# Patient Record
Sex: FEMALE | Race: WHITE | NOT HISPANIC OR LATINO
[De-identification: names, ages, dates, MRNs, and addresses within clinical notes are randomized per-mention and may not be internally consistent; named-entity substitution may affect disease eponyms.]

---
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
## 2017-01-06 ENCOUNTER — MEDICATION RENEWAL (OUTPATIENT)
Age: 22
End: 2017-01-06

## 2017-01-10 ENCOUNTER — OTHER (OUTPATIENT)
Age: 22
End: 2017-01-10

## 2017-01-10 ENCOUNTER — MEDICATION RENEWAL (OUTPATIENT)
Age: 22
End: 2017-01-10

## 2017-01-16 ENCOUNTER — FORM ENCOUNTER (OUTPATIENT)
Age: 22
End: 2017-01-16

## 2017-01-17 ENCOUNTER — OUTPATIENT (OUTPATIENT)
Dept: OUTPATIENT SERVICES | Age: 22
LOS: 1 days | End: 2017-01-17
Payer: COMMERCIAL

## 2017-01-17 DIAGNOSIS — K21.9 GASTRO-ESOPHAGEAL REFLUX DISEASE WITHOUT ESOPHAGITIS: ICD-10-CM

## 2017-01-17 PROCEDURE — 49452 REPLACE G-J TUBE PERC: CPT

## 2017-01-23 ENCOUNTER — MEDICATION RENEWAL (OUTPATIENT)
Age: 22
End: 2017-01-23

## 2017-01-23 DIAGNOSIS — Z43.4 ENCOUNTER FOR ATTENTION TO OTHER ARTIFICIAL OPENINGS OF DIGESTIVE TRACT: ICD-10-CM

## 2017-01-23 DIAGNOSIS — K21.9 GASTRO-ESOPHAGEAL REFLUX DISEASE WITHOUT ESOPHAGITIS: ICD-10-CM

## 2017-01-25 ENCOUNTER — MEDICATION RENEWAL (OUTPATIENT)
Age: 22
End: 2017-01-25

## 2017-01-25 ENCOUNTER — RX RENEWAL (OUTPATIENT)
Age: 22
End: 2017-01-25

## 2017-01-30 ENCOUNTER — MEDICATION RENEWAL (OUTPATIENT)
Age: 22
End: 2017-01-30

## 2017-01-30 ENCOUNTER — OTHER (OUTPATIENT)
Age: 22
End: 2017-01-30

## 2017-01-31 ENCOUNTER — FORM ENCOUNTER (OUTPATIENT)
Age: 22
End: 2017-01-31

## 2017-02-01 ENCOUNTER — MEDICATION RENEWAL (OUTPATIENT)
Age: 22
End: 2017-02-01

## 2017-02-01 ENCOUNTER — OUTPATIENT (OUTPATIENT)
Dept: OUTPATIENT SERVICES | Facility: HOSPITAL | Age: 22
LOS: 1 days | End: 2017-02-01
Payer: COMMERCIAL

## 2017-02-01 DIAGNOSIS — K21.9 GASTRO-ESOPHAGEAL REFLUX DISEASE WITHOUT ESOPHAGITIS: ICD-10-CM

## 2017-02-01 PROCEDURE — 49452 REPLACE G-J TUBE PERC: CPT

## 2017-02-01 RX ORDER — SYRINGE WITH NEEDLE, 1 ML 25GX5/8"
22G X 1-1/2" SYRINGE, EMPTY DISPOSABLE MISCELLANEOUS
Qty: 1 | Refills: 1 | Status: ACTIVE | COMMUNITY
Start: 2017-01-25 | End: 1900-01-01

## 2017-02-03 DIAGNOSIS — K21.9 GASTRO-ESOPHAGEAL REFLUX DISEASE WITHOUT ESOPHAGITIS: ICD-10-CM

## 2017-02-03 DIAGNOSIS — K94.23 GASTROSTOMY MALFUNCTION: ICD-10-CM

## 2017-02-06 ENCOUNTER — APPOINTMENT (OUTPATIENT)
Dept: PEDIATRIC PULMONARY CYSTIC FIB | Facility: CLINIC | Age: 22
End: 2017-02-06

## 2017-02-06 VITALS — HEART RATE: 88 BPM | RESPIRATION RATE: 20 BRPM | OXYGEN SATURATION: 92 %

## 2017-02-06 VITALS — WEIGHT: 72 LBS

## 2017-02-06 VITALS — TEMPERATURE: 98.2 F

## 2017-02-14 ENCOUNTER — FORM ENCOUNTER (OUTPATIENT)
Age: 22
End: 2017-02-14

## 2017-02-15 ENCOUNTER — OUTPATIENT (OUTPATIENT)
Dept: OUTPATIENT SERVICES | Facility: HOSPITAL | Age: 22
LOS: 1 days | End: 2017-02-15
Payer: COMMERCIAL

## 2017-02-15 ENCOUNTER — APPOINTMENT (OUTPATIENT)
Dept: ULTRASOUND IMAGING | Facility: CLINIC | Age: 22
End: 2017-02-15

## 2017-02-15 ENCOUNTER — APPOINTMENT (OUTPATIENT)
Dept: PEDIATRIC NEPHROLOGY | Facility: CLINIC | Age: 22
End: 2017-02-15

## 2017-02-15 VITALS — HEART RATE: 68 BPM | DIASTOLIC BLOOD PRESSURE: 62 MMHG | SYSTOLIC BLOOD PRESSURE: 89 MMHG

## 2017-02-15 VITALS — WEIGHT: 74.96 LBS

## 2017-02-15 DIAGNOSIS — N17.9 ACUTE KIDNEY FAILURE, UNSPECIFIED: ICD-10-CM

## 2017-02-15 DIAGNOSIS — N13.30 UNSPECIFIED HYDRONEPHROSIS: ICD-10-CM

## 2017-02-15 PROCEDURE — 76775 US EXAM ABDO BACK WALL LIM: CPT

## 2017-02-16 ENCOUNTER — MESSAGE (OUTPATIENT)
Age: 22
End: 2017-02-16

## 2017-02-16 LAB
25(OH)D3 SERPL-MCNC: 74 NG/ML
ALBUMIN SERPL ELPH-MCNC: 3.3 G/DL
ALP BLD-CCNC: 116 U/L
ALT SERPL-CCNC: 21 U/L
ANION GAP SERPL CALC-SCNC: 12 MMOL/L
AST SERPL-CCNC: 15 U/L
BASOPHILS # BLD AUTO: 0 K/UL
BASOPHILS NFR BLD AUTO: 0 %
BILIRUB SERPL-MCNC: 0.2 MG/DL
BUN SERPL-MCNC: 5 MG/DL
CALCIUM SERPL-MCNC: 8.7 MG/DL
CALCIUM SERPL-MCNC: 8.7 MG/DL
CHLORIDE SERPL-SCNC: 102 MMOL/L
CO2 SERPL-SCNC: 25 MMOL/L
CREAT SERPL-MCNC: 0.43 MG/DL
EOSINOPHIL # BLD AUTO: 0.01 K/UL
EOSINOPHIL NFR BLD AUTO: 0.1 %
GLUCOSE SERPL-MCNC: 104 MG/DL
HCT VFR BLD CALC: 40.9 %
HGB BLD-MCNC: 12.8 G/DL
IMM GRANULOCYTES NFR BLD AUTO: 0.1 %
LYMPHOCYTES # BLD AUTO: 1.87 K/UL
LYMPHOCYTES NFR BLD AUTO: 21.5 %
MAGNESIUM SERPL-MCNC: 2.2 MG/DL
MAN DIFF?: NORMAL
MCHC RBC-ENTMCNC: 31.3 GM/DL
MCHC RBC-ENTMCNC: 31.6 PG
MCV RBC AUTO: 101 FL
MONOCYTES # BLD AUTO: 0.15 K/UL
MONOCYTES NFR BLD AUTO: 1.7 %
NEUTROPHILS # BLD AUTO: 6.64 K/UL
NEUTROPHILS NFR BLD AUTO: 76.6 %
PARATHYROID HORMONE INTACT: 89 PG/ML
PHOSPHATE SERPL-MCNC: 3.8 MG/DL
PLATELET # BLD AUTO: 191 K/UL
POTASSIUM SERPL-SCNC: 3.9 MMOL/L
PROT SERPL-MCNC: 6.2 G/DL
RBC # BLD: 4.05 M/UL
RBC # FLD: 13.1 %
SODIUM SERPL-SCNC: 139 MMOL/L
WBC # FLD AUTO: 8.68 K/UL

## 2017-02-17 LAB
ALDOSTERONE SERUM: 18.3 NG/DL
APPEARANCE: ABNORMAL
BACTERIA: ABNORMAL
BILIRUBIN URINE: NEGATIVE
BLOOD URINE: NEGATIVE
CALCIUM ?TM UR-MCNC: 2 MG/DL
CALCIUM/CREAT UR: 0.1 RATIO
CHLORIDE ?TM UR-SCNC: <20 MMOL/L
COLOR: YELLOW
CREAT SPEC-SCNC: 13 MG/DL
CREAT SPEC-SCNC: 15 MG/DL
CREAT/PROT UR: 1 RATIO
GLUCOSE QUALITATIVE U: NORMAL MG/DL
HYALINE CASTS: 0 /LPF
KETONES URINE: NEGATIVE
LEUKOCYTE ESTERASE URINE: NEGATIVE
MICROSCOPIC-UA: NORMAL
NITRITE URINE: NEGATIVE
OSMOLALITY SERPL: 291 MOS/KG
PH URINE: >8.5
POTASSIUM UR-SCNC: 21 MMOL/L
PROT UR-MCNC: 13 MG/DL
PROTEIN URINE: NEGATIVE MG/DL
RED BLOOD CELLS URINE: 3 /HPF
RENIN PLASMA: 40.1 PG/ML
SODIUM ?TM SUB UR QN: 25 MMOL/L
SPECIFIC GRAVITY URINE: 1
SQUAMOUS EPITHELIAL CELLS: 2 /HPF
UROBILINOGEN URINE: NORMAL MG/DL
WHITE BLOOD CELLS URINE: 24 /HPF

## 2017-02-21 ENCOUNTER — RX RENEWAL (OUTPATIENT)
Age: 22
End: 2017-02-21

## 2017-02-21 ENCOUNTER — MEDICATION RENEWAL (OUTPATIENT)
Age: 22
End: 2017-02-21

## 2017-02-21 LAB
MAGNESIUM UR-MCNC: 6 MG/DL
OSMOLALITY UR: 134 MOS/KG

## 2017-02-27 ENCOUNTER — APPOINTMENT (OUTPATIENT)
Dept: PEDIATRIC GASTROENTEROLOGY | Facility: CLINIC | Age: 22
End: 2017-02-27

## 2017-02-27 VITALS — WEIGHT: 74.96 LBS

## 2017-03-02 ENCOUNTER — APPOINTMENT (OUTPATIENT)
Dept: PEDIATRIC NEUROLOGY | Facility: CLINIC | Age: 22
End: 2017-03-02

## 2017-03-02 VITALS — WEIGHT: 74.96 LBS

## 2017-04-21 ENCOUNTER — RX RENEWAL (OUTPATIENT)
Age: 22
End: 2017-04-21

## 2017-04-21 ENCOUNTER — OTHER (OUTPATIENT)
Age: 22
End: 2017-04-21

## 2017-04-25 ENCOUNTER — FORM ENCOUNTER (OUTPATIENT)
Age: 22
End: 2017-04-25

## 2017-04-26 ENCOUNTER — OUTPATIENT (OUTPATIENT)
Dept: OUTPATIENT SERVICES | Age: 22
LOS: 1 days | End: 2017-04-26
Payer: COMMERCIAL

## 2017-04-26 DIAGNOSIS — K21.9 GASTRO-ESOPHAGEAL REFLUX DISEASE WITHOUT ESOPHAGITIS: ICD-10-CM

## 2017-04-26 PROCEDURE — 49452 REPLACE G-J TUBE PERC: CPT

## 2017-05-02 DIAGNOSIS — Z43.4 ENCOUNTER FOR ATTENTION TO OTHER ARTIFICIAL OPENINGS OF DIGESTIVE TRACT: ICD-10-CM

## 2017-05-02 DIAGNOSIS — K21.9 GASTRO-ESOPHAGEAL REFLUX DISEASE WITHOUT ESOPHAGITIS: ICD-10-CM

## 2017-05-15 ENCOUNTER — MEDICATION RENEWAL (OUTPATIENT)
Age: 22
End: 2017-05-15

## 2017-05-22 ENCOUNTER — MEDICATION RENEWAL (OUTPATIENT)
Age: 22
End: 2017-05-22

## 2017-05-29 ENCOUNTER — RX RENEWAL (OUTPATIENT)
Age: 22
End: 2017-05-29

## 2017-06-07 ENCOUNTER — APPOINTMENT (OUTPATIENT)
Dept: PEDIATRIC NEPHROLOGY | Facility: CLINIC | Age: 22
End: 2017-06-07

## 2017-06-07 VITALS — WEIGHT: 77.36 LBS

## 2017-06-08 LAB
25(OH)D3 SERPL-MCNC: 72.5 NG/ML
ALBUMIN SERPL ELPH-MCNC: 3.4 G/DL
ALP BLD-CCNC: 134 U/L
ALT SERPL-CCNC: 18 U/L
ANION GAP SERPL CALC-SCNC: 19 MMOL/L
AST SERPL-CCNC: 15 U/L
BASOPHILS # BLD AUTO: 0 K/UL
BASOPHILS NFR BLD AUTO: 0 %
BILIRUB SERPL-MCNC: 0.2 MG/DL
BUN SERPL-MCNC: 7 MG/DL
CALCIUM SERPL-MCNC: 9.8 MG/DL
CALCIUM SERPL-MCNC: 9.8 MG/DL
CHLORIDE SERPL-SCNC: 108 MMOL/L
CO2 SERPL-SCNC: 20 MMOL/L
CREAT SERPL-MCNC: 0.5 MG/DL
EOSINOPHIL # BLD AUTO: 0.01 K/UL
EOSINOPHIL NFR BLD AUTO: 0.2 %
GLUCOSE SERPL-MCNC: 106 MG/DL
HCT VFR BLD CALC: 40.5 %
HGB BLD-MCNC: 12.9 G/DL
IMM GRANULOCYTES NFR BLD AUTO: 0.2 %
LYMPHOCYTES # BLD AUTO: 1.57 K/UL
LYMPHOCYTES NFR BLD AUTO: 26.1 %
MAGNESIUM SERPL-MCNC: 2 MG/DL
MAN DIFF?: NORMAL
MCHC RBC-ENTMCNC: 31.5 PG
MCHC RBC-ENTMCNC: 31.9 GM/DL
MCV RBC AUTO: 98.8 FL
MONOCYTES # BLD AUTO: 0.16 K/UL
MONOCYTES NFR BLD AUTO: 2.7 %
NEUTROPHILS # BLD AUTO: 4.26 K/UL
NEUTROPHILS NFR BLD AUTO: 70.8 %
PARATHYROID HORMONE INTACT: 45 PG/ML
PHOSPHATE SERPL-MCNC: 3.7 MG/DL
PLATELET # BLD AUTO: 120 K/UL
POTASSIUM SERPL-SCNC: 4.5 MMOL/L
PROT SERPL-MCNC: 6.7 G/DL
RBC # BLD: 4.1 M/UL
RBC # FLD: 12.9 %
SODIUM SERPL-SCNC: 147 MMOL/L
WBC # FLD AUTO: 6.01 K/UL

## 2017-06-10 LAB — LEVETIRACETAM SERPL-MCNC: 25.4 MCG/ML

## 2017-06-14 ENCOUNTER — MEDICATION RENEWAL (OUTPATIENT)
Age: 22
End: 2017-06-14

## 2017-07-05 ENCOUNTER — MEDICATION RENEWAL (OUTPATIENT)
Age: 22
End: 2017-07-05

## 2017-07-18 ENCOUNTER — FORM ENCOUNTER (OUTPATIENT)
Age: 22
End: 2017-07-18

## 2017-07-19 ENCOUNTER — OUTPATIENT (OUTPATIENT)
Dept: OUTPATIENT SERVICES | Age: 22
LOS: 1 days | End: 2017-07-19
Payer: COMMERCIAL

## 2017-07-19 DIAGNOSIS — K21.9 GASTRO-ESOPHAGEAL REFLUX DISEASE WITHOUT ESOPHAGITIS: ICD-10-CM

## 2017-07-19 PROCEDURE — 49452 REPLACE G-J TUBE PERC: CPT

## 2017-07-20 DIAGNOSIS — K21.9 GASTRO-ESOPHAGEAL REFLUX DISEASE WITHOUT ESOPHAGITIS: ICD-10-CM

## 2017-07-24 DIAGNOSIS — Z43.4 ENCOUNTER FOR ATTENTION TO OTHER ARTIFICIAL OPENINGS OF DIGESTIVE TRACT: ICD-10-CM

## 2017-08-09 ENCOUNTER — MEDICATION RENEWAL (OUTPATIENT)
Age: 22
End: 2017-08-09

## 2017-08-28 ENCOUNTER — APPOINTMENT (OUTPATIENT)
Dept: PEDIATRIC GASTROENTEROLOGY | Facility: CLINIC | Age: 22
End: 2017-08-28
Payer: COMMERCIAL

## 2017-08-28 VITALS — WEIGHT: 74.96 LBS | BODY MASS INDEX: 17.35 KG/M2 | HEIGHT: 55 IN

## 2017-08-28 PROCEDURE — 99214 OFFICE O/P EST MOD 30 MIN: CPT

## 2017-08-28 RX ORDER — AMOXICILLIN AND CLAVULANATE POTASSIUM 600; 42.9 MG/5ML; MG/5ML
600-42.9 FOR SUSPENSION ORAL
Qty: 150 | Refills: 0 | Status: DISCONTINUED | COMMUNITY
Start: 2017-06-24

## 2017-09-07 ENCOUNTER — APPOINTMENT (OUTPATIENT)
Dept: PEDIATRIC NEUROLOGY | Facility: CLINIC | Age: 22
End: 2017-09-07
Payer: COMMERCIAL

## 2017-09-07 VITALS — WEIGHT: 76.99 LBS

## 2017-09-07 PROCEDURE — 99215 OFFICE O/P EST HI 40 MIN: CPT

## 2017-09-08 ENCOUNTER — MEDICATION RENEWAL (OUTPATIENT)
Age: 22
End: 2017-09-08

## 2017-09-09 ENCOUNTER — MEDICATION RENEWAL (OUTPATIENT)
Age: 22
End: 2017-09-09

## 2017-09-22 ENCOUNTER — MOBILE ON CALL (OUTPATIENT)
Age: 22
End: 2017-09-22

## 2017-09-22 ENCOUNTER — MESSAGE (OUTPATIENT)
Age: 22
End: 2017-09-22

## 2017-09-24 LAB
25(OH)D3 SERPL-MCNC: 71.1 NG/ML
ALBUMIN SERPL ELPH-MCNC: 3.3 G/DL
ALP BLD-CCNC: 120 U/L
ALT SERPL-CCNC: 22 U/L
ANION GAP SERPL CALC-SCNC: 13 MMOL/L
AST SERPL-CCNC: 26 U/L
BASOPHILS # BLD AUTO: 0 K/UL
BASOPHILS NFR BLD AUTO: 0 %
BILIRUB SERPL-MCNC: 0.2 MG/DL
BUN SERPL-MCNC: 5 MG/DL
CALCIUM SERPL-MCNC: 9.1 MG/DL
CALCIUM SERPL-MCNC: 9.1 MG/DL
CHLORIDE SERPL-SCNC: 103 MMOL/L
CO2 SERPL-SCNC: 25 MMOL/L
CREAT SERPL-MCNC: 0.4 MG/DL
EOSINOPHIL # BLD AUTO: 0.04 K/UL
EOSINOPHIL NFR BLD AUTO: 0.4 %
GLUCOSE SERPL-MCNC: 94 MG/DL
HCT VFR BLD CALC: 39.8 %
HGB BLD-MCNC: 12.9 G/DL
IMM GRANULOCYTES NFR BLD AUTO: 0.2 %
LYMPHOCYTES # BLD AUTO: 1.91 K/UL
LYMPHOCYTES NFR BLD AUTO: 17.3 %
MAGNESIUM SERPL-MCNC: 2.2 MG/DL
MAN DIFF?: NORMAL
MCHC RBC-ENTMCNC: 32.4 GM/DL
MCHC RBC-ENTMCNC: 32.4 PG
MCV RBC AUTO: 100 FL
MONOCYTES # BLD AUTO: 0.28 K/UL
MONOCYTES NFR BLD AUTO: 2.5 %
NEUTROPHILS # BLD AUTO: 8.77 K/UL
NEUTROPHILS NFR BLD AUTO: 79.6 %
PARATHYROID HORMONE INTACT: 77 PG/ML
PHOSPHATE SERPL-MCNC: 3.7 MG/DL
PLATELET # BLD AUTO: 124 K/UL
POTASSIUM SERPL-SCNC: 4 MMOL/L
PROT SERPL-MCNC: 7.2 G/DL
RBC # BLD: 3.98 M/UL
RBC # FLD: 13.1 %
SODIUM SERPL-SCNC: 141 MMOL/L
WBC # FLD AUTO: 11.02 K/UL

## 2017-10-04 ENCOUNTER — APPOINTMENT (OUTPATIENT)
Dept: PEDIATRIC NEPHROLOGY | Facility: CLINIC | Age: 22
End: 2017-10-04
Payer: COMMERCIAL

## 2017-10-04 VITALS — WEIGHT: 76.5 LBS

## 2017-10-04 PROCEDURE — 99214 OFFICE O/P EST MOD 30 MIN: CPT

## 2017-10-06 ENCOUNTER — APPOINTMENT (OUTPATIENT)
Dept: PEDIATRIC PULMONARY CYSTIC FIB | Facility: CLINIC | Age: 22
End: 2017-10-06
Payer: COMMERCIAL

## 2017-10-06 VITALS
WEIGHT: 76 LBS | BODY MASS INDEX: 17.59 KG/M2 | RESPIRATION RATE: 24 BRPM | HEIGHT: 55 IN | DIASTOLIC BLOOD PRESSURE: 51 MMHG | OXYGEN SATURATION: 99 % | SYSTOLIC BLOOD PRESSURE: 74 MMHG | HEART RATE: 66 BPM

## 2017-10-06 PROCEDURE — 94005 HOME VENT MGMT SUPERVISION: CPT

## 2017-10-06 PROCEDURE — 99215 OFFICE O/P EST HI 40 MIN: CPT | Mod: 25

## 2017-10-06 PROCEDURE — 94770: CPT

## 2017-10-10 ENCOUNTER — FORM ENCOUNTER (OUTPATIENT)
Age: 22
End: 2017-10-10

## 2017-10-11 ENCOUNTER — OUTPATIENT (OUTPATIENT)
Dept: OUTPATIENT SERVICES | Age: 22
LOS: 1 days | End: 2017-10-11
Payer: COMMERCIAL

## 2017-10-11 DIAGNOSIS — K21.9 GASTRO-ESOPHAGEAL REFLUX DISEASE WITHOUT ESOPHAGITIS: ICD-10-CM

## 2017-10-11 DIAGNOSIS — Z43.4 ENCOUNTER FOR ATTENTION TO OTHER ARTIFICIAL OPENINGS OF DIGESTIVE TRACT: ICD-10-CM

## 2017-10-11 PROCEDURE — 49452 REPLACE G-J TUBE PERC: CPT

## 2017-10-13 ENCOUNTER — MOBILE ON CALL (OUTPATIENT)
Age: 22
End: 2017-10-13

## 2017-10-13 ENCOUNTER — INPATIENT (INPATIENT)
Facility: HOSPITAL | Age: 22
LOS: 0 days | Discharge: ANOTHER HOSPITAL | End: 2017-10-14
Attending: INTERNAL MEDICINE | Admitting: INTERNAL MEDICINE
Payer: COMMERCIAL

## 2017-10-13 VITALS — WEIGHT: 76.06 LBS

## 2017-10-13 DIAGNOSIS — Z93.0 TRACHEOSTOMY STATUS: Chronic | ICD-10-CM

## 2017-10-13 LAB
ALBUMIN SERPL ELPH-MCNC: 2.4 G/DL — LOW (ref 3.3–5)
ALP SERPL-CCNC: 124 U/L — HIGH (ref 40–120)
ALT FLD-CCNC: 24 U/L — SIGNIFICANT CHANGE UP (ref 12–78)
ANION GAP SERPL CALC-SCNC: 7 MMOL/L — SIGNIFICANT CHANGE UP (ref 5–17)
APPEARANCE UR: (no result)
AST SERPL-CCNC: 28 U/L — SIGNIFICANT CHANGE UP (ref 15–37)
BACTERIA # UR AUTO: (no result)
BASE EXCESS BLDV CALC-SCNC: 4.6 MMOL/L — HIGH (ref -2–2)
BASOPHILS # BLD AUTO: 0 K/UL — SIGNIFICANT CHANGE UP (ref 0–0.2)
BASOPHILS NFR BLD AUTO: 0.3 % — SIGNIFICANT CHANGE UP (ref 0–2)
BILIRUB SERPL-MCNC: 0.4 MG/DL — SIGNIFICANT CHANGE UP (ref 0.2–1.2)
BILIRUB UR-MCNC: NEGATIVE — SIGNIFICANT CHANGE UP
BUN SERPL-MCNC: 6 MG/DL — LOW (ref 7–23)
CALCIUM SERPL-MCNC: 8.8 MG/DL — SIGNIFICANT CHANGE UP (ref 8.5–10.1)
CHLORIDE SERPL-SCNC: 99 MMOL/L — SIGNIFICANT CHANGE UP (ref 96–108)
CO2 SERPL-SCNC: 29 MMOL/L — SIGNIFICANT CHANGE UP (ref 22–31)
COLOR SPEC: YELLOW — SIGNIFICANT CHANGE UP
CREAT SERPL-MCNC: 0.38 MG/DL — LOW (ref 0.5–1.3)
DIFF PNL FLD: (no result)
EOSINOPHIL # BLD AUTO: 0 K/UL — SIGNIFICANT CHANGE UP (ref 0–0.5)
EOSINOPHIL NFR BLD AUTO: 0.2 % — SIGNIFICANT CHANGE UP (ref 0–6)
EPI CELLS # UR: (no result)
GLUCOSE SERPL-MCNC: 99 MG/DL — SIGNIFICANT CHANGE UP (ref 70–99)
GLUCOSE UR QL: NEGATIVE MG/DL — SIGNIFICANT CHANGE UP
HCO3 BLDV-SCNC: 30 MMOL/L — HIGH (ref 21–29)
HCT VFR BLD CALC: 38.9 % — SIGNIFICANT CHANGE UP (ref 34.5–45)
HGB BLD-MCNC: 12.9 G/DL — SIGNIFICANT CHANGE UP (ref 11.5–15.5)
INR BLD: 1.1 RATIO — SIGNIFICANT CHANGE UP (ref 0.88–1.16)
KETONES UR-MCNC: NEGATIVE — SIGNIFICANT CHANGE UP
LACTATE SERPL-SCNC: 0.8 MMOL/L — SIGNIFICANT CHANGE UP (ref 0.7–2)
LEUKOCYTE ESTERASE UR-ACNC: (no result)
LYMPHOCYTES # BLD AUTO: 0.8 K/UL — LOW (ref 1–3.3)
LYMPHOCYTES # BLD AUTO: 5.6 % — LOW (ref 13–44)
MCHC RBC-ENTMCNC: 32.1 PG — SIGNIFICANT CHANGE UP (ref 27–34)
MCHC RBC-ENTMCNC: 33.2 GM/DL — SIGNIFICANT CHANGE UP (ref 32–36)
MCV RBC AUTO: 96.5 FL — SIGNIFICANT CHANGE UP (ref 80–100)
MONOCYTES # BLD AUTO: 0.4 K/UL — SIGNIFICANT CHANGE UP (ref 0–0.9)
MONOCYTES NFR BLD AUTO: 2.7 % — SIGNIFICANT CHANGE UP (ref 2–14)
NEUTROPHILS # BLD AUTO: 13 K/UL — HIGH (ref 1.8–7.4)
NEUTROPHILS NFR BLD AUTO: 91.3 % — HIGH (ref 43–77)
NITRITE UR-MCNC: NEGATIVE — SIGNIFICANT CHANGE UP
PCO2 BLDV: 50 MMHG — SIGNIFICANT CHANGE UP (ref 35–50)
PH BLDV: 7.4 — SIGNIFICANT CHANGE UP (ref 7.35–7.45)
PH UR: 8 — SIGNIFICANT CHANGE UP (ref 5–8)
PLATELET # BLD AUTO: 137 K/UL — LOW (ref 150–400)
PO2 BLDV: 215 MMHG — HIGH (ref 25–45)
POTASSIUM SERPL-MCNC: 4.1 MMOL/L — SIGNIFICANT CHANGE UP (ref 3.5–5.3)
POTASSIUM SERPL-SCNC: 4.1 MMOL/L — SIGNIFICANT CHANGE UP (ref 3.5–5.3)
PROT SERPL-MCNC: 6.5 GM/DL — SIGNIFICANT CHANGE UP (ref 6–8.3)
PROT UR-MCNC: NEGATIVE MG/DL — SIGNIFICANT CHANGE UP
PROTHROM AB SERPL-ACNC: 11.9 SEC — SIGNIFICANT CHANGE UP (ref 9.8–12.7)
RAPID RVP RESULT: SIGNIFICANT CHANGE UP
RBC # BLD: 4.03 M/UL — SIGNIFICANT CHANGE UP (ref 3.8–5.2)
RBC # FLD: 11.6 % — SIGNIFICANT CHANGE UP (ref 10.3–14.5)
RBC CASTS # UR COMP ASSIST: SIGNIFICANT CHANGE UP /HPF (ref 0–4)
SAO2 % BLDV: 100 % — HIGH (ref 67–88)
SODIUM SERPL-SCNC: 135 MMOL/L — SIGNIFICANT CHANGE UP (ref 135–145)
SP GR SPEC: 1.01 — SIGNIFICANT CHANGE UP (ref 1.01–1.02)
UROBILINOGEN FLD QL: NEGATIVE MG/DL — SIGNIFICANT CHANGE UP
WBC # BLD: 14.2 K/UL — HIGH (ref 3.8–10.5)
WBC # FLD AUTO: 14.2 K/UL — HIGH (ref 3.8–10.5)
WBC UR QL: SIGNIFICANT CHANGE UP

## 2017-10-13 PROCEDURE — 99285 EMERGENCY DEPT VISIT HI MDM: CPT

## 2017-10-13 PROCEDURE — 71010: CPT | Mod: 26

## 2017-10-13 PROCEDURE — 93010 ELECTROCARDIOGRAM REPORT: CPT

## 2017-10-13 PROCEDURE — 70450 CT HEAD/BRAIN W/O DYE: CPT | Mod: 26

## 2017-10-13 RX ORDER — SODIUM CHLORIDE 9 MG/ML
500 INJECTION INTRAMUSCULAR; INTRAVENOUS; SUBCUTANEOUS
Qty: 0 | Refills: 0 | Status: COMPLETED | OUTPATIENT
Start: 2017-10-13 | End: 2017-10-13

## 2017-10-13 RX ORDER — SODIUM CHLORIDE 9 MG/ML
4 INJECTION INTRAMUSCULAR; INTRAVENOUS; SUBCUTANEOUS
Qty: 0 | Refills: 0 | Status: DISCONTINUED | OUTPATIENT
Start: 2017-10-13 | End: 2017-10-14

## 2017-10-13 RX ORDER — POTASSIUM CHLORIDE 20 MEQ
15 PACKET (EA) ORAL
Qty: 0 | Refills: 0 | Status: DISCONTINUED | OUTPATIENT
Start: 2017-10-13 | End: 2017-10-13

## 2017-10-13 RX ORDER — HEPARIN SODIUM 5000 [USP'U]/ML
5000 INJECTION INTRAVENOUS; SUBCUTANEOUS EVERY 12 HOURS
Qty: 0 | Refills: 0 | Status: DISCONTINUED | OUTPATIENT
Start: 2017-10-13 | End: 2017-10-14

## 2017-10-13 RX ORDER — PIPERACILLIN AND TAZOBACTAM 4; .5 G/20ML; G/20ML
3.38 INJECTION, POWDER, LYOPHILIZED, FOR SOLUTION INTRAVENOUS EVERY 8 HOURS
Qty: 0 | Refills: 0 | Status: DISCONTINUED | OUTPATIENT
Start: 2017-10-13 | End: 2017-10-14

## 2017-10-13 RX ORDER — MAGNESIUM OXIDE 400 MG ORAL TABLET 241.3 MG
400 TABLET ORAL EVERY 12 HOURS
Qty: 0 | Refills: 0 | Status: DISCONTINUED | OUTPATIENT
Start: 2017-10-13 | End: 2017-10-14

## 2017-10-13 RX ORDER — PANTOPRAZOLE SODIUM 20 MG/1
20 TABLET, DELAYED RELEASE ORAL DAILY
Qty: 0 | Refills: 0 | Status: DISCONTINUED | OUTPATIENT
Start: 2017-10-13 | End: 2017-10-14

## 2017-10-13 RX ORDER — BUDESONIDE, MICRONIZED 100 %
1 POWDER (GRAM) MISCELLANEOUS
Qty: 0 | Refills: 0 | Status: DISCONTINUED | OUTPATIENT
Start: 2017-10-13 | End: 2017-10-13

## 2017-10-13 RX ORDER — BUDESONIDE, MICRONIZED 100 %
1 POWDER (GRAM) MISCELLANEOUS
Qty: 0 | Refills: 0 | Status: DISCONTINUED | OUTPATIENT
Start: 2017-10-13 | End: 2017-10-14

## 2017-10-13 RX ORDER — BUDESONIDE, MICRONIZED 100 %
2 POWDER (GRAM) MISCELLANEOUS
Qty: 0 | Refills: 0 | COMMUNITY

## 2017-10-13 RX ORDER — ACETAMINOPHEN 500 MG
650 TABLET ORAL EVERY 6 HOURS
Qty: 0 | Refills: 0 | Status: DISCONTINUED | OUTPATIENT
Start: 2017-10-13 | End: 2017-10-14

## 2017-10-13 RX ORDER — CIPROFLOXACIN AND DEXAMETHASONE 3; 1 MG/ML; MG/ML
2 SUSPENSION/ DROPS AURICULAR (OTIC)
Qty: 0 | Refills: 0 | Status: DISCONTINUED | OUTPATIENT
Start: 2017-10-13 | End: 2017-10-13

## 2017-10-13 RX ORDER — IPRATROPIUM BROMIDE 0.2 MG/ML
500 SOLUTION, NON-ORAL INHALATION ONCE
Qty: 0 | Refills: 0 | Status: COMPLETED | OUTPATIENT
Start: 2017-10-13 | End: 2017-10-13

## 2017-10-13 RX ORDER — SODIUM CHLORIDE 9 MG/ML
1000 INJECTION INTRAMUSCULAR; INTRAVENOUS; SUBCUTANEOUS
Qty: 0 | Refills: 0 | Status: DISCONTINUED | OUTPATIENT
Start: 2017-10-13 | End: 2017-10-14

## 2017-10-13 RX ORDER — EPINEPHRINE 0.3 MG/.3ML
0.3 INJECTION INTRAMUSCULAR; SUBCUTANEOUS ONCE
Qty: 0 | Refills: 0 | Status: DISCONTINUED | OUTPATIENT
Start: 2017-10-13 | End: 2017-10-13

## 2017-10-13 RX ORDER — TOBRAMYCIN SULFATE 40 MG/ML
300 VIAL (ML) INJECTION
Qty: 0 | Refills: 0 | Status: DISCONTINUED | OUTPATIENT
Start: 2017-10-13 | End: 2017-10-14

## 2017-10-13 RX ORDER — PIPERACILLIN AND TAZOBACTAM 4; .5 G/20ML; G/20ML
3.38 INJECTION, POWDER, LYOPHILIZED, FOR SOLUTION INTRAVENOUS ONCE
Qty: 0 | Refills: 0 | Status: COMPLETED | OUTPATIENT
Start: 2017-10-13 | End: 2017-10-13

## 2017-10-13 RX ORDER — PIPERACILLIN AND TAZOBACTAM 4; .5 G/20ML; G/20ML
4.5 INJECTION, POWDER, LYOPHILIZED, FOR SOLUTION INTRAVENOUS EVERY 8 HOURS
Qty: 0 | Refills: 0 | Status: DISCONTINUED | OUTPATIENT
Start: 2017-10-13 | End: 2017-10-13

## 2017-10-13 RX ORDER — SODIUM CHLORIDE 9 MG/ML
3 INJECTION INTRAMUSCULAR; INTRAVENOUS; SUBCUTANEOUS ONCE
Qty: 0 | Refills: 0 | Status: COMPLETED | OUTPATIENT
Start: 2017-10-13 | End: 2017-10-13

## 2017-10-13 RX ORDER — LEVETIRACETAM 250 MG/1
500 TABLET, FILM COATED ORAL
Qty: 0 | Refills: 0 | Status: DISCONTINUED | OUTPATIENT
Start: 2017-10-13 | End: 2017-10-14

## 2017-10-13 RX ORDER — LEVALBUTEROL 1.25 MG/.5ML
1.25 SOLUTION, CONCENTRATE RESPIRATORY (INHALATION)
Qty: 0 | Refills: 0 | Status: DISCONTINUED | OUTPATIENT
Start: 2017-10-13 | End: 2017-10-14

## 2017-10-13 RX ADMIN — Medication 500 MICROGRAM(S): at 20:23

## 2017-10-13 RX ADMIN — SODIUM CHLORIDE 2000 MILLILITER(S): 9 INJECTION INTRAMUSCULAR; INTRAVENOUS; SUBCUTANEOUS at 10:41

## 2017-10-13 RX ADMIN — SODIUM CHLORIDE 2000 MILLILITER(S): 9 INJECTION INTRAMUSCULAR; INTRAVENOUS; SUBCUTANEOUS at 10:40

## 2017-10-13 RX ADMIN — MAGNESIUM OXIDE 400 MG ORAL TABLET 400 MILLIGRAM(S): 241.3 TABLET ORAL at 21:51

## 2017-10-13 RX ADMIN — SODIUM CHLORIDE 2000 MILLILITER(S): 9 INJECTION INTRAMUSCULAR; INTRAVENOUS; SUBCUTANEOUS at 10:30

## 2017-10-13 RX ADMIN — PIPERACILLIN AND TAZOBACTAM 200 GRAM(S): 4; .5 INJECTION, POWDER, LYOPHILIZED, FOR SOLUTION INTRAVENOUS at 12:01

## 2017-10-13 RX ADMIN — PIPERACILLIN AND TAZOBACTAM 25 GRAM(S): 4; .5 INJECTION, POWDER, LYOPHILIZED, FOR SOLUTION INTRAVENOUS at 21:56

## 2017-10-13 RX ADMIN — SODIUM CHLORIDE 2000 MILLILITER(S): 9 INJECTION INTRAMUSCULAR; INTRAVENOUS; SUBCUTANEOUS at 11:12

## 2017-10-13 RX ADMIN — Medication 0.5 MILLIGRAM(S): at 20:24

## 2017-10-13 RX ADMIN — SODIUM CHLORIDE 3 MILLILITER(S): 9 INJECTION INTRAMUSCULAR; INTRAVENOUS; SUBCUTANEOUS at 10:30

## 2017-10-13 NOTE — ED ADULT NURSE NOTE - OBJECTIVE STATEMENT
Pt is non-verbal, non-ambulatory, mom states hasn't been acting herself, staring off to left, left facial paralysis which is knew. Pt has peg tube infusing pedilite.

## 2017-10-13 NOTE — ED PROVIDER NOTE - MEDICAL DECISION MAKING DETAILS
22F pmhx asthma, GERD, seizures on Keppra, presents to ED c/o cough with blood and feeling off as per mother. Plan CXR, labs, fluids.

## 2017-10-13 NOTE — ED PROVIDER NOTE - PSH
Peg (Percutaneous Endoscopic Gastrostomy) Adjustment/Replacement/Removal    S/P Tonsillectomy and Adenoidectomy    Strabismus

## 2017-10-13 NOTE — PATIENT PROFILE ADULT. - HEALTHCARE INFORMATION NEEDED, PROFILE
mother is worried about dehydration, electrolyte imbalance and fluid retention based on prior hospitalizations.

## 2017-10-13 NOTE — PROGRESS NOTE ADULT - SUBJECTIVE AND OBJECTIVE BOX
HPI:     Asked to see patient by Dr. Cdae     seen [facility] resident dougie     This patient is a 22 year old female with cerebral palxy, history of seizures, hx. sepsis 2015 from 2015 after which she     wound up on vent at night(SIMV),      has trach and PEG,       At baseline she is alert and responsive sits in chair, does not walk and is non verbal      Over past day she has been hypothermic, less responsive, weak, bloody secretions      In ED cxr shows right sided infiltrate, wbc 14.      head ct in ED negative          PMH sigificant for:         seizures         developmental disability         cerebral palsy         s/p trach/PEG          GERD with reflux    Socail lives at home , on vent at night                          Weight (kg): 34.5 (10-13 @ 09:08)    ICU Vital Signs Last 24 Hrs  T(C): 35.3 (13 Oct 2017 14:22), Max: 35.5 (13 Oct 2017 09:14)  T(F): 95.6 (13 Oct 2017 14:22), Max: 95.9 (13 Oct 2017 09:14)  HR: 88 (13 Oct 2017 14:22) (75 - 88)  BP: 86/55 (13 Oct 2017 14:22) (83/49 - 88/48)  BP(mean): --  ABP: --  ABP(mean): --  RR: 20 (13 Oct 2017 14:22) (20 - 20)  SpO2: 97% (13 Oct 2017 14:22) (97% - 100%)    neuro spastic, opens eyes, looks aroung  respiratory bilateral rhnchi  cv RRR  abd s/p PEG soft  extremity contracted                              12.9   14.2  )-----------( 137      ( 13 Oct 2017 10:34 )             38.9       10-13    135  |  99  |  6<L>  ----------------------------<  99  4.1   |  29  |  0.38<L>    Ca    8.8      13 Oct 2017 10:34    TPro  6.5  /  Alb  2.4<L>  /  TBili  0.4  /  DBili  x   /  AST  28  /  ALT  24  /  AlkPhos  124<H>  10-13          Urinalysis Basic - ( 13 Oct 2017 12:30 )    Color: Yellow / Appearance: Slightly Turbid / S.010 / pH: x  Gluc: x / Ketone: Negative  / Bili: Negative / Urobili: Negative mg/dL   Blood: x / Protein: Negative mg/dL / Nitrite: Negative   Leuk Esterase: Small / RBC: 0-2 /HPF / WBC 3-5   Sq Epi: x / Non Sq Epi: Many / Bacteria: Moderate        DVT Prophylaxis:  heparin subq                                                               Advanced Directives: Full code

## 2017-10-13 NOTE — ED ADULT TRIAGE NOTE - CHIEF COMPLAINT QUOTE
mom states patient not responding as usual, no fever, cough, nausea, vomiting or diarrhea, not tracking as she usually does.  Patient is non verbal. cough this morning. blood tinged, patient is on vent overnight, and does have hemoptysis at times mom states patient not responding as usual, no fever, cough, nausea, vomiting or diarrhea, not tracking as she usually does.  Patient is non verbal. cough this morning. blood tinged, patient is on vent overnight, and does have hemoptysis at times.  Mom concerned that something is starting and wanted to catch it early.  Patient has peg tube and trach.  patient is currently awake and alert, as per mom patient is not at her usual baseline

## 2017-10-13 NOTE — ED ADULT NURSE REASSESSMENT NOTE - NS ED NURSE REASSESS COMMENT FT1
Orlando RN on ICU made aware that pt temp drop to 94.8  rectal on bear hugger. Orlando stated he will discuss with ICU

## 2017-10-13 NOTE — ED PROVIDER NOTE - CARE PLAN
Principal Discharge DX:	Impacted cerumen of right ear Principal Discharge DX:	Pneumonia  Secondary Diagnosis:	Hypothermia, initial encounter

## 2017-10-13 NOTE — ED ADULT NURSE NOTE - CHIEF COMPLAINT QUOTE
mom states patient not responding as usual, no fever, cough, nausea, vomiting or diarrhea, not tracking as she usually does.  Patient is non verbal. cough this morning. blood tinged, patient is on vent overnight, and does have hemoptysis at times.  Mom concerned that something is starting and wanted to catch it early.  Patient has peg tube and trach.  patient is currently awake and alert, as per mom patient is not at her usual baseline

## 2017-10-13 NOTE — ED PROVIDER NOTE - PMH
Asthma    Cerebral Palsy    Developmental Delay    Gastroesophageal Reflux Disease    Hypothyroidism  Treated with synthroid in past. Stopped treatment in 2008 due to normal thyroid function.  Hypotonia    Scoliosis    Seizures

## 2017-10-13 NOTE — CHART NOTE - NSCHARTNOTEFT_GEN_A_CORE
called by RN as patient mother would like transfer to [facility] due to continuity of care and different vent type    spoke to patient mother bedside who is stating would like transfer to [facility] due to continuity of care and also humidification on ventilator as ventilators at [facility] do not have humidification on them, as per respiratory therapy.     spoke to patient covering pulmonologist Dr Finn ([facility]) who attempted set up transfer to [facility] but PICU full, stated call transfer center in am and patient will be in list for transfer    Dr Finn agrees with management via zosyn and holding tobramycin at this time, stating if patient very to have bloody secretions while on vent to add it back on.     if necessary can reattempt to transfer patient in am via calling transfer center.      mother aware of plan and agrees to stay in [facility] at this time with vent management without humidification and zosyn    d/w Dr Ace, respiratory, RN Staff called by RN as patient mother would like transfer to [facility] due to continuity of care and different vent type    spoke to patient mother bedside who is stating would like transfer to [facility] due to continuity of care and also heated humidification on ventilator as ventilators at [facility] do not have heated humidification on them.    spoke to patient covering pulmonologist Dr Finn ([facility]) who attempted set up transfer to [facility] but PICU full, stated call transfer center in am and patient will be in list for transfer    Dr Finn agrees with management via zosyn and holding tobramycin at this time, stating if patient very to have bloody secretions while on vent to add it back on.     if necessary can reattempt to transfer patient in am via calling transfer center.      mother aware of plan and agrees to stay in [facility] at this time with vent management without heated humidification and zosyn    patient is currently getting adequate humidifcation via HME    d/w Dr Ace, respiratory, RN Staff

## 2017-10-13 NOTE — H&P ADULT - HISTORY OF PRESENT ILLNESS
Patient is nonverbal not motile.    hx provided by mother    22 year old female with pmh of severe global developmental delay, cp/spastic diplegia, seizures, asthma, hypothyroidism, hypotonia, scoliosis (28 degrees) and kyphosis (68 degrees), strabismus, gerd, hypokalemia comes to  ER with complaints of cough with blood tinge and not feeling like her self. mother  patient has 24 hour nursing coverage at home. [facility] last night was told by RN that patient is not making alot of urine and tmep was 95 degrees. [facility] this morning patient did not look like did give pedialyte but then patient may have had ?seizure like activity. [facility] patient was staring at her in space. [facility] patient is weaker then normal. mom  in 2015 was admitted to [facility] where was dx with urosepsis and later placed on trach.    hx of multiple admissions    patient on vent at night  patient on o2 during day    PSH  2001 aug- tonsils and adenoids  2001 july- strabismus correction  2005 march- g tube placement  2012 june- trach placement

## 2017-10-13 NOTE — ED PROVIDER NOTE - OBJECTIVE STATEMENT
22F pmhx asthma, GERD, seizures on Keppra, presents to ED c/o cough with blood and feeling off as per mother. Mom states patient not responding as usual, having glassy eyes, not tracking as she usually does with low urine output since yesterday,.  Patient is non verbal. Patient has peg tube and trach.  Pt is currently awake and alert, as per mom patient is not at her usual baseline.

## 2017-10-13 NOTE — H&P ADULT - PMH
Asthma    Cerebral Palsy    Developmental Delay    Gastroesophageal Reflux Disease    Hypokalemia    Hypothyroidism  Treated with synthroid in past. Stopped treatment in 2008 due to normal thyroid function.  Hypotonia    Kyphosis    Scoliosis    Seizures

## 2017-10-13 NOTE — H&P ADULT - PSH
Peg (Percutaneous Endoscopic Gastrostomy) Adjustment/Replacement/Removal    S/P Tonsillectomy and Adenoidectomy    Strabismus    Tracheostomy in place

## 2017-10-13 NOTE — H&P ADULT - ASSESSMENT
22 year old female with pmh of severe global developmental delay, cp/spastic diplegia, seizures, asthma, hypothyroidism, hypotonia, scoliosis (28 degrees) and kyphosis (68 degrees), strabismus, gerd, hypokalemia    #multifocal pneumonia  - given vent at night admit to icu   - most likely secondary to aspiration pneumonia  - continue zosyn - patient mother reported allergic to vanco (hydronephrosis-unsure if true allergy), cephalosporins f/up cultures to see needed for staph aures  - sputum culture ordered; blood culture and urine culture previously ordered by ed  - s/p zosyn in ed   - ivf normal saline @100cc/hr  - may take home tube feeds as not available in hospital   - on trach at night; o2 via mask on trach during day  - cxr as above   - chest pt     #seizure?  - will monitor patient in icu   - given hx of seizures in past if patient has any seizure like activity consider eeg     #hx of thyroid problem  - check tsh in am

## 2017-10-14 ENCOUNTER — TRANSCRIPTION ENCOUNTER (OUTPATIENT)
Age: 22
End: 2017-10-14

## 2017-10-14 ENCOUNTER — INPATIENT (INPATIENT)
Age: 22
LOS: 1 days | Discharge: HOME | End: 2017-10-16
Attending: PEDIATRICS | Admitting: PEDIATRICS
Payer: COMMERCIAL

## 2017-10-14 VITALS
RESPIRATION RATE: 18 BRPM | OXYGEN SATURATION: 90 % | HEART RATE: 97 BPM | DIASTOLIC BLOOD PRESSURE: 66 MMHG | WEIGHT: 74.52 LBS | SYSTOLIC BLOOD PRESSURE: 100 MMHG | TEMPERATURE: 96 F

## 2017-10-14 VITALS
DIASTOLIC BLOOD PRESSURE: 76 MMHG | RESPIRATION RATE: 12 BRPM | SYSTOLIC BLOOD PRESSURE: 134 MMHG | HEART RATE: 94 BPM | OXYGEN SATURATION: 99 % | TEMPERATURE: 97 F

## 2017-10-14 DIAGNOSIS — J18.8 OTHER PNEUMONIA, UNSPECIFIED ORGANISM: ICD-10-CM

## 2017-10-14 DIAGNOSIS — Z93.0 TRACHEOSTOMY STATUS: Chronic | ICD-10-CM

## 2017-10-14 LAB
ANION GAP SERPL CALC-SCNC: 6 MMOL/L — SIGNIFICANT CHANGE UP (ref 5–17)
BASOPHILS # BLD AUTO: 0 K/UL — SIGNIFICANT CHANGE UP (ref 0–0.2)
BASOPHILS NFR BLD AUTO: 0.3 % — SIGNIFICANT CHANGE UP (ref 0–2)
BUN SERPL-MCNC: 5 MG/DL — LOW (ref 7–23)
CALCIUM SERPL-MCNC: 7.9 MG/DL — LOW (ref 8.5–10.1)
CHLORIDE SERPL-SCNC: 109 MMOL/L — HIGH (ref 96–108)
CO2 SERPL-SCNC: 23 MMOL/L — SIGNIFICANT CHANGE UP (ref 22–31)
CREAT SERPL-MCNC: 0.4 MG/DL — LOW (ref 0.5–1.3)
CULTURE RESULTS: NO GROWTH — SIGNIFICANT CHANGE UP
EOSINOPHIL # BLD AUTO: 0 K/UL — SIGNIFICANT CHANGE UP (ref 0–0.5)
EOSINOPHIL NFR BLD AUTO: 0.2 % — SIGNIFICANT CHANGE UP (ref 0–6)
GLUCOSE SERPL-MCNC: 69 MG/DL — LOW (ref 70–99)
GRAM STN FLD: SIGNIFICANT CHANGE UP
HCT VFR BLD CALC: 33.6 % — LOW (ref 34.5–45)
HGB BLD-MCNC: 10.9 G/DL — LOW (ref 11.5–15.5)
LYMPHOCYTES # BLD AUTO: 1.6 K/UL — SIGNIFICANT CHANGE UP (ref 1–3.3)
LYMPHOCYTES # BLD AUTO: 24.8 % — SIGNIFICANT CHANGE UP (ref 13–44)
MCHC RBC-ENTMCNC: 32 PG — SIGNIFICANT CHANGE UP (ref 27–34)
MCHC RBC-ENTMCNC: 32.3 GM/DL — SIGNIFICANT CHANGE UP (ref 32–36)
MCV RBC AUTO: 99 FL — SIGNIFICANT CHANGE UP (ref 80–100)
MONOCYTES # BLD AUTO: 0.2 K/UL — SIGNIFICANT CHANGE UP (ref 0–0.9)
MONOCYTES NFR BLD AUTO: 2.8 % — SIGNIFICANT CHANGE UP (ref 2–14)
NEUTROPHILS # BLD AUTO: 4.6 K/UL — SIGNIFICANT CHANGE UP (ref 1.8–7.4)
NEUTROPHILS NFR BLD AUTO: 71.8 % — SIGNIFICANT CHANGE UP (ref 43–77)
PLATELET # BLD AUTO: 119 K/UL — LOW (ref 150–400)
POTASSIUM SERPL-MCNC: 3.5 MMOL/L — SIGNIFICANT CHANGE UP (ref 3.5–5.3)
POTASSIUM SERPL-SCNC: 3.5 MMOL/L — SIGNIFICANT CHANGE UP (ref 3.5–5.3)
RBC # BLD: 3.39 M/UL — LOW (ref 3.8–5.2)
RBC # FLD: 12.2 % — SIGNIFICANT CHANGE UP (ref 10.3–14.5)
SODIUM SERPL-SCNC: 138 MMOL/L — SIGNIFICANT CHANGE UP (ref 135–145)
SPECIMEN SOURCE: SIGNIFICANT CHANGE UP
SPECIMEN SOURCE: SIGNIFICANT CHANGE UP
TSH SERPL-MCNC: 0.42 UIU/ML — SIGNIFICANT CHANGE UP (ref 0.36–3.74)
WBC # BLD: 6.4 K/UL — SIGNIFICANT CHANGE UP (ref 3.8–10.5)
WBC # FLD AUTO: 6.4 K/UL — SIGNIFICANT CHANGE UP (ref 3.8–10.5)

## 2017-10-14 PROCEDURE — 71010: CPT | Mod: 26,77

## 2017-10-14 PROCEDURE — 71010: CPT | Mod: 26

## 2017-10-14 PROCEDURE — 99291 CRITICAL CARE FIRST HOUR: CPT

## 2017-10-14 RX ORDER — HEPARIN SODIUM 5000 [USP'U]/ML
0 INJECTION INTRAVENOUS; SUBCUTANEOUS
Qty: 0 | Refills: 0 | COMMUNITY
Start: 2017-10-14

## 2017-10-14 RX ORDER — PIPERACILLIN AND TAZOBACTAM 4; .5 G/20ML; G/20ML
2700 INJECTION, POWDER, LYOPHILIZED, FOR SOLUTION INTRAVENOUS EVERY 6 HOURS
Qty: 0 | Refills: 0 | Status: DISCONTINUED | OUTPATIENT
Start: 2017-10-14 | End: 2017-10-16

## 2017-10-14 RX ORDER — MAGNESIUM OXIDE 400 MG ORAL TABLET 241.3 MG
1 TABLET ORAL
Qty: 0 | Refills: 0 | DISCHARGE
Start: 2017-10-14

## 2017-10-14 RX ORDER — LEVALBUTEROL 1.25 MG/.5ML
1.2 SOLUTION, CONCENTRATE RESPIRATORY (INHALATION)
Qty: 0 | Refills: 0 | Status: DISCONTINUED | OUTPATIENT
Start: 2017-10-14 | End: 2017-10-14

## 2017-10-14 RX ORDER — LEVALBUTEROL 1.25 MG/.5ML
SOLUTION, CONCENTRATE RESPIRATORY (INHALATION)
Qty: 0 | Refills: 0 | Status: DISCONTINUED | OUTPATIENT
Start: 2017-10-14 | End: 2017-10-14

## 2017-10-14 RX ORDER — TOBRAMYCIN SULFATE 40 MG/ML
300 VIAL (ML) INJECTION EVERY 12 HOURS
Qty: 0 | Refills: 0 | Status: DISCONTINUED | OUTPATIENT
Start: 2017-10-14 | End: 2017-10-16

## 2017-10-14 RX ORDER — LACTOBACILLUS ACIDOPHILUS 100MM CELL
1 CAPSULE ORAL
Qty: 0 | Refills: 0 | Status: DISCONTINUED | OUTPATIENT
Start: 2017-10-14 | End: 2017-10-14

## 2017-10-14 RX ORDER — LEVETIRACETAM 250 MG/1
500 TABLET, FILM COATED ORAL EVERY 12 HOURS
Qty: 0 | Refills: 0 | Status: DISCONTINUED | OUTPATIENT
Start: 2017-10-14 | End: 2017-10-16

## 2017-10-14 RX ORDER — LEVALBUTEROL 1.25 MG/.5ML
1.25 SOLUTION, CONCENTRATE RESPIRATORY (INHALATION) EVERY 12 HOURS
Qty: 0 | Refills: 0 | Status: DISCONTINUED | OUTPATIENT
Start: 2017-10-14 | End: 2017-10-15

## 2017-10-14 RX ORDER — ACETAMINOPHEN 500 MG
2 TABLET ORAL
Qty: 0 | Refills: 0 | COMMUNITY
Start: 2017-10-14

## 2017-10-14 RX ORDER — IPRATROPIUM BROMIDE 0.2 MG/ML
500 SOLUTION, NON-ORAL INHALATION EVERY 6 HOURS
Qty: 0 | Refills: 0 | Status: DISCONTINUED | OUTPATIENT
Start: 2017-10-14 | End: 2017-10-16

## 2017-10-14 RX ORDER — LEVALBUTEROL 1.25 MG/.5ML
1.2 SOLUTION, CONCENTRATE RESPIRATORY (INHALATION) ONCE
Qty: 0 | Refills: 0 | Status: COMPLETED | OUTPATIENT
Start: 2017-10-14 | End: 2017-10-14

## 2017-10-14 RX ORDER — SODIUM CHLORIDE 9 MG/ML
3 INJECTION INTRAMUSCULAR; INTRAVENOUS; SUBCUTANEOUS EVERY 12 HOURS
Qty: 0 | Refills: 0 | Status: DISCONTINUED | OUTPATIENT
Start: 2017-10-14 | End: 2017-10-15

## 2017-10-14 RX ORDER — SODIUM CHLORIDE 9 MG/ML
1000 INJECTION INTRAMUSCULAR; INTRAVENOUS; SUBCUTANEOUS
Qty: 0 | Refills: 0 | Status: DISCONTINUED | OUTPATIENT
Start: 2017-10-14 | End: 2017-10-14

## 2017-10-14 RX ORDER — PIPERACILLIN AND TAZOBACTAM 4; .5 G/20ML; G/20ML
0 INJECTION, POWDER, LYOPHILIZED, FOR SOLUTION INTRAVENOUS
Qty: 0 | Refills: 0 | COMMUNITY
Start: 2017-10-14

## 2017-10-14 RX ORDER — MAGNESIUM OXIDE 400 MG ORAL TABLET 241.3 MG
400 TABLET ORAL EVERY 12 HOURS
Qty: 0 | Refills: 0 | Status: DISCONTINUED | OUTPATIENT
Start: 2017-10-14 | End: 2017-10-16

## 2017-10-14 RX ORDER — LACTOBACILLUS ACIDOPHILUS 100MM CELL
0 CAPSULE ORAL
Qty: 0 | Refills: 0 | DISCHARGE
Start: 2017-10-14

## 2017-10-14 RX ORDER — BUDESONIDE, MICRONIZED 100 %
1 POWDER (GRAM) MISCELLANEOUS EVERY 12 HOURS
Qty: 0 | Refills: 0 | Status: DISCONTINUED | OUTPATIENT
Start: 2017-10-14 | End: 2017-10-16

## 2017-10-14 RX ADMIN — Medication 300 MILLIGRAM(S): at 23:13

## 2017-10-14 RX ADMIN — LEVETIRACETAM 500 MILLIGRAM(S): 250 TABLET, FILM COATED ORAL at 05:42

## 2017-10-14 RX ADMIN — Medication 5 MILLILITER(S): at 15:21

## 2017-10-14 RX ADMIN — Medication 300 MILLIGRAM(S): at 09:39

## 2017-10-14 RX ADMIN — SODIUM CHLORIDE 100 MILLILITER(S): 9 INJECTION INTRAMUSCULAR; INTRAVENOUS; SUBCUTANEOUS at 05:40

## 2017-10-14 RX ADMIN — LEVETIRACETAM 500 MILLIGRAM(S): 250 TABLET, FILM COATED ORAL at 22:00

## 2017-10-14 RX ADMIN — PANTOPRAZOLE SODIUM 20 MILLIGRAM(S): 20 TABLET, DELAYED RELEASE ORAL at 15:21

## 2017-10-14 RX ADMIN — LEVALBUTEROL 1.2 MILLIGRAM(S): 1.25 SOLUTION, CONCENTRATE RESPIRATORY (INHALATION) at 09:08

## 2017-10-14 RX ADMIN — Medication 1 MILLIGRAM(S): at 09:21

## 2017-10-14 RX ADMIN — Medication 1 MILLIGRAM(S): at 23:45

## 2017-10-14 RX ADMIN — PIPERACILLIN AND TAZOBACTAM 25 GRAM(S): 4; .5 INJECTION, POWDER, LYOPHILIZED, FOR SOLUTION INTRAVENOUS at 05:39

## 2017-10-14 RX ADMIN — Medication 650 MILLIGRAM(S): at 05:50

## 2017-10-14 RX ADMIN — PIPERACILLIN AND TAZOBACTAM 25 GRAM(S): 4; .5 INJECTION, POWDER, LYOPHILIZED, FOR SOLUTION INTRAVENOUS at 15:20

## 2017-10-14 RX ADMIN — LEVALBUTEROL 1.25 MILLIGRAM(S): 1.25 SOLUTION, CONCENTRATE RESPIRATORY (INHALATION) at 23:03

## 2017-10-14 RX ADMIN — MAGNESIUM OXIDE 400 MG ORAL TABLET 400 MILLIGRAM(S): 241.3 TABLET ORAL at 05:40

## 2017-10-14 NOTE — CHART NOTE - NSCHARTNOTEFT_GEN_A_CORE
Oct 13th/2136:    Respiratory called to pt room approx 2136hrs on Oct 13th. Dr. Gray at bedside with pt. Pt's mom concerned she is not getting humidification but pt is on ventilator with HME use.   Pt's family requesting heated chamber humdification -- equipments which are not available at  at this time.   Case and concerns discussed with senior RRT, RT educator (over phone) and with Dr. Ace.   Dr. Gray spoke with pt's pulmonologist over phone - discussed plan of care and both MDs agree pt is adequately humdified via HME while on vent.     Transfer to [facility] also discussed. See MD note for further details. Nursing administration notified.      Oct 14th/0445:  Also, RRT called to pt's bedside at approx 0445 this morning. Pt's father agitated and concerned about pt's trach cuff pressure. As per ICU RN Jenelle, pt's father was manipulating air in the trach cuff.  RRT discussed concerns with pt's father and cuff pressure checked (via cufflator) to show adequate pressure in the cuff. Pt's father still agitated so Nursing administration notified.

## 2017-10-14 NOTE — PROGRESS NOTE ADULT - SUBJECTIVE AND OBJECTIVE BOX
ATSP by RN to update patients mother on patients current status and plan of care. She was told the the patient spiked a temp to 101 overnight, and there was concern for further aspiration from overnight nurse with bile from Trach. Mother stated that her main issues was a neurological one.    A  Acute Resp Failure  Multilobar PNA    Plan  Cont ICU Care  Serial Neuro Exams  Neurology Exam - Eval seizure d/o  EEG - r/o seizures  BP Stable  TC as tolerated  Vent qHS per family  TF @ goal  dec IVF  Await Blood and Sputum Cx - Pending  IV Zosyn per ID  Gastrograffin study ordered  PT for ROM ordered  Monitor renal function  Will observe urine output closely  DVT prophylaxis - Hep SQ  I spoke to patients mother at bedside with Dr. Gonzales and RN Supervisor Ronen. We discussed the current status, plan of care, and prognosis with all questions answered in detail.    Additional 30-min CC time

## 2017-10-14 NOTE — PHYSICAL THERAPY INITIAL EVALUATION ADULT - PERTINENT HX OF CURRENT PROBLEM, REHAB EVAL
per pt's mom, night prior to admission, RN (pt has 24 hr nsg care) notified parents that patient was making less urine and had temp to 95 with some smacking movements of her face and extremities ? seizure like activity. inc weakness. Here, febrile 10/14 am to 101, wbc ct 14.2, RVP (-), xray with multifocal pna,

## 2017-10-14 NOTE — PHYSICAL THERAPY INITIAL EVALUATION ADULT - ADDITIONAL COMMENTS
pt lives w/ parents, 24 hr nsg care, PT/OT daily. Pt has ceiling lift, gait , WC, hosp bed. Uses ceiling lift for OOBTC but transfers w/ assist to other surfaces including floor for prone exer.

## 2017-10-14 NOTE — DISCHARGE NOTE ADULT - HOSPITAL COURSE
HPI: Patient is nonverbal not motile. hx provided by mother    22 year old female with pmh of severe global developmental delay, cp/spastic diplegia, seizures, asthma, hypothyroidism, hypotonia, scoliosis (28 degrees) and kyphosis (68 degrees), strabismus, gerd, hypokalemia comes to  ER with complaints of cough with blood tinge and not feeling like her self. mother  patient has 24 hour nursing coverage at home. [facility] last night was told by RN that patient is not making alot of urine and tmep was 95 degrees. states this morning patient did not look like did give pedialyte but then patient may have had ?seizure like activity. [facility] patient was staring at her in space. [facility] patient is weaker then normal. mom  in 2015 was admitted to [facility] where was dx with urosepsis and later placed on trach. hx of multiple admissions. patient on vent at night. patient on o2 during day

## 2017-10-14 NOTE — PHYSICAL THERAPY INITIAL EVALUATION ADULT - GENERAL OBSERVATIONS, REHAB EVAL
pt rec'd supine in bed, head SB R, scoliosis evident, O2 via TC, monitors in place, mom present. (per nsg admin pt's mother requesting PT as pt receives PT daily at home and w/o it tends to tense/inc tone)

## 2017-10-14 NOTE — PROGRESS NOTE ADULT - ASSESSMENT
Patient with hx. of seizures, GERD, developmental disabilities, with dec ms lethargy, decrease urine output  likely aspiration pneumonia  treat zosyn  send sputum culture  dec urine output hydration  cotninue keppra  if any signs seizures consider eeg  continue tube feeds  On SIMV at night
22y old F with:  Acute on Chronic Resp Failure - On Vent  Severe global developmental delay  CP/spastic diplegia  Seizures  Asthma  Hypothyroidism  Hypotonia  Scoliosis (28 degrees) and kyphosis (68 degrees)  gerd    Plan:  Cont ICU Care  Critically Ill  Serial Neuro Exams  Cont AED  Multifocal pneumonia  Will cont Mech Vent   Secondary to aspiration pneumonia  Continue IV Zosyn  Cont IV NS  Cont TF @ goal  Monitor renal function  Strict I/O's  DVT prophylaxis - Hep SQ

## 2017-10-14 NOTE — CONSULT NOTE ADULT - ASSESSMENT
22 year old female with pmh of severe global developmental delay, cp/spastic diplegia, seizures, asthma, hypothyroidism, hypotonia, scoliosis (28 degrees) and kyphosis (68 degrees), strabismus, gerd, hypokalemia admitted 10/13 with complaints of cough with blood tinge and not feeling like her self. mother states patient has 24 hour nursing coverage at home. states that night prior to admission, RN notified parents that patient was making less urine and had temp to 95 with some smacking movements of her face and extremities ? seizure like activity. Here, febrile 10/14 am to 101, wbc ct 14.2, RVP (-), xray with multifocal pna, was given IV vancomycin/zosyn.     1. sepsis/multifocal pna/chronic resp failure s/p trach/peg/cp/spastic diplegia  - fever to 101, wbc ct normalized, slowly improving  - agree with IV zosyn [id number] for strep/mssa/gnr/anaroebic coverage #2  - hold further vancomycin  - fu blood cx/urine cx  - trach care  - f/u cbc  - monitor temps  -tolerating abx well so far; no side effects noted  -reason for abx use and side effects reviewed with patient  - supportive care
PROBLEMS:    Acute on Chronic Resp Failure - On Vent  Multifocal pneumonia  Severe global developmental delay  CP/spastic diplegia  Seizures  Asthma  Hypothyroidism  Hypotonia      PLAN;    PAT WILL BE TRANSFERRED TO [facility] FOR CONTINUE CARE.  D/W STAFF

## 2017-10-14 NOTE — DIETITIAN INITIAL EVALUATION ADULT. - PHYSICAL APPEARANCE
underweight/BMI 14.8. NFPE reveals moderate muscle wasting - thighs, calves, temporalis),  moderate fat depletion (triceps)

## 2017-10-14 NOTE — DIETITIAN INITIAL EVALUATION ADULT. - NS AS NUTRI DX NUTRIENT
Malnutrition/Pt meets criteria for severe protein-calorie malnutrition in context of chronic disease.NFPE reveals moderate muscle wasting - thighs, calves, temporalis),  moderate fat depletion (triceps).

## 2017-10-14 NOTE — DIETITIAN INITIAL EVALUATION ADULT. - OTHER INFO
severe global developmental delay, cp/spastic diplegia, seizures, asthma,Pt with  hypothyroidism, hypotonia, scoliosis and kyphosis , gerd, hypokalemia. Dx of multifocal PNA. Pt unable to speak. Consult for enteral feed.  Dx of severe global developmental delay, cp/spastic diplegia, seizures, asthma, Pt with  hypothyroidism, hypotonia, scoliosis and kyphosis , gerd, hypokalemia. Dx of multifocal PNA. Pt unable to speak. Pt has BMI of 14.8.  Pt meets criteria for severe protein-calorie malnutrition in context of chronic disease, NFPE reveals moderate muscle wasting in temporal area, thighs, calves, moderate fat loss in triceps.  Pt on Elecare tube feed at home, tube feed procedure at home very specific.  Pt's mother reports monitoring intake and output very closely.  Mother reports pt has slow motility, does not run TF constantly through day, but stops and starts.  Pt also gets pedialyte at home, depending on her need for electrolytes.

## 2017-10-14 NOTE — DISCHARGE NOTE ADULT - MEDICATION SUMMARY - MEDICATIONS TO TAKE
I will START or STAY ON the medications listed below when I get home from the hospital:    freetext medication  -  -- 1.5 tab(s) by mouth 2 times a day  -- Indication: For Hypokalemia    Pulmicort Respules 1 mg/2 mL inhalation suspension  -- 2 milliliter(s) inhaled 3 times a day  -- Indication: For Pneumonia    tobramycin 75 mg/mL inhalation solution  -- 4 milliliter(s) inhaled 2 times a day  -- Indication: For Pneumonia    acetaminophen 325 mg oral tablet  -- 2 tab(s) by mouth every 6 hours, As needed, For Temp greater than 38.5 C (101.3 F)  -- Indication: For Kyphosis    acetaminophen 325 mg oral tablet  -- 2 tab(s) by mouth every 6 hours, As needed, Moderate Pain (4 - 6)  -- Indication: For Pneumonia    heparin  -- Indication: For Pneumonia    levETIRAcetam 100 mg/mL oral solution  -- 5 milliliter(s) by mouth 2 times a day  -- Indication: For Hypokalemia    levalbuterol 1.25 mg/3 mL inhalation solution  -- 3 milliliter(s) inhaled 2 times a day  -- Indication: For Pneumonia    ipratropium 500 mcg/2.5 mL inhalation solution  -- 1.25 milliliter(s) inhaled once a day, As Needed  -- Indication: For Pneumonia    magnesium oxide 400 mg (241.3 mg elemental magnesium) oral tablet  -- 1 tab(s) by mouth every 12 hours  -- Indication: For Hypokalemia    potassium chloride 10 mEq oral tablet, extended release  -- 1.5 tab(s) oral daily  -- Indication: For Hypokalemia    sodium chloride 7% inhalation solution  -- 2 milliliter(s) inhaled 2 times a day  -- Indication: For Tracheostomy in place    colistimethate 150 mg (colistin base) injection  -- 150 milligram(s) injectable 2 times a day  **Finish on 10/14/17  -- Indication: For Pneumonia    Ciprodex 0.3%-0.1% otic suspension  -- 2 drop(s) intratracheal 2 times a day  -- Indication: For Tracheostomy in place    piperacillin-tazobactam 2 g-0.25 g intravenous injection  --  intravenous   -- Indication: For Pneumonia    lactobacillus acidophilus oral capsule  --  by mouth   -- Indication: For Pneumonia    Protonix 40 mg oral granule, enteric coated  -- 0.5 packet by mouth once a day  -- Indication: For Kyphosis    Multiple Vitamins oral liquid  -- 2 milliliter(s) by mouth once a day  -- Indication: For Pneumonia

## 2017-10-14 NOTE — DISCHARGE NOTE ADULT - CARE PLAN
Principal Discharge DX:	Pneumonia  Goal:	Resolve Pneumonia  Instructions for follow-up, activity and diet:	follow up with [facility].

## 2017-10-14 NOTE — CONSULT NOTE ADULT - SUBJECTIVE AND OBJECTIVE BOX
Patient is a 22y old  Female who presents with a chief complaint of weakness (13 Oct 2017 16:13)      HPI:    22 year old female with pmh of severe global developmental delay, cp/spastic diplegia, seizures, asthma, hypothyroidism, hypotonia, scoliosis (28 degrees) and kyphosis (68 degrees), strabismus, gerd, hypokalemia admitted 10/13 with complaints of cough with blood tinge and not feeling like her self. mother states patient has 24 hour nursing coverage at home. states that night prior to admission, RN notified parents that patient was making less urine and had temp to 95 with some smacking movements of her face and extremities ? seizure like activity. Here, febrile 10 am to 101, wbc ct 14.2, RVP (-), xray with multifocal pna, was given IV vancomycin/zosyn.     no further seizure like activity  better today  fever to 101        PSH  2001 aug- tonsils and adenoids  2001- strabismus correction  2005- g tube placement  2012- trach placement         Meds: per reconciliation sheet, noted below    MEDICATIONS  (STANDING):  buDESOnide   0.5 milliGRAM(s) Respule 1 milliGRAM(s) Inhalation two times a day  heparin  Injectable 5000 Unit(s) SubCutaneous every 12 hours  levalbuterol Inhalation 1.25 milliGRAM(s) Inhalation two times a day  levETIRAcetam  Solution 500 milliGRAM(s) Oral two times a day  magnesium oxide 400 milliGRAM(s) Oral every 12 hours  multivitamin  Drops 2 milliLiter(s) Oral daily  pantoprazole   Suspension 20 milliGRAM(s) Oral daily  piperacillin/tazobactam IVPB. 3.375 Gram(s) IV Intermittent every 8 hours  potassium chloride 1.5 Tablet(s) 1.5 Tablet(s) Oral two times a day  sodium chloride 0.9%. 1000 milliLiter(s) (100 mL/Hr) IV Continuous <Continuous>  sodium chloride 7% Inhalation 4 milliLiter(s) Inhalation two times a day  tobramycin for Nebulization 300 milliGRAM(s) Inhalation two times a day      Allergies    Bactrim (Unknown)  Benadryl (Other)  carbamazepine (Unknown)  cephalosporins (Unknown)  Depakote (Unknown)  diphenhydrAMINE (Seizure)  eggs (Unknown)  EGGS,  NUTS (Anaphylaxis)  Erythromycin Base (Unknown)  metoclopramide (Unknown)  Milk (Unknown)  MILK, SHELLFISH, WHEAT (Unknown)  Nuts (Unknown)  oxcarbazepine (Unknown)  peanuts (Unknown)  Reglan (Unknown)  SEASONAL, POLLEN, GRASS, PET DANDER, FEATHERS (Unknown)  shellfish (Unknown)  Trileptal (Unknown)  TRILEPTAL, DEPAKOTE, REGLAN, VANTIN, CEPHALOSPORINS (Unknown)  valproic acid (Unknown)  vancomycin (Unknown)    Intolerances        Social: no smoking, no alcohol, no illegal drugs; no recent travel, no exposure to TB    Family history: NC      ROS: unable to obtain dt medical condition    Vital Signs Last 24 Hrs  T(C): 38.6 (14 Oct 2017 04:00), Max: 38.6 (14 Oct 2017 04:00)  T(F): 101.5 (14 Oct 2017 04:00), Max: 101.5 (14 Oct 2017 04:00)  HR: 91 (14 Oct 2017 09:00) (77 - 102)  BP: 121/67 (14 Oct 2017 09:00) (54/42 - 121/67)  BP(mean): 78 (14 Oct 2017 09:00) (45 - 78)  RR: 15 (14 Oct 2017 09:00) (14 - 20)  SpO2: 97% (14 Oct 2017 09:00) (84% - 98%)      PE:  Constitutional: frail looking, + trach in place   HEENT: NC/AT, EOMI, PERRLA  Neck: supple  Back: no tenderness  Respiratory: decreased breath sounds  Cardiovascular: S1S2 regular, no murmurs  Abdomen: soft, not tender, not distended, positive BS  Genitourinary: deferred, peg tube in place  Rectal: deferred  Musculoskeletal: no muscle tenderness, no joint swelling or tenderness  Extremities: no pedal edema  Neurological: no focal deficits  Skin: no rashes    Labs:                        10.9   6.4   )-----------( 119      ( 14 Oct 2017 04:48 )             33.6     10-14    138  |  109<H>  |  5<L>  ----------------------------<  69<L>  3.5   |  23  |  0.40<L>    Ca    7.9<L>      14 Oct 2017 04:48    TPro  6.5  /  Alb  2.4<L>  /  TBili  0.4  /  DBili  x   /  AST  28  /  ALT  24  /  AlkPhos  124<H>  10-13     LIVER FUNCTIONS - ( 13 Oct 2017 10:34 )  Alb: 2.4 g/dL / Pro: 6.5 gm/dL / ALK PHOS: 124 U/L / ALT: 24 U/L / AST: 28 U/L / GGT: x           Urinalysis Basic - ( 13 Oct 2017 12:30 )    Color: Yellow / Appearance: Slightly Turbid / S.010 / pH: x  Gluc: x / Ketone: Negative  / Bili: Negative / Urobili: Negative mg/dL   Blood: x / Protein: Negative mg/dL / Nitrite: Negative   Leuk Esterase: Small / RBC: 0-2 /HPF / WBC 3-5   Sq Epi: x / Non Sq Epi: Many / Bacteria: Moderate            Radiology: < from: CT Head No Cont (10.13.17 @ 12:17) >    EXAM:  CT BRAIN                            PROCEDURE DATE:  10/13/2017          INTERPRETATION:  Exam Date: 10/13/2017 12:17 PM    CT head without IV contrast    CLINICAL INFORMATION: Patient has cerebral palsy with unusual behavior   today seizurelike activity        TECHNIQUE: Contiguous axial sections were obtained through the head.     This scan was performed using automatic exposure control (radiation dose   reduction software) to obtain a diagnostic image quality scan with   patient doseas low as reasonably achievable.      COMPARISON: CT head 2009     FINDINGS:       There is no evidence of intraparenchymal or extraaxial hemorrhage.     There is no CT evidence of large vessel acute infarct. No mass effect is   found in the brain.  No evidence of midline shift or herniation pattern.    The ventricles, sulci and basal cisterns are dilated, compatible   generalized cerebral volume loss, as seen on prior exams.    Visualized paranasal sinuses are clear.      IMPRESSION:      No acute intracranial findings.  The ventricles, sulci and basal cisterns   are dilated, compatible generalized cerebral volume loss, as seen on   prior exams.  < from: Xray Chest 1 View AP/PA. (10.13.17 @ 11:10) >    EXAM:  CHEST SINGLE VIEW FRONTAL                            PROCEDURE DATE:  10/13/2017          INTERPRETATION:  History: Cough    Technique: Single frontal portable view of the chest with comparison to    2016    Findings:    Tracheostomy. Heart is mildly prominent. Extensive airspace disease in   the right lung with associated pleural effusion with minimal airspace   disease in the left lung compatible with multifocal pneumonia. Visualized   osseous structures are within normal limits.        Impression:    Findings compatible with multifocal pneumonia more extensive in the right   lung    < end of copied text >                Advanced directives addressed: full resuscitation
HPI:    pat seen & examine & full consult dictated.      PAST MEDICAL & SURGICAL HISTORY:  Hypokalemia  Kyphosis  Scoliosis  Hypotonia  Asthma  Seizures  Cerebral Palsy  Gastroesophageal Reflux Disease  Hypothyroidism: Treated with synthroid in past. Stopped treatment in  due to normal thyroid function.  Developmental Delay  Tracheostomy in place  Peg (Percutaneous Endoscopic Gastrostomy) Adjustment/Replacement/Removal  S/P Tonsillectomy and Adenoidectomy  Strabismus      Home Medications:  Ciprodex 0.3%-0.1% otic suspension: 2 drop(s) intratracheal 2 times a day (13 Oct 2017 15:49)  colistimethate 150 mg (colistin base) injection: 150 milligram(s) injectable 2 times a day  **Finish on 10/14/17 (13 Oct 2017 15:49)  EPINEPHrine: 0.3 milligram(s) intramuscular once, As Needed for anaphylaxis (13 Oct 2017 15:49)  ipratropium 500 mcg/2.5 mL inhalation solution: 1.25 milliliter(s) inhaled once a day, As Needed (13 Oct 2017 16:09)  levalbuterol 1.25 mg/3 mL inhalation solution: 3 milliliter(s) inhaled 2 times a day (13 Oct 2017 15:49)  levETIRAcetam 100 mg/mL oral solution: 5 milliliter(s) orally 2 times a day (13 Oct 2017 15:49)  Multiple Vitamins oral liquid: 2 milliliter(s) orally once a day (13 Oct 2017 15:49)  potassium chloride 10 mEq oral tablet, extended release: 1.5 tab(s) oral daily (13 Oct 2017 15:49)  Protonix 40 mg oral granule, enteric coated: 0.5 packet orally once a day (13 Oct 2017 15:49)  Pulmicort Respules 1 mg/2 mL inhalation suspension: 2 milliliter(s) inhaled 3 times a day (13 Oct 2017 15:53)  sodium chloride 7% inhalation solution: 2 milliliter(s) inhaled 2 times a day (13 Oct 2017 15:49)  tobramycin 75 mg/mL inhalation solution: 4 milliliter(s) inhaled 2 times a day (13 Oct 2017 15:54)      MEDICATIONS  (STANDING):  buDESOnide   0.5 milliGRAM(s) Respule 1 milliGRAM(s) Inhalation two times a day  heparin  Injectable 5000 Unit(s) SubCutaneous every 12 hours  lactobacillus acidophilus 1 Tablet(s) Oral three times a day with meals  levalbuterol for Nebulization - Peds      levalbuterol for Nebulization - Peds 1.2 milliGRAM(s) Nebulizer once  levalbuterol for Nebulization - Peds 1.2 milliGRAM(s) Nebulizer two times a day  levETIRAcetam  Solution 500 milliGRAM(s) Oral two times a day  magnesium oxide 400 milliGRAM(s) Oral every 12 hours  multivitamin   Solution 5 milliLiter(s) Oral daily  pantoprazole   Suspension 20 milliGRAM(s) Oral daily  piperacillin/tazobactam IVPB. 3.375 Gram(s) IV Intermittent every 8 hours  potassium chloride 1.5 Tablet(s) 1.5 Tablet(s) Oral two times a day  sodium chloride 0.9%. 1000 milliLiter(s) (50 mL/Hr) IV Continuous <Continuous>  sodium chloride 7% Inhalation 4 milliLiter(s) Inhalation two times a day  tobramycin for Nebulization 300 milliGRAM(s) Inhalation two times a day    MEDICATIONS  (PRN):  acetaminophen   Tablet 650 milliGRAM(s) Oral every 6 hours PRN For Temp greater than 38.5 C (101.3 F)  acetaminophen   Tablet. 650 milliGRAM(s) Oral every 6 hours PRN Moderate Pain (4 - 6)      Allergies    Bactrim (Unknown)  Benadryl (Other)  carbamazepine (Unknown)  cephalosporins (Unknown)  Depakote (Unknown)  diphenhydrAMINE (Seizure)  eggs (Unknown)  EGGS,  NUTS (Anaphylaxis)  Erythromycin Base (Unknown)  metoclopramide (Unknown)  Milk (Unknown)  MILK, SHELLFISH, WHEAT (Unknown)  Nuts (Unknown)  oxcarbazepine (Unknown)  peanuts (Unknown)  Reglan (Unknown)  SEASONAL, POLLEN, GRASS, PET DANDER, FEATHERS (Unknown)  shellfish (Unknown)  Trileptal (Unknown)  TRILEPTAL, DEPAKOTE, REGLAN, VANTIN, CEPHALOSPORINS (Unknown)  valproic acid (Unknown)  vancomycin (Unknown)    Intolerances        SOCIAL HISTORY: Denies tobacco, etoh abuse or illicit drug use    FAMILY HISTORY:      Vital Signs Last 24 Hrs  T(C): 36.9 (14 Oct 2017 10:00), Max: 38.6 (14 Oct 2017 04:00)  T(F): 98.5 (14 Oct 2017 10:00), Max: 101.5 (14 Oct 2017 04:00)  HR: 110 (14 Oct 2017 12:00) (77 - 122)  BP: 102/50 (14 Oct 2017 12:00) (54/42 - 121/67)  BP(mean): 62 (14 Oct 2017 12:00) (45 - 78)  RR: 12 (14 Oct 2017 12:00) (10 - 20)  SpO2: 96% (14 Oct 2017 12:00) (84% - 98%)  Mode: SIMV (Synchronized Intermittent Mandatory Ventilation)  RR (machine): 15  TV (machine): 400  FiO2: 40  PEEP: 8  ITime: 1  PIP: 31        REVIEW OF SYSTEMS:    CONSTITUTIONAL:  As per HPI.  HEENT:  Eyes:  No diplopia or blurred vision. ENT:  No earache, sore throat or runny nose.  CARDIOVASCULAR:  No pressure, squeezing, tightness, heaviness or aching about the chest, neck, axilla or epigastrium.  RESPIRATORY:  No cough, shortness of breath, PND or orthopnea.  GASTROINTESTINAL:  No nausea, vomiting or diarrhea.  GENITOURINARY:  No dysuria, frequency or urgency.  MUSCULOSKELETAL:  As per HPI.  SKIN:  No change in skin, hair or nails.  NEUROLOGIC:  No paresthesias, fasciculations, seizures or weakness.  PSYCHIATRIC:  No disorder of thought or mood.  ENDOCRINE:  No heat or cold intolerance, polyuria or polydipsia.  HEMATOLOGICAL:  No easy bruising or bleedings:  .     PHYSICAL EXAMINATION:    GENERAL APPEARANCE:  Pt. is not currently dyspneic, in no distress. Pt. is alert, oriented, and pleasant.  HEENT:  Pupils are normal and react normally. No icterus. Mucous membranes well colored.  NECK:  Supple. No lymphadenopathy. Jugular venous pressure not elevated. Carotids equal.   HEART:   The cardiac impulse has a normal quality. Regular. Normal S1 and S2. There are no murmurs, rubs or gallops noted  CHEST:  Chest is clear to auscultation. Normal respiratory effort.  ABDOMEN:  Soft and nontender.   EXTREMITIES:  There is no cyanosis, clubbing or edema.   SKIN:  No rash or significant lesions are noted.    LABS:                        10.9   6.4   )-----------( 119      ( 14 Oct 2017 04:48 )             33.6     10-14    138  |  109<H>  |  5<L>  ----------------------------<  69<L>  3.5   |  23  |  0.40<L>    Ca    7.9<L>      14 Oct 2017 04:48    TPro  6.5  /  Alb  2.4<L>  /  TBili  0.4  /  DBili  x   /  AST  28  /  ALT  24  /  AlkPhos  124<H>  10-13    LIVER FUNCTIONS - ( 13 Oct 2017 10:34 )  Alb: 2.4 g/dL / Pro: 6.5 gm/dL / ALK PHOS: 124 U/L / ALT: 24 U/L / AST: 28 U/L / GGT: x           PT/INR - ( 13 Oct 2017 10:34 )   PT: 11.9 sec;   INR: 1.10 ratio               Urinalysis Basic - ( 13 Oct 2017 12:30 )    Color: Yellow / Appearance: Slightly Turbid / S.010 / pH: x  Gluc: x / Ketone: Negative  / Bili: Negative / Urobili: Negative mg/dL   Blood: x / Protein: Negative mg/dL / Nitrite: Negative   Leuk Esterase: Small / RBC: 0-2 /HPF / WBC 3-5   Sq Epi: x / Non Sq Epi: Many / Bacteria: Moderate          Culture - Sputum (collected 10-13-17 @ 20:40)  Source: .Sputum Sputum/TRAP  Gram Stain (10-14-17 @ 13:52):    Numerous polymorphonuclear leukocytes per low power field    Few Squamous epithelial cells per low power field    Numerous Gram Negative Rods seen per oil power field    Few Gram Positive Rods seen per oil power field    Rare Gram Positive Cocci in Clusters seen per oil power field        RADIOLOGY & ADDITIONAL STUDIES:

## 2017-10-14 NOTE — PROGRESS NOTE ADULT - SUBJECTIVE AND OBJECTIVE BOX
CC: On Vent    HPI: Patient is nonverbal not motile. hx provided by mother    22 year old female with pmh of severe global developmental delay, cp/spastic diplegia, seizures, asthma, hypothyroidism, hypotonia, scoliosis (28 degrees) and kyphosis (68 degrees), strabismus, gerd, hypokalemia comes to  ER with complaints of cough with blood tinge and not feeling like her self. mother  patient has 24 hour nursing coverage at home. [facility] last night was told by RN that patient is not making alot of urine and tmep was 95 degrees. [facility] this morning patient did not look like did give pedialyte but then patient may have had ?seizure like activity. [facility] patient was staring at her in space. [facility] patient is weaker then normal. mom  in  was admitted to [facility] where was dx with urosepsis and later placed on trach. hx of multiple admissions. patient on vent at night. patient on o2 during day    10/14 - Patient seen and examined. Events noted. Remains on the vent.     PSH  2001 aug- tonsils and adenoids  2001- strabismus correction  2005- g tube placement  2012- trach placement (13 Oct 2017 16:13)      PAST MEDICAL & SURGICAL HISTORY:  Hypokalemia  Kyphosis  Scoliosis  Hypotonia  Asthma  Seizures  Cerebral Palsy  Gastroesophageal Reflux Disease  Hypothyroidism: Treated with synthroid in past. Stopped treatment in  due to normal thyroid function.  Developmental Delay  Tracheostomy in place  Peg (Percutaneous Endoscopic Gastrostomy) Adjustment/Replacement/Removal  S/P Tonsillectomy and Adenoidectomy  Strabismus      FAMILY HISTORY:      Social Hx:    Allergies    Bactrim (Unknown)  Benadryl (Other)  carbamazepine (Unknown)  cephalosporins (Unknown)  Depakote (Unknown)  diphenhydrAMINE (Seizure)  eggs (Unknown)  EGGS,  NUTS (Anaphylaxis)  Erythromycin Base (Unknown)  metoclopramide (Unknown)  Milk (Unknown)  MILK, SHELLFISH, WHEAT (Unknown)  Nuts (Unknown)  oxcarbazepine (Unknown)  peanuts (Unknown)  Reglan (Unknown)  SEASONAL, POLLEN, GRASS, PET DANDER, FEATHERS (Unknown)  shellfish (Unknown)  Trileptal (Unknown)  TRILEPTAL, DEPAKOTE, REGLAN, VANTIN, CEPHALOSPORINS (Unknown)  valproic acid (Unknown)  vancomycin (Unknown)    Intolerances        22y      Weight (kg): 34.5 (10-13 @ 09:08)    ICU Vital Signs Last 24 Hrs  T(C): 38.6 (14 Oct 2017 04:00), Max: 38.6 (14 Oct 2017 04:00)  T(F): 101.5 (14 Oct 2017 04:00), Max: 101.5 (14 Oct 2017 04:00)  HR: 96 (14 Oct 2017 06:00) (75 - 102)  BP: 115/51 (14 Oct 2017 05:00) (54/42 - 117/42)  BP(mean): 63 (14 Oct 2017 05:00) (45 - 71)  ABP: --  ABP(mean): --  RR: 17 (14 Oct 2017 06:00) (14 - 20)  SpO2: 97% (14 Oct 2017 06:00) (84% - 100%)      Mode: SIMV (Synchronized Intermittent Mandatory Ventilation)  RR (machine): 15  TV (machine): 400  FiO2: 40  PEEP: 8  ITime: 1  PIP: 31      I&O's Summary    13 Oct 2017 07:01  -  14 Oct 2017 07:00  --------------------------------------------------------  IN: 850 mL / OUT: 0 mL / NET: 850 mL                              10.9   6.4   )-----------( 119      ( 14 Oct 2017 04:48 )             33.6       10-14    138  |  109<H>  |  5<L>  ----------------------------<  69<L>  3.5   |  23  |  0.40<L>    Ca    7.9<L>      14 Oct 2017 04:48    TPro  6.5  /  Alb  2.4<L>  /  TBili  0.4  /  DBili  x   /  AST  28  /  ALT  24  /  AlkPhos  124<H>  10-13      CAPILLARY BLOOD GLUCOSE          LIVER FUNCTIONS - ( 13 Oct 2017 10:34 )  Alb: 2.4 g/dL / Pro: 6.5 gm/dL / ALK PHOS: 124 U/L / ALT: 24 U/L / AST: 28 U/L / GGT: x                   PT/INR - ( 13 Oct 2017 10:34 )   PT: 11.9 sec;   INR: 1.10 ratio                 Urinalysis Basic - ( 13 Oct 2017 12:30 )    Color: Yellow / Appearance: Slightly Turbid / S.010 / pH: x  Gluc: x / Ketone: Negative  / Bili: Negative / Urobili: Negative mg/dL   Blood: x / Protein: Negative mg/dL / Nitrite: Negative   Leuk Esterase: Small / RBC: 0-2 /HPF / WBC 3-5   Sq Epi: x / Non Sq Epi: Many / Bacteria: Moderate        MEDICATIONS  (STANDING):  buDESOnide   0.5 milliGRAM(s) Respule 1 milliGRAM(s) Inhalation two times a day  heparin  Injectable 5000 Unit(s) SubCutaneous every 12 hours  levalbuterol Inhalation 1.25 milliGRAM(s) Inhalation two times a day  levETIRAcetam  Solution 500 milliGRAM(s) Oral two times a day  magnesium oxide 400 milliGRAM(s) Oral every 12 hours  multivitamin  Drops 2 milliLiter(s) Oral daily  pantoprazole   Suspension 20 milliGRAM(s) Oral daily  piperacillin/tazobactam IVPB. 3.375 Gram(s) IV Intermittent every 8 hours  potassium chloride 1.5 Tablet(s) 1.5 Tablet(s) Oral two times a day  sodium chloride 0.9%. 1000 milliLiter(s) (100 mL/Hr) IV Continuous <Continuous>  sodium chloride 7% Inhalation 4 milliLiter(s) Inhalation two times a day  tobramycin for Nebulization 300 milliGRAM(s) Inhalation two times a day    MEDICATIONS  (PRN):  acetaminophen   Tablet 650 milliGRAM(s) Oral every 6 hours PRN For Temp greater than 38.5 C (101.3 F)  acetaminophen   Tablet. 650 milliGRAM(s) Oral every 6 hours PRN Moderate Pain (4 - 6)          DVT Prophylaxis:    Advanced Directives:  Discussed with:    Visit Information:    ** Time is exclusive of billed procedures and/or teaching and/or routine family updates.

## 2017-10-14 NOTE — DISCHARGE NOTE ADULT - PATIENT PORTAL LINK FT
“You can access the [employer] Patient Portal, offered by [facility], by registering with the following website: [email or web address]”

## 2017-10-15 DIAGNOSIS — J04.10 ACUTE TRACHEITIS WITHOUT OBSTRUCTION: ICD-10-CM

## 2017-10-15 DIAGNOSIS — R63.8 OTHER SYMPTOMS AND SIGNS CONCERNING FOOD AND FLUID INTAKE: ICD-10-CM

## 2017-10-15 DIAGNOSIS — G40.409 OTHER GENERALIZED EPILEPSY AND EPILEPTIC SYNDROMES, NOT INTRACTABLE, WITHOUT STATUS EPILEPTICUS: ICD-10-CM

## 2017-10-15 DIAGNOSIS — J18.9 PNEUMONIA, UNSPECIFIED ORGANISM: ICD-10-CM

## 2017-10-15 DIAGNOSIS — J96.10 CHRONIC RESPIRATORY FAILURE, UNSPECIFIED WHETHER WITH HYPOXIA OR HYPERCAPNIA: ICD-10-CM

## 2017-10-15 PROCEDURE — 99291 CRITICAL CARE FIRST HOUR: CPT

## 2017-10-15 RX ORDER — LANSOPRAZOLE 15 MG/1
30 CAPSULE, DELAYED RELEASE ORAL DAILY
Qty: 0 | Refills: 0 | Status: DISCONTINUED | OUTPATIENT
Start: 2017-10-15 | End: 2017-10-15

## 2017-10-15 RX ORDER — POTASSIUM CHLORIDE 20 MEQ
15 PACKET (EA) ORAL DAILY
Qty: 0 | Refills: 0 | Status: DISCONTINUED | OUTPATIENT
Start: 2017-10-15 | End: 2017-10-15

## 2017-10-15 RX ORDER — EPINEPHRINE 0.3 MG/.3ML
0.3 INJECTION INTRAMUSCULAR; SUBCUTANEOUS ONCE
Qty: 0 | Refills: 0 | Status: DISCONTINUED | OUTPATIENT
Start: 2017-10-15 | End: 2017-10-16

## 2017-10-15 RX ORDER — LANSOPRAZOLE 15 MG/1
15 CAPSULE, DELAYED RELEASE ORAL DAILY
Qty: 0 | Refills: 0 | Status: DISCONTINUED | OUTPATIENT
Start: 2017-10-15 | End: 2017-10-16

## 2017-10-15 RX ORDER — POTASSIUM CHLORIDE 20 MEQ
10 PACKET (EA) ORAL
Qty: 0 | Refills: 0 | Status: DISCONTINUED | OUTPATIENT
Start: 2017-10-15 | End: 2017-10-16

## 2017-10-15 RX ORDER — SODIUM CHLORIDE 9 MG/ML
3 INJECTION INTRAMUSCULAR; INTRAVENOUS; SUBCUTANEOUS ONCE
Qty: 0 | Refills: 0 | Status: COMPLETED | OUTPATIENT
Start: 2017-10-15 | End: 2017-10-15

## 2017-10-15 RX ORDER — LEVETIRACETAM 250 MG/1
340 TABLET, FILM COATED ORAL ONCE
Qty: 0 | Refills: 0 | Status: DISCONTINUED | OUTPATIENT
Start: 2017-10-15 | End: 2017-10-15

## 2017-10-15 RX ORDER — MICONAZOLE NITRATE 2 %
1 CREAM (GRAM) TOPICAL
Qty: 0 | Refills: 0 | Status: DISCONTINUED | OUTPATIENT
Start: 2017-10-15 | End: 2017-10-16

## 2017-10-15 RX ORDER — LACTOBACILLUS RHAMNOSUS GG 10B CELL
1 CAPSULE ORAL DAILY
Qty: 0 | Refills: 0 | Status: DISCONTINUED | OUTPATIENT
Start: 2017-10-15 | End: 2017-10-16

## 2017-10-15 RX ORDER — LEVALBUTEROL 1.25 MG/.5ML
1.25 SOLUTION, CONCENTRATE RESPIRATORY (INHALATION) EVERY 8 HOURS
Qty: 0 | Refills: 0 | Status: DISCONTINUED | OUTPATIENT
Start: 2017-10-15 | End: 2017-10-16

## 2017-10-15 RX ORDER — SODIUM CHLORIDE 9 MG/ML
3 INJECTION INTRAMUSCULAR; INTRAVENOUS; SUBCUTANEOUS EVERY 8 HOURS
Qty: 0 | Refills: 0 | Status: DISCONTINUED | OUTPATIENT
Start: 2017-10-15 | End: 2017-10-16

## 2017-10-15 RX ADMIN — Medication 1 PACKET(S): at 18:30

## 2017-10-15 RX ADMIN — Medication 10 MILLIEQUIVALENT(S): at 21:24

## 2017-10-15 RX ADMIN — Medication 1 APPLICATION(S): at 21:24

## 2017-10-15 RX ADMIN — PIPERACILLIN AND TAZOBACTAM 90 MILLIGRAM(S): 4; .5 INJECTION, POWDER, LYOPHILIZED, FOR SOLUTION INTRAVENOUS at 18:30

## 2017-10-15 RX ADMIN — Medication 10 MILLIEQUIVALENT(S): at 12:29

## 2017-10-15 RX ADMIN — MAGNESIUM OXIDE 400 MG ORAL TABLET 400 MILLIGRAM(S): 241.3 TABLET ORAL at 01:00

## 2017-10-15 RX ADMIN — LEVETIRACETAM 500 MILLIGRAM(S): 250 TABLET, FILM COATED ORAL at 10:00

## 2017-10-15 RX ADMIN — Medication 1 MILLIGRAM(S): at 10:15

## 2017-10-15 RX ADMIN — LEVETIRACETAM 500 MILLIGRAM(S): 250 TABLET, FILM COATED ORAL at 22:25

## 2017-10-15 RX ADMIN — LANSOPRAZOLE 15 MILLIGRAM(S): 15 CAPSULE, DELAYED RELEASE ORAL at 10:00

## 2017-10-15 RX ADMIN — LEVALBUTEROL 1.25 MILLIGRAM(S): 1.25 SOLUTION, CONCENTRATE RESPIRATORY (INHALATION) at 09:45

## 2017-10-15 RX ADMIN — Medication 1 MILLIGRAM(S): at 19:53

## 2017-10-15 RX ADMIN — MAGNESIUM OXIDE 400 MG ORAL TABLET 400 MILLIGRAM(S): 241.3 TABLET ORAL at 22:25

## 2017-10-15 RX ADMIN — SODIUM CHLORIDE 3 MILLILITER(S): 9 INJECTION INTRAMUSCULAR; INTRAVENOUS; SUBCUTANEOUS at 09:49

## 2017-10-15 RX ADMIN — SODIUM CHLORIDE 3 MILLILITER(S): 9 INJECTION INTRAMUSCULAR; INTRAVENOUS; SUBCUTANEOUS at 00:40

## 2017-10-15 RX ADMIN — MAGNESIUM OXIDE 400 MG ORAL TABLET 400 MILLIGRAM(S): 241.3 TABLET ORAL at 10:00

## 2017-10-15 RX ADMIN — Medication 300 MILLIGRAM(S): at 10:20

## 2017-10-15 RX ADMIN — PIPERACILLIN AND TAZOBACTAM 90 MILLIGRAM(S): 4; .5 INJECTION, POWDER, LYOPHILIZED, FOR SOLUTION INTRAVENOUS at 06:00

## 2017-10-15 RX ADMIN — LEVALBUTEROL 1.25 MILLIGRAM(S): 1.25 SOLUTION, CONCENTRATE RESPIRATORY (INHALATION) at 23:12

## 2017-10-15 RX ADMIN — PIPERACILLIN AND TAZOBACTAM 90 MILLIGRAM(S): 4; .5 INJECTION, POWDER, LYOPHILIZED, FOR SOLUTION INTRAVENOUS at 00:30

## 2017-10-15 RX ADMIN — PIPERACILLIN AND TAZOBACTAM 90 MILLIGRAM(S): 4; .5 INJECTION, POWDER, LYOPHILIZED, FOR SOLUTION INTRAVENOUS at 12:28

## 2017-10-15 RX ADMIN — SODIUM CHLORIDE 3 MILLILITER(S): 9 INJECTION INTRAMUSCULAR; INTRAVENOUS; SUBCUTANEOUS at 23:33

## 2017-10-15 RX ADMIN — Medication 300 MILLIGRAM(S): at 19:43

## 2017-10-15 NOTE — H&P PEDIATRIC - NEURO
No changes from baseline, non focal exam, no witnessed seizure like episodes, no evidence of agitation or delirium

## 2017-10-15 NOTE — PHYSICAL THERAPY INITIAL EVALUATION PEDIATRIC - MUSCLE TONE ASSESSMENT, REHAB EVAL
bilateral upper extremities/hypotonic/spasticity/bilateral lower extremities/hypertonic/trunk/trunk hypotonia

## 2017-10-15 NOTE — PHYSICAL THERAPY INITIAL EVALUATION PEDIATRIC - FUNCTIONAL LEVEL AT TIME OF EVAL, PT EVAL
rec'd 7x/wk PT/OT from [facility]. As per parents, able to sit up with assist and hasn't ambulated in recent months.

## 2017-10-15 NOTE — PROGRESS NOTE PEDS - SUBJECTIVE AND OBJECTIVE BOX
Patient is a 22y old  Female who presents with a chief complaint of Multifocal pneumonia (15 Oct 2017 02:51). Also h/o left sided facial grimace and stiffening of the left arm  intermittently. H/O MRCP/ tracheostomy--vent dependence (Vented at night/trache collar during the day). Also h/o epilepsy and spasticity and some dysautonomia with temperature instability. Transferred from the MICU at [facility]. Urine + for LE and Gram negative rods noted on tracheal specimen    Interval/Overnight Events: Stable respiratory status overnight but did not tolerate trial off ventilator this am    VITAL SIGNS:  T(C): 36 (10-15-17 @ 08:00), Max: 36.3 (10-15-17 @ 05:00)  HR: 106 (10-15-17 @ 11:04) (62 - 665)  BP: 90/54 (10-15-17 @ 08:00) (88/44 - 134/76)  RR: 12 (10-15-17 @ 08:00) (9 - 18)  SpO2: 92% (10-15-17 @ 11:04) (90% - 99%)      RESPIRATORY:  Mechanical Ventilation: Mode: SIMV (Synchronized Intermittent Mandatory Ventilation), RR (machine): 15, FiO2: 60, PEEP: 8, ITime: 0.7, MAP: 12, PIP: 20    Respiratory Medications:  buDESOnide   for Nebulization - Peds 1 milliGRAM(s) Nebulizer every 12 hours  ipratropium 0.02% for Nebulization - Peds 500 MICROGram(s) Inhalation every 6 hours PRN  levalbuterol for Nebulization - Peds 1.25 milliGRAM(s) Nebulizer every 12 hours  sodium chloride 7% for Nebulization - Peds 3 milliLiter(s) Nebulizer every 12 hours     Chest vest every 6 hours  Thick white secretions  CARDIOVASCULAR    Cardiovascular Medications:  EPINEPHrine   IntraMuscular Injection - Peds 0.3 milliGRAM(s) IntraMuscular once PRN      Cardiac Rhythm: Normal sinus rhythm      HEMATOLOGIC/ONCOLOGIC:                        10.9   6.4   )-----------( 119      ( 14 Oct 2017 04:48 )             33.6       Transfusions:	None    Hematologic/Oncologic Medications:    [X] DVT Prophylaxis: Moderate risk--on Venodynes.      INFECTIOUS DISEASE:  Antimicrobials/Immunologic Medications:  piperacillin/tazobactam IV Intermittent - Peds 2700 milliGRAM(s) IV Intermittent every 6 hours  tobramycin for Nebulization - Peds 300 milliGRAM(s) Nebulizer every 12 hours    Cultures: Urine and blood pending  Tracheal Gram Numerous Gram - rods, moderate gram positive cocci    FLUIDS/ELECTROLYTES/NUTRITION:  I&O's Summary    14 Oct 2017 07:01  -  15 Oct 2017 07:00  --------------------------------------------------------  IN: 680 mL / OUT: 1855 mL / NET: -1175 mL      Daily Weight in Gm: 60099 (14 Oct 2017 19:20)  10-14    138  |  109<H>  |  5<L>  ----------------------------<  69<L>  3.5   |  23  |  0.40<L>    Ca    7.9<L>      14 Oct 2017 04:48        Diet:	 GT		Pediaylte 100 ml/hr--change to home regimen Elecare 90 ml/hr, with  supplemental water       Gastrointestinal Medications:  lansoprazole   Oral  Liquid - Peds 15 milliGRAM(s) Enteral Tube daily  magnesium oxide Tab/Cap - Peds 400 milliGRAM(s) Oral every 12 hours    Comments:    NEUROLOGY:  [] SBS:		[] DOMENICO-1:	[] BIS:  [] Adequacy of sedation and pain control has been assessed and adjusted    Neurologic Medications:  diazepam Rectal Gel - Peds 7.5 milliGRAM(s) Rectal once PRN  levETIRAcetam  Oral Liquid - Peds 500 milliGRAM(s) Enteral Tube every 12 hours        Topical/Other Medications:  lactobacillus Oral Powder (CULTURELLE KIDS) - Peds 1 Packet(s) Enteral Tube daily      PATIENT CARE ACCESS DEVICES:  [] Peripheral IV  [] Central Venous Line	[] R	[] L	[] IJ	[] Fem	[] SC			[] Placed:  [] Arterial Line		[] R	[] L	[] PT	[] DP	[] Fem	[] Rad	[] Ax	[] Placed:  [] PICC:				[] Broviac		[] Mediport  [] Urinary Catheter, Date Placed:  [] Necessity of urinary, arterial, and venous catheters discussed    PHYSICAL EXAM:  Respiratory: [] Normal  .	Breath Sounds:		[] Normal  .	Rhonchi		[] Right		[] Left  .	Wheezing		[] Right		[] Left  .	Diminished		[] Right		[] Left  .	Crackles		[] Right		[] Left  .	Effort:			[] Even unlabored	[] Nasal Flaring		[] Grunting  .				[] Stridor		[] Retractions  .				[] Ventilator assisted  .	Comments:    Cardiovascular:	[] Normal  .	Murmur:		[] None		[] Present:  .	Capillary Refill		[] Brisk, less than 2 seconds	[] Prolonged:  .	Pulses:			[] Equal and strong		[] Other:  .	Comments:    Abdominal: [] Normal  .	Characteristics:	[] Soft	[] Distended	[] Tender	[] Taut	[] Rigid	[] BS Absent  .	Comments:     Skin: [] Normal  .	Edema:		[] None		[] Generalized	[] 1+	[] 2+	[] 3+	[] 4+  .	Rash:		[] None		[] Present:  .	Comments:    Neurologic: [] Normal  .	Characteristics:	[] Alert		[] Sedated	[] No acute change from baseline  .	Comments:      IMAGING STUDIES:    Parent/Guardian is at the bedside:	[] Yes	[] No  Patient and Parent/Guardian updated as to the progress/plan of care:	[] Yes	[] No    [] The patient remains in critical and unstable condition, and requires ICU care and monitoring  [] The patient is improving but requires continued monitoring and adjustment of therapy Patient is a 22y old  Female who presents with a chief complaint of Multifocal pneumonia (15 Oct 2017 02:51). Also h/o left sided facial grimace and stiffening of the left arm  intermittently. H/O MRCP/ tracheostomy--vent dependence (Vented at night/trache collar during the day). Also h/o epilepsy and spasticity and some dysautonomia with temperature instability. Transferred from the MICU at [facility]. Urine + for LE and Gram negative rods noted on tracheal specimen    Interval/Overnight Events: Stable respiratory status overnight but did not tolerate trial off ventilator this am    VITAL SIGNS:  T(C): 36 (10-15-17 @ 08:00), Max: 36.3 (10-15-17 @ 05:00)  HR: 106 (10-15-17 @ 11:04) (62 - 665)  BP: 90/54 (10-15-17 @ 08:00) (88/44 - 134/76)  RR: 12 (10-15-17 @ 08:00) (9 - 18)  SpO2: 92% (10-15-17 @ 11:04) (90% - 99%)      RESPIRATORY:  Mechanical Ventilation: Mode: SIMV (Synchronized Intermittent Mandatory Ventilation), RR (machine): 15, FiO2: 60, PEEP: 8, ITime: 0.7, MAP: 12, PIP: 20    Respiratory Medications:  buDESOnide   for Nebulization - Peds 1 milliGRAM(s) Nebulizer every 12 hours  ipratropium 0.02% for Nebulization - Peds 500 MICROGram(s) Inhalation every 6 hours PRN  levalbuterol for Nebulization - Peds 1.25 milliGRAM(s) Nebulizer every 12 hours  sodium chloride 7% for Nebulization - Peds 3 milliLiter(s) Nebulizer every 12 hours     Chest vest every 6 hours add medibes every 8 hours with Zopinex and 7% every 8 hours  Thick white secretions  CARDIOVASCULAR    Cardiovascular Medications:  EPINEPHrine   IntraMuscular Injection - Peds 0.3 milliGRAM(s) IntraMuscular once PRN      Cardiac Rhythm: Normal sinus rhythm      HEMATOLOGIC/ONCOLOGIC:                        10.9   6.4   )-----------( 119      ( 14 Oct 2017 04:48 )             33.6       Transfusions:	None    Hematologic/Oncologic Medications:    [X] DVT Prophylaxis: Moderate risk--on Venodynes.      INFECTIOUS DISEASE:  Antimicrobials/Immunologic Medications:  piperacillin/tazobactam IV Intermittent - Peds 2700 milliGRAM(s) IV Intermittent every 6 hours  tobramycin for Nebulization - Peds 300 milliGRAM(s) Nebulizer every 12 hours  recently off her cycle of Colistin nebs    Cultures: Urine and blood pending  Tracheal Gram Numerous Gram - rods, moderate gram positive cocci    FLUIDS/ELECTROLYTES/NUTRITION:  I&O's Summary    14 Oct 2017 07:01  -  15 Oct 2017 07:00  --------------------------------------------------------  IN: 680 mL / OUT: 1855 mL / NET: -1175 mL      Daily Weight in Gm: 49812 (14 Oct 2017 19:20)  10-14    138  |  109<H>  |  5<L>  ----------------------------<  69<L>  3.5   |  23  |  0.40<L>    Ca    7.9<L>      14 Oct 2017 04:48        Diet:	 GT		Pediaylte 100 ml/hr--change to home regimen Elecare 90 ml/hr, with  supplemental water       Gastrointestinal Medications:  lansoprazole   Oral  Liquid - Peds 15 milliGRAM(s) Enteral Tube daily  magnesium oxide Tab/Cap - Peds 400 milliGRAM(s) Oral every 12 hours    Comments:    NEUROLOGY:  [] SBS:		[] DOMENICO-1:	[] BIS:  [] Adequacy of sedation and pain control has been assessed and adjusted    Neurologic Medications:  diazepam Rectal Gel - Peds 7.5 milliGRAM(s) Rectal once PRN  levETIRAcetam  Oral Liquid - Peds 500 milliGRAM(s) Enteral Tube every 12 hours        Topical/Other Medications:  lactobacillus Oral Powder (CULTURELLE KIDS) - Peds 1 Packet(s) Enteral Tube daily      PATIENT CARE ACCESS DEVICES:  [] Peripheral IV  [] Central Venous Line	[] R	[] L	[] IJ	[] Fem	[] SC			[] Placed:  [] Arterial Line		[] R	[] L	[] PT	[] DP	[] Fem	[] Rad	[] Ax	[] Placed:  [] PICC:				[] Broviac		[] Mediport  [] Urinary Catheter, Date Placed:  [] Necessity of urinary, arterial, and venous catheters discussed    PHYSICAL EXAM:  Respiratory: [] Normal  .	Breath Sounds:		[] Normal  .	Rhonchi		[] Right		[] Left  .	Wheezing		[] Right		[] Left  .	Diminished		[] Right		[] Left  .	Crackles		[] Right		[] Left  .	Effort:			[] Even unlabored	[] Nasal Flaring		[] Grunting  .				[] Stridor		[] Retractions  .				[] Ventilator assisted  .	Comments:    Cardiovascular:	[] Normal  .	Murmur:		[] None		[] Present:  .	Capillary Refill		[] Brisk, less than 2 seconds	[] Prolonged:  .	Pulses:			[] Equal and strong		[] Other:  .	Comments:    Abdominal: [] Normal  .	Characteristics:	[] Soft	[] Distended	[] Tender	[] Taut	[] Rigid	[] BS Absent  .	Comments:     Skin: [] Normal  .	Edema:		[] None		[] Generalized	[] 1+	[] 2+	[] 3+	[] 4+  .	Rash:		[] None		[] Present:  .	Comments:    Neurologic: [] Normal  .	Characteristics:	[] Alert		[] Sedated	[] No acute change from baseline  .	Comments:      IMAGING STUDIES:    Parent/Guardian is at the bedside:	[] Yes	[] No  Patient and Parent/Guardian updated as to the progress/plan of care:	[] Yes	[] No    [] The patient remains in critical and unstable condition, and requires ICU care and monitoring  [] The patient is improving but requires continued monitoring and adjustment of therapy Patient is a 22y old  Female who presents with a chief complaint of Multifocal pneumonia (15 Oct 2017 02:51). Also h/o left sided facial grimace and stiffening of the left arm  intermittently and seemed more lethargic as per Mother  . H/O MRCP/ tracheostomy--vent dependence (Vented at night/trache collar during the day). Also h/o epilepsy and spasticity and some dysautonomia with temperature instability. Transferred from the MICU at [facility]. Urine + for LE and Gram negative rods noted on tracheal specimen with Xray showing bilateral infiltates.     Interval/Overnight Events: Stable respiratory status overnight but did not tolerate trial off ventilator this am    VITAL SIGNS:  T(C): 36 (10-15-17 @ 08:00), Max: 36.3 (10-15-17 @ 05:00)  HR: 106 (10-15-17 @ 11:04) (62 - 665)  BP: 90/54 (10-15-17 @ 08:00) (88/44 - 134/76)  RR: 12 (10-15-17 @ 08:00) (9 - 18)  SpO2: 92% (10-15-17 @ 11:04) (90% - 99%)      RESPIRATORY:  Mechanical Ventilation: Mode: SIMV (Synchronized Intermittent Mandatory Ventilation), RR (machine): 15, FiO2: 60, PEEP: 8, ITime: 0.7, MAP: 12, PIP: 20    Respiratory Medications:  buDESOnide   for Nebulization - Peds 1 milliGRAM(s) Nebulizer every 12 hours  ipratropium 0.02% for Nebulization - Peds 500 MICROGram(s) Inhalation every 6 hours PRN  levalbuterol for Nebulization - Peds 1.25 milliGRAM(s) Nebulizer every 12 hours  sodium chloride 7% for Nebulization - Peds 3 milliLiter(s) Nebulizer every 12 hours     Chest vest every 6 hours add Metanebs  every 8 hours with Zopinex and 7% NaCl nebs every 8 hours  Thick white secretions    CARDIOVASCULAR    Cardiovascular Medications:  EPINEPHrine   IntraMuscular Injection - Peds 0.3 milliGRAM(s) IntraMuscular once PRN      Cardiac Rhythm: Normal sinus rhythm      HEMATOLOGIC/ONCOLOGIC:                        10.9   6.4   )-----------( 119      ( 14 Oct 2017 04:48 )             33.6       Transfusions:	None    Hematologic/Oncologic Medications:    [X] DVT Prophylaxis: Moderate risk--on Venodynes.      INFECTIOUS DISEASE:  Antimicrobials/Immunologic Medications:  piperacillin/tazobactam IV Intermittent - Peds 2700 milliGRAM(s) IV Intermittent every 6 hours  tobramycin for Nebulization - Peds 300 milliGRAM(s) Nebulizer every 12 hours  Recently off her cycle of Colistin nebs (cycles between Colistin and LUZ)    Cultures: Urine and blood pending  Tracheal Gram Numerous Gram negative rods, rare gram positive cocci in clusters and Culture growing Serratia (sensitivities not yet available)    FLUIDS/ELECTROLYTES/NUTRITION:  I&O's Summary    14 Oct 2017 07:01  -  15 Oct 2017 07:00  --------------------------------------------------------  IN: 680 mL / OUT: 1855 mL / NET: -1175 mL      Daily Weight in Gm: 07602 (14 Oct 2017 19:20)  10-14    138  |  109<H>  |  5<L>  ----------------------------<  69<L>  3.5   |  23  |  0.40<L>    Ca    7.9<L>      14 Oct 2017 04:48        Diet:	 GT Pediaylte 100 ml/hr 9 pm to 7 am--and during the day  Elecare 90 ml/hr, with  supplemental water(home regimen)       Gastrointestinal Medications:  lansoprazole   Oral  Liquid - Peds 15 milliGRAM(s) Enteral Tube daily  magnesium oxide Tab/Cap - Peds 400 milliGRAM(s) Oral every 12 hours    Comments:    NEUROLOGY:    Neurologic Medications:  diazepam Rectal Gel - Peds 7.5 milliGRAM(s) Rectal once PRN  levETIRAcetam  Oral Liquid - Peds 500 milliGRAM(s) Enteral Tube every 12 hours        Topical/Other Medications:  lactobacillus Oral Powder (CULTURELLE KIDS) - Peds 1 Packet(s) Enteral Tube daily      PATIENT CARE ACCESS DEVICES:  [X] Peripheral IV    PHYSICAL EXAM:  Respiratory:  Ventilator assisted via tracheostomy. No respiratory distress. Good air entry bilaterally with no adventitious sounds.   .	Comments:    Cardiovascular:	[] Normal  .	Murmur:		[X] None		[] Present:  .	Capillary Refill		[x] Brisk, less than 2 seconds	[] Prolonged:  .	Pulses:			[x] Equal and strong		[] Other:  .	Comments:    Abdominal: [] Normal  .	Characteristics:	[X] Soft	[X] Non-Distended	[] Tender	[] Taut	[] Rigid	[x] BS present  .	Comments:     Skin: [] Normal  .	Edema:		[X] None		[] Generalized	[] 1+	[] 2+	[] 3+	[] 4+  .	Rash:		[x] None		[] Present:  .	Comments:    Neurologic: [] Normal  .	Characteristics:	[x] Alert		No facial asymmetry. Non-verbal (baseline). Quadriparetic (baseline)        IMAGING STUDIES:    Parent/Guardian is at the bedside:	[X] Yes	[] No  Patient and Parent/Guardian updated as to the progress/plan of care:	[X] Yes	[] No    [x] The patient remains in critical and unstable condition, and requires ICU care and monitoring  [] The patient is improving but requires continued monitoring and adjustment of therapy  [X ] Total critical care time spent by attending physician was 35 minutes, excluding procedure time.

## 2017-10-15 NOTE — H&P PEDIATRIC - NSHPOUTPATIENTPROVIDERS_GEN_ALL_CORE
PMD Dr. Deborah Lora -[phone]  Dr. Latif (pulmonary) PMD Dr. Deborah Hernandez [facility] [phone]  Dr. Latif (pulmonary), Dr. Faustin (neurology), Dr. Hamilton (GI), Dr. Lakhani (nephro), Dr. Sean Hays (ENT)

## 2017-10-15 NOTE — PHYSICAL THERAPY INITIAL EVALUATION PEDIATRIC - RANGE OF MOTION EXAMINATION, REHAB
c/s A/ROM - restricted 2/2 to trach in place and tightness noted; BUE - WFL and BLE - WFL actively, reflexive movements noted

## 2017-10-15 NOTE — PROGRESS NOTE PEDS - SUBJECTIVE AND OBJECTIVE BOX
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   Patient initially presented to [place] ED on 10 AM secondary to decreased alertness, decreased urinary output and ultimately an episode that was concerning to the parents which seemed either like a seizure episode or distress with spasticity. Patient has baseline seizures which are focal which are controlled on Keppra 500 mg BID. This episode was characterized by left sided facial twitching with a grimace of the left upper lip upwards. She was hypoxic to 80% requiring increase in her flow from 2L to 4L via trach. This episode was unlike her usual focal seizures. Not associated with eye rolling or loss of consciousness. The patient does have temperature instability but was found to be below baseline with temps of 95 compared to baseline 97. Trach secretions were white but at baseline in quantity.  Parents transported her to  ED - sepsis work up initiated with fluid resuscitation of NS bolus x 1. Patient received Vanc and Zosyn. CBC revealed WBC 14, thrombocytopenia 119 (137). Blood cultures x 2 resulted negative x 24 hours. UA w/ small leuk esterase, moderate bacteria. Urine culture pending. Trach culture significant for gram neg rods, gram pos rods and cocci. RVP negative. CXR concerning for multifocal PNA with evidence of R pleural effusion. CT head with no emergent findings. Patient usually requires SIMV R 15 20/8 , 2L to maintain SpO2 > 98% overnight and 2L-4L during the day. She required aggressive pulmonary toilet for which she received home nursing care. Was last seen by pulmonology 10/6 and was well. No changes to vent settings or management for airway clearance.   Vancomycin was discontinued when blood cultures were 24 hr negative. Patient continued on IV Zosyn for tracheitis and presumed multifocal PNA secondary to poor airway clearance or aspiration. Vent settings were modified to be volume controlled with  mL. Patient was seen by [facility] pulmonary team and ID team who was in contact with Shira's pulmonologist here at [facility]. Transfer to [facility] PICU was requested by parents of the patient. (15 Oct 2017 02:51)      RESPIRATORY HISTORY: Followed by Dr. Latif, known to service.  On SIMV-PC during sleep only at home.  last admit 1year ago trach cx with resistant pseud and stenotroph.  finishing collistin month at home, due for teofilo change today. Treated for tracheitis x2 this past year with cipro.    PAST MEDICAL & SURGICAL HISTORY:  Hypokalemia  Kyphosis  Scoliosis  Hypotonia  Asthma  Seizures  Cerebral Palsy  Gastroesophageal Reflux Disease  Hypothyroidism: Treated with synthroid in past. Stopped treatment in  due to normal thyroid function.  Developmental Delay  Tracheostomy in place  Peg (Percutaneous Endoscopic Gastrostomy) Adjustment/Replacement/Removal  S/P Tonsillectomy and Adenoidectomy  Strabismus                   MEDICATIONS  (STANDING):  buDESOnide   for Nebulization - Peds 1 milliGRAM(s) Nebulizer every 12 hours  lactobacillus Oral Powder (CULTURELLE KIDS) - Peds 1 Packet(s) Enteral Tube daily  lansoprazole   Oral  Liquid - Peds 15 milliGRAM(s) Enteral Tube daily  levalbuterol for Nebulization - Peds 1.25 milliGRAM(s) Nebulizer every 12 hours  levETIRAcetam  Oral Liquid - Peds 500 milliGRAM(s) Enteral Tube every 12 hours  magnesium oxide Tab/Cap - Peds 400 milliGRAM(s) Oral every 12 hours  piperacillin/tazobactam IV Intermittent - Peds 2700 milliGRAM(s) IV Intermittent every 6 hours  sodium chloride 7% for Nebulization - Peds 3 milliLiter(s) Nebulizer every 12 hours  tobramycin for Nebulization - Peds 300 milliGRAM(s) Nebulizer every 12 hours    MEDICATIONS  (PRN):  diazepam Rectal Gel - Peds 7.5 milliGRAM(s) Rectal once PRN Seizure > 3 min  EPINEPHrine   IntraMuscular Injection - Peds 0.3 milliGRAM(s) IntraMuscular once PRN Anaphylaxis  ipratropium 0.02% for Nebulization - Peds 500 MICROGram(s) Inhalation every 6 hours PRN Bronchospasm    Allergies    Bactrim (Unknown)  Benadryl (Other)  carbamazepine (Unknown)  cephalosporins (Unknown)  Depakote (Unknown)  diphenhydrAMINE (Seizure)  eggs (Unknown)  EGGS,  NUTS (Anaphylaxis)  Erythromycin Base (Unknown)  metoclopramide (Unknown)  Milk (Unknown)  MILK, SHELLFISH, WHEAT (Unknown)  Nuts (Unknown)  oxcarbazepine (Unknown)  peanuts (Unknown)  Reglan (Unknown)  SEASONAL, POLLEN, GRASS, PET DANDER, FEATHERS (Unknown)  shellfish (Unknown)  Trileptal (Unknown)  TRILEPTAL, DEPAKOTE, REGLAN, VANTIN, CEPHALOSPORINS (Unknown)  valproic acid (Unknown)  vancomycin (Unknown)        Vital Signs Last 24 Hrs  T(C): 36 (15 Oct 2017 08:00), Max: 36.3 (15 Oct 2017 05:00)  T(F): 96.8 (15 Oct 2017 08:00), Max: 97.3 (15 Oct 2017 05:00)  HR: 106 (15 Oct 2017 11:04) (62 - 665)  BP: 90/54 (15 Oct 2017 08:00) (88/44 - 134/76)  BP(mean): 63 (15 Oct 2017 08:00) (55 - 92)  RR: 12 (15 Oct 2017 08:00) (9 - 18)  SpO2: 92% (15 Oct 2017 11:04) (90% - 99%)  Daily     Daily Weight in Gm: 52979 (14 Oct 2017 19:20)  Mode: SIMV (Synchronized Intermittent Mandatory Ventilation)  RR (machine): 15  FiO2: 60  PEEP: 8  ITime: 0.7  MAP: 12  PC: 12  PIP: 20        Lab Results:                        10.9   6.4   )-----------( 119      ( 14 Oct 2017 04:48 )             33.6     10-14    138  |  109<H>  |  5<L>  ----------------------------<  69<L>  3.5   |  23  |  0.40<L>    Ca    7.9<L>      14 Oct 2017 04:48        Urinalysis Basic - ( 13 Oct 2017 12:30 )    Color: Yellow / Appearance: Slightly Turbid / S.010 / pH: x  Gluc: x / Ketone: Negative  / Bili: Negative / Urobili: Negative mg/dL   Blood: x / Protein: Negative mg/dL / Nitrite: Negative   Leuk Esterase: Small / RBC: 0-2 /HPF / WBC 3-5   Sq Epi: x / Non Sq Epi: Many / Bacteria: Moderate        MICROBIOLOGY:  Culture - Sputum . (10.13.17 @ 20:40)    Gram Stain:   Numerous polymorphonuclear leukocytes per low power field  Few Squamous epithelial cells per low power field  Numerous Gram Negative Rods seen per oil power field  Few Gram Positive Rods seen per oil power field  Rare Gram Positive Cocci in Clusters seen per oil power field    Specimen Source: .Sputum Sputum/TRAP          IMAGING STUDIES:  < from: Xray Chest 1 View AP/PA. (10.14.17 @ 10:11) >  Findings:    Tracheostomy visualized. Heart is mildly enlarged. Improving right   pleural effusion as well as previously noted multifocal pneumonia with   mild residual changes. The apices and hemidiaphragms are unremarkable.   Degenerative changes of the visualized osseous structures. G-tube   partially visualized.        Impression:    Improving right pleural effusion and associated multifocal pneumonia    < end of copied text >

## 2017-10-15 NOTE — H&P PEDIATRIC - HISTORY OF PRESENT ILLNESS
23 yo F with history of cerebral palsy, global developmental delay, microcephaly complicated by epilepsy, hydronephrosis, diffuse hypotonia, scoliosis with spasticity, GJ tube dependence and trach dependence with chronic colonization with pseudomonas and MRSA and history of aspiration pneumonia transferred from [facility] MICU. 23 yo F with history of cerebral palsy, global developmental delay, microcephaly complicated by epilepsy, hydronephrosis, diffuse hypotonia, scoliosis & kyphosis with spasticity, electrolyte abn including hypokalemia GJ tube dependence and trach dependence with chronic colonization with pseudomonas and MRSA and history of aspiration pneumonia transferred from [facility] MICU.   Patient initially presented to [facility] ED on 10/13 AM secondary to decreased alertness, decreased urinary output and ultimately an episode that was concerning to the parents which seemed either like a seizure episode or distress with spasticity. Patient has baseline seizures which are focal which are controlled on Keppra 500 mg BID. This episode was characterized by left sided facial twitching with a grimace of the left upper lip upwards. She was hypoxic to 80% requiring increase in her flow from 2L to 4L via trach. This episode was unlike her usual focal seizures. Not associated with eye rolling or loss of consciousness. The patient does have temperature instability but was found to be below baseline with temps of 95 compared to baseline 97. Trach secretions were white but at baseline in quantity.  Parents transported her to  ED - sepsis work up initiated with fluid resuscitation of NS bolus x 1. Patient received Vanc and Zosyn. CBC revealed WBC 14, thrombocytopenia 119 (137). Blood cultures x 2 resulted negative x 24 hours. UA w/ small leuk esterase, moderate bacteria. Urine culture pending. Trach culture significant for gram neg rods, gram pos rods and cocci. RVP negative. CXR concerning for multifocal PNA with evidence of R pleural effusion. CT head with no emergent findings. Patient usually requires SIMV R 15 20/8 , 2L to maintain SpO2 > 98% overnight and 2L-4L during the day. She required aggressive pulmonary toilet for which she received home nursing care. Was last seen by pulmonology 10/6 and was well. No changes to vent settings or management for airway clearance. 23 yo F with history of cerebral palsy, global developmental delay, microcephaly complicated by epilepsy, hydronephrosis, hypothyroidism, diffuse hypotonia, scoliosis & kyphosis with spasticity, electrolyte abn including hypokalemia GJ tube dependence and trach dependence with chronic colonization with pseudomonas and MRSA and history of aspiration pneumonia [facility].   Patient initially presented to [facility] ED on 10/13 AM secondary to decreased alertness, decreased urinary output and ultimately an episode that was concerning to the parents which seemed either like a seizure episode or distress with spasticity. Patient has baseline seizures which are focal which are controlled on Keppra 500 mg BID. This episode was characterized by left sided facial twitching with a grimace of the left upper lip upwards. She was hypoxic to 80% requiring increase in her flow from 2L to 4L via trach. This episode was unlike her usual focal seizures. Not associated with eye rolling or loss of consciousness. The patient does have temperature instability but was found to be below baseline with temps of 95 compared to baseline 97. Trach secretions were white but at baseline in quantity.  Parents transported her to  ED - sepsis work up initiated with fluid resuscitation of NS bolus x 1. Patient received Vanc and Zosyn. CBC revealed WBC 14, thrombocytopenia 119 (137). Blood cultures x 2 resulted negative x 24 hours. UA w/ small leuk esterase, moderate bacteria. Urine culture pending. Trach culture significant for gram neg rods, gram pos rods and cocci. RVP negative. CXR concerning for multifocal PNA with evidence of R pleural effusion. CT head with no emergent findings. Patient usually requires SIMV R 15 20/8 , 2L to maintain SpO2 > 98% overnight and 2L-4L during the day. She required aggressive pulmonary toilet for which she received home nursing care. Was last seen by pulmonology 10/6 and was well. No changes to vent settings or management for airway clearance.   Vancomycin was discontinued when blood cultures were 24 hr negative. Patient continued on IV Zosyn for tracheitis and presumed multifocal PNA secondary to poor airway clearance or aspiration. Vent settings were modified to be volume controlled with  mL. Patient was seen by Health system pulmonary team and ID team who was in contact with Shira's pulmonologist here at [facility]. Transfer to [facility] was requested by parents of the patient.

## 2017-10-15 NOTE — H&P PEDIATRIC - SYMPTOMS
Hypothermia, tiredness, dec alertness Inc trach secretions. No nasal congestion, rhinorrhea Increasing oxygen requirements from baseline No loose/watery stools, nonbloody Decreased urine output history of hydronephrosis Questionable seizure like activity 10/13, no rectal Diastat given. No further episodes

## 2017-10-15 NOTE — H&P PEDIATRIC - PROBLEM SELECTOR PLAN 4
- Optimize to home settings of Trach collar to 2-4L during the daytime  - Overnight : RR 15 20/8   - To bring in Trilogy home vent  - CXR on admission for comparison  - Trach care as needed w/ frequent suctioning

## 2017-10-15 NOTE — PHYSICAL THERAPY INITIAL EVALUATION PEDIATRIC - IMPAIRMENTS FOUND, REHAB EVAL
arousal, attention, and cognition/gross motor/ROM/decreased midline orientation/muscle strength/posture/tone

## 2017-10-15 NOTE — H&P PEDIATRIC - PROBLEM SELECTOR PLAN 5
- Restart home feeding regimen  - Electrolyte replacement enterally - KClor, Mag Oxide  - To start probiotic

## 2017-10-15 NOTE — PHYSICAL THERAPY INITIAL EVALUATION PEDIATRIC - GENERAL OBSERVATIONS, REHAB EVAL
Rec'd supine laterally leaning towards R side with HOB elevated to 30 deg with rails intact. +trach with vent, teley, IV, GT all intact. Parents bedside and RN cleared her for evaluation. MOC reported pt fatigued however could benefit from "some ROM and not moving around too much right now".

## 2017-10-15 NOTE — H&P PEDIATRIC - NSHPLABSRESULTS_GEN_ALL_CORE
EXAM:  CHEST SINGLE VIEW FRONTAL                            PROCEDURE DATE:  10/14/2017      INTERPRETATION:  Exam Date: 10/14/2017 10:11 AM    History: Follow-up pneumonia    Technique: Single frontal portable view of the chest with comparison to    10/13/2017    Findings:    Tracheostomy visualized. Heart is mildly enlarged. Improving right   pleural effusion as well as previously noted multifocal pneumonia with   mild residual changes. The apices and hemidiaphragms are unremarkable.   Degenerative changes of the visualized osseous structures. G-tube   partially visualized.    Impression:    Improving right pleural effusion and associated multifocal pneumonia

## 2017-10-15 NOTE — H&P PEDIATRIC - CARDIOVASCULAR
negative No murmur/Symmetric upper and lower extremity pulses of normal amplitude/Regular rate and variability Brisk capillary refill

## 2017-10-15 NOTE — PHYSICAL THERAPY INITIAL EVALUATION PEDIATRIC - FUNCTIONAL LIMITATIONS, REHAB EVAL
bed mobility/transfers/cognitive/perceptual/functional activities/self-care cognitive/perceptual/bed mobility/transfers/functional activities/self-care

## 2017-10-15 NOTE — H&P PEDIATRIC - RESPIRATORY
details Decreased air entry to bilateral bases, course breath sounds bilaterally, no wheezes. Admitted on FiO2 40% to [facility]

## 2017-10-15 NOTE — H&P PEDIATRIC - PROBLEM SELECTOR PLAN 1
- IV Zosyn 80 mg/kg q 6 hrs  - Consider broadening with Vancomycin to cover for staph aureus in the setting of multifocal PNA w/ parapneumonic effusion if patient decompensates  - F/U trach culture and sensitivities, resend trach culture if necessary for speciation and sensitivities  - F/U blood and urine culture - evidence of UTI on UA  - Airway clearance - chest vest twice daily x 30 min, chest PT TID and PRN  - Hypertonic saline q12, Xopenex q12, Pulmicort q12, Atrovent q6 PRN

## 2017-10-15 NOTE — H&P PEDIATRIC - ASSESSMENT
Shira is a 23 yo F with a complex medical history including respiratory failure requiring aggressive assisted airway clearance, history of aspiration with known prior colonization with pseudomonas and MRSA here with increasing supplemental oxygen requirements in the setting of hypothermia and somnolence compared to baseline. Patient has a known history of seizures although this first episode could possibly have indicated a breakthrough seizure, she has had no use of rescue Diastat or Ativan and no further episodes since admission to OSH. Infectious processes lower seizure threshold and regardless should be considered in this situation. She was initially on broad spectrum antibiotics including Vancomycin which was appropriately discontinued when her blood cultures showed no growth and she remained hemodynamically stable without further compromise in clinical status. Patient's hypothermia although raising suspicion for a serious bacterial infection, likely is related to central dysautonomia based on prior hospitalizations and reports from the parents. She was continued on IV Zosyn for coverage of both common etiologies of PNA and anaerobes in the setting of aspiration risk. Although her culture shows gram neg rods, she has been sensitive to Cipro based off prior cultures and will likely be narrowed to enteral form of antibiotic when speciation and sensitivities result. We will optimize her pulmonary toilet / airway clearance on this admission and plan to get her back to home schedule of treatments. Patient is stable to resume J tube feeds. Will involve neurology if there is concern for breakthrough seizures.

## 2017-10-15 NOTE — PROGRESS NOTE PEDS - ASSESSMENT
22 year old female with H/O MRCP, seizure disorder, Quadriparesis, Tracheostomy, GT , Ventilator dependent (at baseline receives night time vent support and Oxygen via nasal cannula during the day) and exclusively GT fed. Transferred form the MICU at [facility] with a diagnosis of Bilateral Pneumonia (Serratia + Tracheal cultures). Mother also reporting episodes of left Facial Grimace/contraction and left arm stiffening and that patient has been more lethargic.    Plan:  Will continue close respiratory monitoring with Pulse Oximetry and EtCO2 and Adjust ventilator support to improve hypoxemia and hypercapnia and relieve work of breathing  If able to wean FiO2 to < 0.5 --will attempt to discontinue again day time vent support (may need wean of day time support to just CPAP prior to discontinuing)  Continue Airway Clearance Therapies: Chest Vest every 6 hours, 7% NaCl nebs/ Zopinex nebs with Metanebs every 8 hours  Neurology Consult to r/o ongoing seizures/EEG (given h/o stiffening of left upper extremity and left facial grimacing with changes in mental status)  Continue Home regimen of feeds  Resume Potassium supplements

## 2017-10-15 NOTE — PHYSICAL THERAPY INITIAL EVALUATION PEDIATRIC - PATIENT PROFILE REVIEW, REHAB EVAL
21 y/o female admitted for primary pneumonia dx, however concerns with seizures as well. Pt is developmentally delayed, has focal hypotonia and spastiscity; scoliosis and kyphosis and CP. Pleas see med chart PMH as well./yes

## 2017-10-16 ENCOUNTER — TRANSCRIPTION ENCOUNTER (OUTPATIENT)
Age: 22
End: 2017-10-16

## 2017-10-16 VITALS — OXYGEN SATURATION: 98 %

## 2017-10-16 DIAGNOSIS — G40.909 EPILEPSY, UNSPECIFIED, NOT INTRACTABLE, WITHOUT STATUS EPILEPTICUS: ICD-10-CM

## 2017-10-16 DIAGNOSIS — Z43.4 ENCOUNTER FOR ATTENTION TO OTHER ARTIFICIAL OPENINGS OF DIGESTIVE TRACT: ICD-10-CM

## 2017-10-16 DIAGNOSIS — J04.10 ACUTE TRACHEITIS WITHOUT OBSTRUCTION: ICD-10-CM

## 2017-10-16 DIAGNOSIS — G80.9 CEREBRAL PALSY, UNSPECIFIED: ICD-10-CM

## 2017-10-16 DIAGNOSIS — K21.9 GASTRO-ESOPHAGEAL REFLUX DISEASE WITHOUT ESOPHAGITIS: ICD-10-CM

## 2017-10-16 DIAGNOSIS — G40.919 EPILEPSY, UNSPECIFIED, INTRACTABLE, WITHOUT STATUS EPILEPTICUS: ICD-10-CM

## 2017-10-16 DIAGNOSIS — J96.21 ACUTE AND CHRONIC RESPIRATORY FAILURE WITH HYPOXIA: ICD-10-CM

## 2017-10-16 LAB
-  AMIKACIN: SIGNIFICANT CHANGE UP
-  AMPICILLIN/SULBACTAM: SIGNIFICANT CHANGE UP
-  AMPICILLIN: SIGNIFICANT CHANGE UP
-  AZTREONAM: SIGNIFICANT CHANGE UP
-  CEFAZOLIN: SIGNIFICANT CHANGE UP
-  CEFEPIME: SIGNIFICANT CHANGE UP
-  CEFOXITIN: SIGNIFICANT CHANGE UP
-  CEFTAZIDIME: SIGNIFICANT CHANGE UP
-  CEFTRIAXONE: SIGNIFICANT CHANGE UP
-  CIPROFLOXACIN: SIGNIFICANT CHANGE UP
-  ERTAPENEM: SIGNIFICANT CHANGE UP
-  GENTAMICIN: SIGNIFICANT CHANGE UP
-  IMIPENEM: SIGNIFICANT CHANGE UP
-  LEVOFLOXACIN: SIGNIFICANT CHANGE UP
-  MEROPENEM: SIGNIFICANT CHANGE UP
-  PIPERACILLIN/TAZOBACTAM: SIGNIFICANT CHANGE UP
-  TOBRAMYCIN: SIGNIFICANT CHANGE UP
-  TRIMETHOPRIM/SULFAMETHOXAZOLE: SIGNIFICANT CHANGE UP
CULTURE RESULTS: SIGNIFICANT CHANGE UP
GRAM STN SPT: SIGNIFICANT CHANGE UP
METHOD TYPE: SIGNIFICANT CHANGE UP
ORGANISM # SPEC MICROSCOPIC CNT: SIGNIFICANT CHANGE UP
ORGANISM # SPEC MICROSCOPIC CNT: SIGNIFICANT CHANGE UP
SPECIMEN SOURCE: SIGNIFICANT CHANGE UP
SPECIMEN SOURCE: SIGNIFICANT CHANGE UP

## 2017-10-16 PROCEDURE — 99239 HOSP IP/OBS DSCHRG MGMT >30: CPT

## 2017-10-16 PROCEDURE — 99253 IP/OBS CNSLTJ NEW/EST LOW 45: CPT

## 2017-10-16 PROCEDURE — 99254 IP/OBS CNSLTJ NEW/EST MOD 60: CPT | Mod: 25

## 2017-10-16 PROCEDURE — 99291 CRITICAL CARE FIRST HOUR: CPT

## 2017-10-16 PROCEDURE — 95816 EEG AWAKE AND DROWSY: CPT | Mod: 26

## 2017-10-16 RX ADMIN — LANSOPRAZOLE 15 MILLIGRAM(S): 15 CAPSULE, DELAYED RELEASE ORAL at 11:16

## 2017-10-16 RX ADMIN — Medication 1 MILLIGRAM(S): at 09:08

## 2017-10-16 RX ADMIN — LEVALBUTEROL 1.25 MILLIGRAM(S): 1.25 SOLUTION, CONCENTRATE RESPIRATORY (INHALATION) at 15:40

## 2017-10-16 RX ADMIN — PIPERACILLIN AND TAZOBACTAM 90 MILLIGRAM(S): 4; .5 INJECTION, POWDER, LYOPHILIZED, FOR SOLUTION INTRAVENOUS at 15:11

## 2017-10-16 RX ADMIN — LEVETIRACETAM 500 MILLIGRAM(S): 250 TABLET, FILM COATED ORAL at 11:16

## 2017-10-16 RX ADMIN — PIPERACILLIN AND TAZOBACTAM 90 MILLIGRAM(S): 4; .5 INJECTION, POWDER, LYOPHILIZED, FOR SOLUTION INTRAVENOUS at 06:00

## 2017-10-16 RX ADMIN — SODIUM CHLORIDE 3 MILLILITER(S): 9 INJECTION INTRAMUSCULAR; INTRAVENOUS; SUBCUTANEOUS at 08:35

## 2017-10-16 RX ADMIN — Medication 1 PACKET(S): at 12:10

## 2017-10-16 RX ADMIN — SODIUM CHLORIDE 3 MILLILITER(S): 9 INJECTION INTRAMUSCULAR; INTRAVENOUS; SUBCUTANEOUS at 15:50

## 2017-10-16 RX ADMIN — Medication 10 MILLIEQUIVALENT(S): at 11:17

## 2017-10-16 RX ADMIN — PIPERACILLIN AND TAZOBACTAM 90 MILLIGRAM(S): 4; .5 INJECTION, POWDER, LYOPHILIZED, FOR SOLUTION INTRAVENOUS at 00:03

## 2017-10-16 RX ADMIN — MAGNESIUM OXIDE 400 MG ORAL TABLET 400 MILLIGRAM(S): 241.3 TABLET ORAL at 12:10

## 2017-10-16 RX ADMIN — Medication 300 MILLIGRAM(S): at 08:55

## 2017-10-16 RX ADMIN — LEVALBUTEROL 1.25 MILLIGRAM(S): 1.25 SOLUTION, CONCENTRATE RESPIRATORY (INHALATION) at 08:05

## 2017-10-16 NOTE — CONSULT NOTE PEDS - SUBJECTIVE AND OBJECTIVE BOX
HPI: 21 yo F with history of cerebral palsy, global developmental delay, microcephaly complicated by epilepsy, hydronephrosis, hypothyroidism, diffuse hypotonia, scoliosis & kyphosis with spasticity, electrolyte abn including hypokalemia GJ tube dependence and trach dependence with chronic colonization with pseudomonas and MRSA and history of aspiration pneumonia transferred from [facility] MICU.     Patient initially presented to [place] ED on 10/13 AM secondary to decreased alertness, decreased urinary output and ultimately an episode that was concerning to the parents which seemed either like a seizure episode or distress with spasticity. Patient has baseline seizures which are focal which are controlled on Keppra 500 mg BID. This episode was characterized by left sided facial twitching with a grimace of the left upper lip upwards. She was hypoxic to 80% requiring increase in her flow from 2L to 4L via trach. This episode was unlike her usual focal seizures. Not associated with eye rolling or loss of consciousness. The patient does have temperature instability but was found to be below baseline with temps of 95 compared to baseline 97. Trach secretions were white but at baseline in quantity.  Parents transported her to  ED - sepsis work up initiated with fluid resuscitation of NS bolus x 1. Patient received Vanc and Zosyn. CBC revealed WBC 14, thrombocytopenia 119 (137). Blood cultures x 2 resulted negative x 24 hours. UA w/ small leuk esterase, moderate bacteria. Urine culture pending. Trach culture significant for gram neg rods, gram pos rods and cocci. RVP negative. CXR concerning for multifocal PNA with evidence of R pleural effusion. CT head with no emergent findings. Patient usually requires SIMV R 15 20/8 , 2L to maintain SpO2 > 98% overnight and 2L-4L during the day. She required aggressive pulmonary toilet for which she received home nursing care. Was last seen by pulmonology 10/6 and was well. No changes to vent settings or management for airway clearance.   Vancomycin was discontinued when blood cultures were 24 hr negative. Patient continued on IV Zosyn for tracheitis and presumed multifocal PNA secondary to poor airway clearance or aspiration. Vent settings were modified to be volume controlled with  mL. Patient was seen by [facility] pulmonary team and ID team who was in contact with Shira's pulmonologist here at [facility]. [facility] [place] [facility] was requested by parents of the patient. (15 Oct 2017 02:51)      Review of Systems:  All review of systems negative, except for those marked:  General:		  Eyes:			  ENT:			  Pulmonary:		  Cardiac:		  Gastrointestinal:	  Renal/Urologic:	  Musculoskeletal		  Endocrine:		  Hematologic:	  Neurologic:		  Skin:			  Allergy/Immune	  Psychiatric:		    PAST MEDICAL & SURGICAL HISTORY:  Hypokalemia  Kyphosis  Scoliosis  Hypotonia  Asthma  Seizures  Cerebral Palsy  Gastroesophageal Reflux Disease  Hypothyroidism: Treated with synthroid in past. Stopped treatment in 2008 due to normal thyroid function.  Developmental Delay  Tracheostomy in place  Peg (Percutaneous Endoscopic Gastrostomy) Adjustment/Replacement/Removal  S/P Tonsillectomy and Adenoidectomy  Strabismus    Past Hospitalizations:  MEDICATIONS  (STANDING):  buDESOnide   for Nebulization - Peds 1 milliGRAM(s) Nebulizer every 12 hours  lactobacillus Oral Powder (CULTURELLE KIDS) - Peds 1 Packet(s) Enteral Tube daily  lansoprazole   Oral  Liquid - Peds 15 milliGRAM(s) Enteral Tube daily  levalbuterol for Nebulization - Peds 1.25 milliGRAM(s) Nebulizer every 8 hours  levETIRAcetam  Oral Liquid - Peds 500 milliGRAM(s) Enteral Tube every 12 hours  magnesium oxide Tab/Cap - Peds 400 milliGRAM(s) Oral every 12 hours  miconazole 2% Topical Ointment (Critic-Aid Clear AF) - Peds 1 Application(s) Topical two times a day  piperacillin/tazobactam IV Intermittent - Peds 2700 milliGRAM(s) IV Intermittent every 6 hours  potassium chloride ER Oral Tab/Cap - Peds 10 milliEquivalent(s) Oral two times a day  sodium chloride 7% for Nebulization - Peds 3 milliLiter(s) Nebulizer every 8 hours  tobramycin for Nebulization - Peds 300 milliGRAM(s) Nebulizer every 12 hours    MEDICATIONS  (PRN):  EPINEPHrine   IntraMuscular Injection - Peds 0.3 milliGRAM(s) IntraMuscular once PRN Anaphylaxis  ipratropium 0.02% for Nebulization - Peds 500 MICROGram(s) Inhalation every 6 hours PRN Bronchospasm  LORazepam IV Intermittent - Peds 3.4 milliGRAM(s) IV Intermittent once PRN seizure    Allergies    Bactrim (Unknown)  Benadryl (Other)  carbamazepine (Unknown)  cephalosporins (Unknown)  Depakote (Unknown)  diphenhydrAMINE (Seizure)  eggs (Unknown)  EGGS,  NUTS (Anaphylaxis)  Erythromycin Base (Unknown)  metoclopramide (Unknown)  Milk (Unknown)  MILK, SHELLFISH, WHEAT (Unknown)  Nuts (Unknown)  oxcarbazepine (Unknown)  peanuts (Unknown)  Reglan (Unknown)  SEASONAL, POLLEN, GRASS, PET DANDER, FEATHERS (Unknown)  shellfish (Unknown)  Trileptal (Unknown)  TRILEPTAL, DEPAKOTE, REGLAN, VANTIN, CEPHALOSPORINS (Unknown)  valproic acid (Unknown)  vancomycin (Unknown)    Vital Signs Last 24 Hrs  T(C): 36.7 (16 Oct 2017 05:00), Max: 36.7 (15 Oct 2017 23:00)  T(F): 98 (16 Oct 2017 05:00), Max: 98 (15 Oct 2017 23:00)  HR: 93 (16 Oct 2017 05:00) (90 - 107)  BP: 107/49 (16 Oct 2017 05:00) (77/46 - 107/49)  RR: 11 (16 Oct 2017 05:00) (11 - 14)  SpO2: 98% (16 Oct 2017 05:00) (91% - 98%)    GENERAL PHYSICAL EXAM  All physical exam findings normal, except for those marked:  General:	well nourished, not acutely or chronically ill-appearing  HEENT:	normocephalic, atraumatic, clear conjunctiva, external ear normal, TM clear, nasal mucosa normal, oral pharynx clear  Neck:          supple, full range of motion, no nuchal rigidity  Cardiovascular:	regular rate and variability, normal S1, S2, no murmurs  Respiratory:	CTA B/L  Abdominal	:                    soft, ND, NT, bowel sounds present, no masses, no organomegaly  Extremities:	no joint swelling, erythema, tenderness; normal ROM, no contractures  Skin:		no rash    NEUROLOGIC EXAM  Mental Status:     Oriented to time/place/person; Good eye contact ; follow simple commands ;  Age appropriate language  and fund of  knowledge.  Cranial Nerves:   PERRL, EOMI, no facial asymmetry , V1-V3 intact , symmetric palate, tongue midline.   Eyes:			Normal: optic discs   Visual Fields:		Full visual field  Muscle Strength:	 Full strength 5/5, proximal and distal,  upper and lower extremities  Muscle Tone:	Normal tone  Deep Tendon Reflexes:         2+/4  : Biceps, Brachioradialis, Triceps Bilateral;  2+/4 : Patellar, Ankle bilateral. No clonus.  Plantar Response:	Plantar reflexes flexion bilaterally  Sensation:		Intact to pain, light touch, temperature and vibration throughout.  Coordination/	No dysmetria in finger to nose test bilaterally  Cerebellum	  Tandem Gait/Romberg	Normal gait     MRI brain- June 2008- parenchymal volume loss    August 2016:VEEG- Right and Left frontotemporal spikes; intermittent polymorphic slowing , bilateral frontotemporal and generalized background slowing; No seizure    CT 10/13/17- No acute intracranial findings.  The ventricles, sulci and basal cisterns   are dilated, compatible generalized cerebral volume loss, as seen on   prior exams. HPI: 23 yo F with history of cerebral palsy, global developmental delay, microcephaly complicated by epilepsy, diffuse hypotonia, scoliosis & kyphosis with spasticity, GJ tube dependence and trach dependence with chronic colonization with pseudomonas and MRSA and history of aspiration pneumonia transferred from [facility] MICU to [place] PICU.    Had increased somnolence, decreased UOP, and seizure like episode- left sided facial twitching with grimace of left upper lip. Also hypoxic at time. Different than typical seizure. Currently being treated for tracheitis.    Review of Systems:  All review of systems negative	    PAST MEDICAL & SURGICAL HISTORY:  Hypokalemia  Kyphosis  Scoliosis  Hypotonia  Asthma  Seizures  Cerebral Palsy  Gastroesophageal Reflux Disease  Hypothyroidism: Treated with synthroid in past. Stopped treatment in 2008 due to normal thyroid function.  Developmental Delay  Tracheostomy in place  Peg (Percutaneous Endoscopic Gastrostomy) Adjustment/Replacement/Removal  S/P Tonsillectomy and Adenoidectomy  Strabismus    Past Hospitalizations:  MEDICATIONS  (STANDING):  buDESOnide   for Nebulization - Peds 1 milliGRAM(s) Nebulizer every 12 hours  lactobacillus Oral Powder (CULTURELLE KIDS) - Peds 1 Packet(s) Enteral Tube daily  lansoprazole   Oral  Liquid - Peds 15 milliGRAM(s) Enteral Tube daily  levalbuterol for Nebulization - Peds 1.25 milliGRAM(s) Nebulizer every 8 hours  levETIRAcetam  Oral Liquid - Peds 500 milliGRAM(s) Enteral Tube every 12 hours  magnesium oxide Tab/Cap - Peds 400 milliGRAM(s) Oral every 12 hours  miconazole 2% Topical Ointment (Critic-Aid Clear AF) - Peds 1 Application(s) Topical two times a day  piperacillin/tazobactam IV Intermittent - Peds 2700 milliGRAM(s) IV Intermittent every 6 hours  potassium chloride ER Oral Tab/Cap - Peds 10 milliEquivalent(s) Oral two times a day  sodium chloride 7% for Nebulization - Peds 3 milliLiter(s) Nebulizer every 8 hours  tobramycin for Nebulization - Peds 300 milliGRAM(s) Nebulizer every 12 hours    MEDICATIONS  (PRN):  EPINEPHrine   IntraMuscular Injection - Peds 0.3 milliGRAM(s) IntraMuscular once PRN Anaphylaxis  ipratropium 0.02% for Nebulization - Peds 500 MICROGram(s) Inhalation every 6 hours PRN Bronchospasm  LORazepam IV Intermittent - Peds 3.4 milliGRAM(s) IV Intermittent once PRN seizure    Allergies    Bactrim (Unknown)  Benadryl (Other)  carbamazepine (Unknown)  cephalosporins (Unknown)  Depakote (Unknown)  diphenhydrAMINE (Seizure)  eggs (Unknown)  EGGS,  NUTS (Anaphylaxis)  Erythromycin Base (Unknown)  metoclopramide (Unknown)  Milk (Unknown)  MILK, SHELLFISH, WHEAT (Unknown)  Nuts (Unknown)  oxcarbazepine (Unknown)  peanuts (Unknown)  Reglan (Unknown)  SEASONAL, POLLEN, GRASS, PET DANDER, FEATHERS (Unknown)  shellfish (Unknown)  Trileptal (Unknown)  TRILEPTAL, DEPAKOTE, REGLAN, VANTIN, CEPHALOSPORINS (Unknown)  valproic acid (Unknown)  vancomycin (Unknown)    Vital Signs Last 24 Hrs  T(C): 36.7 (16 Oct 2017 05:00), Max: 36.7 (15 Oct 2017 23:00)  T(F): 98 (16 Oct 2017 05:00), Max: 98 (15 Oct 2017 23:00)  HR: 93 (16 Oct 2017 05:00) (90 - 107)  BP: 107/49 (16 Oct 2017 05:00) (77/46 - 107/49)  RR: 11 (16 Oct 2017 05:00) (11 - 14)  SpO2: 98% (16 Oct 2017 05:00) (91% - 98%)    GENERAL PHYSICAL EXAM  All physical exam findings normal, except for those marked:  General:	NAD  HEENT:	trach    NEUROLOGIC EXAM  Mental Status:     can awaken with moderate stim, nonverbal, some tracking  Cranial Nerves:   PERRL, EOMI, no facial asymmetry  Motor: increased tone throughout  Tandem Gait/Romberg	nonambulatory    MRI brain- June 2008- parenchymal volume loss    August 2016:VEEG- Right and Left frontotemporal spikes; intermittent polymorphic slowing , bilateral frontotemporal and generalized background slowing; No seizure    CT 10/13/17- No acute intracranial findings.  The ventricles, sulci and basal cisterns   are dilated, compatible generalized cerebral volume loss, as seen on   prior exams.

## 2017-10-16 NOTE — CHART NOTE - NSCHARTNOTEFT_GEN_A_CORE
PEDIATRIC INPATIENT NUTRITION SUPPORT TEAM CONSULTATION     Referring clinician/team requesting consultation:  [facility]  Reason for consultation:  Nutrition Risk Profile;  on enteral feeds within ICU setting     CHIEF COMPLAINT:   Feeding Problems     HISTORY OF PRESENT ILLNESS:  Pt is a 22 year old female with history of cerebral palsy, global developmental delay, microcephaly complicated by epilepsy, hydronephrosis, hypothyroidism, diffuse hypotonia, scoliosis and kyphosis with spasticity, electrolyte abnormalities including hypokalemia, GJ tube dependence and trach dependence, as well as a history of aspiration with known prior colonization with pseudomonas and MRSA, presented as a transfer from [facility]; with acute on chronic respiratory failure secondary to Serratia tracheitis as well as increased seizure frequency.     MEDICATIONS  (STANDING):  buDESOnide   for Nebulization - Peds 1 milliGRAM(s) Nebulizer every 12 hours  lactobacillus Oral Powder (CULTURELLE KIDS) - Peds 1 Packet(s) Enteral Tube daily  lansoprazole   Oral  Liquid - Peds 15 milliGRAM(s) Enteral Tube daily  levalbuterol for Nebulization - Peds 1.25 milliGRAM(s) Nebulizer every 8 hours  levETIRAcetam  Oral Liquid - Peds 500 milliGRAM(s) Enteral Tube every 12 hours  magnesium oxide Tab/Cap - Peds 400 milliGRAM(s) Oral every 12 hours  miconazole 2% Topical Ointment (Critic-Aid Clear AF) - Peds 1 Application(s) Topical two times a day  piperacillin/tazobactam IV Intermittent - Peds 2700 milliGRAM(s) IV Intermittent every 6 hours  potassium chloride ER Oral Tab/Cap - Peds 10 milliEquivalent(s) Oral two times a day  sodium chloride 7% for Nebulization - Peds 3 milliLiter(s) Nebulizer every 8 hours  tobramycin for Nebulization - Peds 300 milliGRAM(s) Nebulizer every 12 hours    PAST MEDICAL & SURGICAL HISTORY:  Hypokalemia  Kyphosis  Scoliosis  Hypotonia  Asthma  Seizures  Cerebral Palsy  Gastroesophageal Reflux Disease  Hypothyroidism: Treated with synthroid in past. Stopped treatment in  due to normal thyroid function.  Developmental Delay  Tracheostomy in place  Peg (Percutaneous Endoscopic Gastrostomy) Adjustment/Replacement/Removal  S/P Tonsillectomy and Adenoidectomy  Strabismus    Allergies    Bactrim (Unknown)  Benadryl (Other)  carbamazepine (Unknown)  cephalosporins (Unknown)  Depakote (Unknown)  diphenhydrAMINE (Seizure)  eggs (Unknown)  EGGS,  NUTS (Anaphylaxis)  Erythromycin Base (Unknown)  metoclopramide (Unknown)  Milk (Unknown)  MILK, SHELLFISH, WHEAT (Unknown)  Nuts (Unknown)  oxcarbazepine (Unknown)  peanuts (Unknown)  Reglan (Unknown)  SEASONAL, POLLEN, GRASS, PET DANDER, FEATHERS (Unknown)  shellfish (Unknown)  Trileptal (Unknown)  TRILEPTAL, DEPAKOTE, REGLAN, VANTIN, CEPHALOSPORINS (Unknown)  valproic acid (Unknown)  vancomycin (Unknown)    REVIEW OF SYSTEMS  History of Pneumonia or Asthma: [] No  [x] Yes  History of Diabetes: [x] No  [] Yes  History of Dysphagia: [] No  [] Yes  (GJ tube feeding dependent)  History of Heart Disease:  [x] No  [] Yes  History of Seizure / Developmental Delay:  [] No   [x] Yes  History of Vomiting:  [x] No   [] Yes    PHYSICAL EXAM  WEIGHT: 33.8kg (10-14 @ 19:20)  HEIGHT:  131.4cm (stated per mom)  WEIGHT AS METABOLIC K.8*kG (defined as maintenance fluid volume in mL/100mL)  BMI: 19.5  *Weight, Height, and BMI percentiles N/A as pt 22 years of age     GENERAL APPEARANCE: Well nourished but with thin extremities, ?muscle atrophy   HEENT: Microcephalic; No periorbital edema; Non-icteric   RESPIRATORY: Ventilated; Mode: Trach  NEUROLOGY: Not alert   EXTREMITIES: No cyanosis  SKIN: No jaundice    ASSESSMENT:   Feeding Problems    Pt is a 22 year old female with acute on chronic respiratory failure secondary to Serratia tracheitis as well as increased seizure frequency.  At baseline, pt receives tube feedings of Elecare Curt (30cal/oz) via J-portion at a rate of 90mL/hr with 30mL water every 90 minutes and a break of 30 minutes every 3 hours for a total of ~800mLs of formula and 800kcals daily. Pt also receives Pedialyte via J-tube overnight. Pt reportedly tolerates this regimen with relatively stable weight (around 34kg).        PLAN:  Would suggest continuing with pt's home feeding regimen as tolerated as described by mom.     Patient seen by the Pediatric Nutrition Support Team.     [x] I have acted as a scribe and documented the above information for Dr. M.J.Pettei    [] Attending Attestation: The above documentation completed by the scribe is an accurate record of both my words and actions.  Attending: Jarrod Bustillos MD      Contact  [phone] PEDIATRIC INPATIENT NUTRITION SUPPORT TEAM CONSULTATION     Referring clinician/team requesting consultation:  [facility]  Reason for consultation:  Nutrition Risk Profile;  on enteral feeds within ICU setting     CHIEF COMPLAINT:   Feeding Problems     HISTORY OF PRESENT ILLNESS:  Pt is a 22 year old female with history of cerebral palsy, global developmental delay, microcephaly complicated by epilepsy, hydronephrosis, hypothyroidism, diffuse hypotonia, scoliosis and kyphosis with spasticity, electrolyte abnormalities including hypokalemia, GJ tube dependence and trach dependence, as well as a history of aspiration with known prior colonization with pseudomonas and MRSA, presented as a transfer from [facility]; with acute on chronic respiratory failure secondary to Serratia tracheitis as well as increased seizure frequency.     MEDICATIONS  (STANDING):  buDESOnide   for Nebulization - Peds 1 milliGRAM(s) Nebulizer every 12 hours  lactobacillus Oral Powder (CULTURELLE KIDS) - Peds 1 Packet(s) Enteral Tube daily  lansoprazole   Oral  Liquid - Peds 15 milliGRAM(s) Enteral Tube daily  levalbuterol for Nebulization - Peds 1.25 milliGRAM(s) Nebulizer every 8 hours  levETIRAcetam  Oral Liquid - Peds 500 milliGRAM(s) Enteral Tube every 12 hours  magnesium oxide Tab/Cap - Peds 400 milliGRAM(s) Oral every 12 hours  miconazole 2% Topical Ointment (Critic-Aid Clear AF) - Peds 1 Application(s) Topical two times a day  piperacillin/tazobactam IV Intermittent - Peds 2700 milliGRAM(s) IV Intermittent every 6 hours  potassium chloride ER Oral Tab/Cap - Peds 10 milliEquivalent(s) Oral two times a day  sodium chloride 7% for Nebulization - Peds 3 milliLiter(s) Nebulizer every 8 hours  tobramycin for Nebulization - Peds 300 milliGRAM(s) Nebulizer every 12 hours    PAST MEDICAL & SURGICAL HISTORY:  Hypokalemia  Kyphosis  Scoliosis  Hypotonia  Asthma  Seizures  Cerebral Palsy  Gastroesophageal Reflux Disease  Hypothyroidism: Treated with synthroid in past. Stopped treatment in  due to normal thyroid function.  Developmental Delay  Tracheostomy in place  Peg (Percutaneous Endoscopic Gastrostomy) Adjustment/Replacement/Removal  S/P Tonsillectomy and Adenoidectomy  Strabismus    Allergies    Bactrim (Unknown)  Benadryl (Other)  carbamazepine (Unknown)  cephalosporins (Unknown)  Depakote (Unknown)  diphenhydrAMINE (Seizure)  eggs (Unknown)  EGGS,  NUTS (Anaphylaxis)  Erythromycin Base (Unknown)  metoclopramide (Unknown)  Milk (Unknown)  MILK, SHELLFISH, WHEAT (Unknown)  Nuts (Unknown)  oxcarbazepine (Unknown)  peanuts (Unknown)  Reglan (Unknown)  SEASONAL, POLLEN, GRASS, PET DANDER, FEATHERS (Unknown)  shellfish (Unknown)  Trileptal (Unknown)  TRILEPTAL, DEPAKOTE, REGLAN, VANTIN, CEPHALOSPORINS (Unknown)  valproic acid (Unknown)  vancomycin (Unknown)    REVIEW OF SYSTEMS  History of Pneumonia or Asthma: [] No  [x] Yes  History of Diabetes: [x] No  [] Yes  History of Dysphagia: [] No  [] Yes  (GJ tube feeding dependent)  History of Heart Disease:  [x] No  [] Yes  History of Seizure / Developmental Delay:  [] No   [x] Yes  History of Vomiting:  [x] No   [] Yes    PHYSICAL EXAM  WEIGHT: 33.8kg (10-14 @ 19:20)  HEIGHT:  131.4cm (stated per mom)  WEIGHT AS METABOLIC K.8*kG (defined as maintenance fluid volume in mL/100mL)  BMI: 19.5  *Weight, Height, and BMI percentiles N/A as pt 22 years of age     GENERAL APPEARANCE: Well nourished but with thin extremities, ?muscle atrophy   HEENT: Microcephalic; No periorbital edema; Non-icteric   RESPIRATORY: Ventilated; Mode: Trach  NEUROLOGY: Not alert   EXTREMITIES: No cyanosis  SKIN: No jaundice    ASSESSMENT:   Feeding Problems    Pt is a 22 year old female with acute on chronic respiratory failure secondary to Serratia tracheitis as well as increased seizure frequency.  At baseline, pt receives tube feedings of Elecare Curt (30cal/oz) via J-portion at a rate of 90mL/hr with 30mL water every 90 minutes and a break of 30 minutes every 3 hours for a total of ~800mLs of formula and 800kcals daily. Pt also receives Pedialyte via J-tube overnight. Pt reportedly tolerates this regimen with relatively stable weight (around 34kg).        PLAN:  Would suggest continuing with pt's home feeding regimen as tolerated as described by mom.     Patient seen by the Pediatric Nutrition Support Team.     [x] I have acted as a scribe and documented the above information for Dr. M.J.Pettei    [X] Attending Attestation: The above documentation completed by the scribe is an accurate record of both my words and actions.  Attending: Jarrod Bustillos MD      Contact  [phone]

## 2017-10-16 NOTE — PROGRESS NOTE PEDS - ATTENDING COMMENTS
Pt D/W mother, PICU team, RT and nursing x30min.    23 yo F with history of cerebral palsy, global developmental delay, microcephaly complicated by epilepsy, hydronephrosis, hypothyroidism, diffuse hypotonia, scoliosis & kyphosis with spasticity, electrolyte abn including hypokalemia GJ tube dependence and trach/vent dependence with chronic colonization with pseudomonas and MRSA and history of aspiration pneumonia, now admitted with acute on chronic resp failure, multifocal pneumonia with serratia- improving slowly with brief periods to TCM, on home vent settings.  - can D/C home today and parents to continue weaning off vent to TCM daytime  - cont levaquin PO and teofilo nebs  - increase ariway clearance with xopenex/atrovent then 7%saline with chest vest Q6hr at home  - nystatin cream to trach stoma for rash  - f/u with Dr. Latif in 2-3months, or sooner if not improving    Total Critical Care time spent by the attending physician is 30 minutes, excluding procedure time.
Pt D/W mother, [place] [facility], PICU team, RT and nursing x40min.    23 yo F with history of cerebral palsy, global developmental delay, microcephaly complicated by epilepsy, hydronephrosis, hypothyroidism, diffuse hypotonia, scoliosis & kyphosis with spasticity, electrolyte abn including hypokalemia GJ tube dependence and trach/vent dependence with chronic colonization with pseudomonas and MRSA and history of aspiration pneumonia, now admitted with acute on chronic resp failure, multifocal pneumonia and possible UTI. bloddy trach secretions improved with humidification overnight  - cont IV zosyn and teofilo nebs  - f/u trach cx and Ucx from [facility]  - send another trach cx from here for sensitivities  - increase ariway clearance with xopenex/atrovent then 7%saline with metanebs Q6hr  - nystatin cream to trach stoma for rash  - cont home vent settings, with O2 as needed- when improving try to wean off vent to sleep only  - neuro eval for suspected sz activity  Total Critical Care time spent by the attending physician is 40 minutes, excluding procedure time.

## 2017-10-16 NOTE — DISCHARGE NOTE PEDIATRIC - CARE PLAN
Principal Discharge DX:	Multifocal pneumonia  Goal:	Return to baseline  Instructions for follow-up, activity and diet:	Continue Levofloxacin once daily via GTube for 7 days. Please hold tube feeds for 2 hours prior and 2 hours after administration of medication. You should follow up with your primary physician within 48 hours.

## 2017-10-16 NOTE — CONSULT NOTE PEDS - PROBLEM SELECTOR RECOMMENDATION 9
-due to increased sleepiness will do routine EEG to assess for subclinical seizure  -continue current keppra dose  -ativan for prolonged seizure >3 minutes  -seizure precautions

## 2017-10-16 NOTE — DISCHARGE NOTE PEDIATRIC - HOSPITAL COURSE
21 yo F with complicated medical history including cerebral palsy, global developmental delay, microcephaly, epilepsy, hydronephrosis, hypothyroidism, GJ tube dependence and trach dependence with chronic colonization with pseudomonas and MRSA and history of aspiration pneumonia transferred from [facility] MICU.   Patient initially presented to [place] ED on 10/13 AM secondary to decreased alertness, decreased urinary output and ultimately an episode that was concerning to the parents which seemed either like a seizure episode or distress with spasticity. Patient has baseline seizures which are focal which are controlled on Keppra 500 mg BID. This episode was characterized by left sided facial twitching with a grimace of the left upper lip upwards. She was hypoxic to 80% requiring increase in her flow from 2L to 4L via trach. This episode was unlike her usual focal seizures. Not associated with eye rolling or loss of consciousness. The patient does have temperature instability but was found to be below baseline with temps of 95 compared to baseline 97. Trach secretions were white but at baseline in quantity.  [place] ED - sepsis work up done. NS bolus x 1. Patient received Vanc and Zosyn. CBC revealed WBC 14, thrombocytopenia 119 (137). UA negative. BCx x2 and UCx pending. Trach culture significant for gram neg rods, gram pos rods and cocci. RVP negative. CXR concerning for multifocal PNA with evidence of R pleural effusion. CT head with no emergent findings. Patient usually requires SIMV R 15 20/8 , 2L to maintain SpO2 > 98% overnight and 2L-4L during the day. She required aggressive pulmonary toilet for which she received home nursing care. Was last seen by pulmonology 10/6 and was well. No changes to vent settings or management for airway clearance.   Vancomycin was discontinued when blood cultures were 24 hr negative. Patient continued on IV Zosyn for tracheitis and presumed multifocal PNA secondary to poor airway clearance or aspiration. Vent settings were modified to be volume controlled with  mL. Patient was seen by [facility] pulmonary team and ID team who was in contact with Shira's pulmonologist here at [facility]. Transfer to [facility] was requested by parents of the patient. 23 yo F with complicated medical history including cerebral palsy, global developmental delay, microcephaly, epilepsy, hydronephrosis, hypothyroidism, GJ tube dependence and trach dependence with chronic colonization with pseudomonas and MRSA and history of aspiration pneumonia transferred from [facility] MICU.   Patient initially presented to [place] ED on 10/13 AM secondary to decreased alertness, decreased urinary output and ultimately an episode that was concerning to the parents which seemed either like a seizure episode or distress with spasticity. Patient has baseline seizures which are focal which are controlled on Keppra 500 mg BID. This episode was characterized by left sided facial twitching with a grimace of the left upper lip upwards. She was hypoxic to 80% requiring increase in her flow from 2L to 4L via trach. This episode was unlike her usual focal seizures. Not associated with eye rolling or loss of consciousness. The patient does have temperature instability but was found to be below baseline with temps of 95 compared to baseline 97. Trach secretions were white but at baseline in quantity.    [place] ED:   sepsis work up done. NS bolus x 1. Patient received Vanc and Zosyn. UA negative. BCx x2 and UCx pending. Trach culture significant for gram neg rods, gram pos rods and cocci. RVP negative. CXR concerning for multifocal PNA with evidence of R pleural effusion. CT head with no emergent findings.    [place] [facility]:  Vancomycin was discontinued when blood cultures were 24 hr negative. Patient continued on IV Zosyn for tracheitis and presumed multifocal PNA secondary to poor airway clearance or aspiration. Vent settings were modified to be volume controlled with  mL. Patient was seen by [facility] pulmonary team and ID team who was in contact with Shira's pulmonologist here at [facility]. Transfer to [facility] was requested by parents of the patient.    [facility] (10/14-10/16)  Cardiac: Stable    Resp: Initially required increased vent settings above baseline, PC SIMV 15 20/8 with increased FiO2. Continued on home respiratory meds. Pulmonology consulted and recommended adding metaneb device for improved airway clearance. Status improved and was weaned back to home settings with trach collar while awake. Restarted on home vent prior to discharge.    ID: On arrival continued on IV zosyn. Blood cultures x2 and Urine Cx from [place] resulted negative. Trach culture grew Serratia marcescens sensitive to levofloxacin and patient was discharged home to complete 7 day course of levaquin.     Neuro: Routine EEG performed to evaluate for subclinical seizures, showed slowing but no evidence of seizure.    FEN/GI: Tolerated home feeding regimen during admission.    Discharge Physical Exam  Vital Signs: T 37, , BP 90/55, RR 15, O2 98% on FiO2 35%  GEN: awake, NAD  HEENT: Trach in place, MMM, PERRL  CVS: S1S2, RRR, no m/r/g  RESPI: Course breath sounds throughout, transmitted vent sounds  ABD: soft, NTND, +BS. GTube c/d/i  EXT: UE/LE contractures  SKIN: no rash

## 2017-10-16 NOTE — PROGRESS NOTE PEDS - SUBJECTIVE AND OBJECTIVE BOX
Interval/Overnight Events:  No acute events overnight.  Tolerated 4 hours off ventilator yesterday.    VITAL SIGNS:  T(C): 36 (10-16-17 @ 08:33), Max: 36.7 (10-15-17 @ 23:00)  HR: 137 (10-16-17 @ 08:33) (90 - 138)  BP: 98/50 (10-16-17 @ 08:33) (77/46 - 107/49)  ABP: --  ABP(mean): --  RR: 12 (10-16-17 @ 08:33) (11 - 14)  SpO2: 96% (10-16-17 @ 08:33) (93% - 98%)  CVP(mm Hg): --    ==================================RESPIRATORY===================================  [ ] FiO2: ___ 	[ ] Heliox: ____ 		[ ] BiPAP: ___   [ ] NC: __  Liters			[ ] HFNC: __ 	Liters, FiO2: __  [x] End-Tidal CO2:  14 (with air leak)  [x] Mechanical Ventilation: Mode: SIMV/PC with PS, RR (machine): 15, FiO2: 35, PEEP: 8, PS: 10, ITime: 0.75, MAP: 11, PIP: 21  [ ] Inhaled Nitric Oxide:    Respiratory Medications:  buDESOnide   for Nebulization - Peds 1 milliGRAM(s) Nebulizer every 12 hours  ipratropium 0.02% for Nebulization - Peds 500 MICROGram(s) Inhalation every 6 hours PRN  levalbuterol for Nebulization - Peds 1.25 milliGRAM(s) Nebulizer every 8 hours  sodium chloride 7% for Nebulization - Peds 3 milliLiter(s) Nebulizer every 8 hours    [ ] Extubation Readiness Assessed  Comments:    ================================CARDIOVASCULAR================================  [ ] NIRS:  Cardiovascular Medications:    Cardiac Rhythm:	[x] NSR		[ ] Other:  Comments:    ===========================HEMATOLOGIC/ONCOLOGIC=============================    Transfusions:	[ ] PRBC 	[ ] Platelets	[ ] FFP		[ ] Cryoprecipitate    Hematologic/Oncologic Medications:    [ ] DVT Prophylaxis:  Comments:    ===============================INFECTIOUS DISEASE===============================  Antimicrobials/Immunologic Medications:  piperacillin/tazobactam IV Intermittent - Peds 2700 milliGRAM(s) IV Intermittent every 6 hours  tobramycin for Nebulization - Peds 300 milliGRAM(s) Nebulizer every 12 hours    RECENT CULTURES:  10-16 @ 02:41 TRACHEAL ASPIRATE     Culture - Respiratory with Gram Stain (10.16.17 @ 02:41)    Gram Stain Sputum:   WBC^White Blood Cells  QNTY CELLS IN GRAM STAIN: FEW (2+)  NOS^No Organisms Seen    Specimen Source: TRACHEAL ASPIRATE      10-13 @ 20:40 .Sputum Sputum/TRAP Serratia marcescens    Moderate Serratia marcescens  Normal Respiratory Ynes present    Numerous polymorphonuclear leukocytes per low power field  Few Squamous epithelial cells per low power field  Numerous Gram Negative Rods seen per oil power field  Few Gram Positive Rods seen per oil power field  Rare Gram Positive Cocci in Clusters seen per oil power field    10-13 @ 12:30 .Urine Catheterized     No growth      10-13 @ 10:35 .Blood Blood-Peripheral     No growth to date.            =========================FLUIDS/ELECTROLYTES/NUTRITION==========================  I&O's Summary    15 Oct 2017 07:01  -  16 Oct 2017 07:00  --------------------------------------------------------  IN: 2478 mL / OUT: 2428 mL / NET: 50 mL    16 Oct 2017 07:01  -  16 Oct 2017 11:21  --------------------------------------------------------  IN: 300 mL / OUT: 0 mL / NET: 300 mL      Daily Weight in Gm: 13822 (14 Oct 2017 19:20)          Diet:	[ ] Regular	[ ] Soft		[ ] Clears	[ ] NPO  .	[ ] Other:  .	[ ] NGT		[ ] NDT		[ ] GT		[x] GJT - [facility] home feeding regimen    Gastrointestinal Medications:  lansoprazole   Oral  Liquid - Peds 15 milliGRAM(s) Enteral Tube daily  magnesium oxide Tab/Cap - Peds 400 milliGRAM(s) Oral every 12 hours  potassium chloride ER Oral Tab/Cap - Peds 10 milliEquivalent(s) Oral two times a day    Comments:    =================================NEUROLOGY====================================  [ ] SBS:		[ ] DOMENICO-1:	[ ] BIS:  [ ] Adequacy of sedation and pain control has been assessed and adjusted    Neurologic Medications:  levETIRAcetam  Oral Liquid - Peds 500 milliGRAM(s) Enteral Tube every 12 hours  LORazepam IV Intermittent - Peds 3.4 milliGRAM(s) IV Intermittent once PRN    Comments:    OTHER MEDICATIONS:  Endocrine/Metabolic Medications:    Genitourinary Medications:    Topical/Other Medications:  lactobacillus Oral Powder (CULTURELLE KIDS) - Peds 1 Packet(s) Enteral Tube daily  miconazole 2% Topical Ointment (Critic-Aid Clear AF) - Peds 1 Application(s) Topical two times a day      ==========================PATIENT CARE ACCESS DEVICES===========================  [x] Peripheral IV  [ ] Central Venous Line	[ ] R	[ ] L	[ ] IJ	[ ] Fem	[ ] SC			Placed:   [ ] Arterial Line		[ ] R	[ ] L	[ ] PT	[ ] DP	[ ] Fem	[ ] Rad	[ ] Ax	Placed:   [ ] PICC:				[ ] Broviac		[ ] Mediport  [ ] Urinary Catheter, Date Placed:   [ ] Necessity of urinary, arterial, and venous catheters discussed    ================================PHYSICAL EXAM==================================      IMAGING STUDIES:    Parent/Guardian is at the bedside:	[x] Yes	[ ] No  Patient and Parent/Guardian updated as to the progress/plan of care:	[x] Yes	[ ] No    [x] The patient remains in critical and unstable condition, and requires ICU care and monitoring  [ ] The patient is improving but requires continued monitoring and adjustment of therapy Interval/Overnight Events:  No acute events overnight.  Tolerated 4 hours off ventilator yesterday.    VITAL SIGNS:  T(C): 36 (10-16-17 @ 08:33), Max: 36.7 (10-15-17 @ 23:00)  HR: 137 (10-16-17 @ 08:33) (90 - 138)  BP: 98/50 (10-16-17 @ 08:33) (77/46 - 107/49)  ABP: --  ABP(mean): --  RR: 12 (10-16-17 @ 08:33) (11 - 14)  SpO2: 96% (10-16-17 @ 08:33) (93% - 98%)  CVP(mm Hg): --    ==================================RESPIRATORY===================================  [ ] FiO2: ___ 	[ ] Heliox: ____ 		[ ] BiPAP: ___   [ ] NC: __  Liters			[ ] HFNC: __ 	Liters, FiO2: __  [ ] End-Tidal CO2:   [x] Mechanical Ventilation: Mode: SIMV/PC with PS, RR (machine): 15, FiO2: 35, PEEP: 8, PS: 10, ITime: 0.75, MAP: 11, PIP: 21  [ ] Inhaled Nitric Oxide:    Respiratory Medications:  buDESOnide   for Nebulization - Peds 1 milliGRAM(s) Nebulizer every 12 hours  ipratropium 0.02% for Nebulization - Peds 500 MICROGram(s) Inhalation every 6 hours PRN  levalbuterol for Nebulization - Peds 1.25 milliGRAM(s) Nebulizer every 8 hours  sodium chloride 7% for Nebulization - Peds 3 milliLiter(s) Nebulizer every 8 hours    [ ] Extubation Readiness Assessed  Comments:    ================================CARDIOVASCULAR================================  [ ] NIRS:  Cardiovascular Medications:    Cardiac Rhythm:	[x] NSR		[ ] Other:  Comments:    ===========================HEMATOLOGIC/ONCOLOGIC=============================    Transfusions:	[ ] PRBC 	[ ] Platelets	[ ] FFP		[ ] Cryoprecipitate    Hematologic/Oncologic Medications:    [ ] DVT Prophylaxis:  Comments:    ===============================INFECTIOUS DISEASE===============================  Antimicrobials/Immunologic Medications:  piperacillin/tazobactam IV Intermittent - Peds 2700 milliGRAM(s) IV Intermittent every 6 hours  tobramycin for Nebulization - Peds 300 milliGRAM(s) Nebulizer every 12 hours    RECENT CULTURES:  10-16 @ 02:41 TRACHEAL ASPIRATE     Culture - Respiratory with Gram Stain (10.16.17 @ 02:41)    Gram Stain Sputum:   WBC^White Blood Cells  QNTY CELLS IN GRAM STAIN: FEW (2+)  NOS^No Organisms Seen    Specimen Source: TRACHEAL ASPIRATE      10-13 @ 20:40 .Sputum Sputum/TRAP Serratia marcescens    Moderate Serratia marcescens  Normal Respiratory Ynes present    Numerous polymorphonuclear leukocytes per low power field  Few Squamous epithelial cells per low power field  Numerous Gram Negative Rods seen per oil power field  Few Gram Positive Rods seen per oil power field  Rare Gram Positive Cocci in Clusters seen per oil power field    10-13 @ 12:30 .Urine Catheterized     No growth      10-13 @ 10:35 .Blood Blood-Peripheral     No growth to date.            =========================FLUIDS/ELECTROLYTES/NUTRITION==========================  I&O's Summary    15 Oct 2017 07:01  -  16 Oct 2017 07:00  --------------------------------------------------------  IN: 2478 mL / OUT: 2428 mL / NET: 50 mL    16 Oct 2017 07:01  -  16 Oct 2017 11:21  --------------------------------------------------------  IN: 300 mL / OUT: 0 mL / NET: 300 mL      Daily Weight in Gm: 88856 (14 Oct 2017 19:20)          Diet:	[ ] Regular	[ ] Soft		[ ] Clears	[ ] NPO  .	[ ] Other:  .	[ ] NGT		[ ] NDT		[ ] GT		[x] GJT - [facility] home feeding regimen    Gastrointestinal Medications:  lansoprazole   Oral  Liquid - Peds 15 milliGRAM(s) Enteral Tube daily  magnesium oxide Tab/Cap - Peds 400 milliGRAM(s) Oral every 12 hours  potassium chloride ER Oral Tab/Cap - Peds 10 milliEquivalent(s) Oral two times a day    Comments:    =================================NEUROLOGY====================================  [ ] SBS:		[ ] DOMENICO-1:	[ ] BIS:  [ ] Adequacy of sedation and pain control has been assessed and adjusted    Neurologic Medications:  levETIRAcetam  Oral Liquid - Peds 500 milliGRAM(s) Enteral Tube every 12 hours  LORazepam IV Intermittent - Peds 3.4 milliGRAM(s) IV Intermittent once PRN    Comments:    OTHER MEDICATIONS:  Endocrine/Metabolic Medications:    Genitourinary Medications:    Topical/Other Medications:  lactobacillus Oral Powder (CULTURELLE KIDS) - Peds 1 Packet(s) Enteral Tube daily  miconazole 2% Topical Ointment (Critic-Aid Clear AF) - Peds 1 Application(s) Topical two times a day      ==========================PATIENT CARE ACCESS DEVICES===========================  [x] Peripheral IV  [ ] Central Venous Line	[ ] R	[ ] L	[ ] IJ	[ ] Fem	[ ] SC			Placed:   [ ] Arterial Line		[ ] R	[ ] L	[ ] PT	[ ] DP	[ ] Fem	[ ] Rad	[ ] Ax	Placed:   [ ] PICC:				[ ] Broviac		[ ] Mediport  [ ] Urinary Catheter, Date Placed:   [ ] Necessity of urinary, arterial, and venous catheters discussed    ================================PHYSICAL EXAM==================================      IMAGING STUDIES:    Parent/Guardian is at the bedside:	[x] Yes	[ ] No  Patient and Parent/Guardian updated as to the progress/plan of care:	[x] Yes	[ ] No    [x] The patient remains in critical and unstable condition, and requires ICU care and monitoring  [ ] The patient is improving but requires continued monitoring and adjustment of therapy Interval/Overnight Events:  No acute events overnight.  Tolerated 4 hours off ventilator yesterday.    VITAL SIGNS:  T(C): 36 (10-16-17 @ 08:33), Max: 36.7 (10-15-17 @ 23:00)  HR: 137 (10-16-17 @ 08:33) (90 - 138)  BP: 98/50 (10-16-17 @ 08:33) (77/46 - 107/49)  ABP: --  ABP(mean): --  RR: 12 (10-16-17 @ 08:33) (11 - 14)  SpO2: 96% (10-16-17 @ 08:33) (93% - 98%)  CVP(mm Hg): --    ==================================RESPIRATORY===================================  [ ] FiO2: ___ 	[ ] Heliox: ____ 		[ ] BiPAP: ___   [ ] NC: __  Liters			[ ] HFNC: __ 	Liters, FiO2: __  [ ] End-Tidal CO2:   [x] Mechanical Ventilation: Mode: SIMV/PC with PS, RR (machine): 15, FiO2: 35, PEEP: 8, PS: 10, ITime: 0.75, MAP: 11, PIP: 20  [ ] Inhaled Nitric Oxide:    Respiratory Medications:  buDESOnide   for Nebulization - Peds 1 milliGRAM(s) Nebulizer every 12 hours  ipratropium 0.02% for Nebulization - Peds 500 MICROGram(s) Inhalation every 6 hours PRN  levalbuterol for Nebulization - Peds 1.25 milliGRAM(s) Nebulizer every 8 hours  sodium chloride 7% for Nebulization - Peds 3 milliLiter(s) Nebulizer every 8 hours    [ ] Extubation Readiness Assessed  Comments:    ================================CARDIOVASCULAR================================  [ ] NIRS:  Cardiovascular Medications:    Cardiac Rhythm:	[x] NSR		[ ] Other:  Comments:    ===========================HEMATOLOGIC/ONCOLOGIC=============================    Transfusions:	[ ] PRBC 	[ ] Platelets	[ ] FFP		[ ] Cryoprecipitate    Hematologic/Oncologic Medications:    [ ] DVT Prophylaxis:  Comments:    ===============================INFECTIOUS DISEASE===============================  Antimicrobials/Immunologic Medications:  piperacillin/tazobactam IV Intermittent - Peds 2700 milliGRAM(s) IV Intermittent every 6 hours  tobramycin for Nebulization - Peds 300 milliGRAM(s) Nebulizer every 12 hours    RECENT CULTURES:  10-16 @ 02:41 TRACHEAL ASPIRATE     Culture - Respiratory with Gram Stain (10.16.17 @ 02:41)    Gram Stain Sputum:   WBC^White Blood Cells  QNTY CELLS IN GRAM STAIN: FEW (2+)  NOS^No Organisms Seen    Specimen Source: TRACHEAL ASPIRATE      10-13 @ 20:40 .Sputum Sputum/TRAP Serratia marcescens    Moderate Serratia marcescens  Normal Respiratory Ynes present    Numerous polymorphonuclear leukocytes per low power field  Few Squamous epithelial cells per low power field  Numerous Gram Negative Rods seen per oil power field  Few Gram Positive Rods seen per oil power field  Rare Gram Positive Cocci in Clusters seen per oil power field    10-13 @ 12:30 .Urine Catheterized     No growth      10-13 @ 10:35 .Blood Blood-Peripheral     No growth to date.            =========================FLUIDS/ELECTROLYTES/NUTRITION==========================  I&O's Summary    15 Oct 2017 07:01  -  16 Oct 2017 07:00  --------------------------------------------------------  IN: 2478 mL / OUT: 2428 mL / NET: 50 mL    16 Oct 2017 07:01  -  16 Oct 2017 11:21  --------------------------------------------------------  IN: 300 mL / OUT: 0 mL / NET: 300 mL      Daily Weight in Gm: 00201 (14 Oct 2017 19:20)          Diet:	[ ] Regular	[ ] Soft		[ ] Clears	[ ] NPO  .	[ ] Other:  .	[ ] NGT		[ ] NDT		[ ] GT		[x] GJT - [facility] home feeding regimen    Gastrointestinal Medications:  lansoprazole   Oral  Liquid - Peds 15 milliGRAM(s) Enteral Tube daily  magnesium oxide Tab/Cap - Peds 400 milliGRAM(s) Oral every 12 hours  potassium chloride ER Oral Tab/Cap - Peds 10 milliEquivalent(s) Oral two times a day    Comments:    =================================NEUROLOGY====================================  [ ] SBS:		[ ] DOMENICO-1:	[ ] BIS:  [ ] Adequacy of sedation and pain control has been assessed and adjusted    Neurologic Medications:  levETIRAcetam  Oral Liquid - Peds 500 milliGRAM(s) Enteral Tube every 12 hours  LORazepam IV Intermittent - Peds 3.4 milliGRAM(s) IV Intermittent once PRN    Comments:    OTHER MEDICATIONS:  Endocrine/Metabolic Medications:    Genitourinary Medications:    Topical/Other Medications:  lactobacillus Oral Powder (CULTURELLE KIDS) - Peds 1 Packet(s) Enteral Tube daily  miconazole 2% Topical Ointment (Critic-Aid Clear AF) - Peds 1 Application(s) Topical two times a day      ==========================PATIENT CARE ACCESS DEVICES===========================  [x] Peripheral IV  [ ] Central Venous Line	[ ] R	[ ] L	[ ] IJ	[ ] Fem	[ ] SC			Placed:   [ ] Arterial Line		[ ] R	[ ] L	[ ] PT	[ ] DP	[ ] Fem	[ ] Rad	[ ] Ax	Placed:   [ ] PICC:				[ ] Broviac		[ ] Mediport  [ ] Urinary Catheter, Date Placed:   [ ] Necessity of urinary, arterial, and venous catheters discussed    ================================PHYSICAL EXAM==================================  Gen - awake; looking around; NAD  Resp - breathing comfortably on current ventilator settings; coarse breath sounds; good air entry, even at bases; no focal findings  CV - RRR, no murmur; distal pulses 2+; cap refill < 2 seconds  Abd - soft, NT, ND, no HSM; GT in place  Ext - warm and well-perfused; nonedematous      IMAGING STUDIES:    Parent/Guardian is at the bedside:	[x] Yes	[ ] No  Patient and Parent/Guardian updated as to the progress/plan of care:	[x] Yes	[ ] No    [x] The patient remains in critical and unstable condition, and requires ICU care and monitoring  [ ] The patient is improving but requires continued monitoring and adjustment of therapy    Critical Care time by attending physician, excluding procedure time = 35 minutes

## 2017-10-16 NOTE — DISCHARGE NOTE PEDIATRIC - PATIENT PORTAL LINK FT
“You can access the [facility] Patient Portal, offered by [facility], by registering with the following website: [email or web address]”

## 2017-10-16 NOTE — CONSULT NOTE PEDS - ATTENDING COMMENTS
CP, epilepsy now with increased seizures. No further seizures, doing well today but sleepy.   -REEG was done to rule out subclinical seizures, showed diffuse slowing   -no change in AEDs at this time, follow with Dr. Mike Pemberton

## 2017-10-16 NOTE — PROGRESS NOTE PEDS - SUBJECTIVE AND OBJECTIVE BOX
Interval/Overnight Events:    VITAL SIGNS:  T(C): 36.7 (10-16-17 @ 05:00), Max: 36.7 (10-15-17 @ 23:00)  HR: 93 (10-16-17 @ 05:00) (90 - 107)  BP: 107/49 (10-16-17 @ 05:00) (77/46 - 107/49)  ABP: --  ABP(mean): --  RR: 11 (10-16-17 @ 05:00) (11 - 14)  SpO2: 98% (10-16-17 @ 05:00) (91% - 98%)  CVP(mm Hg): --  End-Tidal CO2:  NIRS:    ===========================RESPIRATORY==========================  [ ] FiO2: ___ 	[ ] Heliox: ____ 		[ ] BiPAP: ___   [ ] NC: __  Liters			[ ] HFNC: __ 	Liters, FiO2: __  [ ] Mechanical Ventilation:   [ ] Inhaled Nitric Oxide:    Respiratory Medications:  buDESOnide   for Nebulization - Peds 1 milliGRAM(s) Nebulizer every 12 hours  ipratropium 0.02% for Nebulization - Peds 500 MICROGram(s) Inhalation every 6 hours PRN  levalbuterol for Nebulization - Peds 1.25 milliGRAM(s) Nebulizer every 8 hours  sodium chloride 7% for Nebulization - Peds 3 milliLiter(s) Nebulizer every 8 hours    [ ] Extubation Readiness Assessed  Comments:    =========================CARDIOVASCULAR========================  Cardiovascular Medications:  EPINEPHrine   IntraMuscular Injection - Peds 0.3 milliGRAM(s) IntraMuscular once PRN    Cardiac Rhythm:	[x] NSR		[ ] Other:  Comments:    =====================HEMATOLOGY/ONCOLOGY=====================    Transfusions:	[ ] PRBC	[ ] Platelets	[ ] FFP		[ ] Cryoprecipitate    Hematologic/Oncologic Medications:    DVT Prophylaxis:  Comments:    ========================INFECTIOUS DISEASE=======================  Antimicrobials/Immunologic Medications:  piperacillin/tazobactam IV Intermittent - Peds 2700 milliGRAM(s) IV Intermittent every 6 hours  tobramycin for Nebulization - Peds 300 milliGRAM(s) Nebulizer every 12 hours    RECENT CULTURES:  10-16 @ 02:41 TRACHEAL ASPIRATE         10-13 @ 20:40 .Sputum Sputum/TRAP     Moderate Serratia marcescens  Normal Respiratory Ynes present    Numerous polymorphonuclear leukocytes per low power field  Few Squamous epithelial cells per low power field  Numerous Gram Negative Rods seen per oil power field  Few Gram Positive Rods seen per oil power field  Rare Gram Positive Cocci in Clusters seen per oil power field    10-13 @ 12:30 .Urine Catheterized     No growth      10-13 @ 10:35 .Blood Blood-Peripheral     No growth to date.            ==================FLUIDS/ELECTROLYTES/NUTRITION=================  I&O's Summary    15 Oct 2017 07:01  -  16 Oct 2017 07:00  --------------------------------------------------------  IN: 2478 mL / OUT: 2428 mL / NET: 50 mL      Daily Weight in Gm: 24635 (14 Oct 2017 19:20)      Diet:	[ ] Regular	[ ] Soft		[ ] Clears	[ ] NPO  .	[ ] Other:  .	[ ] NGT		[ ] NDT		[ ] GT		[ ] GJT    Gastrointestinal Medications:  lansoprazole   Oral  Liquid - Peds 15 milliGRAM(s) Enteral Tube daily  magnesium oxide Tab/Cap - Peds 400 milliGRAM(s) Oral every 12 hours  potassium chloride ER Oral Tab/Cap - Peds 10 milliEquivalent(s) Oral two times a day    Comments:    ==========================NEUROLOGY===========================  [ ] SBS:		[ ] DOMENICO-1:	[ ] BIS:  [x] Adequacy of sedation and pain control has been assessed and adjusted    Neurologic Medications:  levETIRAcetam  Oral Liquid - Peds 500 milliGRAM(s) Enteral Tube every 12 hours  LORazepam IV Intermittent - Peds 3.4 milliGRAM(s) IV Intermittent once PRN    Comments:    OTHER MEDICATIONS:  Endocrine/Metabolic Medications:  Genitourinary Medications:  Topical/Other Medications:  lactobacillus Oral Powder (CULTURELLE KIDS) - Peds 1 Packet(s) Enteral Tube daily  miconazole 2% Topical Ointment (Critic-Aid Clear AF) - Peds 1 Application(s) Topical two times a day      ==================PATIENT CARE ACCESS DEVICES===================  [ ] Peripheral IV  [ ] Central Venous Line	[ ] R	[ ] L	[ ] IJ	[ ] Fem	[ ] SC			Placed:   [ ] Arterial Line		[ ] R	[ ] L	[ ] PT	[ ] DP	[ ] Fem	[ ] Rad	[ ] Ax	Placed:   [ ] PICC:				[ ] Broviac		[ ] Mediport  [ ] Urinary Catheter, Date Placed:   [x] Necessity of urinary, arterial, and venous catheters discussed    =========================PHYSICAL EXAM=========================  GENERAL: In no acute distress  RESPIRATORY: Lungs clear to auscultation bilaterally. Good aeration. No rales, rhonchi, retractions or wheezing. Effort even and unlabored.  CARDIOVASCULAR: Regular rate and rhythm. Normal S1/S2. No murmurs, rubs, or gallop. Capillary refill < 2 seconds. Distal pulses 2+ and equal.  ABDOMEN: Soft, non-distended. Bowel sounds present. No palpable hepatosplenomegaly.  SKIN: No rash.  EXTREMITIES: Warm and well perfused. No gross extremity deformities.  NEUROLOGIC: Alert and oriented. No acute change from baseline exam.    ===============================================================  IMAGING STUDIES:    Parent/Guardian is at the bedside:	[ ] Yes	[ ] No  Patient and Parent/Guardian updated as to the progress/plan of care:	[ ] Yes	[ ] No    [ ] The patient remains in critical and unstable condition, and requires ICU care and monitoring  [ ] The patient is improving but requires continued monitoring and adjustment of therapy    [ ] The total critical care time spent by attending physician was __ minutes, excluding procedure time.

## 2017-10-16 NOTE — PROGRESS NOTE PEDS - NSHPATTENDINGPLANDISCUSS_GEN_ALL_CORE
mother, PICU team, RT and nursing
PICU Team, Pulmonary Medicine, Mother
mother, [facility], PICU team, RT and nursing

## 2017-10-16 NOTE — DISCHARGE NOTE PEDIATRIC - MEDICATION SUMMARY - MEDICATIONS TO TAKE
I will START or STAY ON the medications listed below when I get home from the hospital:    Resume PT services  -- Indication: For Nutrition, metabolism, and development symptoms    Resume OT services  -- Indication: For Nutrition, metabolism, and development symptoms    Pulmicort Respules 1 mg/2 mL inhalation suspension  -- 2 milliliter(s) inhaled 3 times a day  -- Indication: For Chronic respiratory failure, unspecified whether with hypoxia or hypercapnia    tobramycin 75 mg/mL inhalation solution  -- 4 milliliter(s) inhaled 2 times a day  -- Indication: For Multifocal pneumonia    levETIRAcetam 100 mg/mL oral solution  -- 5 milliliter(s) by mouth 2 times a day  -- Indication: For Other generalized epilepsy, not intractable, without status epilepticus    ipratropium 500 mcg/2.5 mL inhalation solution  -- 1.25 milliliter(s) inhaled once a day, As Needed  -- Indication: For Chronic respiratory failure, unspecified whether with hypoxia or hypercapnia    levalbuterol 1.25 mg/3 mL inhalation solution  -- 3 milliliter(s) inhaled 2 times a day  -- Indication: For Chronic respiratory failure, unspecified whether with hypoxia or hypercapnia    potassium chloride 10 mEq oral tablet, extended release  -- 1.5 tab(s) oral daily  -- Indication: For Nutrition, metabolism, and development symptoms    magnesium oxide 400 mg (241.3 mg elemental magnesium) oral tablet  -- 1 tab(s) by mouth every 12 hours  -- Indication: For Nutrition, metabolism, and development symptoms    sodium chloride 7% inhalation solution  -- 2 milliliter(s) inhaled 2 times a day  -- Indication: For Chronic respiratory failure, unspecified whether with hypoxia or hypercapnia    colistimethate 150 mg (colistin base) injection  -- 150 milligram(s) injectable 2 times a day  **Finish on 10/14/17  -- Indication: For Chronic respiratory failure, unspecified whether with hypoxia or hypercapnia    lactobacillus acidophilus oral capsule  --  by mouth   -- Indication: For Nutrition, metabolism, and development symptoms    Protonix 40 mg oral granule, enteric coated  -- 0.5 packet by mouth once a day  -- Indication: For Nutrition, metabolism, and development symptoms    levoFLOXacin 500 mg oral tablet  -- 1 tab(s) by gastrostomy tube once a day x 7 days. Please hold feeds for 2 hours before and 2 hours after giving medication.  -- Avoid prolonged or excessive exposure to direct and/or artificial sunlight while taking this medication.  Do not take dairy products, antacids, or iron preparations within one hour of this medication.  Finish all this medication unless otherwise directed by prescriber.  May cause drowsiness or dizziness.  Medication should be taken with plenty of water.    -- Indication: For Multifocal pneumonia    Multiple Vitamins oral liquid  -- 2 milliliter(s) by mouth once a day  -- Indication: For Nutrition, metabolism, and development symptoms

## 2017-10-16 NOTE — DISCHARGE NOTE PEDIATRIC - CARE PROVIDER_API CALL
Deborah Hernandez), Pediatrics  [address]  [address]  [place] [address]  Phone: [phone]  Fax: [phone]    Ana Latif), Pediatric Pulmonary Medicine; Pediatrics  [address]  [address]  [place] [address]  Phone: [phone]  Fax: [phone]

## 2017-10-16 NOTE — PROGRESS NOTE PEDS - SUBJECTIVE AND OBJECTIVE BOX
INTERVAL HISTORY:    MEDICATIONS  (STANDING):  buDESOnide   for Nebulization - Peds 1 milliGRAM(s) Nebulizer every 12 hours  lactobacillus Oral Powder (CULTURELLE KIDS) - Peds 1 Packet(s) Enteral Tube daily  lansoprazole   Oral  Liquid - Peds 15 milliGRAM(s) Enteral Tube daily  levalbuterol for Nebulization - Peds 1.25 milliGRAM(s) Nebulizer every 8 hours  levETIRAcetam  Oral Liquid - Peds 500 milliGRAM(s) Enteral Tube every 12 hours  magnesium oxide Tab/Cap - Peds 400 milliGRAM(s) Oral every 12 hours  miconazole 2% Topical Ointment (Critic-Aid Clear AF) - Peds 1 Application(s) Topical two times a day  piperacillin/tazobactam IV Intermittent - Peds 2700 milliGRAM(s) IV Intermittent every 6 hours  potassium chloride ER Oral Tab/Cap - Peds 10 milliEquivalent(s) Oral two times a day  sodium chloride 7% for Nebulization - Peds 3 milliLiter(s) Nebulizer every 8 hours  tobramycin for Nebulization - Peds 300 milliGRAM(s) Nebulizer every 12 hours    MEDICATIONS  (PRN):  EPINEPHrine   IntraMuscular Injection - Peds 0.3 milliGRAM(s) IntraMuscular once PRN Anaphylaxis  ipratropium 0.02% for Nebulization - Peds 500 MICROGram(s) Inhalation every 6 hours PRN Bronchospasm  LORazepam IV Intermittent - Peds 3.4 milliGRAM(s) IV Intermittent once PRN seizure    Allergies    Bactrim (Unknown)  Benadryl (Other)  carbamazepine (Unknown)  cephalosporins (Unknown)  Depakote (Unknown)  diphenhydrAMINE (Seizure)  eggs (Unknown)  EGGS,  NUTS (Anaphylaxis)  Erythromycin Base (Unknown)  metoclopramide (Unknown)  Milk (Unknown)  MILK, SHELLFISH, WHEAT (Unknown)  Nuts (Unknown)  oxcarbazepine (Unknown)  peanuts (Unknown)  Reglan (Unknown)  SEASONAL, POLLEN, GRASS, PET DANDER, FEATHERS (Unknown)  shellfish (Unknown)  Trileptal (Unknown)  TRILEPTAL, DEPAKOTE, REGLAN, VANTIN, CEPHALOSPORINS (Unknown)  valproic acid (Unknown)  vancomycin (Unknown)    Intolerances          Vital Signs Last 24 Hrs  T(C): 37 (16 Oct 2017 14:00), Max: 37.5 (16 Oct 2017 11:02)  T(F): 98.6 (16 Oct 2017 14:00), Max: 99.5 (16 Oct 2017 11:02)  HR: 95 (16 Oct 2017 15:40) (93 - 138)  BP: 90/55 (16 Oct 2017 14:00) (77/46 - 107/49)  BP(mean): 62 (16 Oct 2017 14:00) (53 - 68)  RR: 15 (16 Oct 2017 14:00) (11 - 15)  SpO2: 98% (16 Oct 2017 15:40) (93% - 98%)  Daily     Daily   Mode: SIMV with PS  RR (machine): 15  FiO2: 35  PEEP: 8  PS: 10  ITime: 1  MAP: 10  PC: 12  PIP: 14      MICROBIOLOGY:  Culture - Sputum . (10.13.17 @ 20:40)    Gram Stain:   Numerous polymorphonuclear leukocytes per low power field  Few Squamous epithelial cells per low power field  Numerous Gram Negative Rods seen per oil power field  Few Gram Positive Rods seen per oil power field  Rare Gram Positive Cocci in Clusters seen per oil power field    -  Amikacin: S <=8    -  Ampicillin: R >16    -  Cefazolin: R >16    -  Cefepime: S <=2    -  Cefoxitin: R >16    -  Ceftazidime: S <=1    -  Gentamicin: S <=1    -  Levofloxacin: S 2    -  Meropenem: S <=1    -  Piperacillin/Tazobactam: S <=8    -  Tobramycin: S 4    -  Trimethoprim/Sulfamethoxazole: S 1/19    -  Ampicillin/Sulbactam: R >16/8    -  Ceftriaxone: S <=1    -  Aztreonam: S <=4    -  Ciprofloxacin: I 2    -  Ertapenem: S <=0.5    -  Imipenem: S <=1    Specimen Source: .Sputum Sputum/TRAP    Culture Results:   Moderate Serratia marcescens  Normal Respiratory Ynes present    Organism Identification: Serratia marcescens    Organism: Serratia marcescens    Method Type: WENDY

## 2017-10-16 NOTE — DISCHARGE NOTE PEDIATRIC - CARE PROVIDERS DIRECT ADDRESSES
[email or web address]

## 2017-10-16 NOTE — PROGRESS NOTE PEDS - ASSESSMENT
22 year old female with H/O MRCP, seizure disorder, Quadriparesis, Tracheostomy, GT; with acute on chronic respiratory failure  (at baseline receives night time vent support and Oxygen via nasal cannula during the day) secondary to Serratia pneumonia.  Also with increased seizure frequency.    Plan:  Transition to home vent today; when awake, will trial off vent again  Based on trach culture sensitivities, will change Zosyn to Levaquin; continue David nebs  Continue aggressive pulmonary toilet regimen  f/u with pulmonary 22 year old female with H/O MRCP, seizure disorder, Quadriparesis, Tracheostomy, GT; with acute on chronic respiratory failure  (at baseline receives night time vent support and Oxygen via nasal cannula during the day) secondary to Serratia tracheitis.  Also with increased seizure frequency.    Plan:  Transition to home vent today; when awake, will trial off vent again  Based on trach culture sensitivities, will change Zosyn to Levaquin; continue David nebs  Continue aggressive pulmonary toilet regimen  f/u with pulmonary  Spot Video EEG today  Discharge planning 22 year old female with H/O MRCP, seizure disorder, Quadriparesis, Tracheostomy, GT; with acute on chronic respiratory failure  (at baseline receives night time vent support and Oxygen via nasal cannula during the day) secondary to Serratia tracheitis.  Also with increased seizure frequency.    Plan:  Transition to home vent today; will also trial off vent again for a few hours  Based on trach culture sensitivities, will change Zosyn to Levaquin; continue David nebs  Continue aggressive pulmonary toilet regimen  f/u with pulmonary  Spot Video EEG today  Discharge planning

## 2017-10-16 NOTE — DISCHARGE NOTE PEDIATRIC - PLAN OF CARE
Return to baseline Continue Levofloxacin once daily via GTube for 7 days. Please hold tube feeds for 2 hours prior and 2 hours after administration of medication. You should follow up with your primary physician within 48 hours.

## 2017-10-16 NOTE — CONSULT NOTE PEDS - ASSESSMENT
23 yo F with history of cerebral palsy, global developmental delay, microcephaly complicated by epilepsy, hydronephrosis, hypothyroidism, diffuse hypotonia, scoliosis & kyphosis with spasticity, electrolyte abn including hypokalemia GJ tube dependence and trach dependence admitted for tracheitis. Neurology consulted for breakthrough seizures in setting of illness. 23 yo F with history of cerebral palsy, global developmental delay, microcephaly complicated by epilepsy, diffuse hypotonia, scoliosis & kyphosis with spasticity, GJ tube dependence and trach dependence with chronic colonization with pseudomonas and MRSA and history of aspiration pneumonia admitted for tracheitis. Neurology consulted for breakthrough seizures in setting of illness.

## 2017-10-18 DIAGNOSIS — R68.0 HYPOTHERMIA, NOT ASSOCIATED WITH LOW ENVIRONMENTAL TEMPERATURE: ICD-10-CM

## 2017-10-18 DIAGNOSIS — E87.6 HYPOKALEMIA: ICD-10-CM

## 2017-10-18 DIAGNOSIS — J96.20 ACUTE AND CHRONIC RESPIRATORY FAILURE, UNSPECIFIED WHETHER WITH HYPOXIA OR HYPERCAPNIA: ICD-10-CM

## 2017-10-18 DIAGNOSIS — J69.0 PNEUMONITIS DUE TO INHALATION OF FOOD AND VOMIT: ICD-10-CM

## 2017-10-18 DIAGNOSIS — E03.9 HYPOTHYROIDISM, UNSPECIFIED: ICD-10-CM

## 2017-10-18 DIAGNOSIS — R62.50 UNSPECIFIED LACK OF EXPECTED NORMAL PHYSIOLOGICAL DEVELOPMENT IN CHILDHOOD: ICD-10-CM

## 2017-10-18 DIAGNOSIS — M41.9 SCOLIOSIS, UNSPECIFIED: ICD-10-CM

## 2017-10-18 DIAGNOSIS — G80.9 CEREBRAL PALSY, UNSPECIFIED: ICD-10-CM

## 2017-10-18 DIAGNOSIS — J45.909 UNSPECIFIED ASTHMA, UNCOMPLICATED: ICD-10-CM

## 2017-10-18 DIAGNOSIS — H50.9 UNSPECIFIED STRABISMUS: ICD-10-CM

## 2017-10-18 DIAGNOSIS — Z93.0 TRACHEOSTOMY STATUS: ICD-10-CM

## 2017-10-18 DIAGNOSIS — R05 COUGH: ICD-10-CM

## 2017-10-18 DIAGNOSIS — R56.9 UNSPECIFIED CONVULSIONS: ICD-10-CM

## 2017-10-18 DIAGNOSIS — G80.1 SPASTIC DIPLEGIC CEREBRAL PALSY: ICD-10-CM

## 2017-10-18 DIAGNOSIS — K21.9 GASTRO-ESOPHAGEAL REFLUX DISEASE WITHOUT ESOPHAGITIS: ICD-10-CM

## 2017-10-18 LAB
BACTERIA SPT RESP CULT: SIGNIFICANT CHANGE UP
CULTURE RESULTS: SIGNIFICANT CHANGE UP
CULTURE RESULTS: SIGNIFICANT CHANGE UP
SPECIMEN SOURCE: SIGNIFICANT CHANGE UP
SPECIMEN SOURCE: SIGNIFICANT CHANGE UP

## 2017-11-14 ENCOUNTER — OTHER (OUTPATIENT)
Age: 22
End: 2017-11-14

## 2017-11-14 ENCOUNTER — RX RENEWAL (OUTPATIENT)
Age: 22
End: 2017-11-14

## 2017-11-27 ENCOUNTER — MEDICATION RENEWAL (OUTPATIENT)
Age: 22
End: 2017-11-27

## 2017-12-01 ENCOUNTER — MEDICATION RENEWAL (OUTPATIENT)
Age: 22
End: 2017-12-01

## 2017-12-05 ENCOUNTER — MEDICATION RENEWAL (OUTPATIENT)
Age: 22
End: 2017-12-05

## 2017-12-07 ENCOUNTER — APPOINTMENT (OUTPATIENT)
Dept: OTOLARYNGOLOGY | Facility: CLINIC | Age: 22
End: 2017-12-07
Payer: COMMERCIAL

## 2017-12-07 DIAGNOSIS — R29.898 OTHER SYMPTOMS AND SIGNS INVOLVING THE MUSCULOSKELETAL SYSTEM: ICD-10-CM

## 2017-12-07 DIAGNOSIS — M40.209 UNSPECIFIED KYPHOSIS, SITE UNSPECIFIED: ICD-10-CM

## 2017-12-07 PROCEDURE — 99214 OFFICE O/P EST MOD 30 MIN: CPT | Mod: 25

## 2017-12-07 PROCEDURE — 31615 TRCHEOBRNCHSC EST TRACHS INC: CPT

## 2017-12-18 ENCOUNTER — MEDICATION RENEWAL (OUTPATIENT)
Age: 22
End: 2017-12-18

## 2017-12-18 ENCOUNTER — RX RENEWAL (OUTPATIENT)
Age: 22
End: 2017-12-18

## 2017-12-22 DIAGNOSIS — A49.8 OTHER SPECIFIED RESPIRATORY DISORDERS: ICD-10-CM

## 2017-12-22 DIAGNOSIS — J98.8 OTHER SPECIFIED RESPIRATORY DISORDERS: ICD-10-CM

## 2017-12-23 ENCOUNTER — LABORATORY RESULT (OUTPATIENT)
Age: 22
End: 2017-12-23

## 2018-01-02 ENCOUNTER — MEDICATION RENEWAL (OUTPATIENT)
Age: 23
End: 2018-01-02

## 2018-01-02 LAB
25(OH)D3 SERPL-MCNC: 73 NG/ML
CALCIUM SERPL-MCNC: 9.2 MG/DL
MAGNESIUM SERPL-MCNC: 2.2 MG/DL
PARATHYROID HORMONE INTACT: 57 PG/ML
PHOSPHATE SERPL-MCNC: 3.2 MG/DL

## 2018-01-03 ENCOUNTER — RX RENEWAL (OUTPATIENT)
Age: 23
End: 2018-01-03

## 2018-01-03 ENCOUNTER — MEDICATION RENEWAL (OUTPATIENT)
Age: 23
End: 2018-01-03

## 2018-01-04 ENCOUNTER — FORM ENCOUNTER (OUTPATIENT)
Age: 23
End: 2018-01-04

## 2018-01-05 ENCOUNTER — OUTPATIENT (OUTPATIENT)
Dept: OUTPATIENT SERVICES | Facility: HOSPITAL | Age: 23
LOS: 1 days | End: 2018-01-05
Payer: COMMERCIAL

## 2018-01-05 DIAGNOSIS — R62.51 FAILURE TO THRIVE (CHILD): ICD-10-CM

## 2018-01-05 DIAGNOSIS — Z93.0 TRACHEOSTOMY STATUS: Chronic | ICD-10-CM

## 2018-01-05 PROCEDURE — 49452 REPLACE G-J TUBE PERC: CPT

## 2018-01-11 ENCOUNTER — FORM ENCOUNTER (OUTPATIENT)
Age: 23
End: 2018-01-11

## 2018-01-12 ENCOUNTER — OUTPATIENT (OUTPATIENT)
Dept: OUTPATIENT SERVICES | Age: 23
LOS: 1 days | End: 2018-01-12
Payer: COMMERCIAL

## 2018-01-12 DIAGNOSIS — K21.9 GASTRO-ESOPHAGEAL REFLUX DISEASE WITHOUT ESOPHAGITIS: ICD-10-CM

## 2018-01-12 DIAGNOSIS — Z93.0 TRACHEOSTOMY STATUS: Chronic | ICD-10-CM

## 2018-01-12 DIAGNOSIS — Z43.4 ENCOUNTER FOR ATTENTION TO OTHER ARTIFICIAL OPENINGS OF DIGESTIVE TRACT: ICD-10-CM

## 2018-01-12 PROCEDURE — 49452 REPLACE G-J TUBE PERC: CPT

## 2018-01-17 DIAGNOSIS — K21.9 GASTRO-ESOPHAGEAL REFLUX DISEASE WITHOUT ESOPHAGITIS: ICD-10-CM

## 2018-01-17 DIAGNOSIS — K94.19 OTHER COMPLICATIONS OF ENTEROSTOMY: ICD-10-CM

## 2018-01-18 ENCOUNTER — RX RENEWAL (OUTPATIENT)
Age: 23
End: 2018-01-18

## 2018-01-22 ENCOUNTER — RX RENEWAL (OUTPATIENT)
Age: 23
End: 2018-01-22

## 2018-01-23 ENCOUNTER — MEDICATION RENEWAL (OUTPATIENT)
Age: 23
End: 2018-01-23

## 2018-01-26 ENCOUNTER — MEDICATION RENEWAL (OUTPATIENT)
Age: 23
End: 2018-01-26

## 2018-01-30 ENCOUNTER — MEDICATION RENEWAL (OUTPATIENT)
Age: 23
End: 2018-01-30

## 2018-02-02 ENCOUNTER — MEDICATION RENEWAL (OUTPATIENT)
Age: 23
End: 2018-02-02

## 2018-02-06 ENCOUNTER — FORM ENCOUNTER (OUTPATIENT)
Age: 23
End: 2018-02-06

## 2018-02-07 ENCOUNTER — OUTPATIENT (OUTPATIENT)
Dept: OUTPATIENT SERVICES | Age: 23
LOS: 1 days | End: 2018-02-07
Payer: COMMERCIAL

## 2018-02-07 DIAGNOSIS — Z93.0 TRACHEOSTOMY STATUS: Chronic | ICD-10-CM

## 2018-02-07 DIAGNOSIS — K21.9 GASTRO-ESOPHAGEAL REFLUX DISEASE WITHOUT ESOPHAGITIS: ICD-10-CM

## 2018-02-07 PROCEDURE — 49452 REPLACE G-J TUBE PERC: CPT

## 2018-02-09 DIAGNOSIS — Z43.4 ENCOUNTER FOR ATTENTION TO OTHER ARTIFICIAL OPENINGS OF DIGESTIVE TRACT: ICD-10-CM

## 2018-02-09 DIAGNOSIS — K21.9 GASTRO-ESOPHAGEAL REFLUX DISEASE WITHOUT ESOPHAGITIS: ICD-10-CM

## 2018-02-12 ENCOUNTER — MEDICATION RENEWAL (OUTPATIENT)
Age: 23
End: 2018-02-12

## 2018-02-13 ENCOUNTER — MEDICATION RENEWAL (OUTPATIENT)
Age: 23
End: 2018-02-13

## 2018-02-15 ENCOUNTER — OTHER (OUTPATIENT)
Age: 23
End: 2018-02-15

## 2018-02-16 ENCOUNTER — FORM ENCOUNTER (OUTPATIENT)
Age: 23
End: 2018-02-16

## 2018-02-17 ENCOUNTER — OUTPATIENT (OUTPATIENT)
Dept: OUTPATIENT SERVICES | Facility: HOSPITAL | Age: 23
LOS: 1 days | End: 2018-02-17
Payer: COMMERCIAL

## 2018-02-17 ENCOUNTER — APPOINTMENT (OUTPATIENT)
Dept: RADIOLOGY | Facility: CLINIC | Age: 23
End: 2018-02-17
Payer: COMMERCIAL

## 2018-02-17 DIAGNOSIS — Z00.8 ENCOUNTER FOR OTHER GENERAL EXAMINATION: ICD-10-CM

## 2018-02-17 DIAGNOSIS — Z93.0 TRACHEOSTOMY STATUS: Chronic | ICD-10-CM

## 2018-02-17 PROCEDURE — 71046 X-RAY EXAM CHEST 2 VIEWS: CPT | Mod: 26

## 2018-02-17 PROCEDURE — 71046 X-RAY EXAM CHEST 2 VIEWS: CPT

## 2018-02-22 ENCOUNTER — RX RENEWAL (OUTPATIENT)
Age: 23
End: 2018-02-22

## 2018-02-23 ENCOUNTER — CLINICAL ADVICE (OUTPATIENT)
Age: 23
End: 2018-02-23

## 2018-03-23 ENCOUNTER — CLINICAL ADVICE (OUTPATIENT)
Age: 23
End: 2018-03-23

## 2018-03-23 RX ORDER — LEVOFLOXACIN 25 MG/ML
25 SOLUTION ORAL
Qty: 160 | Refills: 0 | Status: DISCONTINUED | COMMUNITY
Start: 2018-03-23 | End: 2018-03-23

## 2018-03-27 ENCOUNTER — OTHER (OUTPATIENT)
Age: 23
End: 2018-03-27

## 2018-04-02 ENCOUNTER — APPOINTMENT (OUTPATIENT)
Dept: PEDIATRIC PULMONARY CYSTIC FIB | Facility: CLINIC | Age: 23
End: 2018-04-02

## 2018-04-09 ENCOUNTER — APPOINTMENT (OUTPATIENT)
Dept: OTOLARYNGOLOGY | Facility: CLINIC | Age: 23
End: 2018-04-09
Payer: COMMERCIAL

## 2018-04-09 PROCEDURE — 31615 TRCHEOBRNCHSC EST TRACHS INC: CPT

## 2018-04-09 PROCEDURE — 99214 OFFICE O/P EST MOD 30 MIN: CPT | Mod: 25

## 2018-04-20 ENCOUNTER — APPOINTMENT (OUTPATIENT)
Dept: PEDIATRIC PULMONARY CYSTIC FIB | Facility: CLINIC | Age: 23
End: 2018-04-20
Payer: COMMERCIAL

## 2018-04-20 ENCOUNTER — MEDICATION RENEWAL (OUTPATIENT)
Age: 23
End: 2018-04-20

## 2018-04-20 VITALS
RESPIRATION RATE: 28 BRPM | TEMPERATURE: 97.5 F | SYSTOLIC BLOOD PRESSURE: 87 MMHG | HEART RATE: 67 BPM | OXYGEN SATURATION: 90 % | DIASTOLIC BLOOD PRESSURE: 49 MMHG

## 2018-04-20 PROCEDURE — 99215 OFFICE O/P EST HI 40 MIN: CPT

## 2018-04-20 RX ORDER — ALCLOMETASONE DIPROPIONATE 0.5 MG/G
0.05 OINTMENT TOPICAL
Qty: 45 | Refills: 0 | Status: COMPLETED | COMMUNITY
Start: 2017-12-04

## 2018-04-21 RX ORDER — AMOXICILLIN 400 MG/5ML
400 FOR SUSPENSION ORAL
Qty: 200 | Refills: 0 | Status: COMPLETED | COMMUNITY
Start: 2018-01-14

## 2018-04-21 RX ORDER — MUPIROCIN 20 MG/G
2 OINTMENT TOPICAL
Qty: 22 | Refills: 0 | Status: COMPLETED | COMMUNITY
Start: 2017-12-04

## 2018-04-21 RX ORDER — NYSTATIN 100000 [USP'U]/ML
100000 SUSPENSION ORAL
Qty: 60 | Refills: 0 | Status: COMPLETED | COMMUNITY
Start: 2018-01-06

## 2018-04-21 RX ORDER — SULFAMETHOXAZOLE AND TRIMETHOPRIM 200; 40 MG/5ML; MG/5ML
200-40 SUSPENSION ORAL
Qty: 350 | Refills: 0 | Status: COMPLETED | COMMUNITY
Start: 2018-01-12

## 2018-04-21 RX ORDER — FLUCONAZOLE 150 MG/1
150 TABLET ORAL
Qty: 1 | Refills: 0 | Status: COMPLETED | COMMUNITY
Start: 2017-12-04

## 2018-04-23 RX ORDER — SODIUM CHLORIDE FOR INHALATION 0.9 %
0.9 VIAL, NEBULIZER (ML) INHALATION
Qty: 100 | Refills: 5 | Status: ACTIVE | COMMUNITY
Start: 2018-04-23 | End: 1900-01-01

## 2018-04-23 RX ORDER — SOFT LENS DISINFECTANT
0.9 SOLUTION, NON-ORAL MISCELLANEOUS
Qty: 360 | Refills: 5 | Status: DISCONTINUED | COMMUNITY
Start: 2018-04-20 | End: 2018-04-23

## 2018-04-26 ENCOUNTER — RX RENEWAL (OUTPATIENT)
Age: 23
End: 2018-04-26

## 2018-04-30 ENCOUNTER — FORM ENCOUNTER (OUTPATIENT)
Age: 23
End: 2018-04-30

## 2018-05-01 ENCOUNTER — OUTPATIENT (OUTPATIENT)
Dept: OUTPATIENT SERVICES | Age: 23
LOS: 1 days | End: 2018-05-01
Payer: COMMERCIAL

## 2018-05-01 DIAGNOSIS — Z93.0 TRACHEOSTOMY STATUS: Chronic | ICD-10-CM

## 2018-05-01 PROCEDURE — 49452 REPLACE G-J TUBE PERC: CPT

## 2018-05-04 DIAGNOSIS — K21.9 GASTRO-ESOPHAGEAL REFLUX DISEASE WITHOUT ESOPHAGITIS: ICD-10-CM

## 2018-05-04 DIAGNOSIS — Z43.4 ENCOUNTER FOR ATTENTION TO OTHER ARTIFICIAL OPENINGS OF DIGESTIVE TRACT: ICD-10-CM

## 2018-05-07 ENCOUNTER — OTHER (OUTPATIENT)
Age: 23
End: 2018-05-07

## 2018-06-04 ENCOUNTER — OTHER (OUTPATIENT)
Age: 23
End: 2018-06-04

## 2018-06-05 ENCOUNTER — OTHER (OUTPATIENT)
Age: 23
End: 2018-06-05

## 2018-06-07 ENCOUNTER — RX RENEWAL (OUTPATIENT)
Age: 23
End: 2018-06-07

## 2018-06-08 ENCOUNTER — MEDICATION RENEWAL (OUTPATIENT)
Age: 23
End: 2018-06-08

## 2018-06-27 ENCOUNTER — RX RENEWAL (OUTPATIENT)
Age: 23
End: 2018-06-27

## 2018-06-27 ENCOUNTER — MEDICATION RENEWAL (OUTPATIENT)
Age: 23
End: 2018-06-27

## 2018-07-02 ENCOUNTER — FORM ENCOUNTER (OUTPATIENT)
Age: 23
End: 2018-07-02

## 2018-07-03 ENCOUNTER — OUTPATIENT (OUTPATIENT)
Dept: OUTPATIENT SERVICES | Age: 23
LOS: 1 days | End: 2018-07-03
Payer: COMMERCIAL

## 2018-07-03 DIAGNOSIS — Z93.0 TRACHEOSTOMY STATUS: Chronic | ICD-10-CM

## 2018-07-03 DIAGNOSIS — G80.8 OTHER CEREBRAL PALSY: ICD-10-CM

## 2018-07-03 PROCEDURE — 49452 REPLACE G-J TUBE PERC: CPT

## 2018-07-06 DIAGNOSIS — G80.9 CEREBRAL PALSY, UNSPECIFIED: ICD-10-CM

## 2018-07-06 DIAGNOSIS — Z43.4 ENCOUNTER FOR ATTENTION TO OTHER ARTIFICIAL OPENINGS OF DIGESTIVE TRACT: ICD-10-CM

## 2018-07-11 ENCOUNTER — RX RENEWAL (OUTPATIENT)
Age: 23
End: 2018-07-11

## 2018-07-16 LAB
25(OH)D3 SERPL-MCNC: 52.7 NG/ML
ALBUMIN SERPL ELPH-MCNC: 3.3 G/DL
ALP BLD-CCNC: 135 U/L
ALT SERPL-CCNC: 21 U/L
ANION GAP SERPL CALC-SCNC: 13 MMOL/L
AST SERPL-CCNC: 21 U/L
BASOPHILS # BLD AUTO: 0.01 K/UL
BASOPHILS NFR BLD AUTO: 0.1 %
BILIRUB SERPL-MCNC: <0.2 MG/DL
BUN SERPL-MCNC: 4 MG/DL
CALCIUM SERPL-MCNC: 8.6 MG/DL
CALCIUM SERPL-MCNC: 8.6 MG/DL
CHLORIDE SERPL-SCNC: 104 MMOL/L
CO2 SERPL-SCNC: 23 MMOL/L
CREAT SERPL-MCNC: 0.32 MG/DL
EOSINOPHIL # BLD AUTO: 0.06 K/UL
EOSINOPHIL NFR BLD AUTO: 0.6 %
GLUCOSE SERPL-MCNC: 114 MG/DL
HCT VFR BLD CALC: 41.2 %
HGB BLD-MCNC: 12.9 G/DL
IMM GRANULOCYTES NFR BLD AUTO: 0.1 %
LYMPHOCYTES # BLD AUTO: 1.93 K/UL
LYMPHOCYTES NFR BLD AUTO: 20.1 %
MAN DIFF?: NORMAL
MCHC RBC-ENTMCNC: 30.6 PG
MCHC RBC-ENTMCNC: 31.3 GM/DL
MCV RBC AUTO: 97.9 FL
MONOCYTES # BLD AUTO: 0.2 K/UL
MONOCYTES NFR BLD AUTO: 2.1 %
NEUTROPHILS # BLD AUTO: 7.41 K/UL
NEUTROPHILS NFR BLD AUTO: 77 %
PARATHYROID HORMONE INTACT: 68 PG/ML
PHOSPHATE SERPL-MCNC: 3.5 MG/DL
PLATELET # BLD AUTO: 167 K/UL
POTASSIUM SERPL-SCNC: 4.3 MMOL/L
PROT SERPL-MCNC: 6.4 G/DL
RBC # BLD: 4.21 M/UL
RBC # FLD: 13.1 %
SODIUM SERPL-SCNC: 140 MMOL/L
WBC # FLD AUTO: 9.62 K/UL

## 2018-07-19 ENCOUNTER — APPOINTMENT (OUTPATIENT)
Dept: PEDIATRIC NEUROLOGY | Facility: CLINIC | Age: 23
End: 2018-07-19
Payer: COMMERCIAL

## 2018-07-19 VITALS
DIASTOLIC BLOOD PRESSURE: 51 MMHG | HEIGHT: 55 IN | SYSTOLIC BLOOD PRESSURE: 83 MMHG | BODY MASS INDEX: 16.89 KG/M2 | HEART RATE: 70 BPM | WEIGHT: 72.97 LBS

## 2018-07-19 DIAGNOSIS — M41.9 SCOLIOSIS, UNSPECIFIED: ICD-10-CM

## 2018-07-19 PROBLEM — E87.6 HYPOKALEMIA: Chronic | Status: ACTIVE | Noted: 2017-10-13

## 2018-07-19 PROBLEM — M40.209 UNSPECIFIED KYPHOSIS, SITE UNSPECIFIED: Chronic | Status: ACTIVE | Noted: 2017-10-13

## 2018-07-19 PROCEDURE — 99215 OFFICE O/P EST HI 40 MIN: CPT

## 2018-07-19 RX ORDER — POTASSIUM CHLORIDE 750 MG/1
10 TABLET, FILM COATED, EXTENDED RELEASE ORAL
Qty: 90 | Refills: 0 | Status: DISCONTINUED | COMMUNITY
Start: 2017-03-20 | End: 2018-07-19

## 2018-07-19 RX ORDER — TOBRAMYCIN AND DEXAMETHASONE 3; 1 MG/G; MG/G
0.3-0.1 OINTMENT OPHTHALMIC
Qty: 1 | Refills: 0 | Status: DISCONTINUED | COMMUNITY
Start: 2018-03-27 | End: 2018-07-19

## 2018-07-27 ENCOUNTER — OTHER (OUTPATIENT)
Age: 23
End: 2018-07-27

## 2018-07-30 ENCOUNTER — APPOINTMENT (OUTPATIENT)
Dept: PEDIATRIC GASTROENTEROLOGY | Facility: CLINIC | Age: 23
End: 2018-07-30
Payer: COMMERCIAL

## 2018-07-30 VITALS — WEIGHT: 72.97 LBS | HEIGHT: 55 IN | BODY MASS INDEX: 16.89 KG/M2

## 2018-07-30 PROCEDURE — 99214 OFFICE O/P EST MOD 30 MIN: CPT

## 2018-07-30 RX ORDER — RANITIDINE HYDROCHLORIDE 15 MG/ML
15 SYRUP ORAL
Qty: 240 | Refills: 1 | Status: DISCONTINUED | COMMUNITY
Start: 2018-04-20 | End: 2018-07-30

## 2018-07-31 ENCOUNTER — OTHER (OUTPATIENT)
Age: 23
End: 2018-07-31

## 2018-07-31 ENCOUNTER — RX RENEWAL (OUTPATIENT)
Age: 23
End: 2018-07-31

## 2018-08-03 ENCOUNTER — APPOINTMENT (OUTPATIENT)
Dept: OPHTHALMOLOGY | Facility: CLINIC | Age: 23
End: 2018-08-03
Payer: COMMERCIAL

## 2018-08-03 DIAGNOSIS — H50.10 UNSPECIFIED EXOTROPIA: ICD-10-CM

## 2018-08-03 PROCEDURE — 99204 OFFICE O/P NEW MOD 45 MIN: CPT

## 2018-08-20 ENCOUNTER — MEDICATION RENEWAL (OUTPATIENT)
Age: 23
End: 2018-08-20

## 2018-08-21 ENCOUNTER — RX RENEWAL (OUTPATIENT)
Age: 23
End: 2018-08-21

## 2018-08-24 ENCOUNTER — APPOINTMENT (OUTPATIENT)
Dept: PEDIATRIC PULMONARY CYSTIC FIB | Facility: CLINIC | Age: 23
End: 2018-08-24
Payer: COMMERCIAL

## 2018-08-24 VITALS
WEIGHT: 72 LBS | HEART RATE: 75 BPM | HEIGHT: 55 IN | TEMPERATURE: 96.9 F | RESPIRATION RATE: 22 BRPM | OXYGEN SATURATION: 98 % | BODY MASS INDEX: 16.66 KG/M2

## 2018-08-24 DIAGNOSIS — R06.89 OTHER ABNORMALITIES OF BREATHING: ICD-10-CM

## 2018-08-24 DIAGNOSIS — J96.10 CHRONIC RESPIRATORY FAILURE, UNSPECIFIED WHETHER WITH HYPOXIA OR HYPERCAPNIA: ICD-10-CM

## 2018-08-24 DIAGNOSIS — Z99.11 DEPENDENCE ON RESPIRATOR [VENTILATOR] STATUS: ICD-10-CM

## 2018-08-24 PROCEDURE — 99215 OFFICE O/P EST HI 40 MIN: CPT

## 2018-09-22 ENCOUNTER — RX RENEWAL (OUTPATIENT)
Age: 23
End: 2018-09-22

## 2018-09-24 ENCOUNTER — RX RENEWAL (OUTPATIENT)
Age: 23
End: 2018-09-24

## 2018-09-24 RX ORDER — NUT.TX.IMPAIRED DIGEST/FIBER 0.07 G-1.5
LIQUID (ML) ORAL
Qty: 30 | Refills: 0 | Status: DISCONTINUED | OUTPATIENT
Start: 2017-01-10 | End: 2018-09-24

## 2018-09-25 ENCOUNTER — MEDICATION RENEWAL (OUTPATIENT)
Age: 23
End: 2018-09-25

## 2018-09-26 ENCOUNTER — FORM ENCOUNTER (OUTPATIENT)
Age: 23
End: 2018-09-26

## 2018-09-27 ENCOUNTER — OUTPATIENT (OUTPATIENT)
Dept: OUTPATIENT SERVICES | Age: 23
LOS: 1 days | End: 2018-09-27
Payer: COMMERCIAL

## 2018-09-27 DIAGNOSIS — K21.9 GASTRO-ESOPHAGEAL REFLUX DISEASE WITHOUT ESOPHAGITIS: ICD-10-CM

## 2018-09-27 DIAGNOSIS — G80.9 CEREBRAL PALSY, UNSPECIFIED: ICD-10-CM

## 2018-09-27 DIAGNOSIS — Z93.0 TRACHEOSTOMY STATUS: Chronic | ICD-10-CM

## 2018-09-27 PROCEDURE — 49452 REPLACE G-J TUBE PERC: CPT

## 2018-10-01 DIAGNOSIS — K21.9 GASTRO-ESOPHAGEAL REFLUX DISEASE WITHOUT ESOPHAGITIS: ICD-10-CM

## 2018-10-01 DIAGNOSIS — Z43.4 ENCOUNTER FOR ATTENTION TO OTHER ARTIFICIAL OPENINGS OF DIGESTIVE TRACT: ICD-10-CM

## 2018-10-02 ENCOUNTER — FORM ENCOUNTER (OUTPATIENT)
Age: 23
End: 2018-10-02

## 2018-10-03 ENCOUNTER — APPOINTMENT (OUTPATIENT)
Dept: ULTRASOUND IMAGING | Facility: CLINIC | Age: 23
End: 2018-10-03
Payer: COMMERCIAL

## 2018-10-03 ENCOUNTER — APPOINTMENT (OUTPATIENT)
Dept: PEDIATRIC NEPHROLOGY | Facility: CLINIC | Age: 23
End: 2018-10-03
Payer: COMMERCIAL

## 2018-10-03 ENCOUNTER — OUTPATIENT (OUTPATIENT)
Dept: OUTPATIENT SERVICES | Facility: HOSPITAL | Age: 23
LOS: 1 days | End: 2018-10-03
Payer: COMMERCIAL

## 2018-10-03 VITALS
DIASTOLIC BLOOD PRESSURE: 76 MMHG | HEIGHT: 55 IN | SYSTOLIC BLOOD PRESSURE: 118 MMHG | HEART RATE: 67 BPM | WEIGHT: 71.87 LBS | BODY MASS INDEX: 16.63 KG/M2

## 2018-10-03 DIAGNOSIS — Z93.0 TRACHEOSTOMY STATUS: Chronic | ICD-10-CM

## 2018-10-03 DIAGNOSIS — Z00.8 ENCOUNTER FOR OTHER GENERAL EXAMINATION: ICD-10-CM

## 2018-10-03 DIAGNOSIS — E83.42 HYPOMAGNESEMIA: ICD-10-CM

## 2018-10-03 DIAGNOSIS — E87.6 HYPOKALEMIA: ICD-10-CM

## 2018-10-03 DIAGNOSIS — N13.30 UNSPECIFIED HYDRONEPHROSIS: ICD-10-CM

## 2018-10-03 PROCEDURE — 99214 OFFICE O/P EST MOD 30 MIN: CPT

## 2018-10-03 PROCEDURE — 76770 US EXAM ABDO BACK WALL COMP: CPT

## 2018-10-03 PROCEDURE — 76770 US EXAM ABDO BACK WALL COMP: CPT | Mod: 26

## 2018-10-04 LAB
25(OH)D3 SERPL-MCNC: 56.4 NG/ML
ALBUMIN SERPL ELPH-MCNC: 2.7 G/DL
ALP BLD-CCNC: 153 U/L
ALT SERPL-CCNC: 15 U/L
ANION GAP SERPL CALC-SCNC: 8 MMOL/L
AST SERPL-CCNC: 22 U/L
BASOPHILS # BLD AUTO: 0 K/UL
BASOPHILS NFR BLD AUTO: 0 %
BILIRUB SERPL-MCNC: 0.2 MG/DL
BUN SERPL-MCNC: 4 MG/DL
CALCIUM SERPL-MCNC: 8.8 MG/DL
CHLORIDE SERPL-SCNC: 104 MMOL/L
CO2 SERPL-SCNC: 28 MMOL/L
CREAT SERPL-MCNC: 0.41 MG/DL
EOSINOPHIL # BLD AUTO: 0.04 K/UL
EOSINOPHIL NFR BLD AUTO: 0.6 %
GLUCOSE SERPL-MCNC: 83 MG/DL
HCT VFR BLD CALC: 38 %
HGB BLD-MCNC: 11.6 G/DL
IMM GRANULOCYTES NFR BLD AUTO: 0 %
LYMPHOCYTES # BLD AUTO: 1.94 K/UL
LYMPHOCYTES NFR BLD AUTO: 30.5 %
MAGNESIUM SERPL-MCNC: 2.1 MG/DL
MAN DIFF?: NORMAL
MCHC RBC-ENTMCNC: 29.5 PG
MCHC RBC-ENTMCNC: 30.5 GM/DL
MCV RBC AUTO: 96.7 FL
MONOCYTES # BLD AUTO: 0.14 K/UL
MONOCYTES NFR BLD AUTO: 2.2 %
NEUTROPHILS # BLD AUTO: 4.25 K/UL
NEUTROPHILS NFR BLD AUTO: 66.7 %
PHOSPHATE SERPL-MCNC: 1.7 MG/DL
PLATELET # BLD AUTO: 220 K/UL
POTASSIUM SERPL-SCNC: 4.1 MMOL/L
PROT SERPL-MCNC: 6.6 G/DL
RBC # BLD: 3.93 M/UL
RBC # FLD: 13.2 %
SODIUM SERPL-SCNC: 140 MMOL/L
WBC # FLD AUTO: 6.37 K/UL

## 2018-10-04 RX ORDER — DIBASIC SODIUM PHOSPHATE, MONOBASIC POTASSIUM PHOSPHATE AND MONOBASIC SODIUM PHOSPHATE 852; 155; 130 MG/1; MG/1; MG/1
155-852-130 TABLET ORAL TWICE DAILY
Qty: 60 | Refills: 5 | Status: ACTIVE | COMMUNITY
Start: 2018-10-04 | End: 1900-01-01

## 2018-10-05 ENCOUNTER — MESSAGE (OUTPATIENT)
Age: 23
End: 2018-10-05

## 2018-10-05 ENCOUNTER — OTHER (OUTPATIENT)
Age: 23
End: 2018-10-05

## 2018-10-05 RX ORDER — GASTROSTOMY TUBE 28 FR
KIT MISCELLANEOUS
Qty: 30 | Refills: 5 | Status: ACTIVE | OUTPATIENT
Start: 2017-04-21

## 2018-10-07 ENCOUNTER — TRANSCRIPTION ENCOUNTER (OUTPATIENT)
Age: 23
End: 2018-10-07

## 2018-10-07 ENCOUNTER — EMERGENCY (EMERGENCY)
Facility: HOSPITAL | Age: 23
LOS: 0 days | Discharge: HOME | End: 2018-10-07
Admitting: EMERGENCY MEDICINE
Payer: COMMERCIAL

## 2018-10-07 DIAGNOSIS — R40.4 TRANSIENT ALTERATION OF AWARENESS: ICD-10-CM

## 2018-10-07 DIAGNOSIS — Z93.0 TRACHEOSTOMY STATUS: ICD-10-CM

## 2018-10-07 DIAGNOSIS — G80.1 SPASTIC DIPLEGIC CEREBRAL PALSY: ICD-10-CM

## 2018-10-07 DIAGNOSIS — Z93.0 TRACHEOSTOMY STATUS: Chronic | ICD-10-CM

## 2018-10-07 DIAGNOSIS — Z93.1 GASTROSTOMY STATUS: ICD-10-CM

## 2018-10-07 DIAGNOSIS — R62.50 UNSPECIFIED LACK OF EXPECTED NORMAL PHYSIOLOGICAL DEVELOPMENT IN CHILDHOOD: ICD-10-CM

## 2018-10-07 PROCEDURE — 71045 X-RAY EXAM CHEST 1 VIEW: CPT | Mod: 26

## 2018-10-07 PROCEDURE — 99284 EMERGENCY DEPT VISIT MOD MDM: CPT

## 2018-10-08 ENCOUNTER — APPOINTMENT (OUTPATIENT)
Dept: PEDIATRIC GASTROENTEROLOGY | Facility: CLINIC | Age: 23
End: 2018-10-08
Payer: COMMERCIAL

## 2018-10-08 DIAGNOSIS — Z93.1 GASTROSTOMY STATUS: ICD-10-CM

## 2018-10-08 DIAGNOSIS — R63.3 FEEDING DIFFICULTIES: ICD-10-CM

## 2018-10-08 DIAGNOSIS — Z93.4 OTHER ARTIFICIAL OPENINGS OF GASTROINTESTINAL TRACT STATUS: ICD-10-CM

## 2018-10-08 DIAGNOSIS — K21.9 GASTRO-ESOPHAGEAL REFLUX DISEASE W/OUT ESOPHAGITIS: ICD-10-CM

## 2018-10-08 LAB
ALBUMIN SERPL ELPH-MCNC: 2 G/DL — LOW (ref 3.3–5)
ALP SERPL-CCNC: 173 U/L — HIGH (ref 40–120)
ALT FLD-CCNC: 20 U/L — SIGNIFICANT CHANGE UP (ref 12–78)
ANION GAP SERPL CALC-SCNC: 4 MMOL/L — LOW (ref 5–17)
APPEARANCE UR: ABNORMAL
AST SERPL-CCNC: 22 U/L — SIGNIFICANT CHANGE UP (ref 15–37)
BACTERIA # UR AUTO: ABNORMAL
BASOPHILS # BLD AUTO: 0 K/UL — SIGNIFICANT CHANGE UP (ref 0–0.2)
BASOPHILS NFR BLD AUTO: 0 % — SIGNIFICANT CHANGE UP (ref 0–2)
BILIRUB SERPL-MCNC: 0.1 MG/DL — LOW (ref 0.2–1.2)
BILIRUB UR-MCNC: NEGATIVE — SIGNIFICANT CHANGE UP
BUN SERPL-MCNC: 4 MG/DL — LOW (ref 7–23)
CALCIUM SERPL-MCNC: 8.6 MG/DL — SIGNIFICANT CHANGE UP (ref 8.5–10.1)
CHLORIDE SERPL-SCNC: 105 MMOL/L — SIGNIFICANT CHANGE UP (ref 96–108)
CO2 SERPL-SCNC: 31 MMOL/L — SIGNIFICANT CHANGE UP (ref 22–31)
COLOR SPEC: YELLOW — SIGNIFICANT CHANGE UP
COMMENT - URINE: SIGNIFICANT CHANGE UP
CREAT SERPL-MCNC: 0.37 MG/DL — LOW (ref 0.5–1.3)
CULTURE RESULTS: SIGNIFICANT CHANGE UP
DIFF PNL FLD: ABNORMAL
EOSINOPHIL # BLD AUTO: 0.01 K/UL — SIGNIFICANT CHANGE UP (ref 0–0.5)
EOSINOPHIL NFR BLD AUTO: 0.1 % — SIGNIFICANT CHANGE UP (ref 0–6)
EPI CELLS # UR: ABNORMAL
GLUCOSE SERPL-MCNC: 91 MG/DL — SIGNIFICANT CHANGE UP (ref 70–99)
GLUCOSE UR QL: NEGATIVE MG/DL — SIGNIFICANT CHANGE UP
HCG SERPL-ACNC: <1 MIU/ML — SIGNIFICANT CHANGE UP
HCT VFR BLD CALC: 37.2 % — SIGNIFICANT CHANGE UP (ref 34.5–45)
HGB BLD-MCNC: 11.8 G/DL — SIGNIFICANT CHANGE UP (ref 11.5–15.5)
IMM GRANULOCYTES NFR BLD AUTO: 0.2 % — SIGNIFICANT CHANGE UP (ref 0–1.5)
KETONES UR-MCNC: NEGATIVE — SIGNIFICANT CHANGE UP
LACTATE SERPL-SCNC: 1 MMOL/L — SIGNIFICANT CHANGE UP (ref 0.7–2)
LEUKOCYTE ESTERASE UR-ACNC: ABNORMAL
LYMPHOCYTES # BLD AUTO: 1.82 K/UL — SIGNIFICANT CHANGE UP (ref 1–3.3)
LYMPHOCYTES # BLD AUTO: 19.2 % — SIGNIFICANT CHANGE UP (ref 13–44)
MAGNESIUM SERPL-MCNC: 2.2 MG/DL — SIGNIFICANT CHANGE UP (ref 1.6–2.6)
MCHC RBC-ENTMCNC: 30.3 PG — SIGNIFICANT CHANGE UP (ref 27–34)
MCHC RBC-ENTMCNC: 31.7 GM/DL — LOW (ref 32–36)
MCV RBC AUTO: 95.4 FL — SIGNIFICANT CHANGE UP (ref 80–100)
MONOCYTES # BLD AUTO: 0.29 K/UL — SIGNIFICANT CHANGE UP (ref 0–0.9)
MONOCYTES NFR BLD AUTO: 3.1 % — SIGNIFICANT CHANGE UP (ref 2–14)
NEUTROPHILS # BLD AUTO: 7.36 K/UL — SIGNIFICANT CHANGE UP (ref 1.8–7.4)
NEUTROPHILS NFR BLD AUTO: 77.4 % — HIGH (ref 43–77)
NITRITE UR-MCNC: NEGATIVE — SIGNIFICANT CHANGE UP
NRBC # BLD: 0 /100 WBCS — SIGNIFICANT CHANGE UP (ref 0–0)
OSMOLALITY SERPL: 285 MOS/KG — SIGNIFICANT CHANGE UP (ref 275–300)
PH UR: 8 — SIGNIFICANT CHANGE UP (ref 5–8)
PHOSPHATE SERPL-MCNC: 4 MG/DL — SIGNIFICANT CHANGE UP (ref 2.5–4.5)
PLATELET # BLD AUTO: 213 K/UL — SIGNIFICANT CHANGE UP (ref 150–400)
POTASSIUM SERPL-MCNC: 4.5 MMOL/L — SIGNIFICANT CHANGE UP (ref 3.5–5.3)
POTASSIUM SERPL-SCNC: 4.5 MMOL/L — SIGNIFICANT CHANGE UP (ref 3.5–5.3)
PROT SERPL-MCNC: 6.7 GM/DL — SIGNIFICANT CHANGE UP (ref 6–8.3)
PROT UR-MCNC: 15 MG/DL
RBC # BLD: 3.9 M/UL — SIGNIFICANT CHANGE UP (ref 3.8–5.2)
RBC # FLD: 13.3 % — SIGNIFICANT CHANGE UP (ref 10.3–14.5)
RBC CASTS # UR COMP ASSIST: ABNORMAL /HPF (ref 0–4)
SODIUM SERPL-SCNC: 140 MMOL/L — SIGNIFICANT CHANGE UP (ref 135–145)
SP GR SPEC: 1.01 — SIGNIFICANT CHANGE UP (ref 1.01–1.02)
SPECIMEN SOURCE: SIGNIFICANT CHANGE UP
UROBILINOGEN FLD QL: NEGATIVE MG/DL — SIGNIFICANT CHANGE UP
WBC # BLD: 9.5 K/UL — SIGNIFICANT CHANGE UP (ref 3.8–10.5)
WBC # FLD AUTO: 9.5 K/UL — SIGNIFICANT CHANGE UP (ref 3.8–10.5)
WBC UR QL: ABNORMAL

## 2018-10-08 PROCEDURE — 99215 OFFICE O/P EST HI 40 MIN: CPT

## 2018-10-10 LAB — BACTERIA SPT CF RESP CULT: ABNORMAL

## 2018-10-11 LAB — H PYLORI AG STL QL: NEGATIVE

## 2018-10-12 LAB
CULTURE RESULTS: SIGNIFICANT CHANGE UP
CULTURE RESULTS: SIGNIFICANT CHANGE UP
SPECIMEN SOURCE: SIGNIFICANT CHANGE UP
SPECIMEN SOURCE: SIGNIFICANT CHANGE UP

## 2018-10-15 ENCOUNTER — MESSAGE (OUTPATIENT)
Age: 23
End: 2018-10-15

## 2018-10-19 ENCOUNTER — OTHER (OUTPATIENT)
Age: 23
End: 2018-10-19

## 2018-10-22 ENCOUNTER — APPOINTMENT (OUTPATIENT)
Dept: PEDIATRIC GASTROENTEROLOGY | Facility: CLINIC | Age: 23
End: 2018-10-22
Payer: COMMERCIAL

## 2018-10-22 VITALS — WEIGHT: 71.43 LBS

## 2018-10-22 DIAGNOSIS — E87.8 OTHER DISORDERS OF ELECTROLYTE AND FLUID BALANCE, NOT ELSEWHERE CLASSIFIED: ICD-10-CM

## 2018-10-22 DIAGNOSIS — K59.00 CONSTIPATION, UNSPECIFIED: ICD-10-CM

## 2018-10-22 DIAGNOSIS — E88.09 OTHER DISORDERS OF PLASMA-PROTEIN METABOLISM, NOT ELSEWHERE CLASSIFIED: ICD-10-CM

## 2018-10-22 PROCEDURE — 99214 OFFICE O/P EST MOD 30 MIN: CPT

## 2018-10-22 RX ORDER — ENEMA 19; 7 G/133ML; G/133ML
7-19 ENEMA RECTAL
Qty: 2 | Refills: 1 | Status: ACTIVE | COMMUNITY
Start: 2018-10-22 | End: 1900-01-01

## 2018-10-22 RX ORDER — POLYETHYLENE GLYCOL 3350 17 G/17G
17 POWDER, FOR SOLUTION ORAL
Qty: 1 | Refills: 5 | Status: ACTIVE | COMMUNITY
Start: 2018-10-22 | End: 1900-01-01

## 2018-10-23 ENCOUNTER — RESULT REVIEW (OUTPATIENT)
Age: 23
End: 2018-10-23

## 2018-10-23 LAB
ALBUMIN SERPL ELPH-MCNC: 2.7 G/DL
ALP BLD-CCNC: 132 U/L
ALT SERPL-CCNC: 13 U/L
ANION GAP SERPL CALC-SCNC: 10 MMOL/L
AST SERPL-CCNC: 22 U/L
BILIRUB SERPL-MCNC: <0.2 MG/DL
BUN SERPL-MCNC: 4 MG/DL
CALCIUM SERPL-MCNC: 8.7 MG/DL
CHLORIDE SERPL-SCNC: 101 MMOL/L
CO2 SERPL-SCNC: 28 MMOL/L
CREAT SERPL-MCNC: 0.45 MG/DL
GLUCOSE SERPL-MCNC: 92 MG/DL
PHOSPHATE SERPL-MCNC: 3.6 MG/DL
POTASSIUM SERPL-SCNC: 4.2 MMOL/L
PROT SERPL-MCNC: 6.8 G/DL
SODIUM SERPL-SCNC: 139 MMOL/L

## 2018-10-25 ENCOUNTER — APPOINTMENT (OUTPATIENT)
Dept: OTOLARYNGOLOGY | Facility: CLINIC | Age: 23
End: 2018-10-25

## 2018-10-29 ENCOUNTER — RX RENEWAL (OUTPATIENT)
Age: 23
End: 2018-10-29

## 2018-11-01 ENCOUNTER — APPOINTMENT (OUTPATIENT)
Dept: OPHTHALMOLOGY | Facility: CLINIC | Age: 23
End: 2018-11-01

## 2018-11-02 ENCOUNTER — OTHER (OUTPATIENT)
Age: 23
End: 2018-11-02

## 2018-11-05 ENCOUNTER — MEDICATION RENEWAL (OUTPATIENT)
Age: 23
End: 2018-11-05

## 2018-11-07 ENCOUNTER — OTHER (OUTPATIENT)
Age: 23
End: 2018-11-07

## 2018-11-12 ENCOUNTER — MEDICATION RENEWAL (OUTPATIENT)
Age: 23
End: 2018-11-12

## 2018-11-28 ENCOUNTER — FORM ENCOUNTER (OUTPATIENT)
Age: 23
End: 2018-11-28

## 2018-11-29 ENCOUNTER — OUTPATIENT (OUTPATIENT)
Dept: OUTPATIENT SERVICES | Age: 23
LOS: 1 days | End: 2018-11-29
Payer: COMMERCIAL

## 2018-11-29 ENCOUNTER — OTHER (OUTPATIENT)
Age: 23
End: 2018-11-29

## 2018-11-29 DIAGNOSIS — I63.22 CEREBRAL INFARCTION DUE TO UNSPECIFIED OCCLUSION OR STENOSIS OF BASILAR ARTERY: ICD-10-CM

## 2018-11-29 DIAGNOSIS — Z93.0 TRACHEOSTOMY STATUS: Chronic | ICD-10-CM

## 2018-11-29 PROCEDURE — 49452 REPLACE G-J TUBE PERC: CPT

## 2018-12-03 ENCOUNTER — MEDICATION RENEWAL (OUTPATIENT)
Age: 23
End: 2018-12-03

## 2018-12-05 LAB
ALBUMIN SERPL ELPH-MCNC: 2.7 G/DL
ALP BLD-CCNC: 135 U/L
ALT SERPL-CCNC: 17 U/L
ANION GAP SERPL CALC-SCNC: 9 MMOL/L
AST SERPL-CCNC: 22 U/L
BILIRUB SERPL-MCNC: <0.2 MG/DL
BUN SERPL-MCNC: 5 MG/DL
CALCIUM SERPL-MCNC: 8.8 MG/DL
CHLORIDE SERPL-SCNC: 103 MMOL/L
CO2 SERPL-SCNC: 26 MMOL/L
CREAT SERPL-MCNC: 0.44 MG/DL
GLUCOSE SERPL-MCNC: 98 MG/DL
MAGNESIUM SERPL-MCNC: 2.3 MG/DL
PHOSPHATE SERPL-MCNC: 3.1 MG/DL
POTASSIUM SERPL-SCNC: 4.1 MMOL/L
PROT SERPL-MCNC: 6.9 G/DL
SODIUM SERPL-SCNC: 138 MMOL/L

## 2018-12-06 ENCOUNTER — APPOINTMENT (OUTPATIENT)
Dept: OTOLARYNGOLOGY | Facility: CLINIC | Age: 23
End: 2018-12-06
Payer: COMMERCIAL

## 2018-12-06 DIAGNOSIS — R25.2 CRAMP AND SPASM: ICD-10-CM

## 2018-12-06 DIAGNOSIS — Z93.0 TRACHEOSTOMY STATUS: ICD-10-CM

## 2018-12-06 DIAGNOSIS — J95.00 UNSPECIFIED TRACHEOSTOMY COMPLICATION: ICD-10-CM

## 2018-12-06 PROCEDURE — 31231 NASAL ENDOSCOPY DX: CPT

## 2018-12-06 PROCEDURE — 99214 OFFICE O/P EST MOD 30 MIN: CPT | Mod: 25

## 2018-12-06 RX ORDER — LEVOFLOXACIN 500 MG/1
500 TABLET, FILM COATED ORAL DAILY
Qty: 14 | Refills: 0 | Status: DISCONTINUED | COMMUNITY
Start: 2017-02-04 | End: 2018-12-06

## 2018-12-06 RX ORDER — NUT. TX FOR PKU WITH IRON #36 10G-86
POWDER IN PACKET (EA) ORAL
Qty: 60 | Refills: 5 | Status: DISCONTINUED | OUTPATIENT
Start: 2018-09-24 | End: 2018-12-06

## 2018-12-09 DIAGNOSIS — Z43.4 ENCOUNTER FOR ATTENTION TO OTHER ARTIFICIAL OPENINGS OF DIGESTIVE TRACT: ICD-10-CM

## 2018-12-09 DIAGNOSIS — K21.9 GASTRO-ESOPHAGEAL REFLUX DISEASE WITHOUT ESOPHAGITIS: ICD-10-CM

## 2018-12-16 DIAGNOSIS — J04.10 ACUTE TRACHEITIS W/OUT OBSTRUCTION: ICD-10-CM

## 2018-12-16 RX ORDER — LEVOFLOXACIN 500 MG/1
500 TABLET, FILM COATED ORAL DAILY
Qty: 14 | Refills: 0 | Status: ACTIVE | COMMUNITY
Start: 2018-12-16 | End: 1900-01-01

## 2018-12-17 ENCOUNTER — APPOINTMENT (OUTPATIENT)
Dept: NEUROLOGY | Facility: CLINIC | Age: 23
End: 2018-12-17

## 2018-12-26 ENCOUNTER — OTHER (OUTPATIENT)
Age: 23
End: 2018-12-26

## 2018-12-28 ENCOUNTER — MEDICATION RENEWAL (OUTPATIENT)
Age: 23
End: 2018-12-28

## 2018-12-28 ENCOUNTER — RX RENEWAL (OUTPATIENT)
Age: 23
End: 2018-12-28

## 2019-01-07 ENCOUNTER — CLINICAL ADVICE (OUTPATIENT)
Age: 24
End: 2019-01-07

## 2019-01-10 ENCOUNTER — APPOINTMENT (OUTPATIENT)
Dept: PEDIATRIC NEUROLOGY | Facility: CLINIC | Age: 24
End: 2019-01-10

## 2019-02-01 ENCOUNTER — MEDICATION RENEWAL (OUTPATIENT)
Age: 24
End: 2019-02-01

## 2019-02-03 ENCOUNTER — RX RENEWAL (OUTPATIENT)
Age: 24
End: 2019-02-03

## 2019-02-05 ENCOUNTER — FORM ENCOUNTER (OUTPATIENT)
Age: 24
End: 2019-02-05

## 2019-02-06 ENCOUNTER — OUTPATIENT (OUTPATIENT)
Dept: OUTPATIENT SERVICES | Age: 24
LOS: 1 days | End: 2019-02-06
Payer: COMMERCIAL

## 2019-02-06 DIAGNOSIS — Z93.0 TRACHEOSTOMY STATUS: Chronic | ICD-10-CM

## 2019-02-06 PROCEDURE — 49452 REPLACE G-J TUBE PERC: CPT

## 2019-02-08 ENCOUNTER — MEDICATION RENEWAL (OUTPATIENT)
Age: 24
End: 2019-02-08

## 2019-02-11 DIAGNOSIS — Z43.4 ENCOUNTER FOR ATTENTION TO OTHER ARTIFICIAL OPENINGS OF DIGESTIVE TRACT: ICD-10-CM

## 2019-02-11 DIAGNOSIS — K21.9 GASTRO-ESOPHAGEAL REFLUX DISEASE WITHOUT ESOPHAGITIS: ICD-10-CM

## 2019-02-12 ENCOUNTER — MEDICATION RENEWAL (OUTPATIENT)
Age: 24
End: 2019-02-12

## 2019-02-14 ENCOUNTER — MEDICATION RENEWAL (OUTPATIENT)
Age: 24
End: 2019-02-14

## 2019-02-14 RX ORDER — BUDESONIDE AND FORMOTEROL FUMARATE DIHYDRATE 160; 4.5 UG/1; UG/1
160-4.5 AEROSOL RESPIRATORY (INHALATION) TWICE DAILY
Qty: 1 | Refills: 3 | Status: ACTIVE | COMMUNITY
Start: 2019-02-14 | End: 1900-01-01

## 2019-03-25 ENCOUNTER — RX RENEWAL (OUTPATIENT)
Age: 24
End: 2019-03-25

## 2019-03-26 ENCOUNTER — MEDICATION RENEWAL (OUTPATIENT)
Age: 24
End: 2019-03-26

## 2019-03-26 RX ORDER — NEEDLES, FILTER 19GX1 1/2"
22G X 1-1/2" NEEDLE, DISPOSABLE MISCELLANEOUS
Qty: 1 | Refills: 3 | Status: ACTIVE | COMMUNITY
Start: 2017-05-29 | End: 1900-01-01

## 2019-03-28 ENCOUNTER — MEDICATION RENEWAL (OUTPATIENT)
Age: 24
End: 2019-03-28

## 2019-04-12 RX ORDER — TOBRAMYCIN 300 MG/4ML
300 SOLUTION RESPIRATORY (INHALATION)
Qty: 2 | Refills: 5 | Status: ACTIVE | COMMUNITY
Start: 2017-12-05

## 2019-04-19 RX ORDER — BUDESONIDE 0.5 MG/2ML
0.5 INHALANT ORAL TWICE DAILY
Qty: 6 | Refills: 2 | Status: ACTIVE | COMMUNITY
Start: 2019-02-12 | End: 1900-01-01

## 2019-04-30 ENCOUNTER — FORM ENCOUNTER (OUTPATIENT)
Age: 24
End: 2019-04-30

## 2019-05-01 ENCOUNTER — OUTPATIENT (OUTPATIENT)
Dept: OUTPATIENT SERVICES | Facility: HOSPITAL | Age: 24
LOS: 1 days | End: 2019-05-01
Payer: COMMERCIAL

## 2019-05-01 ENCOUNTER — MEDICATION RENEWAL (OUTPATIENT)
Age: 24
End: 2019-05-01

## 2019-05-01 DIAGNOSIS — G80.9 CEREBRAL PALSY, UNSPECIFIED: ICD-10-CM

## 2019-05-01 DIAGNOSIS — Z93.0 TRACHEOSTOMY STATUS: Chronic | ICD-10-CM

## 2019-05-01 PROCEDURE — 49452 REPLACE G-J TUBE PERC: CPT

## 2019-05-02 ENCOUNTER — OTHER (OUTPATIENT)
Age: 24
End: 2019-05-02

## 2019-05-02 RX ORDER — BUDESONIDE 1 MG/2ML
1 INHALANT ORAL
Qty: 180 | Refills: 2 | Status: ACTIVE | COMMUNITY
Start: 2019-05-01

## 2019-05-03 ENCOUNTER — OTHER (OUTPATIENT)
Age: 24
End: 2019-05-03

## 2019-05-06 DIAGNOSIS — Z43.4 ENCOUNTER FOR ATTENTION TO OTHER ARTIFICIAL OPENINGS OF DIGESTIVE TRACT: ICD-10-CM

## 2019-05-06 DIAGNOSIS — K21.9 GASTRO-ESOPHAGEAL REFLUX DISEASE WITHOUT ESOPHAGITIS: ICD-10-CM

## 2019-05-07 ENCOUNTER — OUTPATIENT (OUTPATIENT)
Dept: OUTPATIENT SERVICES | Facility: HOSPITAL | Age: 24
LOS: 1 days | Discharge: HOME | End: 2019-05-07

## 2019-05-07 DIAGNOSIS — R06.02 SHORTNESS OF BREATH: ICD-10-CM

## 2019-05-07 DIAGNOSIS — Z93.0 TRACHEOSTOMY STATUS: Chronic | ICD-10-CM

## 2019-05-07 LAB
BASE EXCESS BLDA CALC-SCNC: 3.8 MMOL/L — HIGH (ref -2–2)
BLOOD GAS COMMENTS ARTERIAL: SIGNIFICANT CHANGE UP
BLOOD GAS COMMENTS ARTERIAL: SIGNIFICANT CHANGE UP
HCO3 BLDA-SCNC: 30 MMOL/L — HIGH (ref 21–29)
PCO2 BLDA: 59 MMHG — HIGH (ref 32–46)
PH BLDA: 7.33 — LOW (ref 7.35–7.45)
PO2 BLDA: 64 MMHG — LOW (ref 74–108)
SAO2 % BLDA: 89 % — LOW (ref 92–96)

## 2019-05-10 ENCOUNTER — MEDICATION RENEWAL (OUTPATIENT)
Age: 24
End: 2019-05-10

## 2019-05-13 ENCOUNTER — EMERGENCY (EMERGENCY)
Facility: HOSPITAL | Age: 24
LOS: 1 days | Discharge: ANOTHER HOSPITAL | End: 2019-05-13
Attending: STUDENT IN AN ORGANIZED HEALTH CARE EDUCATION/TRAINING PROGRAM | Admitting: STUDENT IN AN ORGANIZED HEALTH CARE EDUCATION/TRAINING PROGRAM
Payer: COMMERCIAL

## 2019-05-13 VITALS — WEIGHT: 72.97 LBS | HEIGHT: 55 IN | OXYGEN SATURATION: 75 %

## 2019-05-13 VITALS
RESPIRATION RATE: 17 BRPM | DIASTOLIC BLOOD PRESSURE: 40 MMHG | TEMPERATURE: 94 F | SYSTOLIC BLOOD PRESSURE: 78 MMHG | HEART RATE: 59 BPM | OXYGEN SATURATION: 99 %

## 2019-05-13 DIAGNOSIS — J96.00 ACUTE RESPIRATORY FAILURE, UNSPECIFIED WHETHER WITH HYPOXIA OR HYPERCAPNIA: ICD-10-CM

## 2019-05-13 DIAGNOSIS — Z93.0 TRACHEOSTOMY STATUS: Chronic | ICD-10-CM

## 2019-05-13 DIAGNOSIS — M41.9 SCOLIOSIS, UNSPECIFIED: ICD-10-CM

## 2019-05-13 DIAGNOSIS — F79 UNSPECIFIED INTELLECTUAL DISABILITIES: ICD-10-CM

## 2019-05-13 DIAGNOSIS — K21.9 GASTRO-ESOPHAGEAL REFLUX DISEASE WITHOUT ESOPHAGITIS: ICD-10-CM

## 2019-05-13 DIAGNOSIS — M62.89 OTHER SPECIFIED DISORDERS OF MUSCLE: ICD-10-CM

## 2019-05-13 DIAGNOSIS — J45.909 UNSPECIFIED ASTHMA, UNCOMPLICATED: ICD-10-CM

## 2019-05-13 DIAGNOSIS — J18.9 PNEUMONIA, UNSPECIFIED ORGANISM: ICD-10-CM

## 2019-05-13 DIAGNOSIS — Z99.81 DEPENDENCE ON SUPPLEMENTAL OXYGEN: ICD-10-CM

## 2019-05-13 DIAGNOSIS — Z93.0 TRACHEOSTOMY STATUS: ICD-10-CM

## 2019-05-13 DIAGNOSIS — G80.9 CEREBRAL PALSY, UNSPECIFIED: ICD-10-CM

## 2019-05-13 LAB
ABO RH CONFIRMATION: SIGNIFICANT CHANGE UP
ALBUMIN SERPL ELPH-MCNC: 1.8 G/DL — LOW (ref 3.3–5)
ALP SERPL-CCNC: 142 U/L — HIGH (ref 40–120)
ALT FLD-CCNC: 26 U/L — SIGNIFICANT CHANGE UP (ref 12–78)
ANION GAP SERPL CALC-SCNC: 4 MMOL/L — LOW (ref 5–17)
ANISOCYTOSIS BLD QL: SIGNIFICANT CHANGE UP
APTT BLD: 44.5 SEC — HIGH (ref 27.5–36.3)
AST SERPL-CCNC: 22 U/L — SIGNIFICANT CHANGE UP (ref 15–37)
BASO STIPL BLD QL SMEAR: PRESENT — SIGNIFICANT CHANGE UP
BASOPHILS # BLD AUTO: 0.01 K/UL — SIGNIFICANT CHANGE UP (ref 0–0.2)
BASOPHILS NFR BLD AUTO: 0.2 % — SIGNIFICANT CHANGE UP (ref 0–2)
BILIRUB SERPL-MCNC: 0.1 MG/DL — LOW (ref 0.2–1.2)
BLD GP AB SCN SERPL QL: SIGNIFICANT CHANGE UP
BUN SERPL-MCNC: 4 MG/DL — LOW (ref 7–23)
CALCIUM SERPL-MCNC: 8.4 MG/DL — LOW (ref 8.5–10.1)
CHLORIDE SERPL-SCNC: 107 MMOL/L — SIGNIFICANT CHANGE UP (ref 96–108)
CO2 SERPL-SCNC: 30 MMOL/L — SIGNIFICANT CHANGE UP (ref 22–31)
CREAT SERPL-MCNC: 0.39 MG/DL — LOW (ref 0.5–1.3)
DACRYOCYTES BLD QL SMEAR: SLIGHT — SIGNIFICANT CHANGE UP
EOSINOPHIL # BLD AUTO: 0.02 K/UL — SIGNIFICANT CHANGE UP (ref 0–0.5)
EOSINOPHIL NFR BLD AUTO: 0.4 % — SIGNIFICANT CHANGE UP (ref 0–6)
GLUCOSE SERPL-MCNC: 74 MG/DL — SIGNIFICANT CHANGE UP (ref 70–99)
HCT VFR BLD CALC: 36.7 % — SIGNIFICANT CHANGE UP (ref 34.5–45)
HGB BLD-MCNC: 11.1 G/DL — LOW (ref 11.5–15.5)
HYPOCHROMIA BLD QL: SLIGHT — SIGNIFICANT CHANGE UP
IMM GRANULOCYTES NFR BLD AUTO: 0.4 % — SIGNIFICANT CHANGE UP (ref 0–1.5)
INR BLD: 1.05 RATIO — SIGNIFICANT CHANGE UP (ref 0.88–1.16)
LACTATE SERPL-SCNC: 1 MMOL/L — SIGNIFICANT CHANGE UP (ref 0.7–2)
LYMPHOCYTES # BLD AUTO: 1.87 K/UL — SIGNIFICANT CHANGE UP (ref 1–3.3)
LYMPHOCYTES # BLD AUTO: 37 % — SIGNIFICANT CHANGE UP (ref 13–44)
MACROCYTES BLD QL: SIGNIFICANT CHANGE UP
MANUAL SMEAR VERIFICATION: SIGNIFICANT CHANGE UP
MCHC RBC-ENTMCNC: 30.2 GM/DL — LOW (ref 32–36)
MCHC RBC-ENTMCNC: 30.9 PG — SIGNIFICANT CHANGE UP (ref 27–34)
MCV RBC AUTO: 102.2 FL — HIGH (ref 80–100)
MONOCYTES # BLD AUTO: 0.16 K/UL — SIGNIFICANT CHANGE UP (ref 0–0.9)
MONOCYTES NFR BLD AUTO: 3.2 % — SIGNIFICANT CHANGE UP (ref 2–14)
NEUTROPHILS # BLD AUTO: 2.98 K/UL — SIGNIFICANT CHANGE UP (ref 1.8–7.4)
NEUTROPHILS NFR BLD AUTO: 58.8 % — SIGNIFICANT CHANGE UP (ref 43–77)
PLAT MORPH BLD: NORMAL — SIGNIFICANT CHANGE UP
PLATELET # BLD AUTO: 105 K/UL — LOW (ref 150–400)
POLYCHROMASIA BLD QL SMEAR: SLIGHT — SIGNIFICANT CHANGE UP
POTASSIUM SERPL-MCNC: 4 MMOL/L — SIGNIFICANT CHANGE UP (ref 3.5–5.3)
POTASSIUM SERPL-SCNC: 4 MMOL/L — SIGNIFICANT CHANGE UP (ref 3.5–5.3)
PROT SERPL-MCNC: 6.3 GM/DL — SIGNIFICANT CHANGE UP (ref 6–8.3)
PROTHROM AB SERPL-ACNC: 11.7 SEC — SIGNIFICANT CHANGE UP (ref 10–12.9)
RBC # BLD: 3.59 M/UL — LOW (ref 3.8–5.2)
RBC # FLD: 13.4 % — SIGNIFICANT CHANGE UP (ref 10.3–14.5)
RBC BLD AUTO: ABNORMAL
SODIUM SERPL-SCNC: 141 MMOL/L — SIGNIFICANT CHANGE UP (ref 135–145)
TARGETS BLD QL SMEAR: SLIGHT — SIGNIFICANT CHANGE UP
TYPE + AB SCN PNL BLD: SIGNIFICANT CHANGE UP
WBC # BLD: 5.06 K/UL — SIGNIFICANT CHANGE UP (ref 3.8–10.5)
WBC # FLD AUTO: 5.06 K/UL — SIGNIFICANT CHANGE UP (ref 3.8–10.5)

## 2019-05-13 PROCEDURE — 93010 ELECTROCARDIOGRAM REPORT: CPT

## 2019-05-13 PROCEDURE — 71045 X-RAY EXAM CHEST 1 VIEW: CPT | Mod: 26

## 2019-05-13 PROCEDURE — 99291 CRITICAL CARE FIRST HOUR: CPT

## 2019-05-13 RX ORDER — SODIUM CHLORIDE 9 MG/ML
1000 INJECTION INTRAMUSCULAR; INTRAVENOUS; SUBCUTANEOUS ONCE
Refills: 0 | Status: COMPLETED | OUTPATIENT
Start: 2019-05-13 | End: 2019-05-13

## 2019-05-13 RX ORDER — PIPERACILLIN AND TAZOBACTAM 4; .5 G/20ML; G/20ML
3.38 INJECTION, POWDER, LYOPHILIZED, FOR SOLUTION INTRAVENOUS ONCE
Refills: 0 | Status: COMPLETED | OUTPATIENT
Start: 2019-05-13 | End: 2019-05-13

## 2019-05-13 RX ORDER — LEVETIRACETAM 250 MG/1
500 TABLET, FILM COATED ORAL ONCE
Refills: 0 | Status: COMPLETED | OUTPATIENT
Start: 2019-05-13 | End: 2019-05-13

## 2019-05-13 RX ADMIN — SODIUM CHLORIDE 1000 MILLILITER(S): 9 INJECTION INTRAMUSCULAR; INTRAVENOUS; SUBCUTANEOUS at 21:23

## 2019-05-13 RX ADMIN — PIPERACILLIN AND TAZOBACTAM 3.38 GRAM(S): 4; .5 INJECTION, POWDER, LYOPHILIZED, FOR SOLUTION INTRAVENOUS at 22:35

## 2019-05-13 RX ADMIN — PIPERACILLIN AND TAZOBACTAM 200 GRAM(S): 4; .5 INJECTION, POWDER, LYOPHILIZED, FOR SOLUTION INTRAVENOUS at 22:05

## 2019-05-13 RX ADMIN — SODIUM CHLORIDE 1000 MILLILITER(S): 9 INJECTION INTRAMUSCULAR; INTRAVENOUS; SUBCUTANEOUS at 22:23

## 2019-05-13 RX ADMIN — LEVETIRACETAM 500 MILLIGRAM(S): 250 TABLET, FILM COATED ORAL at 23:15

## 2019-05-13 NOTE — ED PROVIDER NOTE - PHYSICAL EXAMINATION
GEN: non-verbal, normal for baseline. HEENT: Trach in place. Head NC/AT. NECK: No signs of trauma. CV:S1S2, RRR, LUNGS: +Congestion. +Scattered bibasilar rales. ABD: PEG. Soft, NT/ND, no rebound, no guarding. No CVAT. EXT: No e/c/c. 2+ distal pulses. SKIN: No rashes. NEURO: No focal deficits. CN II-XII intact. FROM. 5/5 motor and sensory. ANNEMARIE Robles

## 2019-05-13 NOTE — ED PROVIDER NOTE - ATTENDING CONTRIBUTION TO CARE
I, Concepcion Gomes DO,  performed the initial face to face bedside interview with this patient regarding history of present illness, review of symptoms and relevant past medical, social and family history.  I completed an independent physical examination.  I was the initial provider who evaluated this patient. I have signed out the follow up of any pending tests (i.e. labs, radiological studies) to the ACP.  I have communicated the patient’s plan of care and disposition with the ACP.  The history, relevant review of systems, past medical and surgical history, medical decision making, and physical examination was documented by the scribe in my presence and I attest to the accuracy of the documentation.

## 2019-05-13 NOTE — ED PROVIDER NOTE - PROGRESS NOTE DETAILS
Scribe AS for Dr. Gomes: Per family, pt has primary pulmonologist is at [facility], would like pt to be transferred to [facility] if admission is possible. Eliseo DO: S/o to Dr. Gonzalez at [facility]; all labs, diagnostics discussed with family at bedside; patient to be placed on vent for transfer.

## 2019-05-13 NOTE — ED PROVIDER NOTE - OBJECTIVE STATEMENT
24 y/o female with a PMHx of Asthma, Cerebral Palsy, Developmental Delay, GERD, Hyperthyroidism, Seizures, Scoliosis presents to the ED c/o SOB x three weeks. Pt uses a trach, Family at bedside states that pt had O2 sat at 75 percent on 6 liters of home O2. Family at bedside also states that the pt's HR has been lower than baseline over the past few days. Pt uses a vent at night. Pt is a poor historian due to respiratory distress. 24 y/o female with a PMHx of Asthma, Cerebral Palsy, Developmental Delay, GERD, Hyperthyroidism, Seizures, Scoliosis presents to the ED c/o SOB x three weeks. Pt with trach- uses trach collar during day and vent at night; increase use of vent over last 3 weeks, Family at bedside states that pt had O2 sat at 75 percent on 6 liters of home O2. Family at bedside also states that the pt's HR has been lower than baseline over the past few days. Pt uses a vent at night. HX provided by family at bedside.

## 2019-05-13 NOTE — ED ADULT NURSE NOTE - OBJECTIVE STATEMENT
pt presents to ED trach PEG and nonverbal with parents who state she has been SOB for several weeks. pmhx of Asthma, Cerebral Palsy, Developmental Delay, GERD, Hyperthyroidism, Seizures, Scoliosis. family at bedside. O2 sat at home was 75% on 6L. uses a vent at night. family states HR and BP have been lower than usual. presents to the ED c/o SOB x three weeks. VSS otm, respiratory put pt on our monitor and suctioned x2. LFA 20G placed and labs sent. unable to get rectal temp so pt placed on bearhugger and warming blanket with rectal probe. RN will ctm

## 2019-05-13 NOTE — ED ADULT NURSE NOTE - CHIEF COMPLAINT QUOTE
Pt BIB mom and family with SOB x3 weeks, and low heart rate. Pt seen by PCP last Monday. Pt comes in today with O2 % 75 on 6L home O2. As per mother, pt isn't bringing up any secretions with weak resp effort. Pt has trach. Pt appears to be in no distress. Denies N/V/D & fever.

## 2019-05-13 NOTE — ED ADULT TRIAGE NOTE - CHIEF COMPLAINT QUOTE
Pt BIB mom and family with SOB x3 weeks, and low heart rate. Pt comes in today with O2 % 75 on 6L home O2. As per mother, pt isn't bringing up any secretions with weak resp effort. Pt has trach. Denies N/V/D & fever. Pt BIB mom and family with SOB x3 weeks, and low heart rate. Pt seen by PCP last Monday. Pt comes in today with O2 % 75 on 6L home O2. As per mother, pt isn't bringing up any secretions with weak resp effort. Pt has trach. Pt appears to be in no distress. Denies N/V/D & fever.

## 2019-05-14 ENCOUNTER — TRANSCRIPTION ENCOUNTER (OUTPATIENT)
Age: 24
End: 2019-05-14

## 2019-05-14 ENCOUNTER — INPATIENT (INPATIENT)
Age: 24
LOS: 3 days | Discharge: HOME HEALTH | End: 2019-05-18
Attending: PEDIATRICS | Admitting: PEDIATRICS
Payer: COMMERCIAL

## 2019-05-14 VITALS
WEIGHT: 72.75 LBS | TEMPERATURE: 94 F | SYSTOLIC BLOOD PRESSURE: 84 MMHG | HEIGHT: 55 IN | OXYGEN SATURATION: 96 % | RESPIRATION RATE: 17 BRPM | DIASTOLIC BLOOD PRESSURE: 41 MMHG | HEART RATE: 63 BPM

## 2019-05-14 DIAGNOSIS — Z93.0 TRACHEOSTOMY STATUS: Chronic | ICD-10-CM

## 2019-05-14 DIAGNOSIS — J18.9 PNEUMONIA, UNSPECIFIED ORGANISM: ICD-10-CM

## 2019-05-14 DIAGNOSIS — Z51.5 ENCOUNTER FOR PALLIATIVE CARE: ICD-10-CM

## 2019-05-14 DIAGNOSIS — R00.1 BRADYCARDIA, UNSPECIFIED: ICD-10-CM

## 2019-05-14 DIAGNOSIS — I95.9 HYPOTENSION, UNSPECIFIED: ICD-10-CM

## 2019-05-14 DIAGNOSIS — Z99.11 DEPENDENCE ON RESPIRATOR [VENTILATOR] STATUS: ICD-10-CM

## 2019-05-14 DIAGNOSIS — R63.3 FEEDING DIFFICULTIES: ICD-10-CM

## 2019-05-14 LAB
APPEARANCE UR: CLEAR — SIGNIFICANT CHANGE UP
B PERT DNA SPEC QL NAA+PROBE: NOT DETECTED — SIGNIFICANT CHANGE UP
BACTERIA # UR AUTO: HIGH
BILIRUB UR-MCNC: NEGATIVE — SIGNIFICANT CHANGE UP
BLOOD UR QL VISUAL: SIGNIFICANT CHANGE UP
C PNEUM DNA SPEC QL NAA+PROBE: NOT DETECTED — SIGNIFICANT CHANGE UP
COLOR SPEC: YELLOW — SIGNIFICANT CHANGE UP
CORTIS SERPL-MCNC: 8.9 UG/DL — SIGNIFICANT CHANGE UP (ref 2.7–18.4)
EPI CELLS # UR: SIGNIFICANT CHANGE UP
FLUAV H1 2009 PAND RNA SPEC QL NAA+PROBE: NOT DETECTED — SIGNIFICANT CHANGE UP
FLUAV H1 RNA SPEC QL NAA+PROBE: NOT DETECTED — SIGNIFICANT CHANGE UP
FLUAV H3 RNA SPEC QL NAA+PROBE: NOT DETECTED — SIGNIFICANT CHANGE UP
FLUAV SUBTYP SPEC NAA+PROBE: NOT DETECTED — SIGNIFICANT CHANGE UP
FLUBV RNA SPEC QL NAA+PROBE: NOT DETECTED — SIGNIFICANT CHANGE UP
GLUCOSE UR-MCNC: NEGATIVE — SIGNIFICANT CHANGE UP
GRAM STN SPT: SIGNIFICANT CHANGE UP
HADV DNA SPEC QL NAA+PROBE: NOT DETECTED — SIGNIFICANT CHANGE UP
HCOV PNL SPEC NAA+PROBE: SIGNIFICANT CHANGE UP
HMPV RNA SPEC QL NAA+PROBE: NOT DETECTED — SIGNIFICANT CHANGE UP
HPIV1 RNA SPEC QL NAA+PROBE: NOT DETECTED — SIGNIFICANT CHANGE UP
HPIV2 RNA SPEC QL NAA+PROBE: NOT DETECTED — SIGNIFICANT CHANGE UP
HPIV3 RNA SPEC QL NAA+PROBE: NOT DETECTED — SIGNIFICANT CHANGE UP
HPIV4 RNA SPEC QL NAA+PROBE: NOT DETECTED — SIGNIFICANT CHANGE UP
KETONES UR-MCNC: NEGATIVE — SIGNIFICANT CHANGE UP
LEUKOCYTE ESTERASE UR-ACNC: HIGH
MAGNESIUM SERPL-MCNC: 1.6 MG/DL — SIGNIFICANT CHANGE UP (ref 1.6–2.6)
MAGNESIUM SERPL-MCNC: 1.7 MG/DL — SIGNIFICANT CHANGE UP (ref 1.6–2.6)
NITRITE UR-MCNC: NEGATIVE — SIGNIFICANT CHANGE UP
PH UR: 9 — HIGH (ref 5–8)
PROT UR-MCNC: 100 — HIGH
RBC CASTS # UR COMP ASSIST: SIGNIFICANT CHANGE UP (ref 0–?)
RSV RNA SPEC QL NAA+PROBE: NOT DETECTED — SIGNIFICANT CHANGE UP
RV+EV RNA SPEC QL NAA+PROBE: NOT DETECTED — SIGNIFICANT CHANGE UP
SP GR SPEC: 1.01 — SIGNIFICANT CHANGE UP (ref 1–1.04)
SPECIMEN SOURCE: SIGNIFICANT CHANGE UP
T3 SERPL-MCNC: 122.8 NG/DL — SIGNIFICANT CHANGE UP (ref 80–200)
T4 AB SER-ACNC: 5.58 UG/DL — SIGNIFICANT CHANGE UP (ref 5.1–13)
T4 FREE SERPL-MCNC: 0.85 NG/DL — LOW (ref 0.9–1.8)
TSH SERPL-MCNC: 3.15 UIU/ML — SIGNIFICANT CHANGE UP (ref 0.27–4.2)
UROBILINOGEN FLD QL: NORMAL — SIGNIFICANT CHANGE UP
WBC UR QL: SIGNIFICANT CHANGE UP (ref 0–?)

## 2019-05-14 PROCEDURE — 99291 CRITICAL CARE FIRST HOUR: CPT

## 2019-05-14 PROCEDURE — 99223 1ST HOSP IP/OBS HIGH 75: CPT | Mod: GC

## 2019-05-14 RX ORDER — SODIUM CHLORIDE 9 MG/ML
650 INJECTION INTRAMUSCULAR; INTRAVENOUS; SUBCUTANEOUS ONCE
Refills: 0 | Status: COMPLETED | OUTPATIENT
Start: 2019-05-14 | End: 2019-05-14

## 2019-05-14 RX ORDER — ALBUTEROL 90 UG/1
2.5 AEROSOL, METERED ORAL EVERY 12 HOURS
Refills: 0 | Status: DISCONTINUED | OUTPATIENT
Start: 2019-05-14 | End: 2019-05-18

## 2019-05-14 RX ORDER — PANTOPRAZOLE SODIUM 20 MG/1
26 TABLET, DELAYED RELEASE ORAL EVERY 12 HOURS
Refills: 0 | Status: DISCONTINUED | OUTPATIENT
Start: 2019-05-14 | End: 2019-05-14

## 2019-05-14 RX ORDER — TOBRAMYCIN SULFATE 40 MG/ML
300 VIAL (ML) INJECTION EVERY 12 HOURS
Refills: 0 | Status: DISCONTINUED | OUTPATIENT
Start: 2019-05-14 | End: 2019-05-14

## 2019-05-14 RX ORDER — SODIUM CHLORIDE 9 MG/ML
1.5 INJECTION INTRAMUSCULAR; INTRAVENOUS; SUBCUTANEOUS EVERY 12 HOURS
Refills: 0 | Status: DISCONTINUED | OUTPATIENT
Start: 2019-05-14 | End: 2019-05-16

## 2019-05-14 RX ORDER — LEVALBUTEROL 1.25 MG/.5ML
1.25 SOLUTION, CONCENTRATE RESPIRATORY (INHALATION) EVERY 12 HOURS
Refills: 0 | Status: DISCONTINUED | OUTPATIENT
Start: 2019-05-14 | End: 2019-05-14

## 2019-05-14 RX ORDER — PANTOPRAZOLE SODIUM 20 MG/1
26 TABLET, DELAYED RELEASE ORAL EVERY 24 HOURS
Refills: 0 | Status: DISCONTINUED | OUTPATIENT
Start: 2019-05-14 | End: 2019-05-15

## 2019-05-14 RX ORDER — FLUTICASONE PROPIONATE 50 MCG
1 SPRAY, SUSPENSION NASAL DAILY
Refills: 0 | Status: DISCONTINUED | OUTPATIENT
Start: 2019-05-14 | End: 2019-05-18

## 2019-05-14 RX ORDER — ALBUMIN HUMAN 25 %
33 VIAL (ML) INTRAVENOUS ONCE
Refills: 0 | Status: COMPLETED | OUTPATIENT
Start: 2019-05-14 | End: 2019-05-14

## 2019-05-14 RX ORDER — BUDESONIDE, MICRONIZED 100 %
0.5 POWDER (GRAM) MISCELLANEOUS EVERY 12 HOURS
Refills: 0 | Status: DISCONTINUED | OUTPATIENT
Start: 2019-05-14 | End: 2019-05-16

## 2019-05-14 RX ORDER — IPRATROPIUM BROMIDE 0.2 MG/ML
500 SOLUTION, NON-ORAL INHALATION DAILY
Refills: 0 | Status: DISCONTINUED | OUTPATIENT
Start: 2019-05-14 | End: 2019-05-18

## 2019-05-14 RX ORDER — LEVETIRACETAM 250 MG/1
500 TABLET, FILM COATED ORAL
Refills: 0 | Status: DISCONTINUED | OUTPATIENT
Start: 2019-05-14 | End: 2019-05-18

## 2019-05-14 RX ORDER — DEXTROSE MONOHYDRATE, SODIUM CHLORIDE, AND POTASSIUM CHLORIDE 50; .745; 4.5 G/1000ML; G/1000ML; G/1000ML
1000 INJECTION, SOLUTION INTRAVENOUS
Refills: 0 | Status: DISCONTINUED | OUTPATIENT
Start: 2019-05-14 | End: 2019-05-14

## 2019-05-14 RX ORDER — EPINEPHRINE 0.3 MG/.3ML
0.02 INJECTION INTRAMUSCULAR; SUBCUTANEOUS
Qty: 0.5 | Refills: 0 | Status: DISCONTINUED | OUTPATIENT
Start: 2019-05-14 | End: 2019-05-14

## 2019-05-14 RX ORDER — POTASSIUM CHLORIDE 20 MEQ
10 PACKET (EA) ORAL
Refills: 0 | Status: DISCONTINUED | OUTPATIENT
Start: 2019-05-14 | End: 2019-05-17

## 2019-05-14 RX ORDER — SODIUM CHLORIDE 0.65 %
1 AEROSOL, SPRAY (ML) NASAL DAILY
Refills: 0 | Status: DISCONTINUED | OUTPATIENT
Start: 2019-05-14 | End: 2019-05-18

## 2019-05-14 RX ORDER — FLUCONAZOLE 150 MG/1
150 TABLET ORAL ONCE
Refills: 0 | Status: COMPLETED | OUTPATIENT
Start: 2019-05-14 | End: 2019-05-14

## 2019-05-14 RX ADMIN — LEVETIRACETAM 500 MILLIGRAM(S): 250 TABLET, FILM COATED ORAL at 21:30

## 2019-05-14 RX ADMIN — Medication 0.5 MILLIGRAM(S): at 21:55

## 2019-05-14 RX ADMIN — SODIUM CHLORIDE 1.5 MILLILITER(S): 9 INJECTION INTRAMUSCULAR; INTRAVENOUS; SUBCUTANEOUS at 08:50

## 2019-05-14 RX ADMIN — PANTOPRAZOLE SODIUM 130 MILLIGRAM(S): 20 TABLET, DELAYED RELEASE ORAL at 14:05

## 2019-05-14 RX ADMIN — Medication 5.5 GRAM(S): at 13:23

## 2019-05-14 RX ADMIN — Medication 1 MILLILITER(S): at 13:25

## 2019-05-14 RX ADMIN — EPINEPHRINE 3.96 MICROGRAM(S)/KG/MIN: 0.3 INJECTION INTRAMUSCULAR; SUBCUTANEOUS at 04:00

## 2019-05-14 RX ADMIN — FLUCONAZOLE 150 MILLIGRAM(S): 150 TABLET ORAL at 17:32

## 2019-05-14 RX ADMIN — SODIUM CHLORIDE 650 MILLILITER(S): 9 INJECTION INTRAMUSCULAR; INTRAVENOUS; SUBCUTANEOUS at 02:00

## 2019-05-14 RX ADMIN — EPINEPHRINE 3.96 MICROGRAM(S)/KG/MIN: 0.3 INJECTION INTRAMUSCULAR; SUBCUTANEOUS at 07:20

## 2019-05-14 RX ADMIN — ALBUTEROL 2.5 MILLIGRAM(S): 90 AEROSOL, METERED ORAL at 08:42

## 2019-05-14 RX ADMIN — Medication 1 SPRAY(S): at 10:20

## 2019-05-14 RX ADMIN — Medication 10 MILLIEQUIVALENT(S): at 13:25

## 2019-05-14 RX ADMIN — DEXTROSE MONOHYDRATE, SODIUM CHLORIDE, AND POTASSIUM CHLORIDE 73 MILLILITER(S): 50; .745; 4.5 INJECTION, SOLUTION INTRAVENOUS at 03:00

## 2019-05-14 RX ADMIN — Medication 300 MILLIGRAM(S): at 08:57

## 2019-05-14 RX ADMIN — LEVETIRACETAM 500 MILLIGRAM(S): 250 TABLET, FILM COATED ORAL at 09:30

## 2019-05-14 RX ADMIN — SODIUM CHLORIDE 1.5 MILLILITER(S): 9 INJECTION INTRAMUSCULAR; INTRAVENOUS; SUBCUTANEOUS at 21:40

## 2019-05-14 RX ADMIN — ALBUTEROL 2.5 MILLIGRAM(S): 90 AEROSOL, METERED ORAL at 21:25

## 2019-05-14 RX ADMIN — Medication 1 SPRAY(S): at 11:25

## 2019-05-14 RX ADMIN — Medication 0.5 MILLIGRAM(S): at 09:08

## 2019-05-14 RX ADMIN — Medication 10 MILLIEQUIVALENT(S): at 21:31

## 2019-05-14 RX ADMIN — Medication 500 MICROGRAM(S): at 08:42

## 2019-05-14 NOTE — H&P PEDIATRIC - HISTORY OF PRESENT ILLNESS
22 yo F with complicated medical history including cerebral palsy, global developmental delay, microcephaly, epilepsy, hydronephrosis, hypothyroidism, GJ tube dependence and trach dependence with chronic colonization with pseudomonas and MRSA and history of aspiration pneumonia transferred from [facility] ED after presentation for desaturations, increased ventilatory requirements, and bradycardia at home, found to be hypotensive and hypothermic at [place] ED with chest X ray findings concerning for pneumonia. Per mother, has had increased mucous plugging over the past 3 weeks with episodes of desaturations to the 70's, lower heart rate at night, and decreased alertness and energy. At baseline, uses vent at night, trach collar during the day, but has been requiring ventilator at night. Family has been trying to increase fluid intake and has trialed multiple medication changes including discontinuing lactulose, discontinuing symbicort, adding flonase, discontinuing allegra, all in an attempt to prevent dehydration. Ventilation settings increased from rate 15 to 17 by Dr. Latif. Saw neurologist on 5/13 as well. Presented to [place] ED due to continued change from baseline. 22 yo F with complicated medical history including cerebral palsy, global developmental delay, microcephaly, epilepsy, hydronephrosis, hypothyroidism, GJ tube dependence and trach dependence with chronic colonization with pseudomonas and MRSA and history of aspiration pneumonia transferred from [facility] ED after presentation for desaturations, increased ventilatory requirements, and bradycardia at home, found to be hypotensive and hypothermic at [place] ED with chest X ray findings concerning for pneumonia. Per mother, has had increased mucous plugging over the past 3 weeks with episodes of desaturations to the 70's, lower heart rate at night, and decreased alertness and energy. At baseline, uses vent at night, trach collar during the day, but has been requiring ventilator at night. Family has been trying to increase fluid intake and has trialed multiple medication changes including discontinuing lactulose, discontinuing symbicort, adding flonase, discontinuing allegra, all in an attempt to prevent dehydration. Ventilation settings increased from rate 15 to 17 by Dr. Latif. Saw neurologist on 5/13 as well. Presented to [facility] ED due to continued change from baseline.    [facility] ED and transport course:  CBC 3.6>11.1/36.7<105. /4.0/107/30/4/0.39<74. Alb 1.8, protein 6.3, bili 0.1, alk phos 142, AST/ALT 22/26, lactate 1.0. PT 11.7, INR 1.05, PTT 44.5. Chest X ray limited due to patient positioning but concern for possible LLL consolidation. Blood culture x2 drawn. Given NS bolus and zosyn. Had worsening hypothermia, placed on elmer hugger. Received 2nd NS bolus on transport.

## 2019-05-14 NOTE — H&P PEDIATRIC - NSHPLABSRESULTS_GEN_ALL_CORE
CBC 3.6>11.1/36.7<105. /4.0/107/30/4/0.39<74. Alb 1.8, protein 6.3, bili 0.1, alk phos 142, AST/ALT 22/26, lactate 1.0. PT 11.7, INR 1.05, PTT 44.5.

## 2019-05-14 NOTE — H&P PEDIATRIC - NSICDXPASTSURGICALHX_GEN_ALL_CORE_FT
PAST SURGICAL HISTORY:  Peg (Percutaneous Endoscopic Gastrostomy) Adjustment/Replacement/Removal     S/P Tonsillectomy and Adenoidectomy     Strabismus     Tracheostomy in place

## 2019-05-14 NOTE — CONSULT NOTE PEDS - SUBJECTIVE AND OBJECTIVE BOX
Reason for Consultation:	[] Pain		[] Goals of Care		[] Non-pain symptoms  .			[] End of life discussion		[] Other:    Patient is a 23y old  Female who presents with a chief complaint of respiratory distress and feeding intolerance.  Patient is well known to me from prior admissions and has a history of global developmental delay, cerebral palsy, microcephaly, seizure disorder, tracheostomy dependence and GJ tube dependence.  At baseline, she uses a ventilator at night only.  Mom says she has had increasing problems with feeding, starting with a change to the elecare formula she had been on for years.  They switched her formula with some improvement but despite feeding her through her J tube, she has formula backing up to her stomach if they feed her too quickly and so they are having trouble keeping her hydrated and nourished.  Her serum albumin is low on admission.  Mom denies weight loss.  She says that at baseline, Shira has significant loss of fluid from GT output, in addition to her usual fluid and nutritional needs.   They have tried Reglan which caused dystonia and Erythromycin to which she had a reaction.  Yesterday, Shira presented to [place] ED with desaturation and bradycardia at home.  In the ED she was found to be hypotensive and hypothermic, with a chest xray with concern for left lower lobe pneumonia.  She was given a normal saline bolus and started on antibiotics and transferred to [facility] for further care. Parents also report decreased energy at home.  After arrival to [facility], Shira required initiation of epinephrine drip for hypotension.      REVIEW OF SYSTEMS  Pain Score: 	0	Scale Used:  NIPS  Other symptoms (0=None, 1=Mild, 2=Moderate, 3=Severe)  Anorexia: 	n/a	Dyspnea:	0	Pruritus:  n/a  Nausea: 	n/a	Agitation:	0	Anxiety: n/a  Vomitin-1	Drowsiness:	0	Depression: n/an  Constipation: 	1	Diarrhea:	0	Other:     PAST MEDICAL & SURGICAL HISTORY:  Hypokalemia  Kyphosis  Scoliosis  Hypotonia  Asthma  Seizures  Cerebral Palsy  Gastroesophageal Reflux Disease  Hypothyroidism: Treated with synthroid in past. Stopped treatment in  due to normal thyroid function.  Developmental Delay  Tracheostomy in place  Peg (Percutaneous Endoscopic Gastrostomy) Adjustment/Replacement/Removal  S/P Tonsillectomy and Adenoidectomy  Strabismus    FAMILY HISTORY:  Non-contributory  SOCIAL HISTORY:  Lives at home with parents  MEDICATIONS  (STANDING):  ALBUTerol  Intermittent Nebulization - Peds 2.5 milliGRAM(s) Nebulizer every 12 hours  buDESOnide   for Nebulization - Peds 0.5 milliGRAM(s) Nebulizer every 12 hours  dextrose 5% + sodium chloride 0.9% with potassium chloride 20 mEq/L. - Pediatric 1000 milliLiter(s) (73 mL/Hr) IV Continuous <Continuous>  EPINEPHrine Infusion - Peds 0.02 MICROgram(s)/kG/Min (3.96 mL/Hr) IV Continuous <Continuous>  fluconAZOLE  Oral Liquid - Peds 150 milliGRAM(s) Enteral Tube once  fluticasone propionate (50 MICROgram(s)/actuation) Nasal Spray - Peds 1 Spray(s) Both Nostrils daily  ipratropium 0.02% for Nebulization - Peds 500 MICROGram(s) Inhalation daily  levETIRAcetam  Oral Liquid - Peds 500 milliGRAM(s) Enteral Tube <User Schedule>  levoFLOXacin IV Intermittent - Peds 750 milliGRAM(s) IV Intermittent daily  multivitamin Oral Drops - Peds 1 milliLiter(s) Oral daily  pantoprazole  IV Intermittent - Peds 26 milliGRAM(s) IV Intermittent every 24 hours  potassium chloride ER Oral Tab/Cap - Peds 10 milliEquivalent(s) Oral two times a day  sodium chloride 0.65% Nasal Spray - Peds 1 Spray(s) Both Nostrils daily  sodium chloride 7% for Nebulization - Peds 1.5 milliLiter(s) Nebulizer every 12 hours  tobramycin for Nebulization - Peds 300 milliGRAM(s) Nebulizer every 12 hours    MEDICATIONS  (PRN):      Vital Signs Last 24 Hrs  T(C): 35.5 (14 May 2019 14:00), Max: 36.3 (14 May 2019 05:00)  T(F): 95.9 (14 May 2019 14:00), Max: 97.3 (14 May 2019 05:00)  HR: 71 (14 May 2019 14:00) (55 - 108)  BP: 121/68 (14 May 2019 14:00) (72/43 - 121/68)  BP(mean): 81 (14 May 2019 14:00) (46 - 81)  RR: 17 (14 May 2019 14:00) (11 - 23)  SpO2: 99% (14 May 2019 14:00) (75% - 100%)  Daily Height/Length in cm: 129 (14 May 2019 01:30)    Daily     PHYSICAL EXAM  [x ] Full exam deferred  .	    Lab Results:                        11.1   5.06  )-----------( 105      ( 13 May 2019 20:39 )             36.7         141  |  107  |  4<L>  ----------------------------<  74  4.0   |  30  |  0.39<L>    Ca    8.4<L>      13 May 2019 20:39    TPro  6.3  /  Alb  1.8<L>  /  TBili  0.1<L>  /  DBili  x   /  AST  22  /  ALT  26  /  AlkPhos  142<H>      PT/INR - ( 13 May 2019 20:39 )   PT: 11.7 sec;   INR: 1.05 ratio         PTT - ( 13 May 2019 20:39 )  PTT:44.5 sec  Urinalysis Basic - ( 14 May 2019 05:56 )    Color: YELLOW / Appearance: CLEAR / S.015 / pH: 9.0  Gluc: NEGATIVE / Ketone: NEGATIVE  / Bili: NEGATIVE / Urobili: NORMAL   Blood: LARGE / Protein: 100 / Nitrite: NEGATIVE   Leuk Esterase: SMALL / RBC: 3-5 / WBC 3-5   Sq Epi: x / Non Sq Epi: FEW / Bacteria: MODERATE        IMAGING STUDIES:    Time spent counseling regarding:  [x] Goals of care		[] Resuscitation status		[x] Prognosis		[] Hospice  [] Discharge planning	[x] Symptom management	[x] Emotional support	[] Bereavement  [x] Care coordination with other disciplines  [] Family meeting start time:		End time:		Total Time:  75__ Minutes spend on total encounter: more than 50% of the visit was spent counseling and/or coordinating care  __ Minutes of critical care provided to this unstable patient with organ failure

## 2019-05-14 NOTE — H&P PEDIATRIC - NSHPREVIEWOFSYSTEMS_GEN_ALL_CORE
Gen: No fever  ENT: +increased tracheal secretions  Resp: +increased ventilatory need  Cardiovascular: +hypotension, bradycardia  Gastroenteric: No nausea/vomiting, no diarrhea, +constipation  : No decreased urination  Neuro: +decreased energy

## 2019-05-14 NOTE — H&P PEDIATRIC - NSICDXPASTMEDICALHX_GEN_ALL_CORE_FT
PAST MEDICAL HISTORY:  Asthma     Cerebral Palsy     Developmental Delay     Gastroesophageal Reflux Disease     Hypokalemia     Hypothyroidism Treated with synthroid in past. Stopped treatment in 2008 due to normal thyroid function.    Hypotonia     Kyphosis     Scoliosis     Seizures

## 2019-05-14 NOTE — CONSULT NOTE PEDS - ASSESSMENT
23 year old with history of cerebral palsy, global developmental delay, microcephaly, seizure disorder, trach and GJ tube dependence, with baseline vent dependence at night.  She presents now with acute illness with hypothermia and hypotension as well as chronic issue with feeding intolerance.  Found to have possible pneumonia and significant hypoalbuminemia.    Spoke with mom at length.  We discussed that patients like Shira with severe neurological injury are at risk for feeding issues and that while no one knows exactly why this occurs, there are patients that ultimately die from this.  Some families opt for long term TPN with the risks of line infection and septic shock which can be fatal, and liver disease with long term use.  They have tried prokinetic agents with inability to tolerate the medications.    Mom says that they are able to get about 800 ml of formula in Shira a day, on average.  She feels Shira's weight has been stable so her caloric intake may be adequate but it seems like her protein intake is not adequate.  Would recommend nutrition input to evaluate feeding regimen more closely and make recommendations regarding feeding and possible addition of extra protein to the formula.    Continue ICU care as per PICU team  Mom's goal is for Shira to live as long as possible as long as her quality of life is adequate and she would want long term TPN if necessary.

## 2019-05-14 NOTE — H&P PEDIATRIC - ASSESSMENT
22 yo F with complicated medical history including cerebral palsy, global developmental delay, microcephaly, epilepsy, hydronephrosis, hypothyroidism, GJ tube dependence and trach dependence with chronic colonization with pseudomonas and MRSA and history of aspiration pneumonia transferred from [facility] ED after presentation for desaturations, increased ventilatory requirements, and bradycardia at home, found to be hypotensive and hypothermic at [place] ED with chest X ray findings concerning for pneumonia. Patient's presentation concerning for sepsis. At this time, hypotension is fluid responsive, but may require pressor support. Requires continued ICU monitoring.    PLAN  Resp:  -continue home ventilatory settings - R 17 PC15 PS10 PEEP8 50%    CV:  -monitor BP closely - consider epinephrine drip if remains hypotensive    ID:  -blood culture x2 pending at [facility] ED  -send UA - will send culture if results concerning for infection  -send trach culture  -RVP  -s/p zosyn x1 - will start levofloxacin pending possible urine culture    FEN/GI:  -maintenance IV fluids  -will give home volume of feeds as continuous Pedialyte feeds - 90 cc/hr continuous - family to bring in Alfamino formula  -strict Is/Os    Endo:  -send am cortisol  -send thyroid labs 22 yo F with complicated medical history including cerebral palsy, global developmental delay, microcephaly, epilepsy, hydronephrosis, hypothyroidism, GJ tube dependence and trach dependence with chronic colonization with pseudomonas and MRSA and history of aspiration pneumonia transferred from [facility] ED after presentation for desaturations, increased ventilatory requirements, and bradycardia at home, found to be hypotensive and hypothermic at [place] ED with chest X ray findings concerning for pneumonia. Patient's presentation concerning for sepsis. At this time, hypotension is fluid responsive, but may require pressor support. Requires continued ICU monitoring.    PLAN  Resp:  -continue home ventilatory settings - R 17 PC15 PS10 PEEP8 50%  -home albuterol BID  -home atrovent daily  -home hypertonic 7% saline BID  -home budesonide BID  -home tobramycin BID  -chest PT TID  -home nasal spray QD  -home flonase QD    CV:  -monitor BP closely - consider epinephrine drip if remains hypotensive    ID:  -blood culture x2 pending at [place] ED  -send UA - will send culture if results concerning for infection  -send trach culture  -RVP  -s/p zosyn x1 - will start levofloxacin pending possible urine culture    FEN/GI:  -maintenance IV fluids  -will give home volume of feeds as continuous Pedialyte feeds - 90 cc/hr continuous - family to bring in Alfamino formula  -strict Is/Os  -continue home supplements - KCl 10meq BID, Mg 400mg BID, vitamin D, PVS, methyl B12, probiotic, fish oil    Neuro:  -continue home keppra 500mg BID    Endo:  -send am cortisol  -send thyroid labs

## 2019-05-14 NOTE — H&P PEDIATRIC - NSHPPHYSICALEXAM_GEN_ALL_CORE
Gen: chronically ill appearing, minimally interactive, NAD  HEENT: PERRL, MMM, trach in place  CV: RRR, +S1/S2 no m/r/g  Resp: coarse breath sounds diffusely  Abd: soft, NTND, +BS, G-tube in place with dressing c/d/i  Ext: FROM, brisk cap refill  Skin: WWP, no rashes

## 2019-05-14 NOTE — H&P PEDIATRIC - ATTENDING COMMENTS
24 yo F with a medical history of cerebral palsy, global developmental delay, microcephaly, epilepsy, hydronephrosis, hypothyroidism, GJ tube dependence and trach dependence with chronic colonization with pseudomonas and MRSA and history of aspiration pneumonia. Brought to OSH for 2-3 weeks of increasing lethargy, increasing vent requirements. No fever, diarrhea or other symptoms at home. No additional congestion of trach secretions. When brought to OSH she was found to be hypothermic, bradycardic and hypotensive. She received IVF bolus which improved BPs and was warmed. Labs, Blood culture obtained, and Abx given. On transfer to [facility] she continued to have low BP and another IVF bolus was given.  On presentation to [facility], she is interactive, but according to mother less than usual.  Lungs are CTAB with good air entry  CV RR - but slightly desmond with HR 50-60. Normal S1 S2 and no murmurs  Abd ND NT +BS GT CDI  Ext WWP with 2+ pulses. Cap refill 2-3 seconds.  Neuro: Pupils equal and reactive to light.  Labs: WBC normal. Macrocytic anemia. Chem non contributory.  A/P: 23 yof with multiple medical problems here with acute on chronic resp failure, with hypotension/shock unclear if related to hypothermia or sepsis.  Keep on night vent settings for now, and wean oxygen as tolerated. Possible LLL infiltrate on CXR  Send RVP and resp culture at this time  Give a third IVF bolus now. If no improvement with BPs may start low dose epi infusion  Send thyroid and cortisol levels for possible etiologies of hypothermia and hypotension  Hold GT feeds for now. Keep on maintenance IVF and monitor UOP  Continue antibiotics  Cont AEDs    50 minutes of critical care time spent with patient

## 2019-05-15 DIAGNOSIS — K59.00 CONSTIPATION, UNSPECIFIED: ICD-10-CM

## 2019-05-15 DIAGNOSIS — K21.9 GASTRO-ESOPHAGEAL REFLUX DISEASE WITHOUT ESOPHAGITIS: ICD-10-CM

## 2019-05-15 DIAGNOSIS — R63.8 OTHER SYMPTOMS AND SIGNS CONCERNING FOOD AND FLUID INTAKE: ICD-10-CM

## 2019-05-15 DIAGNOSIS — E88.09 OTHER DISORDERS OF PLASMA-PROTEIN METABOLISM, NOT ELSEWHERE CLASSIFIED: ICD-10-CM

## 2019-05-15 LAB
ALBUMIN SERPL ELPH-MCNC: 3.1 G/DL — LOW (ref 3.3–5)
ALP SERPL-CCNC: 89 U/L — SIGNIFICANT CHANGE UP (ref 40–120)
ALT FLD-CCNC: 19 U/L — SIGNIFICANT CHANGE UP (ref 4–33)
AMORPH PHOS CRY # URNS: SIGNIFICANT CHANGE UP
ANION GAP SERPL CALC-SCNC: 10 MMO/L — SIGNIFICANT CHANGE UP (ref 7–14)
APPEARANCE UR: SIGNIFICANT CHANGE UP
AST SERPL-CCNC: 47 U/L — HIGH (ref 4–32)
BACTERIA # UR AUTO: HIGH
BILIRUB SERPL-MCNC: < 0.2 MG/DL — LOW (ref 0.2–1.2)
BILIRUB UR-MCNC: NEGATIVE — SIGNIFICANT CHANGE UP
BLOOD UR QL VISUAL: NEGATIVE — SIGNIFICANT CHANGE UP
BUN SERPL-MCNC: 4 MG/DL — LOW (ref 7–23)
CALCIUM SERPL-MCNC: 8.4 MG/DL — SIGNIFICANT CHANGE UP (ref 8.4–10.5)
CHLORIDE SERPL-SCNC: 111 MMOL/L — HIGH (ref 98–107)
CO2 SERPL-SCNC: 22 MMOL/L — SIGNIFICANT CHANGE UP (ref 22–31)
COLOR SPEC: SIGNIFICANT CHANGE UP
CREAT SERPL-MCNC: 0.63 MG/DL — SIGNIFICANT CHANGE UP (ref 0.5–1.3)
EPI CELLS # UR: SIGNIFICANT CHANGE UP
GLUCOSE SERPL-MCNC: 94 MG/DL — SIGNIFICANT CHANGE UP (ref 70–99)
GLUCOSE UR-MCNC: NEGATIVE — SIGNIFICANT CHANGE UP
KETONES UR-MCNC: NEGATIVE — SIGNIFICANT CHANGE UP
LEUKOCYTE ESTERASE UR-ACNC: NEGATIVE — SIGNIFICANT CHANGE UP
MAGNESIUM SERPL-MCNC: 1.7 MG/DL — SIGNIFICANT CHANGE UP (ref 1.6–2.6)
NITRITE UR-MCNC: NEGATIVE — SIGNIFICANT CHANGE UP
PH UR: 8 — SIGNIFICANT CHANGE UP (ref 5–8)
PHOSPHATE SERPL-MCNC: 2.8 MG/DL — SIGNIFICANT CHANGE UP (ref 2.5–4.5)
POTASSIUM SERPL-MCNC: 4.4 MMOL/L — SIGNIFICANT CHANGE UP (ref 3.5–5.3)
POTASSIUM SERPL-SCNC: 4.4 MMOL/L — SIGNIFICANT CHANGE UP (ref 3.5–5.3)
PREALB SERPL-MCNC: 17 MG/DL — LOW (ref 20–40)
PROT SERPL-MCNC: 5.9 G/DL — LOW (ref 6–8.3)
PROT UR-MCNC: NEGATIVE — SIGNIFICANT CHANGE UP
SODIUM SERPL-SCNC: 143 MMOL/L — SIGNIFICANT CHANGE UP (ref 135–145)
SP GR SPEC: 1.01 — SIGNIFICANT CHANGE UP (ref 1–1.04)
SPECIMEN SOURCE: SIGNIFICANT CHANGE UP
UROBILINOGEN FLD QL: NORMAL — SIGNIFICANT CHANGE UP

## 2019-05-15 PROCEDURE — 99254 IP/OBS CNSLTJ NEW/EST MOD 60: CPT

## 2019-05-15 PROCEDURE — 99291 CRITICAL CARE FIRST HOUR: CPT

## 2019-05-15 RX ORDER — RANITIDINE HYDROCHLORIDE 150 MG/1
150 TABLET, FILM COATED ORAL
Refills: 0 | Status: DISCONTINUED | OUTPATIENT
Start: 2019-05-15 | End: 2019-05-18

## 2019-05-15 RX ADMIN — ALBUTEROL 2.5 MILLIGRAM(S): 90 AEROSOL, METERED ORAL at 21:21

## 2019-05-15 RX ADMIN — LEVETIRACETAM 500 MILLIGRAM(S): 250 TABLET, FILM COATED ORAL at 21:28

## 2019-05-15 RX ADMIN — Medication 10 MILLIEQUIVALENT(S): at 21:28

## 2019-05-15 RX ADMIN — RANITIDINE HYDROCHLORIDE 150 MILLIGRAM(S): 150 TABLET, FILM COATED ORAL at 21:28

## 2019-05-15 RX ADMIN — Medication 0.5 MILLIGRAM(S): at 21:26

## 2019-05-15 RX ADMIN — ALBUTEROL 2.5 MILLIGRAM(S): 90 AEROSOL, METERED ORAL at 09:21

## 2019-05-15 RX ADMIN — Medication 1 SPRAY(S): at 10:20

## 2019-05-15 RX ADMIN — SODIUM CHLORIDE 1.5 MILLILITER(S): 9 INJECTION INTRAMUSCULAR; INTRAVENOUS; SUBCUTANEOUS at 09:30

## 2019-05-15 RX ADMIN — LEVETIRACETAM 500 MILLIGRAM(S): 250 TABLET, FILM COATED ORAL at 09:30

## 2019-05-15 RX ADMIN — Medication 0.5 MILLIGRAM(S): at 09:38

## 2019-05-15 RX ADMIN — Medication 500 MICROGRAM(S): at 09:21

## 2019-05-15 RX ADMIN — Medication 1 MILLILITER(S): at 10:20

## 2019-05-15 RX ADMIN — Medication 10 MILLIEQUIVALENT(S): at 10:20

## 2019-05-15 NOTE — CONSULT NOTE PEDS - ASSESSMENT
24yo F with complicated PMH including neurodevelopmental delay, Sz disorder, trach and GJ tube dependent with a history of feeding difficulty, gastroesophageal reflux, hydration and electrolyte difficulties, mild left hydronephrosis and right smaller left kidney who has been having difficulty with feeding tolerance and electrolyte disturbance, and history of aspiration pneumonia, admitted for presumed PNA. Active GI issues include optimization of feeds and hypoalmbuminemia, currently resolved. Has complex home feeding regimen, on which her weights have been stable. On admission, 33kg, last weight at GI office in October was 32.4kg. Has been consistently below 1%ile for height and weight, but is stable. On home regimen, received 27kcal/kg/day. Main concern from GI standpoint is hypoalbuminemia. Concern for albumin losses vs decreased production. Initial urine had protein of 100, but repeat was negative. Normal BUN and Cr. Losses could be through the stool, recommend alpha-1 antitrypsin stool study. To look for inadequate production, recommend pre-albumin level. 24yo F with complicated PMH including neurodevelopmental delay, Sz disorder, trach and GJ tube dependent with a history of feeding difficulty, gastroesophageal reflux, hydration and electrolyte difficulties, mild left hydronephrosis and right smaller left kidney who has been having difficulty with feeding tolerance and electrolyte disturbance, and history of aspiration pneumonia, admitted for presumed PNA. Noted to have hypoalbuminemia. Active GI issues include optimization of feeds and hypoalbuminemia, currently normalizing after albumin infusion. Has complex home feeding regimen, on which her weights have been stable. On admission, 33kg, last weight at GI office in October was 32.4kg. Has been consistently below 1%ile for height and weight, but is stable. On home regimen, received 27kcal/kg/day. Main concern from GI standpoint is feeding intolerance, constipation and hypoalbuminemia. Concern for albumin losses vs decreased production. Initial urine had protein of 100, but repeat was negative making urinary loss of protein unlikely. Normal BUN and Cr. Losses could be through the stool, recommend alpha-1 antitrypsin stool study. To assess for adequate production, recommend pre-albumin level.

## 2019-05-15 NOTE — CHART NOTE - NSCHARTNOTEFT_GEN_A_CORE
Nutrition consult received.  Seen with Dr. Bustillos who discussed potential etiologies of low albumin with parents, [facility] house staff, and Dr. Mosqueda of palliative care.  Pt receiving feeds of Alfamino Curt in addition to Pedialyte and water.  Mother states mixing formula using 13 scoops of powder + 8 oz of water- this yields approximately 32.5cal/oz.  Last seen as an outpatient by GI in October 2018 with weight of 32.4kg; current admission weight 33kg. If receives full amount of formula (of ~810mLs daily), it provides ~877kcals (~27kcals kg) and 26.7g protein (~0.8g protein/kg/day); however, mother reports pt receives less on most days.  As pt with history of multiple food allergies, will investigate a modular amino acid based supplement that can be provided if desired.      Full note to follow.

## 2019-05-15 NOTE — PROGRESS NOTE PEDS - SUBJECTIVE AND OBJECTIVE BOX
[facility]'s Date:  1516    ********************************************RESPIRATORY**********************************************  RR: 17 (05-15-19 @ 07:00) (11 - 23)  SpO2: 92% (05-15-19 @ 07:31) (92% - 99%)    End-Tidal CO2:  Mechanical Ventilation Settings:   Mode: SIMV with PS, RR (machine): 17, TV (patient): 180, FiO2: 30, PEEP: 8, PS: 10, ITime: 1, MAP: 13, PIP: 23      Respiratory Medications:  ALBUTerol  Intermittent Nebulization - Peds 2.5 milliGRAM(s) Nebulizer every 12 hours  buDESOnide   for Nebulization - Peds 0.5 milliGRAM(s) Nebulizer every 12 hours  ipratropium 0.02% for Nebulization - Peds 500 MICROGram(s) Inhalation daily  sodium chloride 7% for Nebulization - Peds 1.5 milliLiter(s) Nebulizer every 12 hours          *******************************************CARDIOVASCULAR********************************************  HR: 62 (05-15-19 @ 07:31) (56 - 114)  BP: 108/71 (05-15-19 @ 07:00) (90/53 - 121/68)  Cardiac Rhythm: NSR    *********************************HEMATOLOGIC/ONCOLOGIC*******************************************  ( 20:39):               11.1   5.06 )-----------(105                36.7   Neurophils% (auto):   58.8    manual%: x      Lymphocytes% (auto):  37.0    manual%: x      Eosinphils% (auto):   0.4     manual%: x      Bands%: x       blasts%: x          (  @ 20:39 )   PT: 11.7 sec;   INR: 1.05 ratio  aPTT: 44.5 sec       ********************************************INFECTIOUS************************************************  T(C): 36 (05-15-19 @ 07:00), Max: 37.2 (19 @ 17:00)    Culture - Respiratory with Gram Stain (collected 14 May 2019 05:51)  Source: TRACHEAL ASPIRATE    Culture - Blood (collected 13 May 2019 20:39)  Source: .Blood None  Preliminary Report (15 May 2019 03:01):    No growth to date.    Culture - Blood (collected 13 May 2019 20:39)  Source: .Blood None  Preliminary Report (15 May 2019 03:01):    No growth to date.        Medications:  levoFLOXacin IV Intermittent - Peds 330 milliGRAM(s) IV Intermittent daily      ******************************FLUIDS/ELECTROLYTES/NUTRITION*************************************  Drug Dosing Weight  Weight (kg): 33 (19 @ 01:30)       Daily     I&O's Summary    14 May 2019 07:01  -  15 May 2019 07:00  --------------------------------------------------------  IN: 2355.7 mL / OUT: 3138 mL / NET: -782.3 mL        Labs:   @ 22:00    x      |  x      |  x      ----------------------------<  x      x       |  x      |  x        I.Ca:x     M.6   Ph:x           @ 07:50    x      |  x      |  x      ----------------------------<  x      x       |  x      |  x        I.Ca:x     M.7   Ph:x             @ 20:39  TPro  6.3     AST  22     Alb  1.8      ALT  26     TBili  0.1    AlkPhos  142    DBili  x      Trig: x          Diet:	  Patient is receiving feeds via gtube   	  Gastrointestinal Medications:  multivitamin Oral Drops - Peds 1 milliLiter(s) Oral daily  pantoprazole  IV Intermittent - Peds 26 milliGRAM(s) IV Intermittent every 24 hours  potassium chloride ER Oral Tab/Cap - Peds 10 milliEquivalent(s) Oral two times a day      *****************************************NEUROLOGY**********************************************    Standing Medications:  levETIRAcetam  Oral Liquid - Peds 500 milliGRAM(s) Enteral Tube <User Schedule>    Adequacy of sedation and pain control has been assessed and adjusted    ************************************* OTHER MEDICATIONS ****************************************    Topical/Other Medications:  fluticasone propionate (50 MICROgram(s)/actuation) Nasal Spray - Peds 1 Spray(s) Both Nostrils daily  magnesium glycinate 400 milliGRAM(s) 400 milliGRAM(s) Oral/Enteral Tube every 12 hours  sodium chloride 0.65% Nasal Spray - Peds 1 Spray(s) Both Nostrils daily        *******************************PATIENT CARE ACCESS DEVICES******************************    Necessity of urinary, arterial, and venous catheters discussed    ****************************************PHYSICAL EXAM********************************************  Resp:  fine rhonchi b/l bases, good air entry  Cardiac: RRR, no murmus  Abdomem: Soft, non distended  Skin: No edema, no rashes  Neuro: Alert, interactive, responsive  Other:    *****************************************IMAGING STUDIES*****************************************      *******************************************ATTESTATIONS******************************************  Parent/Guardian is at the bedside:   [x ] Yes   [  ] No  Patient and Parent/Guardian updated as to the progress/plan of care:  [x ] Yes	[  ] No    [ ] The patient remains in critical and unstable condition, and requires ICU care and monitoring  [x ] The patient is improving but requires continued monitoring and adjustment of therapy    Total critical care time spent by attending physician (mins), excluding procedure time:  40 [facility]'s Date:  1516    ********************************************RESPIRATORY**********************************************  RR: 17 (05-15-19 @ 07:00) (11 - 23)  SpO2: 92% (05-15-19 @ 07:31) (92% - 99%)    End-Tidal CO2:    Mechanical Ventilation Settings:   Mode: SIMV with PS, RR (machine): 17, TV (patient): 180, FiO2: 30, PEEP: 8, PS: 10, ITime: 1, MAP: 13, PIP: 23      Respiratory Medications:  ALBUTerol  Intermittent Nebulization - Peds 2.5 milliGRAM(s) Nebulizer every 12 hours  buDESOnide   for Nebulization - Peds 0.5 milliGRAM(s) Nebulizer every 12 hours  ipratropium 0.02% for Nebulization - Peds 500 MICROGram(s) Inhalation daily  sodium chloride 7% for Nebulization - Peds 1.5 milliLiter(s) Nebulizer every 12 hours          *******************************************CARDIOVASCULAR********************************************  HR: 62 (05-15-19 @ 07:31) (56 - 114)  BP: 108/71 (05-15-19 @ 07:00) (90/53 - 121/68)  Cardiac Rhythm: NSR    *********************************HEMATOLOGIC/ONCOLOGIC*******************************************  ( 20:39):               11.1   5.06 )-----------(105                36.7   Neurophils% (auto):   58.8    manual%: x      Lymphocytes% (auto):  37.0    manual%: x      Eosinphils% (auto):   0.4     manual%: x      Bands%: x       blasts%: x          (  @ 20:39 )   PT: 11.7 sec;   INR: 1.05 ratio  aPTT: 44.5 sec       ********************************************INFECTIOUS************************************************  T(C): 36 (05-15-19 @ 07:00), Max: 37.2 (19 @ 17:00)    Culture - Respiratory with Gram Stain (collected 14 May 2019 05:51)  Source: TRACHEAL ASPIRATE    Culture - Blood (collected 13 May 2019 20:39)  Source: .Blood None  Preliminary Report (15 May 2019 03:01):    No growth to date.    Culture - Blood (collected 13 May 2019 20:39)  Source: .Blood None  Preliminary Report (15 May 2019 03:01):    No growth to date.        Medications:  levoFLOXacin IV Intermittent - Peds 330 milliGRAM(s) IV Intermittent daily      ******************************FLUIDS/ELECTROLYTES/NUTRITION*************************************  Drug Dosing Weight  Weight (kg): 33 (19 @ 01:30)       Daily     I&O's Summary    14 May 2019 07:01  -  15 May 2019 07:00  --------------------------------------------------------  IN: 2355.7 mL / OUT: 3138 mL / NET: -782.3 mL        Labs:   @ 22:00    x      |  x      |  x      ----------------------------<  x      x       |  x      |  x        I.Ca:x     M.6   Ph:x           @ 07:50    x      |  x      |  x      ----------------------------<  x      x       |  x      |  x        I.Ca:x     M.7   Ph:x             @ 20:39  TPro  6.3     AST  22     Alb  1.8      ALT  26     TBili  0.1    AlkPhos  142    DBili  x      Trig: x          Diet:	  Patient is receiving feeds via jtube  [place] is vented  	  Gastrointestinal Medications:  multivitamin Oral Drops - Peds 1 milliLiter(s) Oral daily  pantoprazole  IV Intermittent - Peds 26 milliGRAM(s) IV Intermittent every 24 hours  potassium chloride ER Oral Tab/Cap - Peds 10 milliEquivalent(s) Oral two times a day      *****************************************NEUROLOGY**********************************************    Standing Medications:  levETIRAcetam  Oral Liquid - Peds 500 milliGRAM(s) Enteral Tube <User Schedule>    Adequacy of sedation and pain control has been assessed and adjusted    ************************************* OTHER MEDICATIONS ****************************************    Topical/Other Medications:  fluticasone propionate (50 MICROgram(s)/actuation) Nasal Spray - Peds 1 Spray(s) Both Nostrils daily  magnesium glycinate 400 milliGRAM(s) 400 milliGRAM(s) Oral/Enteral Tube every 12 hours  sodium chloride 0.65% Nasal Spray - Peds 1 Spray(s) Both Nostrils daily        *******************************PATIENT CARE ACCESS DEVICES******************************    Necessity of urinary, arterial, and venous catheters discussed    ****************************************PHYSICAL EXAM********************************************  Resp:  fine rhonchi b/l bases, good air entry  Cardiac: RRR, no murmus  Abdomem: Soft, non distended  Skin: No edema, no rashes  Neuro: No acute change from baseline neuro exam , responds to stimuli, opens eyes  Other:    *****************************************IMAGING STUDIES*****************************************      *******************************************ATTESTATIONS******************************************  Parent/Guardian is at the bedside:   [x ] Yes   [  ] No  Patient and Parent/Guardian updated as to the progress/plan of care:  [x ] Yes	[  ] No    [ ] The patient remains in critical and unstable condition, and requires ICU care and monitoring  [x ] The patient is improving but requires continued monitoring and adjustment of therapy    Total critical care time spent by attending physician (mins), excluding procedure time:  40

## 2019-05-15 NOTE — CONSULT NOTE PEDS - SUBJECTIVE AND OBJECTIVE BOX
Patient is a 23y old  Female who presents with a chief complaint of increased home vent settings   HPI:  23 year old female with neurodevelopmental delay, Sz disorder, trach and GJ tube dependent with a history of feeding difficulty, gastroesophageal reflux, hydration and electrolyte difficulties, mild left hydronephrosis and right smaller left kidney who has been having difficulty with feeding tolerance and electrolyte disturbance, and history of aspiration pneumonia transferred from [facility] ED after presentation for desaturations, increased ventilatory requirements, and bradycardia at home. Hypotensive and hypothermic at [place] ED with chest X ray findings concerning for pneumonia.   Is followed by GI, last seen Oct 2018. Was continued on Alfamino Jr with marked improvement in feeding tolerance. Getting 800cc Alfamino over course of day, which comes to 27kcal/kg/day. No more reported egurgitation. Was having trouble controlling stools with  lactulose - would become dehydrated. Now controlled with prunes and prune juice. Today has had pasty stools. At last appointment, was also noted to have low phos and albumin on lab assessment by nephrology. Oral phos was added to her regimen. GJ tube changed by IR every 3 months, last changed 2 weeks ago. No vomiting or retching. Good UO. Parents deny any noticeable swelling.    [place] ED and transport course:  CBC 3.6>11.1/36.7<105. /4.0/107/30/4/0.39<74. Alb 1.8, protein 6.3, bili 0.1, alk phos 142, AST/ALT 22/26, lactate 1.0. PT 11.7, INR 1.05, PTT 44.5. Chest X ray limited due to patient positioning but concern for possible LLL consolidation. Blood culture x2 drawn. Given NS bolus and zosyn. Had worsening hypothermia, placed on elmer hugger.     Interval course: Initial UA with 100 protein. Repeat today negative for protein. Received albumin infusion 1g/kg with repeat albumin 3.1. Nutrition consult recommended continuing current feeds, with GI consult for underlying hypoalbuminemia.      Allergies    Bactrim (Unknown)  carbamazepine (Unknown)  cephalosporins (Rash)  Depakote (Unknown)  diphenhydrAMINE (Seizure)  eggs (Unknown)  EGGS,  NUTS (Anaphylaxis)  Erythromycin Base (Unknown)  metoclopramide (Unknown)  Milk (Unknown)  MILK, SHELLFISH, WHEAT (Unknown)  Nuts (Unknown)  oxcarbazepine (Unknown)  peanuts (Unknown)  SEASONAL, POLLEN, GRASS, PET DANDER, FEATHERS (Unknown)  shellfish (Unknown)  Trileptal (Unknown)  valproic acid (Unknown)  vancomycin (Unknown)    Intolerances      MEDICATIONS  (STANDING):  ALBUTerol  Intermittent Nebulization - Peds 2.5 milliGRAM(s) Nebulizer every 12 hours  buDESOnide   for Nebulization - Peds 0.5 milliGRAM(s) Nebulizer every 12 hours  fluticasone propionate (50 MICROgram(s)/actuation) Nasal Spray - Peds 1 Spray(s) Both Nostrils daily  ipratropium 0.02% for Nebulization - Peds 500 MICROGram(s) Inhalation daily  levETIRAcetam  Oral Liquid - Peds 500 milliGRAM(s) Enteral Tube <User Schedule>  levoFLOXacin  Oral Liquid - Peds 330 milliGRAM(s) Oral daily  magnesium glycinate 400 milliGRAM(s) 400 milliGRAM(s) Oral/Enteral Tube every 12 hours  multivitamin Oral Drops - Peds 1 milliLiter(s) Oral daily  potassium chloride ER Oral Tab/Cap - Peds 10 milliEquivalent(s) Oral two times a day  ranitidine  Oral Liquid - Peds 150 milliGRAM(s) Oral two times a day  sodium chloride 0.65% Nasal Spray - Peds 1 Spray(s) Both Nostrils daily  sodium chloride 7% for Nebulization - Peds 1.5 milliLiter(s) Nebulizer every 12 hours    MEDICATIONS  (PRN):      PAST MEDICAL & SURGICAL HISTORY:  Hypokalemia  Kyphosis  Scoliosis  Hypotonia  Asthma  Seizures  Cerebral Palsy  Gastroesophageal Reflux Disease  Hypothyroidism: Treated with synthroid in past. Stopped treatment in  due to normal thyroid function.  Developmental Delay  Tracheostomy in place  Peg (Percutaneous Endoscopic Gastrostomy) Adjustment/Replacement/Removal  S/P Tonsillectomy and Adenoidectomy  Strabismus    FAMILY HISTORY:      REVIEW OF SYSTEMS  All review of systems negative, except for those marked:  Constitutional:   No fever, + fatigue, no pallor.   HEENT:   No eye pain, no vision changes, no icterus, no mouth ulcers.  Respiratory:   +incresased vent settings  Cardiovascular:   No chest pain, no palpitations.   Skin:   No rashes, no jaundice, no eczema.   Musculoskeletal:   No joint pain, no swelling, no myalgia.   Neurologic:   No headache, no seizure, no weakness.   Genitourinary:   No dysuria, no decreased urine output.  Endocrine:   No thyroid disease, no diabetes.  Heme/Lymphatic:   No anemia, no blood transfusions, no lymph node enlargement, no bleeding, no bruising.    Daily     Daily   BMI: 19.8 (05-14 @ 01:30)  Change in Weight:  Vital Signs Last 24 Hrs  T(C): 37 (15 May 2019 17:00), Max: 37.5 (15 May 2019 11:00)  T(F): 98.6 (15 May 2019 17:00), Max: 99.5 (15 May 2019 11:00)  HR: 80 (15 May 2019 17:00) (60 - 114)  BP: 104/66 (15 May 2019 17:00) (100/63 - 117/71)  BP(mean): 75 (15 May 2019 17:00) (63 - 85)  RR: 17 (15 May 2019 17:00) (17 - 21)  SpO2: 96% (15 May 2019 17:00) (90% - 99%)  I&O's Detail    14 May 2019 07:01  -  15 May 2019 07:00  --------------------------------------------------------  IN:    dextrose 5% + sodium chloride 0.9% with potassium chloride 20 mEq/L. - Pediatric: 730 mL    Enteral Tube Flush: 180 mL    EPINEPHrine Infusion - Peds: 31.7 mL    IV PiggyBack: 190 mL    Miscellaneous Tube Feedin mL    Pedialyte: 540 mL    Solution: 99 mL  Total IN: 2355.7 mL    OUT:    Gastrostomy Tube: 110 mL    Incontinent per Diaper: 3028 mL  Total OUT: 3138 mL    Total NET: -782.3 mL      15 May 2019 07:01  -  15 May 2019 18:22  --------------------------------------------------------  IN:    Enteral Tube Flush: 120 mL    Miscellaneous Tube Feedin mL    Pedialyte: 660 mL    Solution: 38.5 mL  Total IN: 1223.5 mL    OUT:    Gastrostomy Tube: 75 mL    Incontinent per Diaper: 1643 mL  Total OUT: 1718 mL    Total NET: -494.5 mL          PHYSICAL EXAM  General:  laying in bed, limited movement, responsive, awake, pale-appearing  HEENT:    Normal appearance of conjunctiva, ears, nose, lips, oropharynx, and oral mucosa, anicteric, lips dry  Neck:  No masses, no asymmetry. Trach in place  Lymph Nodes:  No lymphadenopathy.   Cardiovascular:  RRR normal S1/S2, no murmur.  Respiratory:  transmitted airway sounds  Abdominal:   soft, no masses or tenderness, normoactive BS, NT/ND, no HSM. GJ tube in place in L abdomen under dressing c/d/i; skin mildly erythematous; bilious drainage in tube  Extremities:   No clubbing or cyanosis, normal capillary refill, no edema.   Skin:   no jaundice, lesions, eczema.   Musculoskeletal:  No joint swelling, erythema or tenderness.   Neuro: responds to stimuli and opens eyes  Other:     Lab Results:                        11.1   5.06  )-----------( 105      ( 13 May 2019 20:39 )             36.7     05-15    143  |  111<H>  |  4<L>  ----------------------------<  94  4.4   |  22  |  0.63    Ca    8.4      15 May 2019 15:20  Phos  2.8     05-15  Mg     1.7     05-15    TPro  5.9<L>  /  Alb  3.1<L>  /  TBili  < 0.2<L>  /  DBili  x   /  AST  47<H>  /  ALT  19  /  AlkPhos  89  05-15    LIVER FUNCTIONS - ( 15 May 2019 15:20 )  Alb: 3.1 g/dL / Pro: 5.9 g/dL / ALK PHOS: 89 u/L / ALT: 19 u/L / AST: 47 u/L / GGT: x           PT/INR - ( 13 May 2019 20:39 )   PT: 11.7 sec;   INR: 1.05 ratio         PTT - ( 13 May 2019 20:39 )  PTT:44.5 sec      Stool Results:          RADIOLOGY RESULTS:    SURGICAL PATHOLOGY: Patient is a 23y old  Female who presented for admission with a chief complaint of increased home vent settings   HPI:  23 year old female with neurodevelopmental delay, Sz disorder, trach and GJ tube dependent with a history of feeding difficulty, gastroesophageal reflux, hydration and electrolyte difficulties, mild left hydronephrosis and right smaller left kidney who has been having difficulty with feeding tolerance and electrolyte disturbance, and history of aspiration pneumonia transferred from [facility] ED after presentation for desaturations, increased ventilatory requirements, and bradycardia at home. Hypotensive and hypothermic at [place] ED with chest X ray findings concerning for pneumonia. Lab assessment pertinent for hypoalbuminemia. Denies hx of edema or swelling.   Is followed by GI, last seen Oct 2018. Had been placed on Alfamino Jr after developed intolerance to Elecare. Was continued on Alfamino Jr with marked improvement in feeding tolerance. Getting 800cc Alfamino over course of day, which comes to 27kcal/kg/day. No more reported regurgitation. Developed constipation on this formula and was placed on lactulose. Was having trouble controlling stools with  lactulose - would become dehydrated. Now controlled with prunes and prune juice. Today has had pasty stools. At last appointment, was also noted to have low phos and albumin on lab assessment by nephrology. Oral phos was added to her regimen. GJ tube changed by IR every 3 months, last changed 2 weeks ago. No vomiting or retching. Good UO. Parents deny any noticeable swelling.    [place] ED and transport course:  CBC 3.6>11.1/36.7<105. /4.0/107/30/4/0.39<74. Alb 1.8, protein 6.3, bili 0.1, alk phos 142, AST/ALT 22/26, lactate 1.0. PT 11.7, INR 1.05, PTT 44.5. Chest X ray limited due to patient positioning but concern for possible LLL consolidation. Blood culture x2 drawn. Given NS bolus and zosyn. Had worsening hypothermia, placed on elmer hugger.     Interval course: Initial UA with 100 protein. Repeat today negative for protein. Received albumin infusion 1g/kg with repeat albumin 3.1. Nutrition consult recommended continuing current feeds, with GI consult for underlying hypoalbuminemia.      Allergies    Bactrim (Unknown)  carbamazepine (Unknown)  cephalosporins (Rash)  Depakote (Unknown)  diphenhydrAMINE (Seizure)  eggs (Unknown)  EGGS,  NUTS (Anaphylaxis)  Erythromycin Base (Unknown)  metoclopramide (Unknown)  Milk (Unknown)  MILK, SHELLFISH, WHEAT (Unknown)  Nuts (Unknown)  oxcarbazepine (Unknown)  peanuts (Unknown)  SEASONAL, POLLEN, GRASS, PET DANDER, FEATHERS (Unknown)  shellfish (Unknown)  Trileptal (Unknown)  valproic acid (Unknown)  vancomycin (Unknown)    Intolerances      MEDICATIONS  (STANDING):  ALBUTerol  Intermittent Nebulization - Peds 2.5 milliGRAM(s) Nebulizer every 12 hours  buDESOnide   for Nebulization - Peds 0.5 milliGRAM(s) Nebulizer every 12 hours  fluticasone propionate (50 MICROgram(s)/actuation) Nasal Spray - Peds 1 Spray(s) Both Nostrils daily  ipratropium 0.02% for Nebulization - Peds 500 MICROGram(s) Inhalation daily  levETIRAcetam  Oral Liquid - Peds 500 milliGRAM(s) Enteral Tube <User Schedule>  levoFLOXacin  Oral Liquid - Peds 330 milliGRAM(s) Oral daily  magnesium glycinate 400 milliGRAM(s) 400 milliGRAM(s) Oral/Enteral Tube every 12 hours  multivitamin Oral Drops - Peds 1 milliLiter(s) Oral daily  potassium chloride ER Oral Tab/Cap - Peds 10 milliEquivalent(s) Oral two times a day  ranitidine  Oral Liquid - Peds 150 milliGRAM(s) Oral two times a day  sodium chloride 0.65% Nasal Spray - Peds 1 Spray(s) Both Nostrils daily  sodium chloride 7% for Nebulization - Peds 1.5 milliLiter(s) Nebulizer every 12 hours    MEDICATIONS  (PRN):      PAST MEDICAL & SURGICAL HISTORY:  Hypokalemia  Kyphosis  Scoliosis  Hypotonia  Asthma  Seizures  Cerebral Palsy  Gastroesophageal Reflux Disease  Hypothyroidism: Treated with synthroid in past. Stopped treatment in  due to normal thyroid function.  Developmental Delay  Tracheostomy in place  Peg (Percutaneous Endoscopic Gastrostomy) Adjustment/Replacement/Removal  S/P Tonsillectomy and Adenoidectomy  Strabismus    FAMILY HISTORY:      REVIEW OF SYSTEMS  All review of systems negative, except for those marked:  Constitutional:   No fever, + fatigue, no pallor.   HEENT:   No eye pain, no vision changes, no icterus, no mouth ulcers.  Respiratory:   increased vent settings  Cardiovascular:   No chest pain, no palpitations.   Skin:   No rashes, no jaundice, no eczema.   Musculoskeletal:   No joint pain, no swelling, no myalgia. Pos contractures.   Neurologic:   No headache, no seizure, no weakness. Non-ambulatory, non-verbal  Genitourinary:   No dysuria, no decreased urine output.  Endocrine:   No thyroid disease, no diabetes.  Heme/Lymphatic:   No anemia, no blood transfusions, no lymph node enlargement, no bleeding, no bruising.    Daily     Daily   BMI: 19.8 (14 @ 01:30)  Change in Weight:  Vital Signs Last 24 Hrs  T(C): 37 (15 May 2019 17:00), Max: 37.5 (15 May 2019 11:00)  T(F): 98.6 (15 May 2019 17:00), Max: 99.5 (15 May 2019 11:00)  HR: 80 (15 May 2019 17:00) (60 - 114)  BP: 104/66 (15 May 2019 17:00) (100/63 - 117/71)  BP(mean): 75 (15 May 2019 17:00) (63 - 85)  RR: 17 (15 May 2019 17:00) (17 - 21)  SpO2: 96% (15 May 2019 17:00) (90% - 99%)  I&O's Detail    14 May 2019 07:01  -  15 May 2019 07:00  --------------------------------------------------------  IN:    dextrose 5% + sodium chloride 0.9% with potassium chloride 20 mEq/L. - Pediatric: 730 mL    Enteral Tube Flush: 180 mL    EPINEPHrine Infusion - Peds: 31.7 mL    IV PiggyBack: 190 mL    Miscellaneous Tube Feedin mL    Pedialyte: 540 mL    Solution: 99 mL  Total IN: 2355.7 mL    OUT:    Gastrostomy Tube: 110 mL    Incontinent per Diaper: 3028 mL  Total OUT: 3138 mL    Total NET: -782.3 mL      15 May 2019 07:01  -  15 May 2019 18:22  --------------------------------------------------------  IN:    Enteral Tube Flush: 120 mL    Miscellaneous Tube Feedin mL    Pedialyte: 660 mL    Solution: 38.5 mL  Total IN: 1223.5 mL    OUT:    Gastrostomy Tube: 75 mL    Incontinent per Diaper: 1643 mL  Total OUT: 1718 mL    Total NET: -494.5 mL          PHYSICAL EXAM  General:  laying in bed, limited movement, responsive, awake, pale-appearing. Non-verbal, non-ambulatory.  HEENT:    Normal appearance of conjunctiva, ears, nose, lips, oropharynx, and oral mucosa, anicteric, lips dry  Neck:  No masses, no asymmetry. Trach in place  Lymph Nodes:  No lymphadenopathy.   Cardiovascular:  RRR normal S1/S2, no murmur.  Respiratory:  transmitted airway sounds  Abdominal:   soft, no masses or tenderness, normoactive BS, NT/ND, no HSM. GJ tube in place in L abdomen under dressing c/d/i; skin mildly erythematous; bilious drainage in tube  Extremities:   No clubbing or cyanosis, normal capillary refill, no edema.   Skin:   no jaundice, lesions, eczema.   Musculoskeletal:  No joint swelling, erythema or tenderness. Contractures.   Neuro: responds to stimuli and opens eyes. non-ambulatory, non-verbal  Other:     Lab Results:                        11.1   5.06  )-----------( 105      ( 13 May 2019 20:39 )             36.7     05-15    143  |  111<H>  |  4<L>  ----------------------------<  94  4.4   |  22  |  0.63    Ca    8.4      15 May 2019 15:20  Phos  2.8     05-15  Mg     1.7     05-15    TPro  5.9<L>  /  Alb  3.1<L>  /  TBili  < 0.2<L>  /  DBili  x   /  AST  47<H>  /  ALT  19  /  AlkPhos  89  05-15    LIVER FUNCTIONS - ( 15 May 2019 15:20 )  Alb: 3.1 g/dL / Pro: 5.9 g/dL / ALK PHOS: 89 u/L / ALT: 19 u/L / AST: 47 u/L / GGT: x           PT/INR - ( 13 May 2019 20:39 )   PT: 11.7 sec;   INR: 1.05 ratio         PTT - ( 13 May 2019 20:39 )  PTT:44.5 sec      Stool Results:          RADIOLOGY RESULTS:    SURGICAL PATHOLOGY:

## 2019-05-15 NOTE — CONSULT NOTE PEDS - PROBLEM SELECTOR RECOMMENDATION 9
- stool alpha-1 antitrypsin   - serum pre-albumin - stool alpha-1 antitrypsin   - serum pre-albumin  -monitor level

## 2019-05-15 NOTE — PROGRESS NOTE PEDS - SUBJECTIVE AND OBJECTIVE BOX
Patient seen and examined at the bedside with attending. Patient with CP, seizure d/o and long term trach. Trach changed approximately every 2 months in the office with Anamaria or at home with Dad. Trach last changed <1 week ago. Plan for PICU to change trach for tracheitis vs protocol? Family refusing trach change at this time, would like to discuss with PICU team first. Please call if trach needs to be changed and the ENT team can contact Anamaria.

## 2019-05-15 NOTE — PROGRESS NOTE PEDS - ASSESSMENT
22 year old female with H/O MRCP, seizure disorder, Quadriparesis, Tracheostomy, GJ tube feeding.   Admitted with increasing lethargy at home, difficulty tolerating feeds and needing increased vent support.   In ED was hypothermic, hypotensive and required fluid bolus/epi infusion. CXR showed possible b/l basilar infiltrate    Resp:  On home vent settings day/night. Usually trach collar during day and vent at night  CV:  Off epi since yesterday afternoon.   BP stable  Heme:    ID:    FEN:    Neuro:    Access: 22 year old female with H/O MRCP, seizure disorder, Quadriparesis, Tracheostomy, GJ tube feeding.   Admitted with increasing lethargy at home, difficulty tolerating feeds and needing increased vent support.   In ED was hypothermic, hypotensive and required fluid bolus/epi infusion. CXR showed possible b/l basilar infiltrate    Resp:  On home vent settings day/night. Usually trach collar during day and vent at night  Trial to trach collar today during day  CV:  Off epi since yesterday afternoon.   BP stable  Heme:  No acute concerns   ID:  On levoflox for pneumonia--change to gtube  Bcx negative,   UCx negative  FEN:  Tolerating feeds. Gtube drained 100 mL  Repeat CMP today with albumin  Nutrition team to discuss feeding regimen--d/w blood tests for allergies (including allergy for all medications)  Neuro:  Continue keppra  Access:  PIV

## 2019-05-16 LAB
BACTERIA UR CULT: SIGNIFICANT CHANGE UP
BASOPHILS # BLD AUTO: 0.01 K/UL — SIGNIFICANT CHANGE UP (ref 0–0.2)
BASOPHILS NFR BLD AUTO: 0.1 % — SIGNIFICANT CHANGE UP (ref 0–2)
EOSINOPHIL # BLD AUTO: 0.01 K/UL — SIGNIFICANT CHANGE UP (ref 0–0.5)
EOSINOPHIL NFR BLD AUTO: 0.1 % — SIGNIFICANT CHANGE UP (ref 0–6)
GRAM STN SPT: SIGNIFICANT CHANGE UP
HCT VFR BLD CALC: 35.7 % — SIGNIFICANT CHANGE UP (ref 34.5–45)
HGB BLD-MCNC: 10.6 G/DL — LOW (ref 11.5–15.5)
IMM GRANULOCYTES NFR BLD AUTO: 0.1 % — SIGNIFICANT CHANGE UP (ref 0–1.5)
LYMPHOCYTES # BLD AUTO: 1.91 K/UL — SIGNIFICANT CHANGE UP (ref 1–3.3)
LYMPHOCYTES # BLD AUTO: 27.4 % — SIGNIFICANT CHANGE UP (ref 13–44)
MCHC RBC-ENTMCNC: 29.7 % — LOW (ref 32–36)
MCHC RBC-ENTMCNC: 30.8 PG — SIGNIFICANT CHANGE UP (ref 27–34)
MCV RBC AUTO: 103.8 FL — HIGH (ref 80–100)
METHOD TYPE: SIGNIFICANT CHANGE UP
MONOCYTES # BLD AUTO: 0.39 K/UL — SIGNIFICANT CHANGE UP (ref 0–0.9)
MONOCYTES NFR BLD AUTO: 5.6 % — SIGNIFICANT CHANGE UP (ref 2–14)
NEUTROPHILS # BLD AUTO: 4.63 K/UL — SIGNIFICANT CHANGE UP (ref 1.8–7.4)
NEUTROPHILS NFR BLD AUTO: 66.7 % — SIGNIFICANT CHANGE UP (ref 43–77)
NRBC # FLD: 0 K/UL — SIGNIFICANT CHANGE UP (ref 0–0)
ORGANISM # SPEC MICROSCOPIC CNT: SIGNIFICANT CHANGE UP
PLATELET # BLD AUTO: 127 K/UL — LOW (ref 150–400)
PMV BLD: 10.6 FL — SIGNIFICANT CHANGE UP (ref 7–13)
RBC # BLD: 3.44 M/UL — LOW (ref 3.8–5.2)
RBC # FLD: 14.2 % — SIGNIFICANT CHANGE UP (ref 10.3–14.5)
WBC # BLD: 6.96 K/UL — SIGNIFICANT CHANGE UP (ref 3.8–10.5)
WBC # FLD AUTO: 6.96 K/UL — SIGNIFICANT CHANGE UP (ref 3.8–10.5)

## 2019-05-16 PROCEDURE — 99291 CRITICAL CARE FIRST HOUR: CPT

## 2019-05-16 PROCEDURE — 99233 SBSQ HOSP IP/OBS HIGH 50: CPT

## 2019-05-16 RX ORDER — ACETAMINOPHEN 500 MG
400 TABLET ORAL ONCE
Refills: 0 | Status: COMPLETED | OUTPATIENT
Start: 2019-05-16 | End: 2019-05-16

## 2019-05-16 RX ORDER — BUDESONIDE, MICRONIZED 100 %
1 POWDER (GRAM) MISCELLANEOUS EVERY 12 HOURS
Refills: 0 | Status: DISCONTINUED | OUTPATIENT
Start: 2019-05-16 | End: 2019-05-18

## 2019-05-16 RX ORDER — SODIUM CHLORIDE 9 MG/ML
4 INJECTION INTRAMUSCULAR; INTRAVENOUS; SUBCUTANEOUS EVERY 12 HOURS
Refills: 0 | Status: DISCONTINUED | OUTPATIENT
Start: 2019-05-16 | End: 2019-05-18

## 2019-05-16 RX ADMIN — Medication 1 MILLILITER(S): at 10:40

## 2019-05-16 RX ADMIN — Medication 1 SPRAY(S): at 10:30

## 2019-05-16 RX ADMIN — RANITIDINE HYDROCHLORIDE 150 MILLIGRAM(S): 150 TABLET, FILM COATED ORAL at 10:25

## 2019-05-16 RX ADMIN — SODIUM CHLORIDE 4 MILLILITER(S): 9 INJECTION INTRAMUSCULAR; INTRAVENOUS; SUBCUTANEOUS at 21:20

## 2019-05-16 RX ADMIN — Medication 500 MICROGRAM(S): at 07:48

## 2019-05-16 RX ADMIN — Medication 1 SPRAY(S): at 10:10

## 2019-05-16 RX ADMIN — Medication 1 MILLIGRAM(S): at 21:26

## 2019-05-16 RX ADMIN — SODIUM CHLORIDE 1.5 MILLILITER(S): 9 INJECTION INTRAMUSCULAR; INTRAVENOUS; SUBCUTANEOUS at 08:10

## 2019-05-16 RX ADMIN — RANITIDINE HYDROCHLORIDE 150 MILLIGRAM(S): 150 TABLET, FILM COATED ORAL at 18:52

## 2019-05-16 RX ADMIN — Medication 400 MILLIGRAM(S): at 12:10

## 2019-05-16 RX ADMIN — ALBUTEROL 2.5 MILLIGRAM(S): 90 AEROSOL, METERED ORAL at 08:08

## 2019-05-16 RX ADMIN — Medication 10 MILLIEQUIVALENT(S): at 21:10

## 2019-05-16 RX ADMIN — LEVETIRACETAM 500 MILLIGRAM(S): 250 TABLET, FILM COATED ORAL at 08:44

## 2019-05-16 RX ADMIN — ALBUTEROL 2.5 MILLIGRAM(S): 90 AEROSOL, METERED ORAL at 21:15

## 2019-05-16 RX ADMIN — Medication 10 MILLIEQUIVALENT(S): at 10:20

## 2019-05-16 RX ADMIN — Medication 400 MILLIGRAM(S): at 13:15

## 2019-05-16 RX ADMIN — Medication 0.5 MILLIGRAM(S): at 07:52

## 2019-05-16 RX ADMIN — LEVETIRACETAM 500 MILLIGRAM(S): 250 TABLET, FILM COATED ORAL at 21:10

## 2019-05-16 RX ADMIN — SODIUM CHLORIDE 1.5 MILLILITER(S): 9 INJECTION INTRAMUSCULAR; INTRAVENOUS; SUBCUTANEOUS at 00:17

## 2019-05-16 NOTE — PROGRESS NOTE PEDS - SUBJECTIVE AND OBJECTIVE BOX
Today's Date:      ********************************************RESPIRATORY**********************************************  RR: 14 (19 @ 08:00) (13 - 18)  SpO2: 97% (19 @ 08:08) (87% - 100%)    End-Tidal CO2:  46  Mechanical Ventilation Settings:   Mode: SIMV with PS, RR (machine): 17, TV (patient): 310, FiO2: 50, PEEP: 10, PS: 10, ITime: 1, MAP: 14, PIP: 23      Respiratory Medications:  ALBUTerol  Intermittent Nebulization - Peds 2.5 milliGRAM(s) Nebulizer every 12 hours  buDESOnide   for Nebulization - Peds 0.5 milliGRAM(s) Nebulizer every 12 hours  ipratropium 0.02% for Nebulization - Peds 500 MICROGram(s) Inhalation daily  sodium chloride 7% for Nebulization - Peds 1.5 milliLiter(s) Nebulizer every 12 hours      *******************************************CARDIOVASCULAR********************************************  HR: 62 (19 @ 08:08) (62 - 107)  BP: 78/50 (19 @ 08:00) (78/50 - 109/66)  Wt(kg): --  Cardiac Rhythm: NSR    Cardiovascular Medications:      *********************************HEMATOLOGIC/ONCOLOGIC*******************************************  ( @ 20:39):               11.1   5.06 )-----------(105                36.7   Neurophils% (auto):   58.8    manual%: x      Lymphocytes% (auto):  37.0    manual%: x      Eosinphils% (auto):   0.4     manual%: x      Bands%: x       blasts%: x          (  @ 20:39 )   PT: 11.7 sec;   INR: 1.05 ratio  aPTT: 44.5 sec       ********************************************INFECTIOUS************************************************  T(C): 36 (19 @ 08:00), Max: 37.5 (05-15-19 @ 11:00)  Wt(kg): --      Culture - Urine (collected 14 May 2019 12:25)  Source: URINE CATHETER  Final Report (16 May 2019 07:45):    NO GROWTH AT 24 HOURS    Culture - Respiratory with Gram Stain (collected 14 May 2019 05:51)  Source: TRACHEAL ASPIRATE  Preliminary Report (15 May 2019 13:57):    CULTURE IN PROGRESS, FURTHER REPORT TO FOLLOW.    GNRID^Gram Neg Stephen To Be Identified    QUANTITY OF GROWTH: MODERATE  Preliminary Report (15 May 2019 13:40):    GNRID^Gram Neg Stephen To Be Identified    QUANTITY OF GROWTH: MODERATE    Culture - Blood (collected 13 May 2019 20:39)  Source: .Blood None  Preliminary Report (15 May 2019 03:01):    No growth to date.    Culture - Blood (collected 13 May 2019 20:39)  Source: .Blood None  Preliminary Report (15 May 2019 03:01):    No growth to date.        Medications:  levoFLOXacin  Oral Liquid - Peds 330 milliGRAM(s) Oral daily      Labs:      ******************************FLUIDS/ELECTROLYTES/NUTRITION*************************************  Drug Dosing Weight  Weight (kg): 33 (19 @ 01:30)      15 May 2019 07:01  -  16 May 2019 07:00  --------------------------------------------------------  IN: 2513.5 mL / OUT: 3262 mL / NET: -748.5 mL    Labs:  05-15 @ 15:20    143    |  111    |  4      ----------------------------<  94     4.4     |  22     |  0.63     I.Ca:x     M.7   Ph:2.8         @ 22:00    x      |  x      |  x      ----------------------------<  x      x       |  x      |  x        I.Ca:x     M.6   Ph:x            05-15 @ 15:20  TPro  5.9     AST  47     Alb  3.1      ALT  19     TBili  < 0.2  AlkPhos  89     DBili  x      Trig: x          Diet:	  Patient is receiving feeds via gtube   	  Gastrointestinal Medications:  multivitamin Oral Drops - Peds 1 milliLiter(s) Oral daily  potassium chloride ER Oral Tab/Cap - Peds 10 milliEquivalent(s) Oral two times a day  ranitidine  Oral Liquid - Peds 150 milliGRAM(s) Oral two times a day      *****************************************NEUROLOGY**********************************************  [ ] DOMENICO-1:          Standing Medications:  levETIRAcetam  Oral Liquid - Peds 500 milliGRAM(s) Enteral Tube <User Schedule>    Adequacy of sedation and pain control has been assessed and adjusted    ************************************* OTHER MEDICATIONS ****************************************  Endocrine/Metabolic Medications:    Genitourinary Medications:    Topical/Other Medications:  fluticasone propionate (50 MICROgram(s)/actuation) Nasal Spray - Peds 1 Spray(s) Both Nostrils daily  magnesium glycinate 400 milliGRAM(s) 400 milliGRAM(s) Oral/Enteral Tube every 12 hours  sodium chloride 0.65% Nasal Spray - Peds 1 Spray(s) Both Nostrils daily        *******************************PATIENT CARE ACCESS DEVICES******************************    Necessity of urinary, arterial, and venous catheters discussed    ****************************************PHYSICAL EXAM********************************************  Resp:  Diminshed at b/l bases  Cardiac: RRR, no murmus  Abdomem: Soft, non distended  Skin: No edema, no rashes  Neuro: No acute change from baseline neuro exam   Other:    *****************************************IMAGING STUDIES*****************************************      *******************************************ATTESTATIONS******************************************  Parent/Guardian is at the bedside:   [x ] Yes   [  ] No  Patient and Parent/Guardian updated as to the progress/plan of care:  [x ] Yes	[  ] No    [x ] The patient remains in critical and unstable condition, and requires ICU care and monitoring  [ ] The patient is improving but requires continued monitoring and adjustment of therapy    Total critical care time spent by attending physician (mins), excluding procedure time:  40

## 2019-05-16 NOTE — PROGRESS NOTE PEDS - ASSESSMENT
23 year old with history of cerebral palsy, global developmental delay, microcephaly, seizure disorder, trach and GJ tube dependence, with baseline vent dependence at night.  She presents now with acute illness with hypothermia and hypotension as well as chronic issue with feeding intolerance.  Found to have possible pneumonia and significant hypoalbuminemia.      Discussed with parents their concerns regarding Shira's antibiotics as well as her nutritional plan and passed along parents concerns to the primary team.    Will continue discussion regarding long term goals of care.  Continue ICU care as per PICU team

## 2019-05-16 NOTE — PROGRESS NOTE PEDS - SUBJECTIVE AND OBJECTIVE BOX
Reason for Consultation:	[] Pain		[x] Goals of Care		[] Non-pain symptoms  .			[] End of life discussion		[] Other:    Patient is a 23y old  Female who presents with a chief complaint of respiratory distress and feeding intolerance.  Patient is well known to me from prior admissions and has a history of global developmental delay, cerebral palsy, microcephaly, seizure disorder, tracheostomy dependence and GJ tube dependence.  At baseline, she uses a ventilator at night only.  Mom says she has had increasing problems with feeding, starting with a change to the elecare formula she had been on for years.  They switched her formula with some improvement but despite feeding her through her J tube, she has formula backing up to her stomach if they feed her too quickly and so they are having trouble keeping her hydrated and nourished.  Her serum albumin is low on admission.  Mom denies weight loss.  She says that at baseline, Shira has significant loss of fluid from GT output, in addition to her usual fluid and nutritional needs.   They have tried Reglan which caused dystonia and Erythromycin to which she had a reaction.  Shira presented to [place] ED with desaturation and bradycardia at home.  In the ED she was found to be hypotensive and hypothermic, with a chest xray with concern for left lower lobe pneumonia.  She was given a normal saline bolus and started on antibiotics and transferred to [facility] for further care. Parents also report decreased energy at home.  After arrival to [facility], Shiar required initiation of epinephrine drip for hypotension.    Shira is now off of the epinephrine drip, but is having increased ventilator requirements which is presumed to be secondary to her pneumonia.  The parents expressed concern with the current treatment of her pneumonia; they were concerned that she should be on IV antibiotics instead of enteral given her "problems with absorption".  They are awaiting nutritions recommendations for protein supplementation.        REVIEW OF SYSTEMS  Pain Score: 	0	Scale Used:  NIPS  Other symptoms (0=None, 1=Mild, 2=Moderate, 3=Severe)  Anorexia: 	n/a	Dyspnea:	0	Pruritus:  n/a  Nausea: 	n/a	Agitation:	0	Anxiety: n/a  Vomitin-1	Drowsiness:	0	Depression: n/an  Constipation: 	1	Diarrhea:	0	Other:     PAST MEDICAL & SURGICAL HISTORY:  Hypokalemia  Kyphosis  Scoliosis  Hypotonia  Asthma  Seizures  Cerebral Palsy  Gastroesophageal Reflux Disease  Hypothyroidism: Treated with synthroid in past. Stopped treatment in  due to normal thyroid function.  Developmental Delay  Tracheostomy in place  Peg (Percutaneous Endoscopic Gastrostomy) Adjustment/Replacement/Removal  S/P Tonsillectomy and Adenoidectomy  Strabismus    FAMILY HISTORY:  Non-contributory  SOCIAL HISTORY:  Lives at home with parents    MEDICATIONS  (STANDING):  ALBUTerol  Intermittent Nebulization - Peds 2.5 milliGRAM(s) Nebulizer every 12 hours  buDESOnide   for Nebulization - Peds 1 milliGRAM(s) Nebulizer every 12 hours  fluticasone propionate (50 MICROgram(s)/actuation) Nasal Spray - Peds 1 Spray(s) Both Nostrils daily  ipratropium 0.02% for Nebulization - Peds 500 MICROGram(s) Inhalation daily  levETIRAcetam  Oral Liquid - Peds 500 milliGRAM(s) Enteral Tube <User Schedule>  levoFLOXacin  Oral Liquid - Peds 330 milliGRAM(s) Oral daily  magnesium glycinate 400 milliGRAM(s) 400 milliGRAM(s) Oral/Enteral Tube every 12 hours  multivitamin Oral Drops - Peds 1 milliLiter(s) Oral daily  potassium chloride ER Oral Tab/Cap - Peds 10 milliEquivalent(s) Oral two times a day  ranitidine  Oral Liquid - Peds 150 milliGRAM(s) Oral two times a day  sodium chloride 0.65% Nasal Spray - Peds 1 Spray(s) Both Nostrils daily  sodium chloride 7% for Nebulization - Peds 1.5 milliLiter(s) Nebulizer every 12 hours    MEDICATIONS  (PRN):      PHYSICAL EXAM  [x ] Full exam deferred      IMAGING STUDIES:    Time spent counseling regarding:  [x] Goals of care		[] Resuscitation status		[x] Prognosis		[] Hospice  [] Discharge planning	[x] Symptom management	[x] Emotional support	[] Bereavement  [] Care coordination with other disciplines  [] Family meeting start time:		End time:		Total Time:  __ Minutes spend on total encounter: more than 50% of the visit was spent counseling and/or coordinating care  __ Minutes of critical care provided to this unstable patient with organ failure Reason for Consultation:	[] Pain		[x] Goals of Care		[] Non-pain symptoms  .			[] End of life discussion		[] Other:    Patient is a 23y old  Female who presents with a chief complaint of respiratory distress and feeding intolerance.  Patient is well known to me from prior admissions and has a history of global developmental delay, cerebral palsy, microcephaly, seizure disorder, tracheostomy dependence and GJ tube dependence.  At baseline, she uses a ventilator at night only.  Mom says she has had increasing problems with feeding, starting with a change to the elecare formula she had been on for years.  They switched her formula with some improvement but despite feeding her through her J tube, she has formula backing up to her stomach if they feed her too quickly and so they are having trouble keeping her hydrated and nourished.  Her serum albumin is low on admission.  Mom denies weight loss.  She says that at baseline, Shira has significant loss of fluid from GT output, in addition to her usual fluid and nutritional needs.   They have tried Reglan which caused dystonia and Erythromycin to which she had a reaction.  Shira presented to [place] ED with desaturation and bradycardia at home.  In the ED she was found to be hypotensive and hypothermic, with a chest xray with concern for left lower lobe pneumonia.  She was given a normal saline bolus and started on antibiotics and transferred to [facility] for further care. Parents also report decreased energy at home.  After arrival to [facility], Shira required initiation of epinephrine drip for hypotension.    Shira is now off of the epinephrine drip, but is having increased ventilator requirements which is presumed to be secondary to her pneumonia.  The parents expressed concern with the current treatment of her pneumonia; they were concerned that she should be on IV antibiotics instead of enteral given her "problems with absorption".  They are awaiting nutrition's recommendations for protein supplementation.        REVIEW OF SYSTEMS  Pain Score: 	0	Scale Used:  NIPS  Other symptoms (0=None, 1=Mild, 2=Moderate, 3=Severe)  Anorexia: 	n/a	Dyspnea:	0	Pruritus:  n/a  Nausea: 	n/a	Agitation:	0	Anxiety: n/a  Vomitin-1	Drowsiness:	0	Depression: n/an  Constipation: 	1	Diarrhea:	0	Other:     PAST MEDICAL & SURGICAL HISTORY:  Hypokalemia  Kyphosis  Scoliosis  Hypotonia  Asthma  Seizures  Cerebral Palsy  Gastroesophageal Reflux Disease  Hypothyroidism: Treated with synthroid in past. Stopped treatment in  due to normal thyroid function.  Developmental Delay  Tracheostomy in place  Peg (Percutaneous Endoscopic Gastrostomy) Adjustment/Replacement/Removal  S/P Tonsillectomy and Adenoidectomy  Strabismus    FAMILY HISTORY:  Non-contributory  SOCIAL HISTORY:  Lives at home with parents    MEDICATIONS  (STANDING):  ALBUTerol  Intermittent Nebulization - Peds 2.5 milliGRAM(s) Nebulizer every 12 hours  buDESOnide   for Nebulization - Peds 1 milliGRAM(s) Nebulizer every 12 hours  fluticasone propionate (50 MICROgram(s)/actuation) Nasal Spray - Peds 1 Spray(s) Both Nostrils daily  ipratropium 0.02% for Nebulization - Peds 500 MICROGram(s) Inhalation daily  levETIRAcetam  Oral Liquid - Peds 500 milliGRAM(s) Enteral Tube <User Schedule>  levoFLOXacin  Oral Liquid - Peds 330 milliGRAM(s) Oral daily  magnesium glycinate 400 milliGRAM(s) 400 milliGRAM(s) Oral/Enteral Tube every 12 hours  multivitamin Oral Drops - Peds 1 milliLiter(s) Oral daily  potassium chloride ER Oral Tab/Cap - Peds 10 milliEquivalent(s) Oral two times a day  ranitidine  Oral Liquid - Peds 150 milliGRAM(s) Oral two times a day  sodium chloride 0.65% Nasal Spray - Peds 1 Spray(s) Both Nostrils daily  sodium chloride 7% for Nebulization - Peds 1.5 milliLiter(s) Nebulizer every 12 hours    MEDICATIONS  (PRN):      PHYSICAL EXAM  [x ] Full exam deferred      IMAGING STUDIES:    Time spent counseling regarding:  [x] Goals of care		[] Resuscitation status		[x] Prognosis		[] Hospice  [] Discharge planning	[x] Symptom management	[x] Emotional support	[] Bereavement  [] Care coordination with other disciplines  [] Family meeting start time:		End time:		Total Time:  25__ Minutes spend on total encounter: more than 50% of the visit was spent counseling and/or coordinating care  __ Minutes of critical care provided to this unstable patient with organ failure

## 2019-05-16 NOTE — PROGRESS NOTE PEDS - ASSESSMENT
24yo F with complicated PMH including neurodevelopmental delay, Sz disorder, trach and GJ tube dependent with a history of feeding difficulty, gastroesophageal reflux, hydration and electrolyte difficulties, mild left hydronephrosis and right smaller left kidney who has been having difficulty with feeding tolerance and electrolyte disturbance, and history of aspiration pneumonia, admitted for presumed PNA. Noted to have hypoalbuminemia. Active GI issues include optimization of feeds and hypoalbuminemia, currently normalizing after albumin infusion. Has complex home feeding regimen, on which her weights have been stable. On admission, 33kg, last weight at GI office in October was 32.4kg. Has been consistently below 1%ile for height and weight, but is stable. On home regimen, received 27kcal/kg/day. Main concern from GI standpoint is feeding intolerance, constipation and hypoalbuminemia.  Will talk to nutrition about increasing caloric intake. Concern for albumin losses vs decreased production. Initial urine had protein of 100, but repeat was negative making urinary loss of protein unlikely. Normal BUN and Cr. Pre-albumin level 17, just below cutoff for normal, could be 2/2 sytemic stress with with respiratory requirements; likely not synthetic dysfunction.  Losses could be through the stool, alpha-1 antitrypsin stool study pending. 24yo F with complicated PMH including neurodevelopmental delay, Sz disorder, trach and GJ tube dependent with a history of feeding difficulty, gastroesophageal reflux, hydration and electrolyte difficulties, mild left hydronephrosis and right smaller left kidney who has been having difficulty with feeding tolerance and electrolyte disturbance, and history of aspiration pneumonia, admitted for presumed PNA. Noted to have hypoalbuminemia. Active GI issues include optimization of feeds and hypoalbuminemia, currently normalizing after albumin infusion. Has complex home feeding regimen, on which her weights have been stable. On admission, 33kg, last weight at GI office in October was 32.4kg. Has been consistently below 1%ile for height and weight, but is stable. On home regimen, received 27kcal/kg/day. Main concern from GI standpoint is feeding intolerance, constipation and hypoalbuminemia.  Concern for albumin losses vs decreased production. Initial urine had protein of 100, but repeat was negative making urinary loss of protein unlikely. Normal BUN and Cr. Pre-albumin level 17, just below cutoff for normal, could be 2/2 sytemic stress with with respiratory requirements; likely not synthetic dysfunction.  Losses could be through the stool, alpha-1 antitrypsin stool study pending.   Plan to optimize feeding regimen with rec as per nutrition support team.

## 2019-05-16 NOTE — PROGRESS NOTE PEDS - PROBLEM SELECTOR PLAN 3
- monitor stool consistency, help regulate with prune juice - monitor stool consistency, help regulate with prune juice as per home regimen

## 2019-05-16 NOTE — ADVANCED PRACTICE NURSE CONSULT - REASON FOR CONSULT
Dr. Bustillos and Nutrition Support team discussed with [facility] housestaff that pt is hypometabolic; pt's current regimen of Alfamino Curt + Pedialyte/water at best would provide ~877cal/day (~27cal/kG/day), 26.7grams/protein/day (if received as ordered).  As per pt's mother, pt never receives full volume of these feeds, and exact quantity she receives is unknown.  Pt noted to have weight gain of 0.6kG since last OP appointment in 10/18 (weight then 32.4kG, current weight 33kG).  Since it is unknown exactly how many calories she receives daily, and her weight is increasing, she can continue with current regimen.  At [facility] request, a modular amino acid based supplement can be added to her regimen, which is Complete amino acid mix (which would be acceptable with her allergies); 3 tablespoons/day could be added to her current regimen, providing an additional 26.4grams/protein/day.

## 2019-05-16 NOTE — PROGRESS NOTE PEDS - SUBJECTIVE AND OBJECTIVE BOX
Interval History: Shira did well overnight from GI perspective overnight, tolerated feeds with no vomiting, no abd distension. No complaints of diarrhea or constipation. Did require increased oxygen settings overnight.     MEDICATIONS  (STANDING):  ALBUTerol  Intermittent Nebulization - Peds 2.5 milliGRAM(s) Nebulizer every 12 hours  buDESOnide   for Nebulization - Peds 1 milliGRAM(s) Nebulizer every 12 hours  fluticasone propionate (50 MICROgram(s)/actuation) Nasal Spray - Peds 1 Spray(s) Both Nostrils daily  ipratropium 0.02% for Nebulization - Peds 500 MICROGram(s) Inhalation daily  levETIRAcetam  Oral Liquid - Peds 500 milliGRAM(s) Enteral Tube <User Schedule>  levoFLOXacin  Oral Liquid - Peds 330 milliGRAM(s) Oral daily  magnesium glycinate 400 milliGRAM(s) 400 milliGRAM(s) Oral/Enteral Tube every 12 hours  multivitamin Oral Drops - Peds 1 milliLiter(s) Oral daily  potassium chloride ER Oral Tab/Cap - Peds 10 milliEquivalent(s) Oral two times a day  ranitidine  Oral Liquid - Peds 150 milliGRAM(s) Oral two times a day  sodium chloride 0.65% Nasal Spray - Peds 1 Spray(s) Both Nostrils daily  sodium chloride 7% for Nebulization - Peds 1.5 milliLiter(s) Nebulizer every 12 hours    MEDICATIONS  (PRN):      Daily     Daily   BMI: 19.8 (-14 @ 01:30)  Change in Weight:  Vital Signs Last 24 Hrs  T(C): 36.1 (16 May 2019 11:00), Max: 37 (15 May 2019 14:00)  T(F): 96.9 (16 May 2019 11:00), Max: 98.6 (15 May 2019 14:00)  HR: 111 (16 May 2019 11:02) (62 - 111)  BP: 88/41 (16 May 2019 11:00) (78/50 - 109/66)  BP(mean): 53 (16 May 2019 11:00) (51 - 75)  RR: 17 (16 May 2019 11:00) (13 - 17)  SpO2: 96% (16 May 2019 11:02) (87% - 100%)  I&O's Detail    15 May 2019 07:01  -  16 May 2019 07:00  --------------------------------------------------------  IN:    Enteral Tube Flush: 330 mL    Miscellaneous Tube Feedin mL    Pedialyte: 1470 mL    Solution: 38.5 mL  Total IN: 2513.5 mL    OUT:    Gastrostomy Tube: 75 mL    Incontinent per Diaper: 3187 mL  Total OUT: 3262 mL    Total NET: -748.5 mL      16 May 2019 07:01  -  16 May 2019 13:11  --------------------------------------------------------  IN:    Enteral Tube Flush: 30 mL    Miscellaneous Tube Feedin mL  Total IN: 165 mL    OUT:    Incontinent per Diaper: 270 mL  Total OUT: 270 mL    Total NET: -105 mL          PHYSICAL EXAM  General:  laying in bed, limited movement, responsive, asleep, pale-appearing. Non-verbal, non-ambulatory.  HEENT:    Normal appearance of conjunctiva, ears, nose, lips, oropharynx, and oral mucosa, anicteric, lips dry  Neck:  No masses, no asymmetry. Trach in place  Lymph Nodes:  No lymphadenopathy.   Cardiovascular:  RRR normal S1/S2, no murmur.  Respiratory:  transmitted airway sounds  Abdominal:   soft, no masses or tenderness, normoactive BS, NT/ND, no HSM. GJ tube in place in L abdomen under dressing c/d/i; skin mildly erythematous; bilious drainage in tube  Extremities:   No clubbing or cyanosis, normal capillary refill, no edema.   Skin:   no jaundice, lesions, eczema.   Musculoskeletal:  No joint swelling, erythema or tenderness. Contractures.   Neuro: responds to stimuli and opens eyes. non-ambulatory, non-verbal    Lab Results:    05-15    143  |  111<H>  |  4<L>  ----------------------------<  94  4.4   |  22  |  0.63    Ca    8.4      15 May 2019 15:20  Phos  2.8     05-15  Mg     1.7     05-15    TPro  5.9<L>  /  Alb  3.1<L>  /  TBili  < 0.2<L>  /  DBili  x   /  AST  47<H>  /  ALT  19  /  AlkPhos  89  05-15    LIVER FUNCTIONS - ( 15 May 2019 15:20 )  Alb: 3.1 g/dL / Pro: 5.9 g/dL / ALK PHOS: 89 u/L / ALT: 19 u/L / AST: 47 u/L / GGT: x                 Stool Results:          RADIOLOGY RESULTS:    SURGICAL PATHOLOGY: Interval History: Shira did well overnight from GI perspective overnight, tolerated feeds with no vomiting, no abd distension. No complaints of diarrhea. Did require increased oxygen settings overnight. Improvement in feeding tolerance reported with [place] bag.     MEDICATIONS  (STANDING):  ALBUTerol  Intermittent Nebulization - Peds 2.5 milliGRAM(s) Nebulizer every 12 hours  buDESOnide   for Nebulization - Peds 1 milliGRAM(s) Nebulizer every 12 hours  fluticasone propionate (50 MICROgram(s)/actuation) Nasal Spray - Peds 1 Spray(s) Both Nostrils daily  ipratropium 0.02% for Nebulization - Peds 500 MICROGram(s) Inhalation daily  levETIRAcetam  Oral Liquid - Peds 500 milliGRAM(s) Enteral Tube <User Schedule>  levoFLOXacin  Oral Liquid - Peds 330 milliGRAM(s) Oral daily  magnesium glycinate 400 milliGRAM(s) 400 milliGRAM(s) Oral/Enteral Tube every 12 hours  multivitamin Oral Drops - Peds 1 milliLiter(s) Oral daily  potassium chloride ER Oral Tab/Cap - Peds 10 milliEquivalent(s) Oral two times a day  ranitidine  Oral Liquid - Peds 150 milliGRAM(s) Oral two times a day  sodium chloride 0.65% Nasal Spray - Peds 1 Spray(s) Both Nostrils daily  sodium chloride 7% for Nebulization - Peds 1.5 milliLiter(s) Nebulizer every 12 hours    MEDICATIONS  (PRN):      Daily     Daily   BMI: 19.8 (05-14 @ 01:30)  Change in Weight:  Vital Signs Last 24 Hrs  T(C): 36.1 (16 May 2019 11:00), Max: 37 (15 May 2019 14:00)  T(F): 96.9 (16 May 2019 11:00), Max: 98.6 (15 May 2019 14:00)  HR: 111 (16 May 2019 11:02) (62 - 111)  BP: 88/41 (16 May 2019 11:00) (78/50 - 109/66)  BP(mean): 53 (16 May 2019 11:00) (51 - 75)  RR: 17 (16 May 2019 11:00) (13 - 17)  SpO2: 96% (16 May 2019 11:02) (87% - 100%)  I&O's Detail    15 May 2019 07:01  -  16 May 2019 07:00  --------------------------------------------------------  IN:    Enteral Tube Flush: 330 mL    Miscellaneous Tube Feedin mL    Pedialyte: 1470 mL    Solution: 38.5 mL  Total IN: 2513.5 mL    OUT:    Gastrostomy Tube: 75 mL    Incontinent per Diaper: 3187 mL  Total OUT: 3262 mL    Total NET: -748.5 mL      16 May 2019 07:  -  16 May 2019 13:11  --------------------------------------------------------  IN:    Enteral Tube Flush: 30 mL    Miscellaneous Tube Feedin mL  Total IN: 165 mL    OUT:    Incontinent per Diaper: 270 mL  Total OUT: 270 mL    Total NET: -105 mL          PHYSICAL EXAM  General:  laying in bed, limited movement, responsive, asleep, pale-appearing. Non-verbal, non-ambulatory.  HEENT:    Normal appearance of conjunctiva, ears, nose, lips, oropharynx, and oral mucosa, anicteric, lips dry  Neck:  No masses, no asymmetry. Trach in place  Lymph Nodes:  No lymphadenopathy.   Cardiovascular:  RRR normal S1/S2, no murmur.  Respiratory:  transmitted airway sounds  Abdominal:   soft, no masses or tenderness, normoactive BS, NT/ND, no HSM. GJ tube in place in L abdomen under dressing c/d/i; skin mildly erythematous; bilious drainage in tube  Extremities:   No clubbing or cyanosis, normal capillary refill, no edema.   Skin:   no jaundice, lesions, eczema.   Musculoskeletal:  No joint swelling, erythema or tenderness. Contractures.   Neuro: responds to stimuli and opens eyes. non-ambulatory, non-verbal    Lab Results:    05-15    143  |  111<H>  |  4<L>  ----------------------------<  94  4.4   |  22  |  0.63    Ca    8.4      15 May 2019 15:20  Phos  2.8     05-15  Mg     1.7     05-15    TPro  5.9<L>  /  Alb  3.1<L>  /  TBili  < 0.2<L>  /  DBili  x   /  AST  47<H>  /  ALT  19  /  AlkPhos  89  05-15    LIVER FUNCTIONS - ( 15 May 2019 15:20 )  Alb: 3.1 g/dL / Pro: 5.9 g/dL / ALK PHOS: 89 u/L / ALT: 19 u/L / AST: 47 u/L / GGT: x                 Stool Results:          RADIOLOGY RESULTS:    SURGICAL PATHOLOGY:

## 2019-05-16 NOTE — PROGRESS NOTE PEDS - PROBLEM SELECTOR PLAN 4
- continue home feeds  - will discuss with nutrition - continue feeding regimen as per nutrition support team

## 2019-05-16 NOTE — PROGRESS NOTE PEDS - ATTENDING COMMENTS
Patient seen and examined, discussed with resident.  Agree with history and physical, assessment and plan as outlined above.   Will continue to follow to address goals of care over time as Shira's condition evolves.

## 2019-05-16 NOTE — PROGRESS NOTE PEDS - ASSESSMENT
23 year old female with H/O MRCP, seizure disorder, Quadriparesis, Tracheostomy, GJ tube feeding.   Admitted with increasing lethargy at home, difficulty tolerating feeds and needing increased vent support.   In ED was hypothermic, hypotensive and required fluid bolus/epi infusion. CXR showed possible b/l basilar infiltrate    Resp:  Usually trach collar during day and vent at night  Did not trial to trach collar yesterday due to increasing oxygen requirements. PEEP increased and stable now on 45% oxygen  CV:  Off epi since Tuesday afternoon.   BP stable  Heme:  Repeat CBC today. F/U platelets  ID:  On levoflox for pneumonia--change to gtube  Bcx negative,   UCx negative  FEN:  Tolerating feeds.   Repeat albumin improved  Allergy f/u as outpatient  GI following  Neuro:  Continue keppra  Access:  PIV

## 2019-05-17 ENCOUNTER — TRANSCRIPTION ENCOUNTER (OUTPATIENT)
Age: 24
End: 2019-05-17

## 2019-05-17 DIAGNOSIS — J69.0 PNEUMONITIS DUE TO INHALATION OF FOOD AND VOMIT: ICD-10-CM

## 2019-05-17 DIAGNOSIS — Z88.1 ALLERGY STATUS TO OTHER ANTIBIOTIC AGENTS STATUS: ICD-10-CM

## 2019-05-17 DIAGNOSIS — Z91.018 ALLERGY TO OTHER FOODS: ICD-10-CM

## 2019-05-17 LAB
ORGANISM # SPEC MICROSCOPIC CNT: SIGNIFICANT CHANGE UP
ORGANISM # SPEC MICROSCOPIC CNT: SIGNIFICANT CHANGE UP

## 2019-05-17 PROCEDURE — 99255 IP/OBS CONSLTJ NEW/EST HI 80: CPT | Mod: GC

## 2019-05-17 PROCEDURE — 99291 CRITICAL CARE FIRST HOUR: CPT

## 2019-05-17 PROCEDURE — 99233 SBSQ HOSP IP/OBS HIGH 50: CPT

## 2019-05-17 RX ORDER — POTASSIUM CHLORIDE 20 MEQ
10 PACKET (EA) ORAL
Refills: 0 | Status: DISCONTINUED | OUTPATIENT
Start: 2019-05-17 | End: 2019-05-18

## 2019-05-17 RX ADMIN — Medication 1 SPRAY(S): at 10:25

## 2019-05-17 RX ADMIN — LEVETIRACETAM 500 MILLIGRAM(S): 250 TABLET, FILM COATED ORAL at 08:57

## 2019-05-17 RX ADMIN — Medication 1 MILLIGRAM(S): at 21:41

## 2019-05-17 RX ADMIN — Medication 10 MILLIEQUIVALENT(S): at 10:25

## 2019-05-17 RX ADMIN — Medication 1 MILLILITER(S): at 10:25

## 2019-05-17 RX ADMIN — ALBUTEROL 2.5 MILLIGRAM(S): 90 AEROSOL, METERED ORAL at 21:20

## 2019-05-17 RX ADMIN — Medication 1 MILLIGRAM(S): at 09:35

## 2019-05-17 RX ADMIN — ALBUTEROL 2.5 MILLIGRAM(S): 90 AEROSOL, METERED ORAL at 09:02

## 2019-05-17 RX ADMIN — Medication 500 MICROGRAM(S): at 09:02

## 2019-05-17 RX ADMIN — RANITIDINE HYDROCHLORIDE 150 MILLIGRAM(S): 150 TABLET, FILM COATED ORAL at 19:16

## 2019-05-17 RX ADMIN — RANITIDINE HYDROCHLORIDE 150 MILLIGRAM(S): 150 TABLET, FILM COATED ORAL at 10:25

## 2019-05-17 RX ADMIN — SODIUM CHLORIDE 4 MILLILITER(S): 9 INJECTION INTRAMUSCULAR; INTRAVENOUS; SUBCUTANEOUS at 09:16

## 2019-05-17 RX ADMIN — LEVETIRACETAM 500 MILLIGRAM(S): 250 TABLET, FILM COATED ORAL at 21:37

## 2019-05-17 RX ADMIN — Medication 10 MILLIEQUIVALENT(S): at 21:37

## 2019-05-17 RX ADMIN — SODIUM CHLORIDE 4 MILLILITER(S): 9 INJECTION INTRAMUSCULAR; INTRAVENOUS; SUBCUTANEOUS at 21:30

## 2019-05-17 NOTE — CONSULT NOTE PEDS - SUBJECTIVE AND OBJECTIVE BOX
Patient is a 23y old  Female who presents with a chief complaint of suspected PNA (15 May 2019 18:22) AI consulted due to need for Bactrim therapy with patient having a history of Bactrim allergy    HPI:  24 yo F with complicated medical history including cerebral palsy, global developmental delay, microcephaly, epilepsy, hydronephrosis, hypothyroidism, GJ tube dependence and trach dependence with chronic colonization with pseudomonas and MRSA and history of aspiration pneumonia transferred from [facility] ED after presentation for desaturations, increased ventilatory requirements, and bradycardia at home, found to be hypotensive and hypothermic at [place] ED with chest X ray findings concerning for pneumonia. Per mother, has had increased mucous plugging over the past 3 weeks with episodes of desaturations to the 70's, lower heart rate at night, and decreased alertness and energy. At baseline, uses vent at night, trach collar during the day, but has been requiring ventilator at night. Family has been trying to increase fluid intake and has trialed multiple medication changes including discontinuing lactulose, discontinuing symbicort, adding flonase, discontinuing allegra, all in an attempt to prevent dehydration. Ventilation settings increased from rate 15 to 17 by Dr. Latif. Saw neurologist on  as well. Presented to [place] ED due to continued change from baseline.    [place] ED and transport course:  CBC 3.6>11.1/36.7<105. /4.0/107/30/4/0.39<74. Alb 1.8, protein 6.3, bili 0.1, alk phos 142, AST/ALT 22/26, lactate 1.0. PT 11.7, INR 1.05, PTT 44.5. Chest X ray limited due to patient positioning but concern for possible LLL consolidation. Blood culture x2 drawn. Given NS bolus and zosyn. Had worsening hypothermia, placed on elmer hugger. Received 2nd NS bolus on transport. (14 May 2019 04:17)      Allergies    Bactrim (Unknown)  carbamazepine (Unknown)  cephalosporins (Rash)  Depakote (Unknown)  diphenhydrAMINE (Seizure)  eggs (Unknown)  EGGS,  NUTS (Anaphylaxis)  Erythromycin Base (Unknown)  metoclopramide (Unknown)  Milk (Unknown)  MILK, SHELLFISH, WHEAT (Unknown)  Nuts (Unknown)  oxcarbazepine (Unknown)  peanuts (Unknown)  SEASONAL, POLLEN, GRASS, PET DANDER, FEATHERS (Unknown)  shellfish (Unknown)  Trileptal (Unknown)  valproic acid (Unknown)  vancomycin (Unknown)    Intolerances      MEDICATIONS  (STANDING):  ALBUTerol  Intermittent Nebulization - Peds 2.5 milliGRAM(s) Nebulizer every 12 hours  buDESOnide   for Nebulization - Peds 1 milliGRAM(s) Nebulizer every 12 hours  fluticasone propionate (50 MICROgram(s)/actuation) Nasal Spray - Peds 1 Spray(s) Both Nostrils daily  ipratropium 0.02% for Nebulization - Peds 500 MICROGram(s) Inhalation daily  levETIRAcetam  Oral Liquid - Peds 500 milliGRAM(s) Enteral Tube <User Schedule>  levoFLOXacin  Oral Liquid - Peds 330 milliGRAM(s) Oral daily  magnesium glycinate 400 milliGRAM(s) 400 milliGRAM(s) Oral/Enteral Tube every 12 hours  multivitamin Oral Drops - Peds 1 milliLiter(s) Oral daily  potassium chloride ER Oral Tab/Cap - Peds 10 milliEquivalent(s) Oral two times a day  ranitidine  Oral Liquid - Peds 150 milliGRAM(s) Oral two times a day  sodium chloride 0.65% Nasal Spray - Peds 1 Spray(s) Both Nostrils daily  sodium chloride 7% for Nebulization - Peds 4 milliLiter(s) Nebulizer every 12 hours    MEDICATIONS  (PRN):      PAST MEDICAL & SURGICAL HISTORY:  Hypokalemia  Kyphosis  Scoliosis  Hypotonia  Asthma  Seizures  Cerebral Palsy  Gastroesophageal Reflux Disease  Hypothyroidism: Treated with synthroid in past. Stopped treatment in  due to normal thyroid function.  Developmental Delay  Tracheostomy in place  Peg (Percutaneous Endoscopic Gastrostomy) Adjustment/Replacement/Removal  S/P Tonsillectomy and Adenoidectomy  Strabismus      REVIEW OF SYSTEMS  All review of systems negative, except for those marked:  General:		  Eyes:			  ENT:			  Pulmonary:		  Cardiac:		  Gastrointestinal:	  Renal/Urologic:		  Musculoskeletal:	  Skin:		  Neurologic:		  Psychiatric:		  Endocrine:		  Hematologic:		  Allergy/Immune:	    SOCIAL/ENVIRONMENTAL HISTORY:  Family Lives:  Education Level:  Tobacco	[] No	[] Yes:  Alcohol		[] No	[] Yes:    FAMILY HISTORY:    Allergies		[] No	[] Yes:   Miscarriages		[] No	[] Yes:   Autoimmune		[] No	[] Yes:   Asthma			[] No	[] Yes:  Still Births		[] No	[] Yes:  Sinusitis		[] No	[] Yes:   Fetal Demise		[] No	[] Yes:   Immune Deficiency	[] No	[] Yes:   Other:			[] No	[] Yes:    Vital Signs Last 24 Hrs  T(C): 35.6 (17 May 2019 05:00), Max: 36.1 (16 May 2019 11:00)  T(F): 96 (17 May 2019 05:00), Max: 96.9 (16 May 2019 11:00)  HR: 80 (17 May 2019 08:00) (64 - 111)  BP: 100/44 (17 May 2019 05:00) (80/54 - 100/44)  BP(mean): 58 (17 May 2019 05:00) (53 - 72)  RR: 17 (17 May 2019 08:00) (14 - 17)  SpO2: 93% (17 May 2019 08:00) (92% - 99%)    PHYSICAL EXAM  All physical exam findings normal, except for those marked:  General:	              alert, well developed/well nourished, no acute distress  Eyes                      no conjunctival injection, no discharge, no photophobia, intact                                extraocular movements, sclera not icteric, no suborbital bogginess  ENT:                      normal tympanic membranes; external ear normal, normal nasal mucosa and turbinates without discharge, no                                 pharyngeal erythema or exudates, no cobblestoning, normal tongue and lips, normal tonsils   Neck                      supple, full range of motion  Lymph Nodes      normal size and consistency, non-tender  Cardiovascular     regular rate and rhythm; Normal S1, S2; No murmur  Respiratory	       good air movement bilaterally, no wheezing or crackles,  no retractions  Abdominal	       soft; ND, NT, no hepatosplenomegaly, + bowel sounds  		               normal external genitalia, no rash  Skin		               skin intact and not indurated; no rash, no desquamation  Neurologic	       alert, appropriate for age, cranial nerves II-XII grossly normal  Musculoskeletal   no joint swelling, erythema, or tenderness; full range of motion, with no contractures; no muscle tenderness; no clubbing; no cyanosis; no edema    Lab Results:                        10.6   6.96  )-----------( 127      ( 16 May 2019 12:35 )             35.7     05-15    143  |  111<H>  |  4<L>  ----------------------------<  94  4.4   |  22  |  0.63    Ca    8.4      15 May 2019 15:20  Phos  2.8     05-15  Mg     1.7     05-15    TPro  5.9<L>  /  Alb  3.1<L>  /  TBili  < 0.2<L>  /  DBili  x   /  AST  47<H>  /  ALT  19  /  AlkPhos  89  05-15    LIVER FUNCTIONS - ( 15 May 2019 15:20 )  Alb: 3.1 g/dL / Pro: 5.9 g/dL / ALK PHOS: 89 u/L / ALT: 19 u/L / AST: 47 u/L / GGT: x             Urinalysis Basic - ( 15 May 2019 14:20 )    Color: LT. YELLOW / Appearance: HAZY / S.010 / pH: 8.0  Gluc: NEGATIVE / Ketone: NEGATIVE  / Bili: NEGATIVE / Urobili: NORMAL   Blood: NEGATIVE / Protein: NEGATIVE / Nitrite: NEGATIVE   Leuk Esterase: NEGATIVE / RBC: x / WBC x   Sq Epi: x / Non Sq Epi: OCC / Bacteria: MANY        IMAGING STUDIES: Patient is a 23y old  Female who presents with a chief complaint of suspected PNA (15 May 2019 18:22) AI consulted due to need for Bactrim therapy with patient having a history of Bactrim allergy    HPI:  24 yo F with complicated medical history including cerebral palsy, global developmental delay, microcephaly, epilepsy, hydronephrosis, hypothyroidism, GJ tube dependence and trach dependence with chronic colonization with pseudomonas and MRSA and history of aspiration pneumonia transferred from [facility] ED after presentation for desaturations, increased ventilatory requirements, and bradycardia at home, found to be hypotensive and hypothermic at [place] ED with chest X ray findings concerning for pneumonia. Per mother, has had increased mucous plugging over the past 3 weeks with episodes of desaturations to the 70's, lower heart rate at night, and decreased alertness and energy. At baseline, uses vent at night, trach collar during the day, but has been requiring ventilator at night. Family has been trying to increase fluid intake and has trialed multiple medication changes including discontinuing lactulose, discontinuing symbicort, adding flonase, discontinuing allegra, all in an attempt to prevent dehydration. Ventilation settings increased from rate 15 to 17 by Dr. Latif. Saw neurologist on  as well. Presented to [place] ED due to continued change from baseline.    [place] ED and transport course:  CBC 3.6>11.1/36.7<105. /4.0/107/30/4/0.39<74. Alb 1.8, protein 6.3, bili 0.1, alk phos 142, AST/ALT 22/26, lactate 1.0. PT 11.7, INR 1.05, PTT 44.5. Chest X ray limited due to patient positioning but concern for possible LLL consolidation. Blood culture x2 drawn. Given NS bolus and zosyn. Had worsening hypothermia, placed on elmer hugger. Received 2nd NS bolus on transport. (14 May 2019 04:17)    Now in [facility]'s PICU she is weaned off of epi drip but trach culture has grown Stenotrophomonas resistant to cephalosporin and indeterminant sensitivity to levaquin but sensitive to Bactrim. Team therefore consults us for Bactrim desensitization.       Blanca has a long allergy list but mom is not certain on the exact reactions for any of them. She does note Blanca had terrible eczema when she was very young but is certain that Bactrim allergy occurred afterwards and did not involve skin sloughing or blistering.     Allergies    Bactrim (Unknown) - Mom says this occurred many years ago when Blanca was younger, likely being treated for an infection but she cannot recall exact details. She believes it was a rash and/or hives  carbamazepine (Unknown) - mom is not certain what the reaction was  cephalosporins (Rash) - mom is not certain what the reaction was  Depakote (Unknown) - mom is not certain what the reaction was  diphenhydrAMINE (Seizure) - Per mom when blanca receives this medications he has seizures. this has happened three times  eggs (Unknown) - testing was positive, Blanca has neve eaten eggs  EGGS,  NUTS (Anaphylaxis) - testing positive, has never eaten these foods  Erythromycin Base (Unknown) - mom is not certain what the reaction was  metoclopramide (Unknown) - Blanca had whole body stiffness after this drug was administered  Milk (Unknown) - After eat ice cream Blanca had throat closure  HELLFISH, WHEAT (Unknown) - testing was positive she never ate these foods  Nuts (Unknown) - testing was positive, she has enver eaten these foods  oxcarbazepine (Unknown) -mom is not certain what the reaction was  peanuts (Unknown) - testing was positive, she has never eaten this food  SEASONAL, POLLEN, GRASS, PET DANDER, FEATHERS (Unknown) - positive skin testing and worsening of eczema  Trileptal (Unknown) - mom is not certain what the reaction was  valproic acid (Unknown) - mom is not certain what the reaction was  vancomycin (Unknown) - per mom this medication caused hydronephrosis        MEDICATIONS  (STANDING):  ALBUTerol  Intermittent Nebulization - Peds 2.5 milliGRAM(s) Nebulizer every 12 hours  buDESOnide   for Nebulization - Peds 1 milliGRAM(s) Nebulizer every 12 hours  fluticasone propionate (50 MICROgram(s)/actuation) Nasal Spray - Peds 1 Spray(s) Both Nostrils daily  ipratropium 0.02% for Nebulization - Peds 500 MICROGram(s) Inhalation daily  levETIRAcetam  Oral Liquid - Peds 500 milliGRAM(s) Enteral Tube <User Schedule>  levoFLOXacin  Oral Liquid - Peds 330 milliGRAM(s) Oral daily  magnesium glycinate 400 milliGRAM(s) 400 milliGRAM(s) Oral/Enteral Tube every 12 hours  multivitamin Oral Drops - Peds 1 milliLiter(s) Oral daily  potassium chloride ER Oral Tab/Cap - Peds 10 milliEquivalent(s) Oral two times a day  ranitidine  Oral Liquid - Peds 150 milliGRAM(s) Oral two times a day  sodium chloride 0.65% Nasal Spray - Peds 1 Spray(s) Both Nostrils daily  sodium chloride 7% for Nebulization - Peds 4 milliLiter(s) Nebulizer every 12 hours    MEDICATIONS  (PRN):      PAST MEDICAL & SURGICAL HISTORY:  Hypokalemia  Kyphosis  Scoliosis  Hypotonia  Asthma  Seizures  Cerebral Palsy  Gastroesophageal Reflux Disease  Hypothyroidism: Treated with synthroid in past. Stopped treatment in  due to normal thyroid function.  Developmental Delay  Tracheostomy in place  Peg (Percutaneous Endoscopic Gastrostomy) Adjustment/Replacement/Removal  S/P Tonsillectomy and Adenoidectomy  Strabismus      REVIEW OF SYSTEMS  All review of systems negative, except for those marked:  General:	global developmental delay, 	  Eyes:		normal	  ENT:	has a trach, increased secrtion		  Pulmonary:	increased vent needs	  Cardiac:		negative  Gastrointestinal:	negative  Renal/Urologic:		see HPI  Musculoskeletal:	negative  Skin:		negative  Neurologic:		global developmental delay  Psychiatric:		global developmental delay  Endocrine:		negative  Hematologic:		negative  Allergy/Immune:	see HPI    SOCIAL/ENVIRONMENTAL HISTORY:  Lives with family, mother at bedside    FAMILY HISTORY:  brother with seasonal allergies, maternal grandmother with asthma    Vital Signs Last 24 Hrs  T(C): 35.6 (17 May 2019 05:00), Max: 36.1 (16 May 2019 11:00)  T(F): 96 (17 May 2019 05:00), Max: 96.9 (16 May 2019 11:00)  HR: 80 (17 May 2019 08:00) (64 - 111)  BP: 100/44 (17 May 2019 05:00) (80/54 - 100/44)  BP(mean): 58 (17 May 2019 05:00) (53 - 72)  RR: 17 (17 May 2019 08:00) (14 - 17)  SpO2: 93% (17 May 2019 08:00) (92% - 99%)    PHYSICAL EXAM  All physical exam findings normal, except for those marked:  General:	              alert, delayed, but responsive  Eyes                      no conjunctival injection, no discharge, no photophobia, intact                                extraocular movements, sclera not icteric, no suborbital bogginess  ENT:                   external ear normal, normal tongue and lips,    Neck                      supple, full range of motion  Lymph Nodes      normal size and consistency, non-tender  Cardiovascular     regular rate and rhythm; Normal S1, S2; No murmur  Respiratory	       patient with trach and vent, good air movement heard b/l  Abdominal	       soft; ND, NT, no hepatosplenomegaly,   Skin		               skin intact and not indurated; no rash, no desquamation  Neurologic	       alert, patient with contractures and obvious developmental delay  Musculoskeletal   no joint swelling, erythema, or tenderness very thin, contractures    Lab Results:                        10.6   6.96  )-----------( 127      ( 16 May 2019 12:35 )             35.7     05-15    143  |  111<H>  |  4<L>  ----------------------------<  94  4.4   |  22  |  0.63    Ca    8.4      15 May 2019 15:20  Phos  2.8     05-15  Mg     1.7     05-15    TPro  5.9<L>  /  Alb  3.1<L>  /  TBili  < 0.2<L>  /  DBili  x   /  AST  47<H>  /  ALT  19  /  AlkPhos  89  05-15    LIVER FUNCTIONS - ( 15 May 2019 15:20 )  Alb: 3.1 g/dL / Pro: 5.9 g/dL / ALK PHOS: 89 u/L / ALT: 19 u/L / AST: 47 u/L / GGT: x             Urinalysis Basic - ( 15 May 2019 14:20 )    Color: LT. YELLOW / Appearance: HAZY / S.010 / pH: 8.0  Gluc: NEGATIVE / Ketone: NEGATIVE  / Bili: NEGATIVE / Urobili: NORMAL   Blood: NEGATIVE / Protein: NEGATIVE / Nitrite: NEGATIVE   Leuk Esterase: NEGATIVE / RBC: x / WBC x   Sq Epi: x / Non Sq Epi: OCC / Bacteria: MANY Patient is a 23y old  Female who presents with a chief complaint of suspected PNA (15 May 2019 18:22) AI consulted due to need for Bactrim therapy with patient having a history of Bactrim allergy    HPI:  22 yo F with complicated medical history including cerebral palsy, global developmental delay, microcephaly, epilepsy, hydronephrosis, hypothyroidism, GJ tube dependence and trach dependence with chronic colonization with pseudomonas and MRSA and history of aspiration pneumonia transferred from [facility] ED after presentation for desaturations, increased ventilatory requirements, and bradycardia at home, found to be hypotensive and hypothermic at [place] ED with chest X ray findings concerning for pneumonia. Per mother, has had increased mucous plugging over the past 3 weeks with episodes of desaturations to the 70's, lower heart rate at night, and decreased alertness and energy. At baseline, uses vent at night, trach collar during the day, but has been requiring ventilator at night. Family has been trying to increase fluid intake and has trialed multiple medication changes including discontinuing lactulose, discontinuing symbicort, adding flonase, discontinuing allegra, all in an attempt to prevent dehydration. Ventilation settings increased from rate 15 to 17 by Dr. Latif. Saw neurologist on  as well. Presented to [place] ED due to continued change from baseline.    [place] ED and transport course:  CBC 3.6>11.1/36.7<105. /4.0/107/30/4/0.39<74. Alb 1.8, protein 6.3, bili 0.1, alk phos 142, AST/ALT 22/26, lactate 1.0. PT 11.7, INR 1.05, PTT 44.5. Chest X ray limited due to patient positioning but concern for possible LLL consolidation. Blood culture x2 drawn. Given NS bolus and zosyn. Had worsening hypothermia, placed on elmer hugger. Received 2nd NS bolus on transport. (14 May 2019 04:17)    Now in [facility] PICU she is weaned off of epi drip but trach culture has grown Stenotrophomonas resistant to cephalosporin and indeterminant sensitivity to levaquin but sensitive to Bactrim. Team therefore consults us for Bactrim desensitization.     Blanca has a long allergy list but mom is not certain on the exact reactions for any of them. She does note Blanca had terrible eczema when she was very young but is certain that Bactrim allergy occurred afterwards and did not involve skin sloughing or blistering.     Allergies    Bactrim (Unknown) - Mom says this occurred many years ago when Blanca was younger, likely being treated for an infection but she cannot recall exact details. She believes it was a rash and/or hives  carbamazepine (Unknown) - mom is not certain what the reaction was  cephalosporins (Rash) - mom is not certain what the reaction was  Depakote (Unknown) - mom is not certain what the reaction was  diphenhydrAMINE (Seizure) - Per mom when blanca receives this medications he has seizures. this has happened three times  eggs (Unknown) - testing was positive, Blanca has neve eaten eggs  EGGS,  NUTS (Anaphylaxis) - testing positive, has never eaten these foods  Erythromycin Base (Unknown) - mom is not certain what the reaction was  metoclopramide (Unknown) - Blanca had whole body stiffness after this drug was administered  Milk (Unknown) - After eat ice cream Blanca had throat closure  SHELLFISH, WHEAT (Unknown) - testing was positive she never ate these foods  Nuts (Unknown) - testing was positive, she has enver eaten these foods  oxcarbazepine (Unknown) -mom is not certain what the reaction was  peanuts (Unknown) - testing was positive, she has never eaten this food  SEASONAL, POLLEN, GRASS, PET DANDER, FEATHERS (Unknown) - positive skin testing and worsening of eczema  Trileptal (Unknown) - mom is not certain what the reaction was  valproic acid (Unknown) - mom is not certain what the reaction was  vancomycin (Unknown) - per mom this medication caused hydronephrosis    MEDICATIONS  (STANDING):  ALBUTerol  Intermittent Nebulization - Peds 2.5 milliGRAM(s) Nebulizer every 12 hours  buDESOnide   for Nebulization - Peds 1 milliGRAM(s) Nebulizer every 12 hours  fluticasone propionate (50 MICROgram(s)/actuation) Nasal Spray - Peds 1 Spray(s) Both Nostrils daily  ipratropium 0.02% for Nebulization - Peds 500 MICROGram(s) Inhalation daily  levETIRAcetam  Oral Liquid - Peds 500 milliGRAM(s) Enteral Tube <User Schedule>  levoFLOXacin  Oral Liquid - Peds 330 milliGRAM(s) Oral daily  magnesium glycinate 400 milliGRAM(s) 400 milliGRAM(s) Oral/Enteral Tube every 12 hours  multivitamin Oral Drops - Peds 1 milliLiter(s) Oral daily  potassium chloride ER Oral Tab/Cap - Peds 10 milliEquivalent(s) Oral two times a day  ranitidine  Oral Liquid - Peds 150 milliGRAM(s) Oral two times a day  sodium chloride 0.65% Nasal Spray - Peds 1 Spray(s) Both Nostrils daily  sodium chloride 7% for Nebulization - Peds 4 milliLiter(s) Nebulizer every 12 hours    MEDICATIONS  (PRN):      PAST MEDICAL & SURGICAL HISTORY:  Hypokalemia  Kyphosis  Scoliosis  Hypotonia  Asthma  Seizures  Cerebral Palsy  Gastroesophageal Reflux Disease  Hypothyroidism: Treated with synthroid in past. Stopped treatment in  due to normal thyroid function.  Developmental Delay  Tracheostomy in place  Peg (Percutaneous Endoscopic Gastrostomy) Adjustment/Replacement/Removal  S/P Tonsillectomy and Adenoidectomy  Strabismus      REVIEW OF SYSTEMS  All review of systems negative, except for those marked:  General:	global developmental delay, 	  Eyes:		normal	  ENT:	has a trach, increased secrtion		  Pulmonary:	increased vent needs	  Cardiac:		negative  Gastrointestinal:	negative  Renal/Urologic:		see HPI  Musculoskeletal:	negative  Skin:		negative  Neurologic:		global developmental delay  Psychiatric:		global developmental delay  Endocrine:		negative  Hematologic:		negative  Allergy/Immune:	see HPI    SOCIAL/ENVIRONMENTAL HISTORY:  Lives with family, mother at bedside    FAMILY HISTORY:  brother with seasonal allergies, maternal grandmother with asthma    Vital Signs Last 24 Hrs  T(C): 35.6 (17 May 2019 05:00), Max: 36.1 (16 May 2019 11:00)  T(F): 96 (17 May 2019 05:00), Max: 96.9 (16 May 2019 11:00)  HR: 80 (17 May 2019 08:00) (64 - 111)  BP: 100/44 (17 May 2019 05:00) (80/54 - 100/44)  BP(mean): 58 (17 May 2019 05:00) (53 - 72)  RR: 17 (17 May 2019 08:00) (14 - 17)  SpO2: 93% (17 May 2019 08:00) (92% - 99%)    PHYSICAL EXAM  All physical exam findings normal, except for those marked:  General:	              alert, delayed, but responsive  Eyes                      no conjunctival injection, no discharge, no photophobia, intact                                extraocular movements, sclera not icteric, no suborbital bogginess  ENT:                   external ear normal, normal tongue and lips,    Neck                      supple, full range of motion  Lymph Nodes      normal size and consistency, non-tender  Cardiovascular     regular rate and rhythm; Normal S1, S2; No murmur  Respiratory	       patient with trach and vent, good air movement heard b/l  Abdominal	       soft; ND, NT, no hepatosplenomegaly,   Skin		               skin intact and not indurated; no rash, no desquamation  Neurologic	       alert, patient with contractures and obvious developmental delay  Musculoskeletal   no joint swelling, erythema, or tenderness very thin, contractures    Lab Results:                        10.6   6.96  )-----------( 127      ( 16 May 2019 12:35 )             35.7     05-15    143  |  111<H>  |  4<L>  ----------------------------<  94  4.4   |  22  |  0.63    Ca    8.4      15 May 2019 15:20  Phos  2.8     05-15  Mg     1.7     05-15    TPro  5.9<L>  /  Alb  3.1<L>  /  TBili  < 0.2<L>  /  DBili  x   /  AST  47<H>  /  ALT  19  /  AlkPhos  89  05-15    LIVER FUNCTIONS - ( 15 May 2019 15:20 )  Alb: 3.1 g/dL / Pro: 5.9 g/dL / ALK PHOS: 89 u/L / ALT: 19 u/L / AST: 47 u/L / GGT: x             Urinalysis Basic - ( 15 May 2019 14:20 )    Color: LT. YELLOW / Appearance: HAZY / S.010 / pH: 8.0  Gluc: NEGATIVE / Ketone: NEGATIVE  / Bili: NEGATIVE / Urobili: NORMAL   Blood: NEGATIVE / Protein: NEGATIVE / Nitrite: NEGATIVE   Leuk Esterase: NEGATIVE / RBC: x / WBC x   Sq Epi: x / Non Sq Epi: OCC / Bacteria: MANY

## 2019-05-17 NOTE — PROGRESS NOTE PEDS - SUBJECTIVE AND OBJECTIVE BOX
Today's Date:      ********************************************RESPIRATORY**********************************************  RR: 17 (19 @ 08:00) ()  SpO2: 93% (19 @ 08:00) (92% - 99%)    End-Tidal CO2:  36  Mechanical Ventilation Settings:   Mode: SIMV with PS, RR (machine): 17, TV (patient): 170, FiO2: 30, PEEP: 10, PS: 10, ITime: 1, MAP: 14, PIP: 23    Respiratory Medications:  ALBUTerol  Intermittent Nebulization - Peds 2.5 milliGRAM(s) Nebulizer every 12 hours  buDESOnide   for Nebulization - Peds 1 milliGRAM(s) Nebulizer every 12 hours  ipratropium 0.02% for Nebulization - Peds 500 MICROGram(s) Inhalation daily  sodium chloride 7% for Nebulization - Peds 4 milliLiter(s) Nebulizer every 12 hours    *******************************************CARDIOVASCULAR********************************************  HR: 80 (19 @ 08:00) (64 - 111)  BP: 95/52 (19 @ 08:00) (80/54 - 100/44)  Cardiac Rhythm: NSR    *********************************HEMATOLOGIC/ONCOLOGIC*******************************************  ( @ 12:35):               10.6   6.96 )-----------(127                35.7   Neurophils% (auto):   66.7    manual%: x      Lymphocytes% (auto):  27.4    manual%: x      Eosinphils% (auto):   0.1     manual%: x      Bands%: x       blasts%: x          (  @ 20:39 )   PT: 11.7 sec;   INR: 1.05 ratio  aPTT: 44.5 sec    ********************************************INFECTIOUS************************************************  T(C): 35.6 (19 @ 08:00), Max: 36.1 (19 @ 11:00)    Culture - Urine (collected 14 May 2019 12:25)  Source: URINE CATHETER  Final Report (16 May 2019 07:45):    NO GROWTH AT 24 HOURS    Trach Cx: S. maltophilia    Medications:  levoFLOXacin  Oral Liquid - Peds 330 milliGRAM(s) Oral daily    ******************************FLUIDS/ELECTROLYTES/NUTRITION*************************************  Drug Dosing Weight  Weight (kg): 33 (19 @ 01:30)    16 May 2019 07:01  -  17 May 2019 07:00  --------------------------------------------------------  IN: 2267.5 mL / OUT: 2119 mL / NET: 148.5 mL    Labs:  05-15 @ 15:20    143    |  111    |  4      ----------------------------<  94     4.4     |  22     |  0.63     I.Ca:x     M.7   Ph:2.8        05- @ 22:00    x      |  x      |  x      ----------------------------<  x      x       |  x      |  x        I.Ca:x     M.6   Ph:x            0515 @ 15:20  TPro  5.9     AST  47     Alb  3.1      ALT  19     TBili  < 0.2  AlkPhos  89     DBili  x      Trig: x          Diet:	  Patient is receiving feeds via gtube   	  Gastrointestinal Medications:  multivitamin Oral Drops - Peds 1 milliLiter(s) Oral daily  potassium chloride ER Oral Tab/Cap - Peds 10 milliEquivalent(s) Oral two times a day  ranitidine  Oral Liquid - Peds 150 milliGRAM(s) Oral two times a day      *****************************************NEUROLOGY**********************************************     Standing Medications:  levETIRAcetam  Oral Liquid - Peds 500 milliGRAM(s) Enteral Tube <User Schedule>    Adequacy of sedation and pain control has been assessed and adjusted    ************************************* OTHER MEDICATIONS ****************************************    Topical/Other Medications:  fluticasone propionate (50 MICROgram(s)/actuation) Nasal Spray - Peds 1 Spray(s) Both Nostrils daily  magnesium glycinate 400 milliGRAM(s) 400 milliGRAM(s) Oral/Enteral Tube every 12 hours  sodium chloride 0.65% Nasal Spray - Peds 1 Spray(s) Both Nostrils daily    *******************************PATIENT CARE ACCESS DEVICES******************************    Necessity of urinary, arterial, and venous catheters discussed    ****************************************PHYSICAL EXAM********************************************  Resp:  good air entry, still diminished at b/l bases  Cardiac: RRR, no murmus  Abdomem: Soft, non distended  Skin: No edema, no rashes  Neuro: No acute change from baseline neuro exam     *****************************************IMAGING STUDIES*****************************************      *******************************************ATTESTATIONS******************************************  Parent/Guardian is at the bedside:   [x ] Yes   [  ] No  Patient and Parent/Guardian updated as to the progress/plan of care:  [x ] Yes	[  ] No    [ ] The patient remains in critical and unstable condition, and requires ICU care and monitoring  [x ] The patient is improving but requires continued monitoring and adjustment of therapy    Total critical care time spent by attending physician (mins), excluding procedure time:  40

## 2019-05-17 NOTE — DISCHARGE NOTE PROVIDER - CARE PROVIDERS DIRECT ADDRESSES
[email or web address]

## 2019-05-17 NOTE — PROGRESS NOTE PEDS - SUBJECTIVE AND OBJECTIVE BOX
Interval History: Patient seen and examined. Vitals stable on vent. Patient did well overnight, tolerated feeds with no vomiting, no abd distension. No complaints of diarrhea. Improvement in feeding tolerance reported with Valdez bag. 1 bowel movement in last 24 hours and good urine output.     MEDICATIONS  (STANDING):  ALBUTerol  Intermittent Nebulization - Peds 2.5 milliGRAM(s) Nebulizer every 12 hours  buDESOnide   for Nebulization - Peds 1 milliGRAM(s) Nebulizer every 12 hours  fluticasone propionate (50 MICROgram(s)/actuation) Nasal Spray - Peds 1 Spray(s) Both Nostrils daily  ipratropium 0.02% for Nebulization - Peds 500 MICROGram(s) Inhalation daily  levETIRAcetam  Oral Liquid - Peds 500 milliGRAM(s) Enteral Tube <User Schedule>  levoFLOXacin  Oral Liquid - Peds 500 milliGRAM(s) Oral daily  magnesium glycinate 400 milliGRAM(s) 400 milliGRAM(s) Oral/Enteral Tube every 12 hours  multivitamin Oral Drops - Peds 1 milliLiter(s) Oral daily  potassium chloride ER Oral Tab/Cap - Peds 10 milliEquivalent(s) Oral two times a day  ranitidine  Oral Liquid - Peds 150 milliGRAM(s) Oral two times a day  sodium chloride 0.65% Nasal Spray - Peds 1 Spray(s) Both Nostrils daily  sodium chloride 7% for Nebulization - Peds 4 milliLiter(s) Nebulizer every 12 hours    MEDICATIONS  (PRN):      Daily     Daily   BMI: 19.8 (05-14 @ 01:30)  Change in Weight:  Vital Signs Last 24 Hrs  T(C): 35.7 (17 May 2019 14:00), Max: 35.9 (17 May 2019 11:00)  T(F): 96.2 (17 May 2019 14:00), Max: 96.6 (17 May 2019 11:00)  HR: 79 (17 May 2019 15:20) (64 - 109)  BP: 81/49 (17 May 2019 11:00) (81/49 - 100/44)  BP(mean): 56 (17 May 2019 11:00) (55 - 72)  RR: 17 (17 May 2019 14:00) (14 - 18)  SpO2: 93% (17 May 2019 15:20) (92% - 99%)  I&O's Detail    16 May 2019 07:01  -  17 May 2019 07:00  --------------------------------------------------------  IN:    Enteral Tube Flush: 360 mL    Miscellaneous Tube Feedin mL    Pedialyte: 1187.5 mL  Total IN: 2267.5 mL    OUT:    Incontinent per Diaper: 2119 mL  Total OUT: 2119 mL    Total NET: 148.5 mL      17 May 2019 07:01  -  17 May 2019 16:48  --------------------------------------------------------  IN:  Total IN: 0 mL    OUT:    Incontinent per Diaper: 331 mL  Total OUT: 331 mL    Total NET: -331 mL          PHYSICAL EXAM  General:  laying in bed, limited movement, responsive, asleep, pale-appearing. Non-verbal, non-ambulatory.  HEENT:    Trach in place   Neck:  No masses, no asymmetry. Trach in place  Lymph Nodes:  No lymphadenopathy.   Cardiovascular:  RRR normal S1/S2, no murmur.  Respiratory:  transmitted airway sounds  Abdominal:   soft, no masses or tenderness, normoactive BS, NT/ND, no HSM. GJ tube in place in L abdomen under dressing c/d/i;    Skin:   no jaundice, lesions, eczema.   Musculoskeletal:    Contractures.   Neuro: responds to stimuli and opens eyes. non-ambulatory, non-verbal    Lab Results:                        10.6   6.96  )-----------( 127      ( 16 May 2019 12:35 )             35.7                   Stool Results:          RADIOLOGY RESULTS:    SURGICAL PATHOLOGY: Interval History: Patient seen and examined. Vitals stable on vent. Patient did well overnight from a feeding standpoint, tolerated feeds with no vomiting, no abd distension. No complaints of diarrhea. Improvement in feeding tolerance reported with Valdez bag. 1 bowel movement in last 24 hours and good urine output.     MEDICATIONS  (STANDING):  ALBUTerol  Intermittent Nebulization - Peds 2.5 milliGRAM(s) Nebulizer every 12 hours  buDESOnide   for Nebulization - Peds 1 milliGRAM(s) Nebulizer every 12 hours  fluticasone propionate (50 MICROgram(s)/actuation) Nasal Spray - Peds 1 Spray(s) Both Nostrils daily  ipratropium 0.02% for Nebulization - Peds 500 MICROGram(s) Inhalation daily  levETIRAcetam  Oral Liquid - Peds 500 milliGRAM(s) Enteral Tube <User Schedule>  levoFLOXacin  Oral Liquid - Peds 500 milliGRAM(s) Oral daily  magnesium glycinate 400 milliGRAM(s) 400 milliGRAM(s) Oral/Enteral Tube every 12 hours  multivitamin Oral Drops - Peds 1 milliLiter(s) Oral daily  potassium chloride ER Oral Tab/Cap - Peds 10 milliEquivalent(s) Oral two times a day  ranitidine  Oral Liquid - Peds 150 milliGRAM(s) Oral two times a day  sodium chloride 0.65% Nasal Spray - Peds 1 Spray(s) Both Nostrils daily  sodium chloride 7% for Nebulization - Peds 4 milliLiter(s) Nebulizer every 12 hours    MEDICATIONS  (PRN):      Daily     Daily   BMI: 19.8 (05-14 @ 01:30)  Change in Weight:  Vital Signs Last 24 Hrs  T(C): 35.7 (17 May 2019 14:00), Max: 35.9 (17 May 2019 11:00)  T(F): 96.2 (17 May 2019 14:00), Max: 96.6 (17 May 2019 11:00)  HR: 79 (17 May 2019 15:20) (64 - 109)  BP: 81/49 (17 May 2019 11:00) (81/49 - 100/44)  BP(mean): 56 (17 May 2019 11:00) (55 - 72)  RR: 17 (17 May 2019 14:00) (14 - 18)  SpO2: 93% (17 May 2019 15:20) (92% - 99%)  I&O's Detail    16 May 2019 07:01  -  17 May 2019 07:00  --------------------------------------------------------  IN:    Enteral Tube Flush: 360 mL    Miscellaneous Tube Feedin mL    Pedialyte: 1187.5 mL  Total IN: 2267.5 mL    OUT:    Incontinent per Diaper: 2119 mL  Total OUT: 2119 mL    Total NET: 148.5 mL      17 May 2019 07:01  -  17 May 2019 16:48  --------------------------------------------------------  IN:  Total IN: 0 mL    OUT:    Incontinent per Diaper: 331 mL  Total OUT: 331 mL    Total NET: -331 mL          PHYSICAL EXAM  General:  laying in bed, limited movement, responsive, asleep, pale-appearing. Non-verbal, non-ambulatory.  HEENT:    Trach in place   Neck:  No masses, no asymmetry. Trach in place  Lymph Nodes:  No lymphadenopathy.   Cardiovascular:  RRR normal S1/S2, no murmur.  Respiratory:  transmitted airway sounds  Abdominal:   soft, no masses or tenderness, normoactive BS, NT/ND, no HSM. GJ tube in place in L abdomen under dressing c/d/i;    Skin:   no jaundice, lesions, eczema.   Musculoskeletal:    Contractures.   Neuro: responds to stimuli and opens eyes. non-ambulatory, non-verbal    Lab Results:                        10.6   6.96  )-----------( 127      ( 16 May 2019 12:35 )             35.7                   Stool Results:          RADIOLOGY RESULTS:    SURGICAL PATHOLOGY:

## 2019-05-17 NOTE — CONSULT NOTE PEDS - ATTENDING COMMENTS
I have seen and examined this patient and agree with Dr. Hairston's above note. Given history of rash associated with bactrim, and lack of validated skin testing to Bactrim, recommend desensitization protocol if benefits outweigh risks as per ICU team. Protocol as above.

## 2019-05-17 NOTE — PROGRESS NOTE PEDS - ASSESSMENT
24yo F with complicated PMH including neurodevelopmental delay, Sz disorder, trach and GJ tube dependent with a history of feeding difficulty, gastroesophageal reflux, hydration and electrolyte difficulties, mild left hydronephrosis and right smaller left kidney who has been having difficulty with feeding tolerance and electrolyte disturbance, and history of aspiration pneumonia, admitted for presumed PNA. Noted to have hypoalbuminemia. Active GI issues include optimization of feeds and hypoalbuminemia, currently normalizing after albumin infusion. Has complex home feeding regimen, on which her weights have been stable. On admission, 33kg, last weight at GI office in October was 32.4kg. Has been consistently below 1%ile for height and weight, but is stable. On home regimen, received 27kcal/kg/day. Main concern from GI standpoint is feeding intolerance, constipation and hypoalbuminemia.  Concern for albumin losses vs decreased production. Initial urine had protein of 100, but repeat was negative making urinary loss of protein unlikely. Normal BUN and Cr. could be secondary to systemic stress with with respiratory requirements; likely not synthetic dysfunction. losses could be through the stool, alpha-1 antitrypsin stool study pending.   Plan to optimize feeding regimen with rec as per nutrition support team. 24yo F with complicated PMH including neurodevelopmental delay, Sz disorder, trach and GJ tube dependent with a history of feeding difficulty, gastroesophageal reflux, hydration and electrolyte difficulties, mild left hydronephrosis and right smaller left kidney who has been having difficulty with feeding tolerance and electrolyte disturbance, and history of aspiration pneumonia, admitted for presumed PNA. Noted to have hypoalbuminemia. Active GI issues include optimization of feeds. Patient also noted to have worsening hypoalbuminemia, currently normalizing after albumin infusion. Has complex home feeding regimen, on which her weights have been stable. On admission, 33kg, last weight at GI office in October was 32.4kg. Has been consistently below 1%ile for height and weight, but is stable. On home regimen, received 27kcal/kg/day. Main concern from GI standpoint is feeding intolerance and constipation. Hypoalbuminemia may be secondary to gastrointestinal loss although no evidence of diarrhea.   Plan to optimize feeding regimen with rec as per nutrition support team.

## 2019-05-17 NOTE — CONSULT NOTE PEDS - ASSESSMENT
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 She is currently being treated for pneumonia and requiring increased vent settings. However her respiratory culture is growing Stenotrophomonas and per ICU team requires bactrim. There is no standardized or validated testing for bactrim allergy. Therefore we must err on the side of caution and presume the patient is allergic. If the primary team decides that the risks of giving bactrim are outweighed by the benefits we would recommend a desensitization protocol Consent for this is obtained by the primary service. All emergency medications should be readily available and the protocol should be performed during the weekday when full staffing is available. Emergency medications include Epinephrine, albuterol and cetirizine. Although Benadryl is normally the antihistamine of choice in an acute reaction Blanca has had seizures three times immediately following administration of benadryl, precluding its use in this patient. We would therefore keep cetirizine on hand (must ask pharmacy for cetirizine and not therapeutic alternate, loratadine Please note desensitization is NOT a cure and the patient is still allergic to bactrim. Should she need the therapy again in the future, or should she need a dose escalation, she would need another desensitization.    The [facility] has asked for a dose of 160 mg trimethoprim every 8 hours (3 times a day). We have created the following protocol based on this dosing:    Oral Bactrim Desensitization Protocol							  Goal dose: Bactrim (TMP/SMZ)160/800 (20 mL of 40/200 per 5 mL suspension)				  						  										  			Cumulative							  Dose #	Volume	Dose (TMP)	Dose (TMP)							  1	0.05 mL	0.4 mg	0.4 mg							  2	0.15 mL	1.2 mg	1.6 mg							  3	0.3 mL	2.4 mg	4 mg							  4	0.5 mL	4 mg	8 mg							  5	1 mL	8 mg	16 mg							  6	2 mL	16 mg	32 mg							  7	4 mL	32 mg	64 mg							  8	6 mL	48 mg	112 mg							  9	10 mL	48 mg	160 mg							  										  										  										  										  30 minutes between doses								  										  										  Special Instructions:									  *Procedure must be done in ICU or CCU; strongly advise that this should be done during the day with full staffing available.	  *Patient must be consented for this procedure by ICU/CCU team prior to desensitization; risks include anaphylaxis	  and death.										  *0.3 cc Epinephrine (1:1000) IM, Cetirizine 10 mg, and Albuterol INH must be kept at bedside at all times during	  procedure.										  *Record vital signs and exam prior to each dose							  *If reactions occur, temporarily halt the desensitization and treat as appropriate: Cetirizine for pruritis/rash, albuterol for wheeze, 	  epinephrine (0.3mg 1:1000) for systemic symptoms. Resume the protocol by repeating the same dose where the reaction	  occurred. 										  In case of mild allergic reaction (hives, mild swelling or itching), immediately give 10mg cetirizine and	  contact Allergy/Immunology.								  *The protocol should be aborted if the patient develops hypotension and/or laryngeal edema that are not immediately responsive	  to IM epinephrine.									  *Refractory cases and/or those on beta blockers may require glucagon (1-2mg IV over 5 minutes); followed by 5-15mcg/min 	  infusion if needed.									  *In case of anaphylaxis, patient should be treated with 0.3 cc 1:1000 Epinephrine IM immediately.		  (Symptoms of anaphylaxis include respiratory distress, hypotension, and/or involvement of 2 organ systems)	  *Additional cardiorespiratory support as per ICU/CCU team.					  *Once the protocol is completed, continue the dosing regimen with the next dose at the next scheduled time. 	  *After the next scheduled dose is given, monitor for 2 hours. If no reaction, may move and observe the patient on the floors.	  *Patient must be educated that once she/he has been desensitized to Bactrim,				  *If the patient goes without the drug for more than 24-48 hours, repeat desensitization will be required. 		  *If the patient requires a higher dose, or goes to a reduced prophylactic dose and requires full dose in the future, repeat	  modified desensitization will be required.							  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 She is currently being treated for pneumonia and requiring increased vent settings. However her respiratory culture is growing Stenotrophomonas and per ICU team requires bactrim. There is no standardized or validated testing for bactrim allergy. Therefore we must err on the side of caution and presume the patient is allergic. If the primary team decides that the risks of giving bactrim are outweighed by the benefits we would recommend a desensitization protocol Consent for this is obtained by the primary service. All emergency medications should be readily available and the protocol should be performed during the weekday when full staffing is available. Emergency medications include Epinephrine, albuterol and cetirizine. Although Benadryl is normally the antihistamine of choice in an acute reaction Shira has had seizures three times immediately following administration of benadryl, precluding its use in this patient. We would therefore keep cetirizine on hand (must ask pharmacy for cetirizine and not therapeutic alternate, loratadine Please note desensitization is NOT a cure and the patient is still allergic to bactrim. Should she need the therapy again in the future, or should she need a dose escalation, she would need another desensitization.    The [facility]IC[facility] has asked for a dose of 160 mg trimethoprim every 8 hours (3 times a day). We have created the following protocol based on this dosing:    Oral Bactrim Desensitization Protocol							  Goal dose: Bactrim (TMP/SMZ)160/800 (20 mL of 40/200 per 5 mL suspension)				  						  										  			Cumulative							  Dose #	Volume	Dose (TMP)	Dose (TMP)							  1	0.05 mL	0.4 mg	0.4 mg							  2	0.15 mL	1.2 mg	1.6 mg							  3	0.3 mL	2.4 mg	4 mg							  4	0.5 mL	4 mg	8 mg							  5	1 mL	8 mg	16 mg							  6	2 mL	16 mg	32 mg							  7	4 mL	32 mg	64 mg							  8	6 mL	48 mg	112 mg							  9	10 mL	48 mg	160 mg							  										  										  										  										  30 minutes between doses								  										  										  Special Instructions:									  *Procedure must be done in ICU or CCU; strongly advise that this should be done during the day with full staffing available.	  *Patient must be consented for this procedure by ICU/CCU team prior to desensitization; risks include anaphylaxis	  and death.										  *0.3 cc Epinephrine (1:1000) IM, Cetirizine 10 mg, and Albuterol INH must be kept at bedside at all times during	  procedure.										  *Record vital signs and exam prior to each dose							  *If reactions occur, temporarily halt the desensitization and treat as appropriate: Cetirizine for pruritis/rash, albuterol for wheeze, 	  epinephrine (0.3mg 1:1000) for systemic symptoms. Resume the protocol by repeating the same dose where the reaction	  occurred. 										  In case of mild allergic reaction (hives, mild swelling or itching), immediately give 10mg cetirizine and	  contact Allergy/Immunology.								  *The protocol should be aborted if the patient develops hypotension and/or laryngeal edema that are not immediately responsive	  to IM epinephrine.									  *Refractory cases and/or those on beta blockers may require glucagon (1-2mg IV over 5 minutes); followed by 5-15mcg/min 	  infusion if needed.									  *In case of anaphylaxis, patient should be treated with 0.3 cc 1:1000 Epinephrine IM immediately.		  (Symptoms of anaphylaxis include respiratory distress, hypotension, and/or involvement of 2 organ systems)	  *Additional cardiorespiratory support as per ICU/CCU team.					  *Once the protocol is completed, continue the dosing regimen with the next dose at the next scheduled time. 	  *After the next scheduled dose is given, monitor for 2 hours. If no reaction, may move and observe the patient on the floors.	  *Patient must be educated that once she/he has been desensitized to Bactrim,				  *If the patient goes without the drug for more than 24-48 hours, repeat desensitization will be required. 		  *If the patient requires a higher dose, or goes to a reduced prophylactic dose and requires full dose in the future, repeat	  modified desensitization will be required.							  **Please contact us with any questions: [phone]

## 2019-05-17 NOTE — ADVANCED PRACTICE NURSE CONSULT - REASON FOR CONSULT
Nutrition Support Team Summary:  Discussed pt Shira Clark, with housestaff and attending staff on Wed 5/15 and housestaff again on Thurs. 5/16.    Initial question centered around presence of low albumin and nutritional protein needs.   Extensive discussion provided on Wednesday that low albumin in the hospital is most commonly caused by non-nutritional factors.   Low albumin as a consequence of nutritional malnutrition arises from provision of a predominance of non-nitrogen calories (CHO and fat) relative to nitrogen intake as classically seen in kwashiorkhor.  For example,  in anorexia nervosa where minimal calories are consumed and severe weight loss occurs, most commonly these patients have normal albumins since whatever calories are consumed are proportionate in protein and non-protein calories.   Since this patient’s only source of calories is derived from Alphamino – a feeding composed of proportionate amounts of nitrogen to non-nitrogen calories --- insufficient intake alone should not result in low albumen.            Since [facility]] desired a greater protein/nitrogen intake and by report this patient has multiple food protein allergies, we recommended and provided to the [facility]] housestaff yesterday a modular amino acid supplement which would maintain the low allergy nature of intake.   We recommended that as a supplement the RDA for an adult, 0.8 gram/kilogram/day could be added to feedings.    Yesterday, [facility]] posed the question to the nutrition team with regard to what was the caloric goal for this patient?   Yesterday we replied to [facility]] housestaff members ( and briefly summarized in note yesterday) that in such an individual with severe developmental delay and tube feedings it is common for the patient to be hypometabolic, i.e., have a much lower caloric requirement than usual for their weight.   In such patients, their caloric goals are determined over the long term by observation of weight changes for a given caloric intake.   For example, if 50% of usual calories are supplied and the individual gains excessively over time, then it is concluded that fewer calories are necessary for maintenance.   This patient has been receiving tube feeding for years.   [facility] and the present weight is noted to be higher.   Thus, the amount of calories supplied over that time was sufficient.   How much was that?  The mother reports that a different amount of formula was given day-to-day.   No set amount can thus be stated but it seemed reasonable to continue to provide the amount that was set as a goal previously and follow inputs in the hospital and ascertain weight changes.    Pediatric GI is following to help manage changes in feeding methods given the dysmotility that has been described.    The aforementioned text was created by attending Dr. Jarrod Bustillos to describe prior [facility]] discussions.

## 2019-05-17 NOTE — DISCHARGE NOTE PROVIDER - HOSPITAL COURSE
22 yo F with complicated medical history including cerebral palsy, global developmental delay, microcephaly, epilepsy, hydronephrosis, hypothyroidism, GJ tube dependence and trach dependence with chronic colonization with pseudomonas and MRSA and history of aspiration pneumonia transferred from [facility] ED after presentation for desaturations, increased ventilatory requirements, and bradycardia at home, found to be hypotensive and hypothermic at [facility] ED with chest X ray findings concerning for pneumonia. Per mother, has had increased mucous plugging over the past 3 weeks with episodes of desaturations to the 70's, lower heart rate at night, and decreased alertness and energy. At baseline, uses vent at night, trach collar during the day, but has been requiring ventilator at night. Family has been trying to increase fluid intake and has trialed multiple medication changes including discontinuing lactulose, discontinuing symbicort, adding flonase, discontinuing allegra, all in an attempt to prevent dehydration. Ventilation settings increased from rate 15 to 17 by Dr. Latif. Saw neurologist on 5/13 as well. Presented to [facility] ED due to continued change from baseline.        [facility] ED and transport course:    CBC 3.6>11.1/36.7<105. /4.0/107/30/4/0.39<74. Alb 1.8, protein 6.3, bili 0.1, alk phos 142, AST/ALT 22/26, lactate 1.0. PT 11.7, INR 1.05, PTT 44.5. Chest X ray limited due to patient positioning but concern for possible LLL consolidation. Blood culture x2 drawn. Given NS bolus and zosyn. Had worsening hypothermia, placed on elmer hugger. Received 2nd NS bolus on transport.        PICU Course (5/14 - ) 24 yo F with complicated medical history including cerebral palsy, global developmental delay, microcephaly, epilepsy, hydronephrosis, hypothyroidism, GJ tube dependence and trach dependence with chronic colonization with pseudomonas and MRSA and history of aspiration pneumonia transferred from [facility] ED after presentation for desaturations, increased ventilatory requirements, and bradycardia at home, found to be hypotensive and hypothermic at [place] ED with chest X ray findings concerning for pneumonia. Per mother, has had increased mucous plugging over the past 3 weeks with episodes of desaturations to the 70's, lower heart rate at night, and decreased alertness and energy. At baseline, uses vent at night, trach collar during the day, but has been requiring ventilator at night. Family has been trying to increase fluid intake and has trialed multiple medication changes including discontinuing lactulose, discontinuing symbicort, adding flonase, discontinuing allegra, all in an attempt to prevent dehydration. Ventilation settings increased from rate 15 to 17 by Dr. Latif. Saw neurologist on 5/13 as well. Presented to [place] ED due to continued change from baseline.        [place] ED and transport course:    CBC 3.6>11.1/36.7<105. /4.0/107/30/4/0.39<74. Alb 1.8, protein 6.3, bili 0.1, alk phos 142, AST/ALT 22/26, lactate 1.0. PT 11.7, INR 1.05, PTT 44.5. Chest X ray limited due to patient positioning but concern for possible LLL consolidation. Blood culture x2 drawn. Given NS bolus and zosyn. Had worsening hypothermia, placed on elmer hugger. Received 2nd NS bolus on transport.        PICU Course (5/14 - )            Please resume all home services without restrictions upon discharge. 24 yo F with complicated medical history including cerebral palsy, global developmental delay, microcephaly, epilepsy, hydronephrosis, hypothyroidism, GJ tube dependence and trach dependence with chronic colonization with pseudomonas and MRSA and history of aspiration pneumonia transferred from [facility] ED after presentation for desaturations, increased ventilatory requirements, and bradycardia at home, found to be hypotensive and hypothermic at [place] ED with chest X ray findings concerning for pneumonia. Per mother, has had increased mucous plugging over the past 3 weeks with episodes of desaturations to the 70's, lower heart rate at night, and decreased alertness and energy. At baseline, uses vent at night, trach collar during the day, but has been requiring ventilator at night. Family has been trying to increase fluid intake and has trialed multiple medication changes including discontinuing lactulose, discontinuing symbicort, adding flonase, discontinuing allegra, all in an attempt to prevent dehydration. Ventilation settings increased from rate 15 to 17 by Dr. Latif. Saw neurologist on 5/13 as well. Presented to [place] ED due to continued change from baseline.        [place] ED and transport course:    CBC 3.6>11.1/36.7<105. /4.0/107/30/4/0.39<74. Alb 1.8, protein 6.3, bili 0.1, alk phos 142, AST/ALT 22/26, lactate 1.0. PT 11.7, INR 1.05, PTT 44.5. Chest X ray limited due to patient positioning but concern for possible LLL consolidation. Blood culture x2 drawn. Given NS bolus and zosyn. Had worsening hypothermia, placed on elmer hugger. Received 2nd NS bolus on transport.        PICU Course (5/14 - )        Respiratory:  Patient was placed on home medication and home ventilator settings R: 17 PC:15 PS:15 PS:10 PEEP8 FIO2%:35        ID: Culture from the trache grew  stenotrophomonas & serratia tracheitis  patient was placed on  Levaquin 500 mg PO due to allergy to Bactrim.            Please resume all home services without restrictions upon discharge. 22 yo F, with PMHx medical history including cerebral palsy, global developmental delay, microcephaly, epilepsy, hydronephrosis, hypothyroidism, GJ tube dependence and trach dependence with chronic colonization with pseudomonas and MRSA and history of aspiration pneumonia transferred from [facility] ED after presentation for desaturations, increased ventilatory requirements, and bradycardia at home, found to be hypotensive and hypothermic at [place] ED with chest X ray findings concerning for pneumonia. Per mother, has had increased mucous plugging over the past 3 weeks with episodes of desaturations to the 70's, lower heart rate at night, and decreased alertness and energy. At baseline, uses vent at night, trach collar during the day, but has been requiring ventilator at night. Family has been trying to increase fluid intake and has trialed multiple medication changes including discontinuing lactulose, discontinuing symbicort, adding flonase, discontinuing allegra, all in an attempt to prevent dehydration. Ventilation settings increased from rate 15 to 17 by Dr. Latif. Saw neurologist on 5/13 as well. Presented to [place] ED due to continued change from baseline.        [place] ED and transport course:    CBC 3.6>11.1/36.7<105. /4.0/107/30/4/0.39<74. Alb 1.8, protein 6.3, bili 0.1, alk phos 142, AST/ALT 22/26, lactate 1.0. PT 11.7, INR 1.05, PTT 44.5. Chest X ray limited due to patient positioning but concern for possible LLL consolidation. Blood culture x2 drawn. Given NS bolus and zosyn. Had worsening hypothermia, placed on elmer hugger. Received 2nd NS bolus on transport.        PICU Course (5/14 - )        Respiratory:  Patient was placed on home medication and home ventilator settings R: 17 PC:15 PS:15 PS:10 PEEP8 FIO2%:35        ID: Culture from the trache grew  stenotrophomonas & serratia tracheitis  patient was placed on  Levaquin 500 mg PO due to allergy to Bactrim.            Please resume all home services without restrictions upon discharge. 24 yo F, with PMHx medical history including cerebral palsy, global developmental delay, microcephaly, epilepsy, hydronephrosis, hypothyroidism, GJ tube dependence and trach dependence with chronic colonization with pseudomonas and MRSA and history of aspiration pneumonia transferred from  ED after presentation for desaturations, increased ventilatory requirements, and bradycardia at home, found to be hypotensive and hypothermic at [place] ED with chest X ray findings concerning for pneumonia and tracheitis + Stenotrophomonas and serratia.         Per mother, has had increased mucous plugging over the past 3 weeks with episodes of desaturations to the 70's, lower heart rate at night, and decreased alertness and energy. At baseline, uses vent at night, trach collar during the day, but has been requiring ventilator at night. Family has been trying to increase fluid intake and has trialed multiple medication changes including discontinuing lactulose, discontinuing symbicort, adding flonase, discontinuing allegra, all in an attempt to prevent dehydration. Ventilation settings increased from rate 15 to 17 by Dr. Latif. Saw neurologist on 5/13 as well.         [place] ED and transport course:    CBC 3.6>11.1/36.7<105. /4.0/107/30/4/0.39<74. Alb 1.8, protein 6.3, bili 0.1, alk phos 142, AST/ALT 22/26, lactate 1.0. PT 11.7, INR 1.05, PTT 44.5. Chest X ray limited due to patient positioning but concern for possible LLL consolidation. Blood culture x2 drawn. Given NS bolus and zosyn. Had worsening hypothermia, placed on elmer hugger. Received 2nd NS bolus on transport.        PICU Course (5/14 - 05/18)        Respiratory:  Patient was placed on home medication and home ventilator settings R: 17 PC:15 PS:15 PS:10 PEEP8 FIO2%:35. Throughout hospital course patient intermittently required increased PEEP to 10, and fio2. Was sent home on home vent settings, was not placed on trach collar. CXR showed bibasilar opacities, compatible with effusion or infiltrate. Patient was continued on her home respiratory medications.         ID: Culture from the trach grew stenotrophomonas & serratia tracheitis patient was placed on  Levaquin 500 mg PO due to allergy to Bactrim. Allergy and immunology was consulted, for consideration for desensitization trial to bactrim but ultimately patient was given a higher dose of Levaquin instead. RVP, Urine culture, and blood culture were negative.  First u/a showed a PH of 9.0 and protein, small leukesterase, and bacteria. Repeat u/a showed resolution. Also had low temperatures at presentation for which she received the bear hugger.        FENGI: On presentation, patient was noted to have a low albumin level, and was therefore given a 24 hour transfusion of albumin. Level increased from 1.9 to 3.1. Patient was evaluated by GI for a low albumin level. A prealbumin level was completed and was found to be 17, and a repeat one prior to discharge was completed that is pending. A stool alpha 1 antitrypsin level was send and is pending. Nutrition was also consulted for parents concern of low protein intake, and a supplement called "complete amino acid mix" was recommended. Patient was continued on her home feeding regiment.  Patient was continued on home supplements.         NEUROLOGY: Continued on home medication of keppra. No seizures noted during hospital stay.         CARDIOLOGY: Patient originally presented with hypotension and was started on an epinephrine drip that was quickly discontinued after 24 hours.         OTHER: Palitive care was consulted, to discuss patients further life planning.         Please resume all home services without restrictions upon discharge.

## 2019-05-17 NOTE — PROGRESS NOTE PEDS - PROBLEM SELECTOR PLAN 1
Patient: Lesli Leon    Procedure Summary     Date:  02/20/19 Room / Location:   [facility]    Anesthesia Start:  0721 Anesthesia Stop:      Procedure:  LEFT TOTAL KNEE ARTHROPLASTY (Left Knee) Diagnosis:       Osteoarthritis of left knee      (Osteoarthritis of left knee [M17.12])    Surgeon:  Stephon Garcia MD Provider:  Fabrice Dacosta MD    Anesthesia Type:  MAC, spinal ASA Status:  3          Anesthesia Type: MAC, spinal  Last vitals  BP   111/63 (02/20/19 1010)   Temp   98 °F (36.7 °C) (02/20/19 1010)   Pulse   91 (02/20/19 1010)   Resp   17 (02/20/19 1010)     SpO2   97 % (02/20/19 1010)     Post Anesthesia Care and Evaluation    Patient location during evaluation: PACU  Patient participation: complete - patient participated  Level of consciousness: awake and alert  Pain score: 0  Pain management: adequate  Airway patency: patent  Anesthetic complications: No anesthetic complications  PONV Status: none  Cardiovascular status: hemodynamically stable and acceptable  Respiratory status: nonlabored ventilation, acceptable and nasal cannula  Hydration status: acceptable       -F/u stool alpha-1 antitrypsin to assess for loss

## 2019-05-17 NOTE — DISCHARGE NOTE PROVIDER - NSDCCPCAREPLAN_GEN_ALL_CORE_FT
PRINCIPAL DISCHARGE DIAGNOSIS  Diagnosis: Pneumonia  Assessment and Plan of Treatment:       SECONDARY DISCHARGE DIAGNOSES  Diagnosis: Acute tracheitis  Assessment and Plan of Treatment:

## 2019-05-17 NOTE — DISCHARGE NOTE PROVIDER - PROVIDER TOKENS
PROVIDER:[TOKEN:[[id number]]],PROVIDER:[TOKEN:[[id number]],FOLLOWUP:[1-3 days]] PROVIDER:[TOKEN:[[id number]]],PROVIDER:[TOKEN:[[id number]],FOLLOWUP:[1-3 days]],PROVIDER:[TOKEN:[[id number]],FOLLOWUP:[1-3 days]]

## 2019-05-17 NOTE — DISCHARGE NOTE PROVIDER - CARE PROVIDER_API CALL
Vivian Espinoza)  Allergy and Immunology; Internal Medicine  [address], [address]  [place] [address]  Phone: [phone]  Fax: [phone]  Follow Up Time:     Mila Hamilton)  Pediatric Gastroenterology  [address]  [place] [address]  Phone: [phone]  Fax: [phone]  Follow Up Time: 1-3 days Vivian Espinoza)  Allergy and Immunology; Internal Medicine  [address], [address]  [place] [address]  Phone: [phone]  Fax: [phone]  Follow Up Time:     Mila Hamilton)  Pediatric Gastroenterology  [address]  [place] [address]  Phone: [phone]  Fax: [phone]  Follow Up Time: 1-3 days    Haseeb Teran)  Family Medicine  [address] [place] [address]  Phone: [phone]  Fax: [phone]  Follow Up Time: 1-3 days

## 2019-05-17 NOTE — DISCHARGE NOTE PROVIDER - NSDCFUADDAPPT_GEN_ALL_CORE_FT
June 4th at 11am with Dr. Renee Espinoza , no antihistamines or allergy medications for five days prior.

## 2019-05-17 NOTE — DISCHARGE NOTE PROVIDER - NSDCCPGOAL_GEN_ALL_CORE_FT
To get better and follow your care plan as instructed. Please seek medical attention if experience increased work of breathing, inability to tolerate diet, fever greater than 100.4F or any other alarming signs or symptoms.

## 2019-05-17 NOTE — PROGRESS NOTE PEDS - ASSESSMENT
23 year old female with H/O MRCP, seizure disorder, Quadriparesis, Tracheostomy, GJ tube feeding.   Admitted with increasing lethargy at home, difficulty tolerating feeds and needing increased vent support.   In ED was hypothermic, hypotensive and required fluid bolus/epi infusion. CXR showed possible b/l basilar infiltrate--trach culture shows S. maltophilia with only intermediate sensitivity to levoflox and sensitivity to Bactrim to which Shira has a documented allergy. Awaiting allergy to come in to work on desensitizaiton/sensitivity testing    Resp:  Usually trach collar during day and vent at night  Wean PEEP to 8  Mom states can take home on 24/7 vent support if oxygen requirement is low  CV:  Off epi since Tuesday afternoon.   BP stable  Heme:  Platelet count stable  ID:  Awaiting desensitization protocol from  for bactrim to treat S. maltphilia  Bcx negative,   UCx negative  FEN:  Tolerating feeds.   Repeat albumin improved  GI following  Neuro:  Continue keppra  Access:  PIV

## 2019-05-17 NOTE — CONSULT NOTE PEDS - PROBLEM SELECTOR RECOMMENDATION 2
Allergy to Bactrim with no available testing means that if bactrim is needed a desensitizaition protocol must be performed as above  -seizure with benadryl is not an alelrgy but rather an adverse side effect. patient must therefore avoid benadryl. Cant ry cetirizine in case of reaction/need for antihistamine  -many other medications are listed as alelrgies but actually are avoided due toa dverse effect not IgE mediated allergy  -many antibiotics are currently being avoided due to a history of reaction, but family is not sure what the reaction was. If there is interest the patient can be seen by the drug allergy center [phone] to clarify these issues when possible Allergy to Bactrim with no available testing means that if bactrim is needed a desensitizaition protocol must be performed as above  -seizure with benadryl is not an allergy but rather an adverse side effect. patient must therefore avoid benadryl. Cant ry cetirizine in case of reaction/need for antihistamine  -many other medications are listed as alelrgies but actually are avoided due toa dverse effect not IgE mediated allergy  -many antibiotics are currently being avoided due to a history of reaction, but family is not sure what the reaction was. If there is interest the patient can be seen by the drug allergy center [phone] to clarify these issues when possible

## 2019-05-17 NOTE — CONSULT NOTE PEDS - PROBLEM SELECTOR RECOMMENDATION 3
outpatient management, patient all ready has an AuviQ  is family desires skin testing and serum IgE's can be drawn outpatient. outpatient management, patient already has an AuviQ  if family desires skin testing and serum IgEs, these can be drawn outpatient.

## 2019-05-17 NOTE — DISCHARGE NOTE NURSING/CASE MANAGEMENT/SOCIAL WORK - NSDCDPATPORTLINK_GEN_ALL_CORE
You can access the [facility] Patient Portal, offered by [facility], by registering with the following website: [email or web address]

## 2019-05-18 VITALS
DIASTOLIC BLOOD PRESSURE: 61 MMHG | SYSTOLIC BLOOD PRESSURE: 98 MMHG | HEART RATE: 89 BPM | OXYGEN SATURATION: 100 % | RESPIRATION RATE: 14 BRPM | TEMPERATURE: 97 F

## 2019-05-18 LAB
-  AMIKACIN: SIGNIFICANT CHANGE UP
-  AMPICILLIN/SULBACTAM: SIGNIFICANT CHANGE UP
-  AMPICILLIN: SIGNIFICANT CHANGE UP
-  AZTREONAM: SIGNIFICANT CHANGE UP
-  CEFAZOLIN: SIGNIFICANT CHANGE UP
-  CEFEPIME: SIGNIFICANT CHANGE UP
-  CEFOXITIN: SIGNIFICANT CHANGE UP
-  CEFTAZIDIME: SIGNIFICANT CHANGE UP
-  CEFTAZIDIME: SIGNIFICANT CHANGE UP
-  CEFTRIAXONE: SIGNIFICANT CHANGE UP
-  CIPROFLOXACIN: SIGNIFICANT CHANGE UP
-  ERTAPENEM: SIGNIFICANT CHANGE UP
-  GENTAMICIN: SIGNIFICANT CHANGE UP
-  IMIPENEM: SIGNIFICANT CHANGE UP
-  LEVOFLOXACIN: SIGNIFICANT CHANGE UP
-  LEVOFLOXACIN: SIGNIFICANT CHANGE UP
-  MEROPENEM: SIGNIFICANT CHANGE UP
-  PIPERACILLIN/TAZOBACTAM: SIGNIFICANT CHANGE UP
-  TIGECYCLINE: SIGNIFICANT CHANGE UP
-  TOBRAMYCIN: SIGNIFICANT CHANGE UP
-  TRIMETHOPRIM/SULFAMETHOXAZOLE: SIGNIFICANT CHANGE UP
-  TRIMETHOPRIM/SULFAMETHOXAZOLE: SIGNIFICANT CHANGE UP
BACTERIA SPT RESP CULT: SIGNIFICANT CHANGE UP
METHOD TYPE: SIGNIFICANT CHANGE UP
ORGANISM # SPEC MICROSCOPIC CNT: SIGNIFICANT CHANGE UP
ORGANISM # SPEC MICROSCOPIC CNT: SIGNIFICANT CHANGE UP
PREALB SERPL-MCNC: 22 MG/DL — SIGNIFICANT CHANGE UP (ref 20–40)

## 2019-05-18 PROCEDURE — 99232 SBSQ HOSP IP/OBS MODERATE 35: CPT | Mod: GC

## 2019-05-18 RX ORDER — POTASSIUM CHLORIDE 20 MEQ
7.5 PACKET (EA) ORAL
Qty: 0 | Refills: 0 | DISCHARGE
Start: 2019-05-18

## 2019-05-18 RX ORDER — ACETAMINOPHEN 500 MG
12.5 TABLET ORAL
Qty: 0 | Refills: 0 | DISCHARGE
Start: 2019-05-18

## 2019-05-18 RX ORDER — POTASSIUM CHLORIDE 20 MEQ
1.5 PACKET (EA) ORAL
Qty: 0 | Refills: 0 | DISCHARGE

## 2019-05-18 RX ORDER — FLUTICASONE PROPIONATE 50 MCG
1 SPRAY, SUSPENSION NASAL
Qty: 0 | Refills: 0 | DISCHARGE
Start: 2019-05-18

## 2019-05-18 RX ORDER — COLISTIMETHATE SODIUM 150 MG/2ML
150 INJECTION INTRAMUSCULAR; INTRAVENOUS
Qty: 0 | Refills: 0 | DISCHARGE

## 2019-05-18 RX ORDER — ACETAMINOPHEN 500 MG
9.38 TABLET ORAL
Qty: 0 | Refills: 0 | DISCHARGE
Start: 2019-05-18

## 2019-05-18 RX ORDER — ACETAMINOPHEN 500 MG
400 TABLET ORAL EVERY 6 HOURS
Refills: 0 | Status: DISCONTINUED | OUTPATIENT
Start: 2019-05-18 | End: 2019-05-18

## 2019-05-18 RX ADMIN — RANITIDINE HYDROCHLORIDE 150 MILLIGRAM(S): 150 TABLET, FILM COATED ORAL at 11:50

## 2019-05-18 RX ADMIN — Medication 1 SPRAY(S): at 10:59

## 2019-05-18 RX ADMIN — Medication 10 MILLIEQUIVALENT(S): at 10:59

## 2019-05-18 RX ADMIN — SODIUM CHLORIDE 4 MILLILITER(S): 9 INJECTION INTRAMUSCULAR; INTRAVENOUS; SUBCUTANEOUS at 11:37

## 2019-05-18 RX ADMIN — ALBUTEROL 2.5 MILLIGRAM(S): 90 AEROSOL, METERED ORAL at 09:58

## 2019-05-18 RX ADMIN — Medication 1 MILLIGRAM(S): at 10:20

## 2019-05-18 RX ADMIN — Medication 500 MICROGRAM(S): at 09:58

## 2019-05-18 RX ADMIN — Medication 1 MILLILITER(S): at 10:59

## 2019-05-18 RX ADMIN — Medication 1 SPRAY(S): at 11:08

## 2019-05-18 RX ADMIN — LEVETIRACETAM 500 MILLIGRAM(S): 250 TABLET, FILM COATED ORAL at 09:40

## 2019-05-18 NOTE — PROGRESS NOTE PEDS - ASSESSMENT
23 year old female with H/O MRCP, seizure disorder, Quadriparesis, Tracheostomy, GJ tube feeding.   Admitted with increasing lethargy at home, difficulty tolerating feeds and needing increased vent support.   In ED was hypothermic, hypotensive and required fluid bolus/epi infusion. CXR showed possible b/l basilar infiltrate--trach culture shows S. maltophilia with only intermediate sensitivity to levoflox and sensitivity to Bactrim to which Shira has a documented allergy. Awaiting allergy to come in to work on desensitizaiton/sensitivity testing    Resp:  Usually trach collar during day and vent at night  Wean PEEP to 8  Mom states can take home on 24/7 vent support if oxygen requirement is low  CV:  Off epi since Tuesday afternoon.   BP stable  Heme:  Platelet count stable  ID:  Awaiting desensitization protocol from  for bactrim to treat S. maltphilia  Bcx negative,   UCx negative  FEN:  Tolerating feeds.   Repeat albumin improved  GI following  Neuro:  Continue keppra  Access:  PIV 23 year old female with H/O MRCP, seizure disorder, Quadriparesis, Tracheostomy, GJ tube feeding.   Admitted with increasing lethargy at home, difficulty tolerating feeds and needing increased vent support.   In ED was hypothermic, hypotensive and required fluid bolus/epi infusion. CXR showed possible b/l basilar infiltrate--trach culture shows S. maltophilia with only intermediate sensitivity to levoflox and sensitivity to Bactrim to which Shira has a documented allergy.  Will not do a desensitization to Bactrim but dose of Levofloxacin increased instead.  Will trial on home ventilator now and if she tolerates that will discharge home later today.  Parents comfortable with all of Shira's care and able to wean the vent at home.    Resp:  Usually trach collar during day and vent at night  Transition to home ventilator  Mom states can take home on 24/7 vent support if oxygen requirement is low  CV:  Off epi since Tuesday afternoon.   BP stable  Heme:  Platelet count stable  ID:  Continue on increased dose of Levofloxacin.  Will treat for a total of 7 days for pneumonia  Follow up sensitivities for Serratia  Bcx negative,   UCx negative  FEN:  Tolerating feeds- addition of Valdez bag has helped with tolerance  Repeat albumin improved  GI following- will repeat  prealbumin level prior to discharge  Neuro:  Continue keppra  Access:  PIV

## 2019-05-18 NOTE — PROGRESS NOTE PEDS - PROBLEM SELECTOR PROBLEM 1
Acute on chronic respiratory failure with hypoxemia
Bradycardia
Hypoalbuminemia
Hypoalbuminemia

## 2019-05-18 NOTE — PROGRESS NOTE PEDS - SUBJECTIVE AND OBJECTIVE BOX
Interval/Overnight Events:    VITAL SIGNS:  T(C): 36.3 (05-18-19 @ 05:00), Max: 36.5 (05-18-19 @ 02:00)  HR: 83 (05-18-19 @ 05:00) (72 - 117)  BP: 86/59 (05-18-19 @ 05:00) (80/40 - 95/52)  RR: 17 (05-18-19 @ 05:00) (10 - 19)  SpO2: 92% (05-18-19 @ 05:00) (90% - 99%)    Daily     Medications:  levoFLOXacin  Oral Liquid - Peds 500 milliGRAM(s) Oral daily  multivitamin Oral Drops - Peds 1 milliLiter(s) Oral daily  potassium chloride  Oral Liquid - Peds 10 milliEquivalent(s) Oral two times a day  ranitidine  Oral Liquid - Peds 150 milliGRAM(s) Oral two times a day  fluticasone propionate (50 MICROgram(s)/actuation) Nasal Spray - Peds 1 Spray(s) Both Nostrils daily  magnesium glycinate 400 milliGRAM(s) 400 milliGRAM(s) Oral/Enteral Tube every 12 hours  sodium chloride 0.65% Nasal Spray - Peds 1 Spray(s) Both Nostrils daily    ===========================RESPIRATORY==========================  [ ] FiO2: ___ 	[ ] Heliox: ____ 		[ ] BiPAP: ___ /  [ ] CPAP:____  [ ] NC: __  Liters			[ ] HFNC: __ 	Liters, FiO2: __  [ ] Mechanical Ventilation:     ALBUTerol  Intermittent Nebulization - Peds 2.5 milliGRAM(s) Nebulizer every 12 hours  buDESOnide   for Nebulization - Peds 1 milliGRAM(s) Nebulizer every 12 hours  ipratropium 0.02% for Nebulization - Peds 500 MICROGram(s) Inhalation daily  sodium chloride 7% for Nebulization - Peds 4 milliLiter(s) Nebulizer every 12 hours    Secretions:  =========================CARDIOVASCULAR========================  Cardiac Rhythm:	[x] NSR		[ ] Other:      [ ] PIV  [ ] Central Venous Line	[ ] R	[ ] L	[ ] IJ	[ ] Fem	[ ] SC			Placed:   [ ] Arterial Line		[ ] R	[ ] L	[ ] PT	[ ] DP	[ ] Fem	[ ] Rad	[ ] Ax	Placed:   [ ] PICC:				[ ] Broviac		[ ] Mediport    ======================HEMATOLOGY/ONCOLOGY====================  Transfusions:	[ ] PRBC	[ ] Platelets	[ ] FFP		[ ] Cryoprecipitate  DVT Prophylaxis: Turning & Positioning per protocol    ===================FLUIDS/ELECTROLYTES/NUTRITION=================  I&O's Summary    17 May 2019 07:01  -  18 May 2019 07:00  --------------------------------------------------------  IN: 2623 mL / OUT: 1596 mL / NET: 1027 mL      Diet:	[ ] Regular	[ ] Soft		[ ] Clears	[ ] NPO  .	[ ] Other:  .	[ ] NGT		[ ] NDT		[ ] GT		[ ] GJT    ============================NEUROLOGY=========================    acetaminophen   Oral Liquid - Peds. 400 milliGRAM(s) Oral every 6 hours PRN  levETIRAcetam  Oral Liquid - Peds 500 milliGRAM(s) Enteral Tube <User Schedule>    [x] Adequacy of sedation and pain control has been assessed and adjusted    ===========================PATIENT CARE========================  [ ] Cooling [place] being used. Target Temperature:  [ ] There are pressure ulcers/areas of breakdown that are being addressed?  [x] Preventative measures are being taken to decrease risk for skin breakdown.  [x] Necessity of urinary, arterial, and venous catheters discussed    =========================ANCILLARY TESTS========================  LABS:    RECENT CULTURES:  05-14 @ 12:25 URINE CATHETER         05-14 @ 05:51 TRACHEAL ASPIRATE Stenotrophomonas maltophilia  Serratia marcescens        05-13 @ 20:39 .Blood None     No growth to date.          IMAGING STUDIES:    ==========================PHYSICAL EXAM========================  GENERAL: In no acute distress  RESPIRATORY: Lungs clear to auscultation bilaterally. Good aeration. No rales, rhonchi, retractions or wheezing. Effort even and unlabored.  CARDIOVASCULAR: Regular rate and rhythm. Normal S1/S2. No murmurs, rubs, or gallop. Capillary refill < 2 seconds. Distal pulses 2+ and equal.  ABDOMEN: Soft, non-distended.    SKIN: No rash.  EXTREMITIES: Warm and well perfused. No gross extremity deformities.  NEUROLOGIC: Awake and alert  ==============================================================  Parent/Guardian is at the bedside:	[ ] Yes	[ ] No  Patient and Parent/Guardian updated as to the progress/plan of care:	[x ] Yes	[ ] No    [x ] The patient remains in critical and unstable condition, and requires ICU care and monitoring; The total critical care time spent by attending physician was      minutes, excluding procedure time.  [ ] The patient is improving but requires continued monitoring and adjustment of therapy Interval/Overnight Events:  no acute events overnight.     VITAL SIGNS:  T(C): 36.3 (05-18-19 @ 05:00), Max: 36.5 (05-18-19 @ 02:00)  HR: 83 (05-18-19 @ 05:00) (72 - 117)  BP: 86/59 (05-18-19 @ 05:00) (80/40 - 95/52)  RR: 17 (05-18-19 @ 05:00) (10 - 19)  SpO2: 92% (05-18-19 @ 05:00) (90% - 99%)    Daily     Medications:  levoFLOXacin  Oral Liquid - Peds 500 milliGRAM(s) Oral daily  multivitamin Oral Drops - Peds 1 milliLiter(s) Oral daily  potassium chloride  Oral Liquid - Peds 10 milliEquivalent(s) Oral two times a day  ranitidine  Oral Liquid - Peds 150 milliGRAM(s) Oral two times a day  fluticasone propionate (50 MICROgram(s)/actuation) Nasal Spray - Peds 1 Spray(s) Both Nostrils daily  magnesium glycinate 400 milliGRAM(s) 400 milliGRAM(s) Oral/Enteral Tube every 12 hours  sodium chloride 0.65% Nasal Spray - Peds 1 Spray(s) Both Nostrils daily    ===========================RESPIRATORY==========================  [x] Mechanical Ventilation: Mode: SIMV with PS RR (machine): 17 FiO2: 35 PEEP: 8 PS: 10 ITime: 1 MAP: 14 PC: 15 PIP: 24      ALBUTerol  Intermittent Nebulization - Peds 2.5 milliGRAM(s) Nebulizer every 12 hours  buDESOnide   for Nebulization - Peds 1 milliGRAM(s) Nebulizer every 12 hours  ipratropium 0.02% for Nebulization - Peds 500 MICROGram(s) Inhalation daily  sodium chloride 7% for Nebulization - Peds 4 milliLiter(s) Nebulizer every 12 hours    Secretions:  copious   =========================CARDIOVASCULAR========================  Cardiac Rhythm:	[x] NSR		[ ] Other:      [x ] PIV  [ ] Central Venous Line	[ ] R	[ ] L	[ ] IJ	[ ] Fem	[ ] SC			Placed:   [ ] Arterial Line		[ ] R	[ ] L	[ ] PT	[ ] DP	[ ] Fem	[ ] Rad	[ ] Ax	Placed:   [ ] PICC:				[ ] Broviac		[ ] Mediport    ======================HEMATOLOGY/ONCOLOGY====================  Transfusions:	[ ] PRBC	[ ] Platelets	[ ] FFP		[ ] Cryoprecipitate  DVT Prophylaxis: Turning & Positioning per protocol    ===================FLUIDS/ELECTROLYTES/NUTRITION=================  I&O's Summary    17 May 2019 07:01  -  18 May 2019 07:00  --------------------------------------------------------  IN: 2623 mL / OUT: 1596 mL / NET: 1027 mL      Diet:	[ ] Regular	[ ] Soft		[ ] Clears	[ ] NPO  .	[ ] Other:  .	[ ] NGT		[ ] NDT		[ ] GT		[x ] GJT  alfaamino and water    ============================NEUROLOGY=========================    acetaminophen   Oral Liquid - Peds. 400 milliGRAM(s) Oral every 6 hours PRN  levETIRAcetam  Oral Liquid - Peds 500 milliGRAM(s) Enteral Tube <User Schedule>    [x] Adequacy of sedation and pain control has been assessed and adjusted    ===========================PATIENT CARE========================  [ ] Cooling [place] being used. Target Temperature:  [ ] There are pressure ulcers/areas of breakdown that are being addressed?  [x] Preventative measures are being taken to decrease risk for skin breakdown.  [x] Necessity of urinary, arterial, and venous catheters discussed    =========================ANCILLARY TESTS========================  LABS:    RECENT CULTURES:  05-14 @ 12:25 URINE CATHETER         05-14 @ 05:51 TRACHEAL ASPIRATE Stenotrophomonas maltophilia  Serratia marcescens        05-13 @ 20:39 .Blood None     No growth to date.          IMAGING STUDIES:    ==========================PHYSICAL EXAM========================  GENERAL: In no acute distress  RESPIRATORY: Lungs clear to auscultation bilaterally. Good aeration. No rales, rhonchi, retractions or wheezing. Effort even and unlabored, ventilator assisted.  CARDIOVASCULAR: Regular rate and rhythm. Normal S1/S2. No murmurs, rubs, or gallop. Capillary refill < 2 seconds. Distal pulses 2+ and equal.  ABDOMEN: Soft, non-distended.  GJT in place, non-tender  SKIN: No rash.  EXTREMITIES: Warm and well perfused. No gross extremity deformities.  NEUROLOGIC: No acute change from baseline  ==============================================================  Parent/Guardian is at the bedside:	[ x] Yes	[ ] No  Patient and Parent/Guardian updated as to the progress/plan of care:	[x ] Yes	[ ] No    [] The patient remains in critical and unstable condition, and requires ICU care and monitoring; The total critical care time spent by attending physician was      minutes, excluding procedure time.  [x ] The patient is improving but requires continued monitoring and adjustment of therapy

## 2019-05-18 NOTE — PROGRESS NOTE PEDS - PROBLEM SELECTOR PROBLEM 3
Pneumonia
Seizure disorder
Constipation
Constipation

## 2019-05-18 NOTE — PROGRESS NOTE PEDS - PROVIDER SPECIALTY LIST PEDS
Critical Care
ENT
Gastroenterology
Gastroenterology
Palliative/Hospice
Critical Care

## 2019-05-18 NOTE — PROGRESS NOTE PEDS - PROBLEM SELECTOR PROBLEM 2
Acute tracheitis without obstruction
Hypotension
Acute tracheitis without obstruction
Gastroesophageal Reflux Disease
Gastroesophageal Reflux Disease

## 2019-05-18 NOTE — PROGRESS NOTE PEDS - PROBLEM SELECTOR PROBLEM 4
Feeding difficulties
Cerebral palsy, unspecified type
Nutrition, metabolism, and development symptoms
Nutrition, metabolism, and development symptoms

## 2019-05-20 ENCOUNTER — INBOUND DOCUMENT (OUTPATIENT)
Age: 24
End: 2019-05-20

## 2019-05-21 ENCOUNTER — MEDICATION RENEWAL (OUTPATIENT)
Age: 24
End: 2019-05-21

## 2019-05-21 RX ORDER — COLISTIMETHATE SODIUM 150 MG/1
150 INJECTION, POWDER, FOR SOLUTION INTRAMUSCULAR; INTRAVENOUS
Qty: 60 | Refills: 5 | Status: ACTIVE | COMMUNITY
Start: 2017-11-27 | End: 1900-01-01

## 2019-05-22 ENCOUNTER — MEDICATION RENEWAL (OUTPATIENT)
Age: 24
End: 2019-05-22

## 2019-05-23 ENCOUNTER — RX RENEWAL (OUTPATIENT)
Age: 24
End: 2019-05-23

## 2019-05-23 LAB — MISCELLANEOUS - CHEM: SIGNIFICANT CHANGE UP

## 2019-05-31 ENCOUNTER — OTHER (OUTPATIENT)
Age: 24
End: 2019-05-31

## 2019-06-04 ENCOUNTER — APPOINTMENT (OUTPATIENT)
Dept: PEDIATRIC ALLERGY IMMUNOLOGY | Facility: CLINIC | Age: 24
End: 2019-06-04

## 2019-06-07 ENCOUNTER — APPOINTMENT (OUTPATIENT)
Dept: PEDIATRIC PULMONARY CYSTIC FIB | Facility: CLINIC | Age: 24
End: 2019-06-07

## 2019-07-12 ENCOUNTER — MEDICATION RENEWAL (OUTPATIENT)
Age: 24
End: 2019-07-12

## 2019-07-12 RX ORDER — TOBRAMYCIN 300 MG/5ML
300 SOLUTION RESPIRATORY (INHALATION) TWICE DAILY
Qty: 270 | Refills: 6 | Status: ACTIVE | COMMUNITY
Start: 2019-07-12 | End: 1900-01-01

## 2019-07-17 ENCOUNTER — FORM ENCOUNTER (OUTPATIENT)
Age: 24
End: 2019-07-17

## 2019-07-18 ENCOUNTER — OUTPATIENT (OUTPATIENT)
Dept: OUTPATIENT SERVICES | Age: 24
LOS: 1 days | End: 2019-07-18
Payer: COMMERCIAL

## 2019-07-18 DIAGNOSIS — K21.9 GASTRO-ESOPHAGEAL REFLUX DISEASE WITHOUT ESOPHAGITIS: ICD-10-CM

## 2019-07-18 DIAGNOSIS — Z93.0 TRACHEOSTOMY STATUS: Chronic | ICD-10-CM

## 2019-07-18 PROCEDURE — 49452 REPLACE G-J TUBE PERC: CPT

## 2019-07-22 DIAGNOSIS — K21.9 GASTRO-ESOPHAGEAL REFLUX DISEASE WITHOUT ESOPHAGITIS: ICD-10-CM

## 2019-07-22 DIAGNOSIS — Z43.4 ENCOUNTER FOR ATTENTION TO OTHER ARTIFICIAL OPENINGS OF DIGESTIVE TRACT: ICD-10-CM

## 2019-08-01 ENCOUNTER — MEDICATION RENEWAL (OUTPATIENT)
Age: 24
End: 2019-08-01

## 2019-08-01 ENCOUNTER — RX RENEWAL (OUTPATIENT)
Age: 24
End: 2019-08-01

## 2019-08-01 RX ORDER — FLUTICASONE PROPIONATE 50 UG/1
50 SPRAY, METERED NASAL DAILY
Qty: 1 | Refills: 3 | Status: ACTIVE | COMMUNITY
Start: 2019-02-08 | End: 1900-01-01

## 2019-08-19 NOTE — H&P ADULT - NEGATIVE RESPIRATORY AND THORAX SYMPTOMS
no wheezing Benzoyl Peroxide Counseling: Patient counseled that medicine may cause skin irritation and bleach clothing.  In the event of skin irritation, the patient was advised to reduce the amount of the drug applied or use it less frequently.   The patient verbalized understanding of the proper use and possible adverse effects of benzoyl peroxide.  All of the patient's questions and concerns were addressed.

## 2019-08-22 ENCOUNTER — MEDICATION RENEWAL (OUTPATIENT)
Age: 24
End: 2019-08-22

## 2019-10-29 ENCOUNTER — FORM ENCOUNTER (OUTPATIENT)
Age: 24
End: 2019-10-29

## 2019-10-30 ENCOUNTER — OUTPATIENT (OUTPATIENT)
Dept: OUTPATIENT SERVICES | Age: 24
LOS: 1 days | End: 2019-10-30
Payer: COMMERCIAL

## 2019-10-30 DIAGNOSIS — K21.9 GASTRO-ESOPHAGEAL REFLUX DISEASE WITHOUT ESOPHAGITIS: ICD-10-CM

## 2019-10-30 DIAGNOSIS — Z93.0 TRACHEOSTOMY STATUS: Chronic | ICD-10-CM

## 2019-10-30 PROCEDURE — 49452 REPLACE G-J TUBE PERC: CPT

## 2019-11-01 DIAGNOSIS — Z43.4 ENCOUNTER FOR ATTENTION TO OTHER ARTIFICIAL OPENINGS OF DIGESTIVE TRACT: ICD-10-CM

## 2019-11-01 DIAGNOSIS — K21.9 GASTRO-ESOPHAGEAL REFLUX DISEASE WITHOUT ESOPHAGITIS: ICD-10-CM

## 2020-01-06 RX ORDER — TOBRAMYCIN SULFATE 40 MG/ML
5 VIAL (ML) INJECTION
Qty: 0 | Refills: 0 | DISCHARGE
Start: 2020-01-06 | End: 2020-02-02

## 2020-01-06 RX ORDER — TOBRAMYCIN SULFATE 40 MG/ML
4 VIAL (ML) INJECTION
Qty: 0 | Refills: 0 | DISCHARGE
Start: 2020-01-06 | End: 2020-02-02

## 2020-01-24 ENCOUNTER — INPATIENT (INPATIENT)
Facility: HOSPITAL | Age: 25
LOS: 4 days | Discharge: HOME HEALTH | DRG: 870 | End: 2020-01-29
Attending: INTERNAL MEDICINE | Admitting: INTERNAL MEDICINE
Payer: COMMERCIAL

## 2020-01-24 VITALS
WEIGHT: 78.04 LBS | DIASTOLIC BLOOD PRESSURE: 62 MMHG | HEART RATE: 139 BPM | TEMPERATURE: 105 F | SYSTOLIC BLOOD PRESSURE: 98 MMHG | RESPIRATION RATE: 17 BRPM | OXYGEN SATURATION: 94 %

## 2020-01-24 DIAGNOSIS — Z93.0 TRACHEOSTOMY STATUS: Chronic | ICD-10-CM

## 2020-01-24 DIAGNOSIS — J18.9 PNEUMONIA, UNSPECIFIED ORGANISM: ICD-10-CM

## 2020-01-24 DIAGNOSIS — I31.3 PERICARDIAL EFFUSION (NONINFLAMMATORY): ICD-10-CM

## 2020-01-24 LAB
ALBUMIN SERPL ELPH-MCNC: 2.5 G/DL — LOW (ref 3.3–5)
ALP SERPL-CCNC: 134 U/L — HIGH (ref 40–120)
ALT FLD-CCNC: 54 U/L — SIGNIFICANT CHANGE UP (ref 12–78)
ANION GAP SERPL CALC-SCNC: 3 MMOL/L — LOW (ref 5–17)
APPEARANCE UR: CLEAR — SIGNIFICANT CHANGE UP
APTT BLD: 33.6 SEC — SIGNIFICANT CHANGE UP (ref 27.5–36.3)
AST SERPL-CCNC: 54 U/L — HIGH (ref 15–37)
BACTERIA # UR AUTO: ABNORMAL
BASOPHILS # BLD AUTO: 0.01 K/UL — SIGNIFICANT CHANGE UP (ref 0–0.2)
BASOPHILS NFR BLD AUTO: 0.1 % — SIGNIFICANT CHANGE UP (ref 0–2)
BILIRUB SERPL-MCNC: 0.2 MG/DL — SIGNIFICANT CHANGE UP (ref 0.2–1.2)
BILIRUB UR-MCNC: NEGATIVE — SIGNIFICANT CHANGE UP
BUN SERPL-MCNC: 12 MG/DL — SIGNIFICANT CHANGE UP (ref 7–23)
CALCIUM SERPL-MCNC: 8.5 MG/DL — SIGNIFICANT CHANGE UP (ref 8.5–10.1)
CHLORIDE SERPL-SCNC: 102 MMOL/L — SIGNIFICANT CHANGE UP (ref 96–108)
CO2 SERPL-SCNC: 30 MMOL/L — SIGNIFICANT CHANGE UP (ref 22–31)
COLOR SPEC: YELLOW — SIGNIFICANT CHANGE UP
CREAT SERPL-MCNC: 0.65 MG/DL — SIGNIFICANT CHANGE UP (ref 0.5–1.3)
DIFF PNL FLD: ABNORMAL
EOSINOPHIL # BLD AUTO: 0.02 K/UL — SIGNIFICANT CHANGE UP (ref 0–0.5)
EOSINOPHIL NFR BLD AUTO: 0.1 % — SIGNIFICANT CHANGE UP (ref 0–6)
EPI CELLS # UR: SIGNIFICANT CHANGE UP
GLUCOSE SERPL-MCNC: 84 MG/DL — SIGNIFICANT CHANGE UP (ref 70–99)
GLUCOSE UR QL: NEGATIVE MG/DL — SIGNIFICANT CHANGE UP
HCO3 BLDV-SCNC: 30 MMOL/L — HIGH (ref 21–29)
HCT VFR BLD CALC: 43.4 % — SIGNIFICANT CHANGE UP (ref 34.5–45)
HGB BLD-MCNC: 14.2 G/DL — SIGNIFICANT CHANGE UP (ref 11.5–15.5)
IMM GRANULOCYTES NFR BLD AUTO: 0.4 % — SIGNIFICANT CHANGE UP (ref 0–1.5)
INR BLD: 1.25 RATIO — HIGH (ref 0.88–1.16)
KETONES UR-MCNC: ABNORMAL
LACTATE SERPL-SCNC: 1.3 MMOL/L — SIGNIFICANT CHANGE UP (ref 0.7–2)
LEUKOCYTE ESTERASE UR-ACNC: NEGATIVE — SIGNIFICANT CHANGE UP
LYMPHOCYTES # BLD AUTO: 1.57 K/UL — SIGNIFICANT CHANGE UP (ref 1–3.3)
LYMPHOCYTES # BLD AUTO: 11.1 % — LOW (ref 13–44)
MCHC RBC-ENTMCNC: 32.6 PG — SIGNIFICANT CHANGE UP (ref 27–34)
MCHC RBC-ENTMCNC: 32.7 GM/DL — SIGNIFICANT CHANGE UP (ref 32–36)
MCV RBC AUTO: 99.5 FL — SIGNIFICANT CHANGE UP (ref 80–100)
MONOCYTES # BLD AUTO: 0.8 K/UL — SIGNIFICANT CHANGE UP (ref 0–0.9)
MONOCYTES NFR BLD AUTO: 5.6 % — SIGNIFICANT CHANGE UP (ref 2–14)
NEUTROPHILS # BLD AUTO: 11.74 K/UL — HIGH (ref 1.8–7.4)
NEUTROPHILS NFR BLD AUTO: 82.7 % — HIGH (ref 43–77)
NITRITE UR-MCNC: NEGATIVE — SIGNIFICANT CHANGE UP
PCO2 BLDV: 49 MMHG — SIGNIFICANT CHANGE UP (ref 35–50)
PH BLDV: 7.41 — SIGNIFICANT CHANGE UP (ref 7.35–7.45)
PH UR: 8 — SIGNIFICANT CHANGE UP (ref 5–8)
PLATELET # BLD AUTO: 181 K/UL — SIGNIFICANT CHANGE UP (ref 150–400)
PO2 BLDV: 97 MMHG — HIGH (ref 25–45)
POTASSIUM SERPL-MCNC: 5.8 MMOL/L — HIGH (ref 3.5–5.3)
POTASSIUM SERPL-SCNC: 5.8 MMOL/L — HIGH (ref 3.5–5.3)
PROT SERPL-MCNC: 7.3 GM/DL — SIGNIFICANT CHANGE UP (ref 6–8.3)
PROT UR-MCNC: 15 MG/DL
PROTHROM AB SERPL-ACNC: 14 SEC — HIGH (ref 10–12.9)
RAPID RVP RESULT: SIGNIFICANT CHANGE UP
RBC # BLD: 4.36 M/UL — SIGNIFICANT CHANGE UP (ref 3.8–5.2)
RBC # FLD: 12 % — SIGNIFICANT CHANGE UP (ref 10.3–14.5)
RBC CASTS # UR COMP ASSIST: >50 /HPF (ref 0–4)
SAO2 % BLDV: 97 % — HIGH (ref 67–88)
SODIUM SERPL-SCNC: 135 MMOL/L — SIGNIFICANT CHANGE UP (ref 135–145)
SP GR SPEC: 1.01 — SIGNIFICANT CHANGE UP (ref 1.01–1.02)
UROBILINOGEN FLD QL: NEGATIVE MG/DL — SIGNIFICANT CHANGE UP
WBC # BLD: 14.2 K/UL — HIGH (ref 3.8–10.5)
WBC # FLD AUTO: 14.2 K/UL — HIGH (ref 3.8–10.5)
WBC UR QL: SIGNIFICANT CHANGE UP

## 2020-01-24 PROCEDURE — 94002 VENT MGMT INPAT INIT DAY: CPT

## 2020-01-24 PROCEDURE — 86039 ANTINUCLEAR ANTIBODIES (ANA): CPT

## 2020-01-24 PROCEDURE — 93971 EXTREMITY STUDY: CPT | Mod: RT

## 2020-01-24 PROCEDURE — 99223 1ST HOSP IP/OBS HIGH 75: CPT

## 2020-01-24 PROCEDURE — 86658 ENTEROVIRUS ANTIBODY: CPT

## 2020-01-24 PROCEDURE — 94003 VENT MGMT INPAT SUBQ DAY: CPT

## 2020-01-24 PROCEDURE — 84100 ASSAY OF PHOSPHORUS: CPT

## 2020-01-24 PROCEDURE — 71250 CT THORAX DX C-: CPT

## 2020-01-24 PROCEDURE — ZZZZZ: CPT

## 2020-01-24 PROCEDURE — 93306 TTE W/DOPPLER COMPLETE: CPT | Mod: 26

## 2020-01-24 PROCEDURE — 85652 RBC SED RATE AUTOMATED: CPT

## 2020-01-24 PROCEDURE — 86140 C-REACTIVE PROTEIN: CPT

## 2020-01-24 PROCEDURE — 36600 WITHDRAWAL OF ARTERIAL BLOOD: CPT

## 2020-01-24 PROCEDURE — 85027 COMPLETE CBC AUTOMATED: CPT

## 2020-01-24 PROCEDURE — 71250 CT THORAX DX C-: CPT | Mod: 26

## 2020-01-24 PROCEDURE — 36415 COLL VENOUS BLD VENIPUNCTURE: CPT

## 2020-01-24 PROCEDURE — 86644 CMV ANTIBODY: CPT

## 2020-01-24 PROCEDURE — 94667 MNPJ CHEST WALL 1ST: CPT

## 2020-01-24 PROCEDURE — 86747 PARVOVIRUS ANTIBODY: CPT

## 2020-01-24 PROCEDURE — 87070 CULTURE OTHR SPECIMN AEROBIC: CPT

## 2020-01-24 PROCEDURE — 84443 ASSAY THYROID STIM HORMONE: CPT

## 2020-01-24 PROCEDURE — 87086 URINE CULTURE/COLONY COUNT: CPT

## 2020-01-24 PROCEDURE — 86665 EPSTEIN-BARR CAPSID VCA: CPT

## 2020-01-24 PROCEDURE — 86645 CMV ANTIBODY IGM: CPT

## 2020-01-24 PROCEDURE — 84145 PROCALCITONIN (PCT): CPT

## 2020-01-24 PROCEDURE — 86663 EPSTEIN-BARR ANTIBODY: CPT

## 2020-01-24 PROCEDURE — 80048 BASIC METABOLIC PNL TOTAL CA: CPT

## 2020-01-24 PROCEDURE — 94640 AIRWAY INHALATION TREATMENT: CPT

## 2020-01-24 PROCEDURE — 97161 PT EVAL LOW COMPLEX 20 MIN: CPT | Mod: GP

## 2020-01-24 PROCEDURE — 93308 TTE F-UP OR LMTD: CPT

## 2020-01-24 PROCEDURE — 86664 EPSTEIN-BARR NUCLEAR ANTIGEN: CPT

## 2020-01-24 PROCEDURE — 93010 ELECTROCARDIOGRAM REPORT: CPT

## 2020-01-24 PROCEDURE — 71045 X-RAY EXAM CHEST 1 VIEW: CPT | Mod: 26

## 2020-01-24 PROCEDURE — 93005 ELECTROCARDIOGRAM TRACING: CPT

## 2020-01-24 PROCEDURE — 71045 X-RAY EXAM CHEST 1 VIEW: CPT

## 2020-01-24 PROCEDURE — 82803 BLOOD GASES ANY COMBINATION: CPT

## 2020-01-24 PROCEDURE — 80053 COMPREHEN METABOLIC PANEL: CPT

## 2020-01-24 PROCEDURE — 83735 ASSAY OF MAGNESIUM: CPT

## 2020-01-24 PROCEDURE — 93306 TTE W/DOPPLER COMPLETE: CPT

## 2020-01-24 RX ORDER — ALBUTEROL 90 UG/1
2.5 AEROSOL, METERED ORAL EVERY 4 HOURS
Refills: 0 | Status: DISCONTINUED | OUTPATIENT
Start: 2020-01-24 | End: 2020-01-24

## 2020-01-24 RX ORDER — SODIUM CHLORIDE 9 MG/ML
1100 INJECTION INTRAMUSCULAR; INTRAVENOUS; SUBCUTANEOUS ONCE
Refills: 0 | Status: COMPLETED | OUTPATIENT
Start: 2020-01-24 | End: 2020-01-24

## 2020-01-24 RX ORDER — FLUTICASONE PROPIONATE 50 MCG
1 SPRAY, SUSPENSION NASAL DAILY
Refills: 0 | Status: DISCONTINUED | OUTPATIENT
Start: 2020-01-24 | End: 2020-01-29

## 2020-01-24 RX ORDER — LEVETIRACETAM 250 MG/1
700 TABLET, FILM COATED ORAL AT BEDTIME
Refills: 0 | Status: DISCONTINUED | OUTPATIENT
Start: 2020-01-24 | End: 2020-01-29

## 2020-01-24 RX ORDER — AZTREONAM 2 G
VIAL (EA) INJECTION
Refills: 0 | Status: DISCONTINUED | OUTPATIENT
Start: 2020-01-24 | End: 2020-01-24

## 2020-01-24 RX ORDER — BUDESONIDE, MICRONIZED 100 %
0.25 POWDER (GRAM) MISCELLANEOUS
Refills: 0 | Status: DISCONTINUED | OUTPATIENT
Start: 2020-01-24 | End: 2020-01-24

## 2020-01-24 RX ORDER — LEVALBUTEROL 1.25 MG/.5ML
3 SOLUTION, CONCENTRATE RESPIRATORY (INHALATION) EVERY 4 HOURS
Refills: 0 | Status: DISCONTINUED | OUTPATIENT
Start: 2020-01-24 | End: 2020-01-24

## 2020-01-24 RX ORDER — SODIUM CHLORIDE 9 MG/ML
1 INJECTION INTRAMUSCULAR; INTRAVENOUS; SUBCUTANEOUS
Refills: 0 | Status: DISCONTINUED | OUTPATIENT
Start: 2020-01-24 | End: 2020-01-24

## 2020-01-24 RX ORDER — SUCRALFATE 1 G
1000 TABLET ORAL
Refills: 0 | Status: DISCONTINUED | OUTPATIENT
Start: 2020-01-24 | End: 2020-01-29

## 2020-01-24 RX ORDER — LEVETIRACETAM 250 MG/1
500 TABLET, FILM COATED ORAL DAILY
Refills: 0 | Status: DISCONTINUED | OUTPATIENT
Start: 2020-01-25 | End: 2020-01-29

## 2020-01-24 RX ORDER — AZTREONAM 2 G
1000 VIAL (EA) INJECTION EVERY 6 HOURS
Refills: 0 | Status: DISCONTINUED | OUTPATIENT
Start: 2020-01-25 | End: 2020-01-25

## 2020-01-24 RX ORDER — AZTREONAM 2 G
2000 VIAL (EA) INJECTION ONCE
Refills: 0 | Status: COMPLETED | OUTPATIENT
Start: 2020-01-24 | End: 2020-01-24

## 2020-01-24 RX ORDER — BUDESONIDE, MICRONIZED 100 %
2 POWDER (GRAM) MISCELLANEOUS
Qty: 0 | Refills: 0 | DISCHARGE

## 2020-01-24 RX ORDER — VANCOMYCIN HCL 1 G
750 VIAL (EA) INTRAVENOUS ONCE
Refills: 0 | Status: COMPLETED | OUTPATIENT
Start: 2020-01-24 | End: 2020-01-24

## 2020-01-24 RX ORDER — SODIUM CHLORIDE 0.65 %
2 AEROSOL, SPRAY (ML) NASAL DAILY
Refills: 0 | Status: DISCONTINUED | OUTPATIENT
Start: 2020-01-24 | End: 2020-01-29

## 2020-01-24 RX ORDER — IPRATROPIUM BROMIDE 0.2 MG/ML
500 SOLUTION, NON-ORAL INHALATION EVERY 8 HOURS
Refills: 0 | Status: DISCONTINUED | OUTPATIENT
Start: 2020-01-24 | End: 2020-01-24

## 2020-01-24 RX ORDER — PANTOPRAZOLE SODIUM 20 MG/1
40 TABLET, DELAYED RELEASE ORAL DAILY
Refills: 0 | Status: DISCONTINUED | OUTPATIENT
Start: 2020-01-24 | End: 2020-01-29

## 2020-01-24 RX ORDER — LEVALBUTEROL 1.25 MG/.5ML
1.25 SOLUTION, CONCENTRATE RESPIRATORY (INHALATION)
Refills: 0 | Status: DISCONTINUED | OUTPATIENT
Start: 2020-01-24 | End: 2020-01-24

## 2020-01-24 RX ORDER — CHLORHEXIDINE GLUCONATE 213 G/1000ML
15 SOLUTION TOPICAL EVERY 12 HOURS
Refills: 0 | Status: DISCONTINUED | OUTPATIENT
Start: 2020-01-24 | End: 2020-01-27

## 2020-01-24 RX ORDER — ACETAMINOPHEN 500 MG
500 TABLET ORAL EVERY 6 HOURS
Refills: 0 | Status: DISCONTINUED | OUTPATIENT
Start: 2020-01-24 | End: 2020-01-29

## 2020-01-24 RX ORDER — ACETAMINOPHEN 500 MG
650 TABLET ORAL ONCE
Refills: 0 | Status: COMPLETED | OUTPATIENT
Start: 2020-01-24 | End: 2020-01-24

## 2020-01-24 RX ORDER — DIAZEPAM 5 MG
10 TABLET ORAL DAILY
Refills: 0 | Status: DISCONTINUED | OUTPATIENT
Start: 2020-01-24 | End: 2020-01-24

## 2020-01-24 RX ORDER — HEPARIN SODIUM 5000 [USP'U]/ML
5000 INJECTION INTRAVENOUS; SUBCUTANEOUS EVERY 12 HOURS
Refills: 0 | Status: DISCONTINUED | OUTPATIENT
Start: 2020-01-24 | End: 2020-01-28

## 2020-01-24 RX ADMIN — SODIUM CHLORIDE 1100 MILLILITER(S): 9 INJECTION INTRAMUSCULAR; INTRAVENOUS; SUBCUTANEOUS at 17:45

## 2020-01-24 RX ADMIN — Medication 2000 MILLIGRAM(S): at 19:21

## 2020-01-24 RX ADMIN — SODIUM CHLORIDE 1100 MILLILITER(S): 9 INJECTION INTRAMUSCULAR; INTRAVENOUS; SUBCUTANEOUS at 18:45

## 2020-01-24 RX ADMIN — Medication 100 MILLIGRAM(S): at 18:21

## 2020-01-24 RX ADMIN — Medication 650 MILLIGRAM(S): at 17:30

## 2020-01-24 RX ADMIN — LEVETIRACETAM 700 MILLIGRAM(S): 250 TABLET, FILM COATED ORAL at 23:31

## 2020-01-24 RX ADMIN — Medication 50 MILLIGRAM(S): at 23:32

## 2020-01-24 RX ADMIN — Medication 250 MILLIGRAM(S): at 21:35

## 2020-01-24 NOTE — PATIENT PROFILE ADULT - OVER THE PAST TWO WEEKS HAVE YOU FELT DOWN, DEPRESSED OR HOPELESS?
patient non verbal, has cerebral palsy,all the history takes from mother.as per her mom patient has no signs of depression

## 2020-01-24 NOTE — ED PROVIDER NOTE - CLINICAL SUMMARY MEDICAL DECISION MAKING FREE TEXT BOX
Pt septic, likely from respiratory source, likely PNA given outpatient CXR. Will treat with broad spectrum antibiotics, already discussed w/ ICU, pending admission.

## 2020-01-24 NOTE — ED PROVIDER NOTE - CARE PLAN
Principal Discharge DX:	Pneumonia due to infectious organism, unspecified laterality, unspecified part of lung  Secondary Diagnosis:	Sepsis, due to unspecified organism, unspecified whether acute organ dysfunction present

## 2020-01-24 NOTE — ED PROVIDER NOTE - OBJECTIVE STATEMENT
25 y/o female with PMHx of asthma, cerebral palsy, developmental delay, GERD, hypokalemia, hypothyroidism, hypotonia, kyphosis, scoliosis, seizures, PEG tube in place, s/p tonsillectomy and adenoidectomy, strabismus, tracheostomy presents to the ED BIB mother regarding difficulty breathing, cough, congestion since last night. Per mother at bedside, pt's chest congestion and SOB worsened last night but she noted pt was still satting in the 90s. Denies fever. Called pulmonologist Dr. Bustillo, saw MD in office, had CXR done which showed multiple pleural effusions bilaterally. Dr. Bustillo urged pt to be seen in the ED. Recent echo at [facility] [place] which was normal. Non verbal and non ambulatory at baseline. Pulmonologist: Iwona. Hx unobtainable per pt due to baseline developmental delay, non verbal. Hx provided by mother at bedside.

## 2020-01-24 NOTE — ED PROVIDER NOTE - CRITICAL CARE PROVIDED
consultation with other physicians/consult w/ pt's family directly relating to pts condition/documentation/direct patient care (not related to procedure)

## 2020-01-24 NOTE — ED ADULT NURSE NOTE - NSIMPLEMENTINTERV_GEN_ALL_ED
Implemented All Fall with Harm Risk Interventions:  [place] to call system. Call bell, personal items and telephone within reach. Instruct patient to call for assistance. Room bathroom lighting operational. Non-slip footwear when patient is off stretcher. Physically safe environment: no spills, clutter or unnecessary equipment. Stretcher in lowest position, wheels locked, appropriate side rails in place. Provide visual cue, wrist band, yellow gown, etc. Monitor gait and stability. Monitor for mental status changes and reorient to person, place, and time. Review medications for side effects contributing to fall risk. Reinforce activity limits and safety measures with patient and family. Provide visual clues: red socks.

## 2020-01-24 NOTE — PHARMACOTHERAPY INTERVENTION NOTE - COMMENTS
Spoke with Mustapha SHARPE and changed tylenol dose from 650 mg to 500 mg every 6 hours based on patient's weight.

## 2020-01-24 NOTE — ED ADULT NURSE NOTE - OBJECTIVE STATEMENT
25 y/o female with PMHx of asthma, cerebral palsy, developmental delay, GERD, hypokalemia, hypothyroidism, hypotonia, kyphosis, scoliosis, seizures, PEG tube in place, s/p tonsillectomy and adenoidectomy, strabismus, tracheostomy presents to the ED BIB mother regarding difficulty breathing, cough, congestion since last night. Per mother at bedside, pt's chest congestion and SOB worsened last night but she noted pt was still satting in the 90s. Denies fever. Called pulmonologist Dr. Bustillo, saw MD in office, had CXR done which showed multiple pleural effusions bilaterally. Dr. Bustillo urged pt to be seen in the ED. Recent echo at [facility] [place] which was normal. Non verbal and non ambulatory at baseline. Pulmonologist: Iwona. Hx unobtainable per pt due to baseline developmental delay, non verbal. Hx provided by mother at bedside.

## 2020-01-24 NOTE — H&P ADULT - HISTORY OF PRESENT ILLNESS
23 yo F with hx of GERD, cerebral palsy s/p trach and PEG, seizures, asthma and scoliosis presents to ED from Dr. Harris's office for further evaluation. Pt reportedly with lethargy for the past few days and went to Dr. Harris's office, where she was found to be short of breath. CXR done in office as per Dr. Harris showed b/l worsening airspace disease and O2 sat was 90% on vent.    In the ED patient was febrile to 104.5 F, and tachycardic to 140s.     Patient received 1.1 NS bolus x1 in the ED, lactate 1.3. 25 yo F with hx of GERD, cerebral palsy s/p trach and PEG, seizures, asthma and scoliosis presents to ED from Dr. Harris's office for further evaluation. Pt reportedly with lethargy for the past few days and went to Dr. Harris's office, where she was found to be short of breath. CXR done in office as per Dr. Harris showed b/l worsening airspace disease and O2 sat was 90% on vent.    In the ED patient was febrile to 104.8 F, BP- 95/57 and tachycardic to 140s.     Patient received 1.1 NS bolus x1 in the ED, WBC 14.20, lactate 1.3.

## 2020-01-24 NOTE — CONSULT NOTE ADULT - SUBJECTIVE AND OBJECTIVE BOX
HPI: CHAMP CALDWELL is a 24y old Female with PMH asthma, cerebral palsy, developmental delay, seizures, scoliosis, hypothyroidism, GERD, s/p trach, PEG, on trilogy at home, with multiple hospitalizations in the past at [facility] PICU pneumonia, colonized with serratia marcescens, but sputum cultures also grew out achromobacter xylosoxidans and burkolderia cepacia in the past, who presented to clinic 2020 for worsening SOB and lethargy for the past few days. CXR in clinic revealed bilateral worsening airspace disease  In the ED patient was febrile to 104.8 F, BP- 95/57 and tachycardic to 140s.     Patient received 1.1 NS bolus x1 in the ED, WBC 14.20, lactate 1.3.  CT chest revealed pleural effusionj    Past Medical History:   Hypokalemia  Kyphosis  Scoliosis  Hypotonia  Asthma  Seizures  Cerebral Palsy  Gastroesophageal Reflux Disease  Hypothyroidism  Developmental Delay    Past Surgical History:   Tracheostomy in place  Peg (Percutaneous Endoscopic Gastrostomy) Adjustment/Replacement/Removal  S/P Tonsillectomy and Adenoidectomy  Strabismus    Family History:  Paternal grandfather colon cancer     Social History: Lives at home    Review of Systems:  All 10 systems reviewed in detailed and found to be negative with the exception of what has already been described above    Allergies:  Bactrim (Unknown)  carbamazepine (Unknown)  cephalosporins (Rash)  Corn (Unknown)  Depakote (Unknown)  diphenhydrAMINE (Seizure)  eggs (Unknown)  EGGS,  NUTS (Anaphylaxis)  Erythromycin Base (Unknown)  metoclopramide (Unknown)  Milk (Unknown)  MILK, SHELLFISH, WHEAT (Unknown)  Nuts (Unknown)  oxcarbazepine (Unknown)  peanuts (Unknown)  Potatoes (Unknown)  SEASONAL, POLLEN, GRASS, PET DANDER, FEATHERS (Unknown)  Sesame (Unknown)  shellfish (Unknown)  Trileptal (Unknown)  valproic acid (Unknown)  vancomycin (Unknown)  Wheat (Unknown)    Meds  MEDICATIONS  (STANDING):  ALBUTerol    0.083% 2.5 milliGRAM(s) Nebulizer every 4 hours  buDESOnide    Inhalation Suspension 0.25 milliGRAM(s) Inhalation two times a day  chlorhexidine 0.12% Liquid 15 milliLiter(s) Oral Mucosa every 12 hours  fluticasone propionate 50 MICROgram(s)/spray Nasal Spray 1 Spray(s) Both Nostrils daily  heparin  Injectable 5000 Unit(s) SubCutaneous every 12 hours  ipratropium 0.02% for Nebulization - Peds 500 MICROGram(s) Inhalation every 8 hours  levETIRAcetam  Oral Liquid - Peds 500 milliGRAM(s) Oral daily  levETIRAcetam  Oral Liquid - Peds 700 milliGRAM(s) Oral at bedtime  pantoprazole   Suspension 40 milliGRAM(s) Oral daily  sodium chloride 0.65% Nasal 2 Spray(s) Both Nostrils daily    MEDICATIONS  (PRN):  acetaminophen    Suspension .. 500 milliGRAM(s) Oral every 6 hours PRN Temp greater or equal to 38C (100.4F), Mild Pain (1 - 3)  sucralfate Oral Liquid - Peds 1000 milliGRAM(s) Oral Before meals and at bedtime PRN gastric bleeding    Physical Exam  T(C): 39.3 (20 @ 19:51), Max: 40.4 (20 @ 17:07)  HR: 101 (20 @ 21:45) (101 - 145)  BP: 94/58 (20 @ 19:51) (86/67 - 107/52)  RR: 13 (20 @ 21:45) (13 - 17)  SpO2: 100% (20 @ 21:45) (94% - 100%)  Gen: Trach, no distress, on vent  HEENT: Microcephalic  Cardio: Tachycardic  Resp: Coarse breath sounds  GI: PEG tube  Ext: No cyanosis, clubbing or edema  Neuro: Nonfocal    Labs:                        14.2   14.20 )-----------( 181      ( 2020 17:47 )             43.4         135  |  102  |  12  ----------------------------<  84  5.8<H>   |  30  |  0.65    Ca    8.5      2020 17:47    TPro  7.3  /  Alb  2.5<L>  /  TBili  0.2  /  DBili  x   /  AST  54<H>  /  ALT  54  /  AlkPhos  134<H>      PT/INR - ( 2020 17:47 )   PT: 14.0 sec;   INR: 1.25 ratio         PTT - ( 2020 17:47 )  PTT:33.6 sec  Urinalysis Basic - ( 2020 17:47 )    Color: Yellow / Appearance: Clear / S.010 / pH: x  Gluc: x / Ketone: Trace  / Bili: Negative / Urobili: Negative mg/dL   Blood: x / Protein: 15 mg/dL / Nitrite: Negative   Leuk Esterase: Negative / RBC: >50 /HPF / WBC 0-2   Sq Epi: x / Non Sq Epi: Occasional / Bacteria: Occasional    < from: CT Chest No Cont (20 @ 20:27) >  COMPARISON: None.    PROCEDURE:   CT of the Chest was performed without intravenous contrast. This limits evaluation of vascular structures.  Sagittal and coronal reformats were performed.      FINDINGS:    LUNGS AND AIRWAYS: A tracheostomy is present. Motion artifact slightly limits evaluation although there is suggestion of a focal density in the right lateral wall of the right proximal mainstem bronchus. This may represent secretions although other etiologies cannot be excluded. Again, this is limited in evaluation. There are faint bilateral groundglass opacities and groundglass nodular opacities in the upper lobes. Mild interlobular septal thickening is seen in the upper lobes. Minimal patchy groundglass opacities are present in the lingula. There is atelectasis of the left lower lobe and atelectatic changes at the right lung base.    PLEURA: Small bilateral pleural effusions, left greater than right.    MEDIASTINUM AND ALEXIS: Very limited in evaluation without contrast and due to the pleural fluid. I cannot rule out subcarinal lymphadenopathy.    VESSELS: Within normal limits.    HEART: Heart size is normal. There is a large pericardial effusion.    CHEST WALL AND LOWER NECK: Within normal limits.    VISUALIZED UPPER ABDOMEN: There is a percutaneous tube in the stomach which continues into the jejunum. The distal tip is not included on the examination.    BONES: Scoliosis    IMPRESSION:     Large pericardial effusion. This was discussed with ANNEMARIE Kaur at 8:44 PM on 2020.    Faint groundglass opacities and nodular groundglass opacities. Mild interlobular septal thickening. This may be due to fluid overload. Underlying infection cannot be excluded. Clinical correlation follow-up is recommended. Nodular opacities in the lingula which may be due to infection. Atelectasis of the left lower lobe.    Small bilateral pleural effusions.    Motion artifact limits evaluation of the airways although there is suspicion for a nodular density in the right mainstem bronchus proximally along theright side. This may be due to secretions although other etiologies cannot be excluded. Clinical correlation is recommended.`    Limited evaluation for lymphadenopathy as above.      Sputum cultures:  19  Serratia marcescens -       Resistant to: Augmentin, cefazolin, cefuroxime, tetracyclines       Intermediate: Tobramycin       Susceptible: Cefepime, ceftriaxone, cipro, ertapenem, gentamicin, levofloxacin, meropenem, bactrim    Achromobacter xyloxidans -       Resistant to: Amikacin, aztronam, cefepime, cipro, gentamicin, tobramycin       Intermediate: Cefotaxime, ceftriaxone, levofloxacin       Susceptible: Ceftazidime, imipenem, meropenem, zosyn, ticarcillin/clavulonate, bactrim    19  Serratia marcescens -       Resistant to: Augmentin, cefazolin, cefuroxime, tetracyclines       Susceptible: Cefepime, ceftriaxone, cipro, ertapenem, gentamicin, levofloxacin, meropenem, bactrim, tobramycin    Burkholderia cepacia       Susceptible to: Ceftazidime, chloramphenicol, levofloxacin, meropenem, bactrim

## 2020-01-24 NOTE — ED PROVIDER NOTE - ENMT, MLM
Airway patent, Nasal mucosa clear. Mouth with normal mucosa. Throat has no vesicles, no oropharyngeal exudates and uvula is midline. Airway patent, Nasal mucosa clear. Throat has no vesicles, no oropharyngeal exudates and uvula is midline. +Increased oral secretions

## 2020-01-24 NOTE — H&P ADULT - ASSESSMENT
23 yo F with hx of GERD, cerebral palsy s/p trach and PEG, seizures, scoliosis, asthma, presents febrile and tachycardic, with b/l pleural effusion and concern for Pneumonia    #Pulm  #Febrile syndrome suspect 2/2 CAP/Pleural Effusion  -On vent qhs at home   -Pt to remain on vent given respiratory compromise  -CT chest for further evaluation  -Continue vancomycin - mother reports allergic reaction to vancomycin was hydronephrosis   -ID consult (pt with numerous medication allergies)  -f/u RVP panel  -Hx of asthma- continue home meds    #Neuro  #Hx of CP and seizures  -Continue home meds    #GI  #GERD  -PEG intact- resume tube feeds  -Protonix    #DVT PPX  -Chemical prophylaxis     #Advance Directives 25 yo F with hx of GERD, cerebral palsy s/p trach and PEG, seizures, scoliosis, asthma, presents febrile and tachycardic, with b/l pleural effusion and concern for Pneumonia    #Pulm  #Febrile syndrome suspect 2/2 CAP/Pleural Effusion  -Admit to CCU  -On vent qhs at home   -Pt to remain on vent given respiratory compromise  -CT chest for further evaluation  -Continue vancomycin - mother reports allergic reaction to vancomycin was hydronephrosis , mother denies any rash, angioedema or anaphylactic reaction to vanco  -Aztreonam given in ED-- family unaware of allergy to aztreonam but denied anaphylaxis  -Continue vancomycin and aztreonam if patient tolerating well  -ID consult (pt with numerous medication allergies)  -f/u RVP panel  -Hx of asthma- continue home meds    #Hyperkalemia 2/2 hemolysis  -K-5.8 slight hemolysis  -Monitor BMP to confirm K within normal limits    #Neuro  #Hx of CP and seizures  -No active issues  -Continue home meds    #GI  #GERD  -No active issues  -PEG intact- resume tube feeds  -Protonix    #DVT PPX  -Chemical prophylaxis     #Advance Directives  -Full code 25 yo F with hx of GERD, cerebral palsy s/p trach and PEG, seizures, scoliosis, asthma, presents febrile and tachycardic, with b/l pleural effusion and concern for Pneumonia    #Pulm  #Febrile syndrome suspect 2/2 CAP/Pleural Effusion  -Admit to CCU  -On vent qhs at home   -Pt to remain on vent given respiratory compromise  -CT chest for further evaluation  -Continue vancomycin - mother reports allergic reaction to vancomycin was hydronephrosis , mother denies any rash, angioedema or anaphylactic reaction to vanco  -Aztreonam given in ED-- family unaware of allergy to aztreonam but denied anaphylaxis  -Continue vancomycin and aztreonam if patient tolerating well  -ID consult (pt with numerous medication allergies)  -f/u RVP panel  -Hx of asthma- continue home meds  -Tylenol prn pain or fever  -Gentle hydration and monitor BP closely      #Hyperkalemia 2/2 hemolysis  -K-5.8 slight hemolysis  -Monitor BMP to confirm K within normal limits    #Neuro  #Hx of CP and seizures  -No active issues  -Continue home meds    #GI  #GERD  -No active issues  -PEG intact- resume tube feeds  -Protonix    #DVT PPX  -Chemical prophylaxis     #Advance Directives  -Full code 23 yo F with hx of GERD, cerebral palsy s/p trach and PEG, seizures, scoliosis, asthma, presents febrile and tachycardic, with b/l pleural effusion and concern for Pneumonia    #Pulm  #Febrile syndrome suspect 2/2 CAP/Pleural Effusion  -Admit to CCU  -On vent qhs at home   -Pt to remain on vent given respiratory compromise  -CT chest for further evaluation  -Continue vancomycin - mother reports allergic reaction to vancomycin was hydronephrosis , mother denies any rash, angioedema or anaphylactic reaction to vanco  -Aztreonam given in ED-- family unaware of allergy to aztreonam but denied anaphylaxis  -Continue vancomycin and aztreonam if patient tolerating well  -ID consult (pt with numerous medication allergies)  -f/u RVP panel  -Hx of asthma- continue home meds  -Tylenol prn pain or fever  -Gentle hydration and monitor BP closely    -f/u UA, blood cx and urine cx    #Hyperkalemia 2/2 hemolysis  -K-5.8 slight hemolysis  -Monitor BMP to confirm K within normal limits    #Neuro  #Hx of CP and seizures  -No active issues  -Continue home meds    #GI  #GERD  -No active issues  -PEG intact- resume tube feeds  -Protonix    #DVT PPX  -Chemical prophylaxis     #Advance Directives  -Full code

## 2020-01-24 NOTE — CONSULT NOTE ADULT - SUBJECTIVE AND OBJECTIVE BOX
HPI:  23 yo F with hx of GERD, cerebral palsy s/p trach and PEG, seizures, asthma and scoliosis presents to ED from Dr. Harris's office for further evaluation.   Pt reportedly with lethargy for the past few days and went to Dr. Harris's office, where she was found to be short of breath.   CXR done in office as per Dr. Harris showed b/l worsening airspace disease and O2 sat was 90% on vent.  In the ED patient was febrile to 104.8 F, BP- 95/57 and tachycardic to 140s.   Patient received 1.1 NS bolus x1 in the ED, WBC 14.20, lactate 1.3. (24 Jan 2020 18:12)    cardiology consulted for pericardial effusion.  CT scan ordered to assess pleural effusion & for infiltrate.  Incidental finding of "large pericardial effusion" reported.  Echocardiography performed at bedside shows moderate to large pericardial effusion w/ no tamponade physiology - see full report.  Pt currently on vent.  she is nonverbal at baseline.  family at bedside.    PAST MEDICAL & SURGICAL HISTORY:  Hypokalemia  Kyphosis  Scoliosis  Hypotonia  Asthma  Seizures  Cerebral Palsy  Gastroesophageal Reflux Disease  Hypothyroidism: Treated with synthroid in past. Stopped treatment in 2008 due to normal thyroid function.  Developmental Delay  Tracheostomy in place  Peg (Percutaneous Endoscopic Gastrostomy) Adjustment/Replacement/Removal  S/P Tonsillectomy and Adenoidectomy  Strabismus      Allergies    Bactrim (Unknown)  carbamazepine (Unknown)  cephalosporins (Rash)  Corn (Unknown)  Depakote (Unknown)  diphenhydrAMINE (Seizure)  eggs (Unknown)  EGGS,  NUTS (Anaphylaxis)  Erythromycin Base (Unknown)  metoclopramide (Unknown)  Milk (Unknown)  MILK, SHELLFISH, WHEAT (Unknown)  Nuts (Unknown)  oxcarbazepine (Unknown)  peanuts (Unknown)  Potatoes (Unknown)  SEASONAL, POLLEN, GRASS, PET DANDER, FEATHERS (Unknown)  Sesame (Unknown)  shellfish (Unknown)  Trileptal (Unknown)  valproic acid (Unknown)  vancomycin (Unknown)  Wheat (Unknown)    Intolerances    SOCIAL HISTORY: Lives at home    FAMILY HISTORY: Paternal grandfather colon cancer      MEDICATIONS:  MEDICATIONS  (STANDING):  ALBUTerol    0.083% 2.5 milliGRAM(s) Nebulizer every 4 hours  buDESOnide    Inhalation Suspension 0.25 milliGRAM(s) Inhalation two times a day  chlorhexidine 0.12% Liquid 15 milliLiter(s) Oral Mucosa every 12 hours  fluticasone propionate 50 MICROgram(s)/spray Nasal Spray 1 Spray(s) Both Nostrils daily  heparin  Injectable 5000 Unit(s) SubCutaneous every 12 hours  ipratropium 0.02% for Nebulization - Peds 500 MICROGram(s) Inhalation every 8 hours  levETIRAcetam  Oral Liquid - Peds 500 milliGRAM(s) Oral daily  levETIRAcetam  Oral Liquid - Peds 700 milliGRAM(s) Oral at bedtime  pantoprazole   Suspension 40 milliGRAM(s) Oral daily  sodium chloride 0.65% Nasal 2 Spray(s) Both Nostrils daily    MEDICATIONS  (PRN):  acetaminophen    Suspension .. 500 milliGRAM(s) Oral every 6 hours PRN Temp greater or equal to 38C (100.4F), Mild Pain (1 - 3)  sucralfate Oral Liquid - Peds 1000 milliGRAM(s) Oral Before meals and at bedtime PRN gastric bleeding      REVIEW OF SYSTEMS:    unable to obtain due to mental status    Vital Signs Last 24 Hrs  T(C): 39.3 (24 Jan 2020 19:51), Max: 40.4 (24 Jan 2020 17:07)  T(F): 102.7 (24 Jan 2020 19:51), Max: 104.8 (24 Jan 2020 17:07)  HR: 109 (24 Jan 2020 22:00) (101 - 145)  BP: 96/62 (24 Jan 2020 22:00) (86/67 - 107/52)  BP(mean): 71 (24 Jan 2020 22:00) (71 - 71)  RR: 22 (24 Jan 2020 22:00) (13 - 22)  SpO2: 100% (24 Jan 2020 22:00) (94% - 100%)    I&O's Summary      PHYSICAL EXAM:    HEENT: PERR, EOMI,  No oral cyananosis.  Neck:  supple,  No JVD  Respiratory: coarse breath sounds bilaterally, No wheezing, rales or rhonchi  Cardiovascular: S1 and S2, regular rate and rhythm, no Murmurs, gallops or rubs  Gastrointestinal: Bowel Sounds present, soft, nontender.   Extremities: No peripheral edema. No clubbing or cyanosis.  Vascular: 2+ peripheral pulses      LABS: All Labs Reviewed:                        14.2   14.20 )-----------( 181      ( 24 Jan 2020 17:47 )             43.4     24 Jan 2020 17:47    135    |  102    |  12     ----------------------------<  84     5.8     |  30     |  0.65     Ca    8.5        24 Jan 2020 17:47    TPro  7.3    /  Alb  2.5    /  TBili  0.2    /  DBili  x      /  AST  54     /  ALT  54     /  AlkPhos  134    24 Jan 2020 17:47    PT/INR - ( 24 Jan 2020 17:47 )   PT: 14.0 sec;   INR: 1.25 ratio         PTT - ( 24 Jan 2020 17:47 )  PTT:33.6 sec    ECG: sinus tachycardia no ST elevation/WA depression

## 2020-01-24 NOTE — ED ADULT TRIAGE NOTE - CHIEF COMPLAINT QUOTE
pt present sot ED with complaints of difficulty breathing. per mom, symptoms started last night. pt was seen by Md Mcdonald today and was sent in after chest x-ray showed pneumonia. pt is on chronic vent. pt sent straight to main ED for evaluation.

## 2020-01-24 NOTE — ED PROVIDER NOTE - CARDIAC, MLM
Normal rate, regular rhythm.  Heart sounds S1, S2.  No murmurs, rubs or gallops. regular rhythm.  Heart sounds S1, S2.  No murmurs, rubs or gallops. +tachycardic

## 2020-01-24 NOTE — ED PROVIDER NOTE - UNABLE TO OBTAIN
Hx unobtainable per pt due to baseline developmental delay, non verbal. Hx provided by mother at bedside. Severe Illness/Injury

## 2020-01-24 NOTE — CONSULT NOTE ADULT - PROBLEM SELECTOR RECOMMENDATION 9
Incidental finding on CT scan.  Moderate to large in size.  No evidence of tamponade physiology.  Etiology unclear:  No evidence of malignancy, uremia, infection, autoimmune disease.  TSH and ELIZABETH ordered.  Will continue to monitor w/ serial echos for now. Incidental finding on CT scan.  Moderate to large in size.  No evidence of tamponade physiology.  Etiology unclear:  No evidence of malignancy, uremia, autoimmune disease.  Doubt infectious.  TSH and ELIZABETH ordered.  Will continue to monitor w/ serial echos for now. Incidental finding on CT scan.  Moderate to large in size.  No evidence of tamponade physiology.  Etiology unclear:  No evidence of malignancy, uremia, autoimmune disease.  cannot rule out infectious process.  TSH and ELIZABETH ordered.  Will continue to monitor w/ serial echos for now.

## 2020-01-24 NOTE — ED PROVIDER NOTE - PROGRESS NOTE DETAILS
I Adnan Alladin attest that this documentation has been prepared under the direction and in the presence of Doctor Castro. Pt septic with effusions on cxr.  Had tachycardia and increased secretions and fever here.  Covered with broad spec abx given vent dependent with multiple prior episodes of pna.  Discussed with ICU who accepted for further w/u and management.  Further care per inpatient treatment team.

## 2020-01-24 NOTE — CONSULT NOTE ADULT - ASSESSMENT
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 Pericardial effusion: Getting echocardiogram. Cardiology consult. Had trace pericardial effusion on echo 6/24/19 with EF 55-60%  2. Groundglass opacities, pneumonia, chronic colonization: On aztreonam and vancomycin, secondary to allergies. Refer to prior sputum cultures in lab data  -Continue aztreonam. May need to switch to another agent that can cover gram positives as mother reports vanco may have caused her to have hydronephrosis in the past  -Hold teofilo, hypertonic saline now while on the vent  -sputum cultures, blood cultures  3. Asthma: continue xopenex, ipratropium, budesonide  4. Seizures: levetiracetam. Try to avoid abx that may lower seizure threshold  5. Hyperkalemia: Unclear etiology. Continue to trend. No acidosis on abg  6. PEG tube/malnutrition: Albumin 2.5. It was 3.4 a few weeks prior as per mother  -Nutrition consult  7. Pleural effusions: May be secondary to hypoalbuminemia. Too small to tap    Dr. James will manage the pulmonary aspects of this patients care over the weekend

## 2020-01-24 NOTE — H&P ADULT - ATTENDING COMMENTS
pt seen and examined in ED with Dr Tran (FP Resident)  agree with above with exception to/addition of the followinyo F s/p trach and J-tube.  Presenting with s/s of sepsis suspicious for pneumonia.  Lactate WNL - hemodynamics reasonable.  Tolerating her regular PC vent settings.  Treated with Azactam and Vanco due to numerous allergies & IVF.  CT chest done to further delineate extent of pleural effusions noted on CXR vs underlying infiltrates.  Risk of CT scan explained to pts family and they wanted to proceed despite exposures to radiation.  Pt admitted to CCU.    RVP negative, UA negative. Lactate WNL.  D/w ANNEMARIE Kaur in regard to report of large pericardial effusion.  Will consult thoracic sx, obtain ECHO to assess for tamponade, and consult cardiology to read remotely.    Nutrition eval ordered for AM given pts very specific TF regimen and multiple allergies.    A) sepsis likely of respiratory source - pneumonia with gram neg organisms likely - LLL ?      large pericardial effusion - need to assess for tamponade though hemodynamics appear reasonable       tachycardia in ED attributable to 103* fever, could be from cardiac compression.  P) continue care in CCU, full vent support for now      NPO except meds in case procedure required to relieve/drain pericardial effusion       ABx empirically - ID eval requested        GI and DVT prophylaxis as appropriate         HOB 30          Cardiology, CT surgery, ECHO            IVF             Supportive care    MOLST completed - pt is full resuscitation as d/w pts mother.

## 2020-01-24 NOTE — PATIENT PROFILE ADULT - NSPROMEDSADMININFO_GEN_A_NUR
administer in food/requires elixir form/crushed and administered via peg tube/difficulty swallowing pills

## 2020-01-24 NOTE — H&P ADULT - NSHPPHYSICALEXAM_GEN_ALL_CORE
Vital Signs Last 24 Hrs  T(C): 39.4 (24 Jan 2020 18:30), Max: 40.4 (24 Jan 2020 17:07)  T(F): 102.9 (24 Jan 2020 18:30), Max: 104.8 (24 Jan 2020 17:07)  HR: 121 (24 Jan 2020 18:30) (121 - 145)  BP: 95/57 (24 Jan 2020 18:30) (86/67 - 107/52)  BP(mean): --  RR: 17 (24 Jan 2020 18:30) (17 - 17)  SpO2: 96% (24 Jan 2020 18:30) (94% - 98%)

## 2020-01-25 LAB
ADD ON TEST-SPECIMEN IN LAB: SIGNIFICANT CHANGE UP
ALBUMIN SERPL ELPH-MCNC: 2.1 G/DL — LOW (ref 3.3–5)
ALP SERPL-CCNC: 111 U/L — SIGNIFICANT CHANGE UP (ref 40–120)
ALT FLD-CCNC: 43 U/L — SIGNIFICANT CHANGE UP (ref 12–78)
ANION GAP SERPL CALC-SCNC: 4 MMOL/L — LOW (ref 5–17)
AST SERPL-CCNC: 35 U/L — SIGNIFICANT CHANGE UP (ref 15–37)
BASE EXCESS BLDA CALC-SCNC: 3.1 MMOL/L — HIGH (ref -2–2)
BILIRUB SERPL-MCNC: 0.3 MG/DL — SIGNIFICANT CHANGE UP (ref 0.2–1.2)
BLOOD GAS COMMENTS ARTERIAL: SIGNIFICANT CHANGE UP
BLOOD GAS COMMENTS ARTERIAL: SIGNIFICANT CHANGE UP
BUN SERPL-MCNC: 11 MG/DL — SIGNIFICANT CHANGE UP (ref 7–23)
CALCIUM SERPL-MCNC: 8.3 MG/DL — LOW (ref 8.5–10.1)
CHLORIDE SERPL-SCNC: 106 MMOL/L — SIGNIFICANT CHANGE UP (ref 96–108)
CO2 SERPL-SCNC: 28 MMOL/L — SIGNIFICANT CHANGE UP (ref 22–31)
CREAT SERPL-MCNC: 0.44 MG/DL — LOW (ref 0.5–1.3)
GAS PNL BLDA: SIGNIFICANT CHANGE UP
GLUCOSE SERPL-MCNC: 95 MG/DL — SIGNIFICANT CHANGE UP (ref 70–99)
HCO3 BLDA-SCNC: 27 MMOL/L — SIGNIFICANT CHANGE UP (ref 21–29)
HCT VFR BLD CALC: 37.3 % — SIGNIFICANT CHANGE UP (ref 34.5–45)
HGB BLD-MCNC: 11.8 G/DL — SIGNIFICANT CHANGE UP (ref 11.5–15.5)
MAGNESIUM SERPL-MCNC: 2.1 MG/DL — SIGNIFICANT CHANGE UP (ref 1.6–2.6)
MCHC RBC-ENTMCNC: 31.6 GM/DL — LOW (ref 32–36)
MCHC RBC-ENTMCNC: 31.7 PG — SIGNIFICANT CHANGE UP (ref 27–34)
MCV RBC AUTO: 100.3 FL — HIGH (ref 80–100)
PCO2 BLDA: 42 MMHG — SIGNIFICANT CHANGE UP (ref 32–46)
PH BLDA: 7.42 — SIGNIFICANT CHANGE UP (ref 7.35–7.45)
PHOSPHATE SERPL-MCNC: 2.5 MG/DL — SIGNIFICANT CHANGE UP (ref 2.5–4.5)
PLATELET # BLD AUTO: 149 K/UL — LOW (ref 150–400)
PO2 BLDA: 111 MMHG — HIGH (ref 74–108)
POTASSIUM SERPL-MCNC: 3.8 MMOL/L — SIGNIFICANT CHANGE UP (ref 3.5–5.3)
POTASSIUM SERPL-SCNC: 3.8 MMOL/L — SIGNIFICANT CHANGE UP (ref 3.5–5.3)
PROT SERPL-MCNC: 6.2 GM/DL — SIGNIFICANT CHANGE UP (ref 6–8.3)
RBC # BLD: 3.72 M/UL — LOW (ref 3.8–5.2)
RBC # FLD: 11.9 % — SIGNIFICANT CHANGE UP (ref 10.3–14.5)
SAO2 % BLDA: 98 % — HIGH (ref 92–96)
SODIUM SERPL-SCNC: 138 MMOL/L — SIGNIFICANT CHANGE UP (ref 135–145)
WBC # BLD: 9.67 K/UL — SIGNIFICANT CHANGE UP (ref 3.8–10.5)
WBC # FLD AUTO: 9.67 K/UL — SIGNIFICANT CHANGE UP (ref 3.8–10.5)

## 2020-01-25 PROCEDURE — 93308 TTE F-UP OR LMTD: CPT | Mod: 26

## 2020-01-25 PROCEDURE — 71045 X-RAY EXAM CHEST 1 VIEW: CPT | Mod: 26

## 2020-01-25 PROCEDURE — 99233 SBSQ HOSP IP/OBS HIGH 50: CPT

## 2020-01-25 RX ORDER — ALBUTEROL 90 UG/1
2 AEROSOL, METERED ORAL EVERY 6 HOURS
Refills: 0 | Status: DISCONTINUED | OUTPATIENT
Start: 2020-01-25 | End: 2020-01-29

## 2020-01-25 RX ORDER — MEROPENEM 1 G/30ML
1000 INJECTION INTRAVENOUS EVERY 8 HOURS
Refills: 0 | Status: DISCONTINUED | OUTPATIENT
Start: 2020-01-25 | End: 2020-01-29

## 2020-01-25 RX ORDER — IPRATROPIUM BROMIDE 0.2 MG/ML
2 SOLUTION, NON-ORAL INHALATION EVERY 6 HOURS
Refills: 0 | Status: DISCONTINUED | OUTPATIENT
Start: 2020-01-25 | End: 2020-01-27

## 2020-01-25 RX ORDER — BUDESONIDE AND FORMOTEROL FUMARATE DIHYDRATE 160; 4.5 UG/1; UG/1
2 AEROSOL RESPIRATORY (INHALATION)
Refills: 0 | Status: DISCONTINUED | OUTPATIENT
Start: 2020-01-25 | End: 2020-01-29

## 2020-01-25 RX ADMIN — MEROPENEM 100 MILLIGRAM(S): 1 INJECTION INTRAVENOUS at 21:21

## 2020-01-25 RX ADMIN — Medication 2 PUFF(S): at 02:30

## 2020-01-25 RX ADMIN — PANTOPRAZOLE SODIUM 40 MILLIGRAM(S): 20 TABLET, DELAYED RELEASE ORAL at 12:55

## 2020-01-25 RX ADMIN — Medication 2 PUFF(S): at 08:36

## 2020-01-25 RX ADMIN — BUDESONIDE AND FORMOTEROL FUMARATE DIHYDRATE 2 PUFF(S): 160; 4.5 AEROSOL RESPIRATORY (INHALATION) at 21:53

## 2020-01-25 RX ADMIN — MEROPENEM 100 MILLIGRAM(S): 1 INJECTION INTRAVENOUS at 14:39

## 2020-01-25 RX ADMIN — Medication 2 PUFF(S): at 21:51

## 2020-01-25 RX ADMIN — ALBUTEROL 2 PUFF(S): 90 AEROSOL, METERED ORAL at 08:37

## 2020-01-25 RX ADMIN — Medication 50 MILLIGRAM(S): at 05:09

## 2020-01-25 RX ADMIN — Medication 650 MILLIGRAM(S): at 05:15

## 2020-01-25 RX ADMIN — PANTOPRAZOLE SODIUM 40 MILLIGRAM(S): 20 TABLET, DELAYED RELEASE ORAL at 00:00

## 2020-01-25 RX ADMIN — CHLORHEXIDINE GLUCONATE 15 MILLILITER(S): 213 SOLUTION TOPICAL at 20:33

## 2020-01-25 RX ADMIN — BUDESONIDE AND FORMOTEROL FUMARATE DIHYDRATE 2 PUFF(S): 160; 4.5 AEROSOL RESPIRATORY (INHALATION) at 08:36

## 2020-01-25 RX ADMIN — ALBUTEROL 2 PUFF(S): 90 AEROSOL, METERED ORAL at 02:30

## 2020-01-25 RX ADMIN — ALBUTEROL 2 PUFF(S): 90 AEROSOL, METERED ORAL at 14:44

## 2020-01-25 RX ADMIN — Medication 2 PUFF(S): at 14:43

## 2020-01-25 RX ADMIN — ALBUTEROL 2 PUFF(S): 90 AEROSOL, METERED ORAL at 21:52

## 2020-01-25 RX ADMIN — LEVETIRACETAM 500 MILLIGRAM(S): 250 TABLET, FILM COATED ORAL at 09:40

## 2020-01-25 RX ADMIN — CHLORHEXIDINE GLUCONATE 15 MILLILITER(S): 213 SOLUTION TOPICAL at 18:12

## 2020-01-25 RX ADMIN — LEVETIRACETAM 700 MILLIGRAM(S): 250 TABLET, FILM COATED ORAL at 21:22

## 2020-01-25 NOTE — PROGRESS NOTE ADULT - SUBJECTIVE AND OBJECTIVE BOX
23 yo F with hx of GERD, cerebral palsy s/p trach and PEG, seizures, asthma and scoliosis presents to ED from Dr. Harris's office for further evaluation.   Pt reportedly with lethargy for the past few days and went to Dr. Harris's office, where she was found to be short of breath.   CXR done in office as per Dr. Harris showed b/l worsening airspace disease and O2 sat was 90% on vent.  In the ED patient was febrile to 104.8 F, BP- 95/57 and tachycardic to 140s.   Patient received 1.1 NS bolus x1 in the ED, WBC 14.20, lactate 1.3. (24 Jan 2020 18:12)    cardiology consulted for pericardial effusion.  CT scan ordered to assess pleural effusion & for infiltrate.  Incidental finding of "large pericardial effusion" reported.  Echocardiography performed at bedside shows moderate to large pericardial effusion w/ no tamponade physiology - see full report.  Pt currently on vent.  she is nonverbal at baseline.  family at bedside.    1/25/20: hemodynamically stable overnight.  no events on telemetry.  Discussed plan of care with family at bedside.      MEDICATIONS:  MEDICATIONS  (STANDING):  ALBUTerol    90 MICROgram(s) HFA Inhaler 2 Puff(s) Inhalation every 6 hours  aztreonam  IVPB 1000 milliGRAM(s) IV Intermittent every 6 hours  budesonide  80 MICROgram(s)/formoterol 4.5 MICROgram(s) Inhaler 2 Puff(s) Inhalation two times a day  chlorhexidine 0.12% Liquid 15 milliLiter(s) Oral Mucosa every 12 hours  fluticasone propionate 50 MICROgram(s)/spray Nasal Spray 1 Spray(s) Both Nostrils daily  heparin  Injectable 5000 Unit(s) SubCutaneous every 12 hours  ipratropium 17 MICROgram(s) HFA Inhaler 2 Puff(s) Inhalation every 6 hours  levETIRAcetam  Oral Liquid - Peds 500 milliGRAM(s) Oral daily  levETIRAcetam  Oral Liquid - Peds 700 milliGRAM(s) Oral at bedtime  pantoprazole   Suspension 40 milliGRAM(s) Oral daily  sodium chloride 0.65% Nasal 2 Spray(s) Both Nostrils daily    MEDICATIONS  (PRN):  acetaminophen    Suspension .. 500 milliGRAM(s) Oral every 6 hours PRN Temp greater or equal to 38C (100.4F), Mild Pain (1 - 3)  sucralfate Oral Liquid - Peds 1000 milliGRAM(s) Oral Before meals and at bedtime PRN gastric bleeding      Vital Signs Last 24 Hrs  T(C): 36.6 (25 Jan 2020 00:00), Max: 40.4 (24 Jan 2020 17:07)  T(F): 97.9 (25 Jan 2020 00:00), Max: 104.8 (24 Jan 2020 17:07)  HR: 82 (25 Jan 2020 09:00) (82 - 145)  BP: 84/50 (25 Jan 2020 09:00) (84/50 - 107/52)  BP(mean): 58 (25 Jan 2020 09:00) (58 - 71)  RR: 19 (25 Jan 2020 09:00) (13 - 24)  SpO2: 99% (25 Jan 2020 09:00) (92% - 100%)    I&O's Summary    24 Jan 2020 07:01  -  25 Jan 2020 07:00  --------------------------------------------------------  IN: 400 mL / OUT: 0 mL / NET: 400 mL      PHYSICAL EXAM:    HEENT: PERR, EOMI,  No oral cyananosis.  Neck:  supple,  No JVD  Respiratory: coarse breath sounds bilaterally, No wheezing, rales or rhonchi  Cardiovascular: S1 and S2, regular rate and rhythm, no Murmurs, gallops or rubs  Gastrointestinal: Bowel Sounds present, soft, nontender.   Extremities: No peripheral edema. No clubbing or cyanosis.  Vascular: 2+ peripheral pulses        LABS: All Labs Reviewed:                        11.8   9.67  )-----------( 149      ( 25 Jan 2020 06:40 )             37.3                         14.2   14.20 )-----------( 181      ( 24 Jan 2020 17:47 )             43.4     25 Jan 2020 06:40    139    |  108    |  11     ----------------------------<  97     3.7     |  26     |  0.39   25 Jan 2020 01:06    138    |  106    |  11     ----------------------------<  95     3.8     |  28     |  0.44   24 Jan 2020 17:47    135    |  102    |  12     ----------------------------<  84     5.8     |  30     |  0.65     Ca    8.4        25 Jan 2020 06:40  Ca    8.3        25 Jan 2020 01:06  Ca    8.5        24 Jan 2020 17:47  Phos  2.5       25 Jan 2020 06:40  Mg     2.1       25 Jan 2020 06:40    TPro  6.0    /  Alb  2.0    /  TBili  0.3    /  DBili  x      /  AST  27     /  ALT  40     /  AlkPhos  114    25 Jan 2020 06:40  TPro  6.2    /  Alb  2.1    /  TBili  0.3    /  DBili  x      /  AST  35     /  ALT  43     /  AlkPhos  111    25 Jan 2020 01:06  TPro  7.3    /  Alb  2.5    /  TBili  0.2    /  DBili  x      /  AST  54     /  ALT  54     /  AlkPhos  134    24 Jan 2020 17:47    PT/INR - ( 24 Jan 2020 17:47 )   PT: 14.0 sec;   INR: 1.25 ratio         PTT - ( 24 Jan 2020 17:47 )  PTT:33.6 sec      Blood Culture:     01-25 @ 06:40  TSH: 1.56      ECG: sinus tachycardia no ST elevation/NC depression    < from: Transthoracic Echocardiogram (01.24.20 @ 22:41) >   Summary     Moderate to large pericardial effusion. Effusion is largest in size   posteriorly & adjacent to RV & RA with minimal effusion at the apex. There   is no echocardiographic evidence of tamponade physiology.   The left ventricle is normal in size, wallthickness, wall motion and   contractility.   Estimated left ventricular ejection fraction is 55-60 %.     Signature     ----------------------------------------------------------------   Electronically signed by Parvez Meyer MD(Interpreting   physician) on 01/24/2020 11:16 PM   ----------------------------------------------------------------    < end of copied text >

## 2020-01-25 NOTE — DIETITIAN INITIAL EVALUATION ADULT. - PERTINENT LABORATORY DATA
01-25 Na139 mmol/L Glu 97 mg/dL K+ 3.7 mmol/L Cr  0.39 mg/dL<L> BUN 11 mg/dL Phos 2.5 mg/dL Alb 2.0 g/dL<L> PAB n/a

## 2020-01-25 NOTE — CONSULT NOTE ADULT - SUBJECTIVE AND OBJECTIVE BOX
Patient is a 24y old  Female who presents with a chief complaint of tachycardia, shortness of breath (2020 09:19)    HPI:  23 yo F with hx of GERD, cerebral palsy s/p trach and PEG, seizures, asthma and scoliosis admitted on  from her pulmonologist office for evaluation of increased lethargy, shortness of breath and worsening oxygenation. The family at bedside noted blood tinged trach secretions and no fever at home, however upon admission was febrile to 104.8. Family also noted that patient heart rate was very elevated as well. No prior diarrheal illnesses, in fact patient can be constipated at times. Last hospitalization was  at [facility]. History per medical record and family at bedside.           PMH: as above  PSH: as above  Meds: per reconciliation sheet, noted below  MEDICATIONS  (STANDING):  ALBUTerol    90 MICROgram(s) HFA Inhaler 2 Puff(s) Inhalation every 6 hours  budesonide  80 MICROgram(s)/formoterol 4.5 MICROgram(s) Inhaler 2 Puff(s) Inhalation two times a day  chlorhexidine 0.12% Liquid 15 milliLiter(s) Oral Mucosa every 12 hours  fluticasone propionate 50 MICROgram(s)/spray Nasal Spray 1 Spray(s) Both Nostrils daily  heparin  Injectable 5000 Unit(s) SubCutaneous every 12 hours  ipratropium 17 MICROgram(s) HFA Inhaler 2 Puff(s) Inhalation every 6 hours  levETIRAcetam  Oral Liquid - Peds 500 milliGRAM(s) Oral daily  levETIRAcetam  Oral Liquid - Peds 700 milliGRAM(s) Oral at bedtime  meropenem  IVPB 1000 milliGRAM(s) IV Intermittent every 8 hours  pantoprazole   Suspension 40 milliGRAM(s) Oral daily  sodium chloride 0.65% Nasal 2 Spray(s) Both Nostrils daily    MEDICATIONS  (PRN):  acetaminophen    Suspension .. 500 milliGRAM(s) Oral every 6 hours PRN Temp greater or equal to 38C (100.4F), Mild Pain (1 - 3)  sucralfate Oral Liquid - Peds 1000 milliGRAM(s) Oral Before meals and at bedtime PRN gastric bleeding    Allergies    Bactrim (Unknown)  carbamazepine (Unknown)  cephalosporins (Rash)  Corn (Unknown)  Depakote (Unknown)  diphenhydrAMINE (Seizure)  eggs (Unknown)  EGGS,  NUTS (Anaphylaxis)  Erythromycin Base (Unknown)  metoclopramide (Unknown)  Milk (Unknown)  MILK, SHELLFISH, WHEAT (Unknown)  Nuts (Unknown)  oxcarbazepine (Unknown)  peanuts (Unknown)  Potatoes (Unknown)  SEASONAL, POLLEN, GRASS, PET DANDER, FEATHERS (Unknown)  Sesame (Unknown)  shellfish (Unknown)  Trileptal (Unknown)  valproic acid (Unknown)  vancomycin (Unknown)  Wheat (Unknown)    Intolerances      Social: no smoking, no alcohol, no illegal drugs; no recent travel, no exposure to TB  FAMILY HISTORY:     no history of premature cardiovascular disease in first degree relatives  ROS: unable to obtain secondary to patient medical condition     Vital Signs Last 24 Hrs  T(C): 35.8 (2020 10:49), Max: 40.4 (2020 17:07)  T(F): 96.5 (2020 10:49), Max: 104.8 (2020 17:07)  HR: 82 (2020 10:00) (82 - 145)  BP: 81/55 (2020 10:00) (81/55 - 107/52)  BP(mean): 58 (2020 10:00) (58 - 71)  RR: 18 (2020 10:00) (13 - 24)  SpO2: 98% (2020 10:00) (92% - 100%)  Daily     Daily Weight in k.5 (2020 00:00)    PE:    Constitutional: frail looking  HEENT: NC/AT, EOMI, PERRLA, conjunctivae clear; ears and nose atraumatic; pharynx clear  Neck: trach in place  Back: no tenderness  Respiratory: respiratory effort normal; scattered coarse breath sounds  Cardiovascular: S1S2 regular, no murmurs  Abdomen: soft, not tender, not distended, positive BS; no liver or spleen organomegaly; peg in place  Genitourinary: no suprapubic tenderness  Musculoskeletal: no muscle tenderness, no joint swelling or tenderness  Neurological/ Psychiatric: awake, alert, flaccid lower extremities  Skin: no rashes; no palpable lesions    Labs: all available labs reviewed                        11.8   9.67  )-----------( 149      ( 2020 06:40 )             37.3         139  |  108  |  11  ----------------------------<  97  3.7   |  26  |  0.39<L>    Ca    8.4<L>      2020 06:40  Phos  2.5       Mg     2.1         TPro  6.0  /  Alb  2.0<L>  /  TBili  0.3  /  DBili  x   /  AST  27  /  ALT  40  /  AlkPhos  114       LIVER FUNCTIONS - ( 2020 06:40 )  Alb: 2.0 g/dL / Pro: 6.0 gm/dL / ALK PHOS: 114 U/L / ALT: 40 U/L / AST: 27 U/L / GGT: x           Urinalysis Basic - ( 2020 17:47 )    Color: Yellow / Appearance: Clear / S.010 / pH: x  Gluc: x / Ketone: Trace  / Bili: Negative / Urobili: Negative mg/dL   Blood: x / Protein: 15 mg/dL / Nitrite: Negative   Leuk Esterase: Negative / RBC: >50 /HPF / WBC 0-2   Sq Epi: x / Non Sq Epi: Occasional / Bacteria: Occasional          Lactate, Blood (20 @ 17:47)    Lactate, Blood: 1.3 mmol/L        < from: CT Chest No Cont (20 @ 20:27) >    EXAM:  CT CHEST                            PROCEDURE DATE:  2020          INTERPRETATION:  CLINICAL INFORMATION: Patient on ventilator with respiratory symptoms and fever of 103. Assess for infiltrates and pleural effusions.    COMPARISON: None.    PROCEDURE:   CT of the Chest was performed without intravenous contrast. This limits evaluation of vascular structures.  Sagittal and coronal reformats were performed.      FINDINGS:    LUNGS AND AIRWAYS: A tracheostomy is present. Motion artifact slightly limits evaluation although there is suggestion of a focal density in the right lateral wall of the right proximal mainstem bronchus. This may represent secretions although other etiologies cannot be excluded. Again, this is limited in evaluation. There are faint bilateral groundglass opacities and groundglass nodular opacities in the upper lobes. Mild interlobular septal thickening is seen in the upper lobes. Minimal patchy groundglass opacities are present in the lingula. There is atelectasis of the left lower lobe and atelectatic changes at the right lung base.    PLEURA: Small bilateral pleural effusions, left greater than right.    MEDIASTINUM AND ALEXIS: Very limited in evaluation without contrast and due to the pleural fluid. I cannot rule out subcarinal lymphadenopathy.    VESSELS: Within normal limits.    HEART: Heart size is normal. There is a large pericardial effusion.    CHEST WALL AND LOWER NECK: Within normal limits.    VISUALIZED UPPER ABDOMEN: There is a percutaneous tube in the stomach which continues into the jejunum. The distal tip is not included on the examination.    BONES: Scoliosis    IMPRESSION:     Large pericardial effusion. This was discussed with ANNEMARIE Kaur at 8:44 PM on 2020.    Faint groundglass opacities and nodular groundglass opacities. Mild interlobular septal thickening. This may be due to fluid overload. Underlying infection cannot be excluded. Clinical correlation follow-up is recommended. Nodular opacities in the lingula which may be due to infection. Atelectasis of the left lower lobe.    Small bilateral pleural effusions.    Motion artifact limits evaluation of the airways although there is suspicion for a nodular density in the right mainstem bronchus proximally along theright side. This may be due to secretions although other etiologies cannot be excluded. Clinical correlation is recommended.`    Limited evaluation for lymphadenopathy as above.    < end of copied text >        < from: Transthoracic Echocardiogram (20 @ 22:41) >     Impression     Summary     Moderate to large pericardial effusion. Effusion is largest in size   posteriorly & adjacent to RV & RA with minimal effusion at the apex. There   is no echocardiographic evidence of tamponade physiology.   The left ventricle is normal in size, wallthickness, wall motion and   contractility.   Estimated left ventricular ejection fraction is 55-60 %.    < end of copied text >                          Radiology: all available radiological tests reviewed    Advanced directives addressed: full resuscitation

## 2020-01-25 NOTE — DIETITIAN INITIAL EVALUATION ADULT. - ENTERAL
[employer] 1.5 Peptide at goal rate of 25 ml/hr over 24 hours with free water flush at 55 ml/hr over 24 hours.

## 2020-01-25 NOTE — DIETITIAN INITIAL EVALUATION ADULT. - PERTINENT MEDS FT
MEDICATIONS  (STANDING):  ALBUTerol    90 MICROgram(s) HFA Inhaler 2 Puff(s) Inhalation every 6 hours  budesonide  80 MICROgram(s)/formoterol 4.5 MICROgram(s) Inhaler 2 Puff(s) Inhalation two times a day  chlorhexidine 0.12% Liquid 15 milliLiter(s) Oral Mucosa every 12 hours  fluticasone propionate 50 MICROgram(s)/spray Nasal Spray 1 Spray(s) Both Nostrils daily  heparin  Injectable 5000 Unit(s) SubCutaneous every 12 hours  ipratropium 17 MICROgram(s) HFA Inhaler 2 Puff(s) Inhalation every 6 hours  levETIRAcetam  Oral Liquid - Peds 500 milliGRAM(s) Oral daily  levETIRAcetam  Oral Liquid - Peds 700 milliGRAM(s) Oral at bedtime  meropenem  IVPB 1000 milliGRAM(s) IV Intermittent every 8 hours  pantoprazole   Suspension 40 milliGRAM(s) Oral daily  sodium chloride 0.65% Nasal 2 Spray(s) Both Nostrils daily    MEDICATIONS  (PRN):  acetaminophen    Suspension .. 500 milliGRAM(s) Oral every 6 hours PRN Temp greater or equal to 38C (100.4F), Mild Pain (1 - 3)  sucralfate Oral Liquid - Peds 1000 milliGRAM(s) Oral Before meals and at bedtime PRN gastric bleeding

## 2020-01-25 NOTE — DIETITIAN INITIAL EVALUATION ADULT. - OTHER INFO
25 yo F with hx of GERD, cerebral palsy s/p trach and PEG, seizures, asthma and scoliosis presents to ED from Dr. Harris's office for further evaluation. Pt reportedly with lethargy for the past few days and went to Dr. Harris's office, where she was found to be short of breath. CXR done in office as per Dr. Harris showed b/l worsening airspace disease and O2 sat was 90% on vent.    Pt seen for assessment and tube feeding . Pt also being followed by RD outpatient, contacted and information provided. Pt has PEJ tube (Not PEG) Pt has long h/x with tube feedings and difficulty with tolerance. Pt prior to seeing RD was on Pediatric formula Elecare JR, the formulas ingredients changed and pt experienced vomiting and intolerance. Pt was switched to Alfamino JR and tolerated. These tube feedings are generally acceptable for pediatric (pt was using into 20's). Pt has been transitioning to [employer] 1.5 Peptide formula and appears to be tolerating currently. Pt's current home TF is 540ml total volume daily with 3 Tablespoons of an amino-acid blend. Pt also receives Pedialyte 1 liter daily and 360ml free water flush. Pt takes multiple supplements listed below. Pt's current tube feeding is transitional and pt does require more calories and protein. Will Re-access patient to see tolerance with current feedings. When discussing pt with outpatient RD, Found pt has issues with chronic constipation (was on iron supplements) and sodium levels.  Recommend holding iron supplement and assess Gastirc output. Pt on lactulose for constipation. Also noted new Formula ([employer]) may have more fiber than prior formula. Will monitor tolerance. Tube feeding recommendation noted below. Given pt's allergies and complications recommend continuing with home formula. Will monitor lytes and bowel movements.    Supplements:  Potassium Chloride (10 meq tablet) 1 tab twice per day  Calcium 200mg daily QD  Magnesium Glycinate 400mg tablet Q12  Vitamin d- 3-5 drops per day QD  Poly-Vi-Sol Vitamin- 2ml QD  Methyl B!@- 1 crushed tab daily (5,000 mcg vit b12 and 1,000mcg Folate)  Probiotic-1/2 Tsp QD (10 billion+ CFU)  1.5 ml Fish Oil TID

## 2020-01-25 NOTE — PROGRESS NOTE ADULT - PROBLEM SELECTOR PLAN 1
Incidental finding on CT scan.  Moderate to large in size.  No evidence of tamponade physiology on echo yesterday.  Etiology unclear:  No evidence of malignancy, uremia, autoimmune disease.  TSH and ELIZABETH ordered.  cannot rule out infectious process - discussed w/ ID, bacterial pericardial effusion unlikely may be viral.  Will continue to monitor w/ serial echos for now.  If remains unchanged in size on monday will favor observation rather than drainage.

## 2020-01-25 NOTE — PROGRESS NOTE ADULT - SUBJECTIVE AND OBJECTIVE BOX
Patient is 25yo old Female with PMH asthma, cerebral palsy, developmental delay, seizures, scoliosis, hypothyroidism, GERD, s/p trach, PEG, on trilogy at home, with multiple hospitalizations in the past for pneumonia, presented to clinic 2020 for worsening SOB and lethargy for the past few sent to the ER.   In the ER patient was febrile, 104.8, RVP neg    CT Chest revealed GGO, and pericaridal effusion  ECHO done last night, mod-large pericardial effusion, no evidence of tamponade  Cardiology, Dr Rosenthal, consulted appreciated  ID and Nutrition consult pending    : patient seen and examined, at bedside, mother at bedside, reports patient is doing slightly better. no major complaints.     T(C): 36.6 (20 @ 00:00), Max: 40.4 (20 @ 17:07)  HR: 97 (20 @ 08:00) (94 - 145)  BP: 89/58 (20 @ 08:00) (86/56 - 107/52)  RR: 24 (20 @ 08:00) (13 - 24)  SpO2: 98% (20 @ 08:00) (92% - 100%)  Wt(kg): --    Gen: Awake, on vent, NAD, cachexia  HEENT: NCAT, EOMI  Neck: Supple, + Trach  CV: nml S1S2, RRR  Lungs: decreased breath sounds at bases  Abd: Soft, NT, ND, BS+, +PEG  Ext: No edema  Neuro: Non focal                                  11.8   9.67  )-----------( 149      ( 2020 06:40 )             37.3     2020 06:40    139    |  108    |  11     ----------------------------<  97     3.7     |  26     |  0.39     Ca    8.4        2020 06:40  Phos  2.5       2020 06:40  Mg     2.1       2020 06:40    TPro  6.0    /  Alb  2.0    /  TBili  0.3    /  DBili  x      /  AST  27     /  ALT  40     /  AlkPhos  114    2020 06:40    PT/INR - ( 2020 17:47 )   PT: 14.0 sec;   INR: 1.25 ratio         PTT - ( 2020 17:47 )  PTT:33.6 sec  CAPILLARY BLOOD GLUCOSE        LIVER FUNCTIONS - ( 2020 06:40 )  Alb: 2.0 g/dL / Pro: 6.0 gm/dL / ALK PHOS: 114 U/L / ALT: 40 U/L / AST: 27 U/L / GGT: x           Urinalysis Basic - ( 2020 17:47 )    Color: Yellow / Appearance: Clear / S.010 / pH: x  Gluc: x / Ketone: Trace  / Bili: Negative / Urobili: Negative mg/dL   Blood: x / Protein: 15 mg/dL / Nitrite: Negative   Leuk Esterase: Negative / RBC: >50 /HPF / WBC 0-2   Sq Epi: x / Non Sq Epi: Occasional / Bacteria: Occasional

## 2020-01-25 NOTE — PROGRESS NOTE ADULT - SUBJECTIVE AND OBJECTIVE BOX
CHAMP CALDWELL  MRN: [id number]    S: 1/25/2019: Chart reviewed. Patient is comfortable on vent this AM. O2 sat 100%% on FiO2 50 %.     PAST MEDICAL & SURGICAL HISTORY:  Hypokalemia  Kyphosis  Scoliosis  Hypotonia  Asthma  Seizures  Cerebral Palsy  Gastroesophageal Reflux Disease  Hypothyroidism: Treated with synthroid in past. Stopped treatment in 2008 due to normal thyroid function.  Developmental Delay  Tracheostomy in place  Peg (Percutaneous Endoscopic Gastrostomy) Adjustment/Replacement/Removal  S/P Tonsillectomy and Adenoidectomy  Strabismus      O: T(C): 36.6 (01-25-20 @ 00:00), Max: 40.4 (01-24-20 @ 17:07)  HR: 97 (01-25-20 @ 08:00) (94 - 145)  BP: 89/58 (01-25-20 @ 08:00) (86/56 - 107/52)  RR: 24 (01-25-20 @ 08:00) (13 - 24)  SpO2: 98% (01-25-20 @ 08:00) (92% - 100%)  Wt(kg): --    PHYSICAL EXAM:      GENERAL: comfortable on vent    NEURO: sleeping    NECK: no JVD    CHEST: equal bilaterally; presently clear anteriorly.  - no wheezing    CARDIAC: RR    ABDOMEN: soft; non-tender. Normo-active bowel sounds.     EXT: no edema    LABS:    ABG - ( 25 Jan 2020 05:09 )  pH, Arterial: 7.42  pH, Blood: x     /  pCO2: 42    /  pO2: 111   / HCO3: 27    / Base Excess: 3.1   /  SaO2: 98                              11.8   9.67  )-----------( 149      ( 25 Jan 2020 06:40 )             37.3       01-25    139  |  108  |  11  ----------------------------<  97  3.7   |  26  |  0.39<L>    Ca    8.4<L>      25 Jan 2020 06:40  Phos  2.5     01-25  Mg     2.1     01-25    TPro  6.0  /  Alb  2.0<L>  /  TBili  0.3  /  DBili  x   /  AST  27  /  ALT  40  /  AlkPhos  114  01-25    RADIOLOGY:       EXAM:  CT CHEST                            PROCEDURE DATE:  01/24/2020      INTERPRETATION:  CLINICAL INFORMATION: Patient on ventilator with respiratory symptoms and fever of 103. Assess for infiltrates and pleural effusions.    COMPARISON: None.    PROCEDURE:   CT of the Chest was performed without intravenous contrast. This limits evaluation of vascular structures.  Sagittal and coronal reformats were performed.      FINDINGS:    LUNGS AND AIRWAYS: A tracheostomy is present. Motion artifact slightly limits evaluation although there is suggestion of a focal density in the right lateral wall of the right proximal mainstem bronchus. This may represent secretions although other etiologies cannot be excluded. Again, this is limited in evaluation. There are faint bilateral groundglass opacities and groundglass nodular opacities in the upper lobes. Mild interlobular septal thickening is seen in the upper lobes. Minimal patchy groundglass opacities are present in the lingula. There is atelectasis of the left lower lobe and atelectatic changes at the right lung base.    PLEURA: Small bilateral pleural effusions, left greater than right.    MEDIASTINUM AND ALEXIS: Very limited in evaluation without contrast and due to the pleural fluid. I cannot rule out subcarinal lymphadenopathy.    VESSELS: Within normal limits.    HEART: Heart size is normal. There is a large pericardial effusion.    CHEST WALL AND LOWER NECK: Within normal limits.    VISUALIZED UPPER ABDOMEN: There is a percutaneous tube in the stomach which continues into the jejunum. The distal tip is not included on the examination.    BONES: Scoliosis    IMPRESSION:     Large pericardial effusion. This was discussed with ANNEMARIE Kaur at 8:44 PM on 1/24/2020.    Faint groundglass opacities and nodular groundglass opacities. Mild interlobular septal thickening. This may be due to fluid overload. Underlying infection cannot be excluded. Clinical correlation follow-up is recommended. Nodular opacities in the lingula which may be due to infection. Atelectasis of the left lower lobe.    Small bilateral pleural effusions.    Motion artifact limits evaluation of the airways although there is suspicion for a nodular density in the right mainstem bronchus proximally along theright side. This may be due to secretions although other etiologies cannot be excluded. Clinical correlation is recommended.`    Limited evaluation for lymphadenopathy as above.    The above findings were discussed with ANNEMARIE Kaur at 8:44 PM on 1/24/2020.    KAT RHODES     < from: Transthoracic Echocardiogram (01.24.20 @ 22:41) >   EXAM:  ECHO TTE WO CON COMP W DOP         PROCEDURE DATE:  01/24/2020      INTERPRETATION:  Transthoracic Echocardiography Report (TTE)     Demographics     Patient name        JAVI OAKES       Age           24 year(s)                       ABHISHEK     Med Rec #           [id number]         Gender        Female     Account #           [id number]      Date of Birth 1995     Interpreting        Parvez Meyer MD   Room Number   0034   Physician     Referring Physician TAYLA DIAMOND JR  Sonographer   Radha Griffith MD                              [facility]     Date of study       01/24/2020 10:10                       PM     Height              50.79 in          Weight        77.16 pounds    Type of Study:     TTE procedure: ECHO TTE WO CON COMP W DOP     BP: 96/68 mmHg     Study Location: [facility] Quality: Fair    Indications   1) I31.3 - Pericardial effusion noninflammatory    M-Mode Measurements (cm)     LVEDd: 3.82 cm                       LVESd: 2.78 cm   IVSEd: 0.78 cm   LVPWd: 1 cm                          ACS: 1.4 cm                                        LA: 2.8 cm    Doppler Measurements:                                    MV Peak E-Wave: 84.9 cm/s   TR Velocity:201 cm/s           MV Peak A-Wave: 54.8 cm/s   TR Gradient:16.1604 mmHg       MV E/A Ratio: 1.55 %   Estimated RAP:5 mmHg           MV Peak Gradient: 2.88 mmHg   RVSP:21 mmHg     Findings     Mitral Valve   Trace mitral regurgitation is present.     Aortic Valve   Normal aortic valve structure and function.     Tricuspid Valve   Trace tricuspid valve regurgitation is present.     Pulmonic Valve   Normal appearing pulmonic valve structure and function.     Left Atrium   Normal appearing left atrium.     Left Ventricle   The leftventricle is normal in size, wall thickness, wall motion and   contractility.   Estimated left ventricular ejection fraction is 55-60 %.     Right Atrium   Normal appearing right atrium.     Right Ventricle   Normal appearing right ventricle structure and function.     Pericardial Effusion   Moderate to large pericardial effusion. Effusion is largest in size   posteriorly & adjacent to RV & RA with minimal effusion at the apex. There   is no echocardiographic evidence of tamponade physiology.   No RA or RV diastolic collapse. Mitral inflow variation is 18%. IV is not   dilated.     Pleural Effusion   No evidence of pleural effusion.     Miscellaneous   All visualized extra cardiac structures appears to be normal.     Impression     Summary     Moderate to large pericardial effusion. Effusion is largest in size   posteriorly & adjacent to RV & RA with minimal effusion at the apex. There   is no echocardiographic evidence of tamponade physiology.   The left ventricle is normal in size, wallthickness, wall motion and   contractility.   Estimated left ventricular ejection fraction is 55-60 %.     Signature     ----------------------------------------------------------------   Electronically signed by Parvez Meyer MD(Interpreting   physician) on 01/24/2020 11:16 PM   ----------------------------------------------------------------    Valves     Mitral Valve     Peak E-Wave: 84.9 cm/s   Peak A-Wave: 54.8 cm/s   Peak Gradient: 2.88 mmHg                                                 E/A Ratio: 1.55     Aortic Valve     Cusp Separation: 1.4 cm     Tricuspid Valve     TR Velocity: 201 cm/s                  Estimated RAP: 5 mmHg   TR Gradient: 16.1604 mmHg              Estimated RVSP: 21 mmHg     Pulmonic Valve              Estimated PASP: 21.16 mmHg    Structures     Left Atrium     LA Dimension: 2.8 cm           LA Area: 9.17 cm^2                                  LA Volume/Index: 17 ml /15m^2     Left Ventricle     Diastolic Dimension: 3.82 cm          Systolic Dimension: 2.78 cm   Septum Diastolic: 0.78 cm   PW Diastolic: 1 cm     FS: 27.23 %     Right Atrium     RA Systolic Pressure: 5 mmHg     Right Ventricle              RV Systolic Pressure: 21.16 mmHg        JULIANO WINN           MEDICATIONS  (STANDING):  ALBUTerol    90 MICROgram(s) HFA Inhaler 2 Puff(s) Inhalation every 6 hours  aztreonam  IVPB 1000 milliGRAM(s) IV Intermittent every 6 hours  budesonide  80 MICROgram(s)/formoterol 4.5 MICROgram(s) Inhaler 2 Puff(s) Inhalation two times a day  chlorhexidine 0.12% Liquid 15 milliLiter(s) Oral Mucosa every 12 hours  fluticasone propionate 50 MICROgram(s)/spray Nasal Spray 1 Spray(s) Both Nostrils daily  heparin  Injectable 5000 Unit(s) SubCutaneous every 12 hours  ipratropium 17 MICROgram(s) HFA Inhaler 2 Puff(s) Inhalation every 6 hours  levETIRAcetam  Oral Liquid - Peds 500 milliGRAM(s) Oral daily  levETIRAcetam  Oral Liquid - Peds 700 milliGRAM(s) Oral at bedtime  pantoprazole   Suspension 40 milliGRAM(s) Oral daily  sodium chloride 0.65% Nasal 2 Spray(s) Both Nostrils daily    MEDICATIONS  (PRN):  acetaminophen    Suspension .. 500 milliGRAM(s) Oral every 6 hours PRN Temp greater or equal to 38C (100.4F), Mild Pain (1 - 3)  sucralfate Oral Liquid - Peds 1000 milliGRAM(s) Oral Before meals and at bedtime PRN gastric bleeding        A/P: 1. Acute febrile illness with bilateral ground glass opacities on chest CT. Continue broad spectrum antibiotics per ID. Follow up cultures. Continue vent support and O2 supplement per CCM.     2. Pericardial effusion. D/W Dr. Meyer. Follow up ECHO. Currently no hemodynamic compromise.     3. Nutritional support. Per CCM. Nutrition consult.     4. Hx of asthma. Inhaled bronchodilators/budesonide. No evidence of exacerbation.      5. Hx of seizures. On Levetiracetam.     6. DVT prophylaxis. S.C. heparin.     Reviewed plan with patient's mother at bedside.

## 2020-01-25 NOTE — DIETITIAN INITIAL EVALUATION ADULT. - ADD RECOMMEND
C/w bowel protocol, monitor output, monitor tolerance. Maintain head elevated 30-45 degrees. Monitor lytes. Will continue to monitor pt's nutritional status.

## 2020-01-26 LAB
ALBUMIN SERPL ELPH-MCNC: 1.9 G/DL — LOW (ref 3.3–5)
ALP SERPL-CCNC: 124 U/L — HIGH (ref 40–120)
ALT FLD-CCNC: 30 U/L — SIGNIFICANT CHANGE UP (ref 12–78)
ANION GAP SERPL CALC-SCNC: 5 MMOL/L — SIGNIFICANT CHANGE UP (ref 5–17)
AST SERPL-CCNC: 18 U/L — SIGNIFICANT CHANGE UP (ref 15–37)
BILIRUB SERPL-MCNC: 0.2 MG/DL — SIGNIFICANT CHANGE UP (ref 0.2–1.2)
BUN SERPL-MCNC: 17 MG/DL — SIGNIFICANT CHANGE UP (ref 7–23)
CALCIUM SERPL-MCNC: 8.1 MG/DL — LOW (ref 8.5–10.1)
CHLORIDE SERPL-SCNC: 108 MMOL/L — SIGNIFICANT CHANGE UP (ref 96–108)
CMV IGG FLD QL: <0.2 U/ML — SIGNIFICANT CHANGE UP
CMV IGG SERPL-IMP: NEGATIVE — SIGNIFICANT CHANGE UP
CMV IGM FLD-ACNC: <8 AU/ML — SIGNIFICANT CHANGE UP
CMV IGM SERPL QL: NEGATIVE — SIGNIFICANT CHANGE UP
CO2 SERPL-SCNC: 26 MMOL/L — SIGNIFICANT CHANGE UP (ref 22–31)
CREAT SERPL-MCNC: 0.47 MG/DL — LOW (ref 0.5–1.3)
EBV EA AB SER IA-ACNC: <5 U/ML — SIGNIFICANT CHANGE UP
EBV EA AB TITR SER IF: NEGATIVE — SIGNIFICANT CHANGE UP
EBV EA IGG SER-ACNC: NEGATIVE — SIGNIFICANT CHANGE UP
EBV NA IGG SER IA-ACNC: <3 U/ML — SIGNIFICANT CHANGE UP
EBV PATRN SPEC IB-IMP: SIGNIFICANT CHANGE UP
EBV VCA IGG AVIDITY SER QL IA: NEGATIVE — SIGNIFICANT CHANGE UP
EBV VCA IGM SER IA-ACNC: <10 U/ML — SIGNIFICANT CHANGE UP
EBV VCA IGM SER IA-ACNC: <10 U/ML — SIGNIFICANT CHANGE UP
EBV VCA IGM TITR FLD: NEGATIVE — SIGNIFICANT CHANGE UP
GLUCOSE SERPL-MCNC: 89 MG/DL — SIGNIFICANT CHANGE UP (ref 70–99)
GRAM STN FLD: SIGNIFICANT CHANGE UP
HCT VFR BLD CALC: 34.3 % — LOW (ref 34.5–45)
HGB BLD-MCNC: 10.7 G/DL — LOW (ref 11.5–15.5)
MCHC RBC-ENTMCNC: 31.2 GM/DL — LOW (ref 32–36)
MCHC RBC-ENTMCNC: 31.5 PG — SIGNIFICANT CHANGE UP (ref 27–34)
MCV RBC AUTO: 100.9 FL — HIGH (ref 80–100)
PLATELET # BLD AUTO: 188 K/UL — SIGNIFICANT CHANGE UP (ref 150–400)
POTASSIUM SERPL-MCNC: 4 MMOL/L — SIGNIFICANT CHANGE UP (ref 3.5–5.3)
POTASSIUM SERPL-SCNC: 4 MMOL/L — SIGNIFICANT CHANGE UP (ref 3.5–5.3)
PROT SERPL-MCNC: 6.1 GM/DL — SIGNIFICANT CHANGE UP (ref 6–8.3)
RBC # BLD: 3.4 M/UL — LOW (ref 3.8–5.2)
RBC # FLD: 12.1 % — SIGNIFICANT CHANGE UP (ref 10.3–14.5)
SODIUM SERPL-SCNC: 139 MMOL/L — SIGNIFICANT CHANGE UP (ref 135–145)
SPECIMEN SOURCE: SIGNIFICANT CHANGE UP
WBC # BLD: 10 K/UL — SIGNIFICANT CHANGE UP (ref 3.8–10.5)
WBC # FLD AUTO: 10 K/UL — SIGNIFICANT CHANGE UP (ref 3.8–10.5)

## 2020-01-26 PROCEDURE — 99233 SBSQ HOSP IP/OBS HIGH 50: CPT

## 2020-01-26 PROCEDURE — 93010 ELECTROCARDIOGRAM REPORT: CPT

## 2020-01-26 RX ADMIN — MEROPENEM 100 MILLIGRAM(S): 1 INJECTION INTRAVENOUS at 21:48

## 2020-01-26 RX ADMIN — Medication 2 PUFF(S): at 14:47

## 2020-01-26 RX ADMIN — Medication 2 PUFF(S): at 21:17

## 2020-01-26 RX ADMIN — CHLORHEXIDINE GLUCONATE 15 MILLILITER(S): 213 SOLUTION TOPICAL at 19:11

## 2020-01-26 RX ADMIN — Medication 2 PUFF(S): at 01:52

## 2020-01-26 RX ADMIN — BUDESONIDE AND FORMOTEROL FUMARATE DIHYDRATE 2 PUFF(S): 160; 4.5 AEROSOL RESPIRATORY (INHALATION) at 08:41

## 2020-01-26 RX ADMIN — PANTOPRAZOLE SODIUM 40 MILLIGRAM(S): 20 TABLET, DELAYED RELEASE ORAL at 11:36

## 2020-01-26 RX ADMIN — ALBUTEROL 2 PUFF(S): 90 AEROSOL, METERED ORAL at 08:40

## 2020-01-26 RX ADMIN — ALBUTEROL 2 PUFF(S): 90 AEROSOL, METERED ORAL at 14:48

## 2020-01-26 RX ADMIN — ALBUTEROL 2 PUFF(S): 90 AEROSOL, METERED ORAL at 01:52

## 2020-01-26 RX ADMIN — Medication 1 SPRAY(S): at 15:29

## 2020-01-26 RX ADMIN — Medication 2 SPRAY(S): at 11:37

## 2020-01-26 RX ADMIN — LEVETIRACETAM 700 MILLIGRAM(S): 250 TABLET, FILM COATED ORAL at 21:48

## 2020-01-26 RX ADMIN — MEROPENEM 100 MILLIGRAM(S): 1 INJECTION INTRAVENOUS at 05:18

## 2020-01-26 RX ADMIN — BUDESONIDE AND FORMOTEROL FUMARATE DIHYDRATE 2 PUFF(S): 160; 4.5 AEROSOL RESPIRATORY (INHALATION) at 21:17

## 2020-01-26 RX ADMIN — LEVETIRACETAM 500 MILLIGRAM(S): 250 TABLET, FILM COATED ORAL at 11:35

## 2020-01-26 RX ADMIN — MEROPENEM 100 MILLIGRAM(S): 1 INJECTION INTRAVENOUS at 15:28

## 2020-01-26 RX ADMIN — Medication 2 PUFF(S): at 08:40

## 2020-01-26 RX ADMIN — ALBUTEROL 2 PUFF(S): 90 AEROSOL, METERED ORAL at 21:17

## 2020-01-26 NOTE — PROGRESS NOTE ADULT - SUBJECTIVE AND OBJECTIVE BOX
24F CP  GERD SZ DO trach peg.    Admit 1/24 with lethargy sepsis leukocytosis tachycardia  fever 104.      RX for aspiration PNA.    ABX ungraded to meropenum, no + cultures still febrile but less so.      Found with large pericardial effusion without tamponade physiology.     Viral serologies sent to investigate  Pericarditis with fever/effusion also in diff.     Repeat echo Monday.  If increasing, will need drainage.      DVT P

## 2020-01-26 NOTE — PROGRESS NOTE ADULT - SUBJECTIVE AND OBJECTIVE BOX
Date of service: 01-26-20 @ 13:10    Patient on ventilatory support; intermittently febrile        ROS: unable to obtain secondary to patient medical condition     MEDICATIONS  (STANDING):  ALBUTerol    90 MICROgram(s) HFA Inhaler 2 Puff(s) Inhalation every 6 hours  budesonide  80 MICROgram(s)/formoterol 4.5 MICROgram(s) Inhaler 2 Puff(s) Inhalation two times a day  chlorhexidine 0.12% Liquid 15 milliLiter(s) Oral Mucosa every 12 hours  fluticasone propionate 50 MICROgram(s)/spray Nasal Spray 1 Spray(s) Both Nostrils daily  heparin  Injectable 5000 Unit(s) SubCutaneous every 12 hours  ipratropium 17 MICROgram(s) HFA Inhaler 2 Puff(s) Inhalation every 6 hours  levETIRAcetam  Oral Liquid - Peds 500 milliGRAM(s) Oral daily  levETIRAcetam  Oral Liquid - Peds 700 milliGRAM(s) Oral at bedtime  meropenem  IVPB 1000 milliGRAM(s) IV Intermittent every 8 hours  pantoprazole   Suspension 40 milliGRAM(s) Oral daily  sodium chloride 0.65% Nasal 2 Spray(s) Both Nostrils daily    MEDICATIONS  (PRN):  acetaminophen    Suspension .. 500 milliGRAM(s) Oral every 6 hours PRN Temp greater or equal to 38C (100.4F), Mild Pain (1 - 3)  sucralfate Oral Liquid - Peds 1000 milliGRAM(s) Oral Before meals and at bedtime PRN gastric bleeding      Vital Signs Last 24 Hrs  T(C): 38.5 (26 Jan 2020 05:00), Max: 38.5 (26 Jan 2020 05:00)  T(F): 101.3 (26 Jan 2020 05:00), Max: 101.3 (26 Jan 2020 05:00)  HR: 99 (26 Jan 2020 12:00) (78 - 118)  BP: 104/61 (26 Jan 2020 12:00) (55/45 - 110/75)  BP(mean): 71 (26 Jan 2020 12:00) (47 - 84)  RR: 18 (26 Jan 2020 12:00) (11 - 26)  SpO2: 97% (26 Jan 2020 12:00) (96% - 100%)    Physical Exam:            Constitutional: frail looking  HEENT: NC/AT, EOMI, PERRLA, conjunctivae clear; ears and nose atraumatic; pharynx clear  Neck: trach in place  Back: no tenderness  Respiratory: respiratory effort normal; scattered coarse breath sounds  Cardiovascular: S1S2 regular, no murmurs  Abdomen: soft, not tender, not distended, positive BS; no liver or spleen organomegaly; peg in place  Genitourinary: no suprapubic tenderness  Musculoskeletal: no muscle tenderness, no joint swelling or tenderness  Neurological/ Psychiatric: awake, alert, flaccid lower extremities  Skin: no rashes; no palpable lesions    Labs: all available labs reviewed                      Labs:                        10.7   10.00 )-----------( 188      ( 26 Jan 2020 06:45 )             34.3     01-26    139  |  108  |  17  ----------------------------<  89  4.0   |  26  |  0.47<L>    Ca    8.1<L>      26 Jan 2020 06:45  Phos  2.5     01-25  Mg     2.1     01-25    TPro  6.1  /  Alb  1.9<L>  /  TBili  0.2  /  DBili  x   /  AST  18  /  ALT  30  /  AlkPhos  124<H>  01-26           Cultures:       Culture - Urine (collected 01-24-20 @ 19:27)  Source: .Urine None  Final Report (01-25-20 @ 22:52):    No growth    Culture - Blood (collected 01-24-20 @ 17:47)  Source: .Blood None  Preliminary Report (01-26-20 @ 01:02):    No growth to date.    Culture - Blood (collected 01-24-20 @ 17:47)  Source: .Blood None  Preliminary Report (01-26-20 @ 01:02):    No growth to date.      Tor[place]-Barr Virus Serologic Test (01.25.20 @ 12:09)    EBV VCA IgM EIA: <10.0 U/mL    EBV EA Ab EIA: <5.0 U/mL    EBV VCA IgG EIA: <10.0 U/mL    EBV Interpretation: See Note: INTERPRETATION OF TORITO BARR VIRUS (EBV) ANTIBODY RESULTS  EBV VCA IGG AB EBV NA IGG AB EBV VCA IGM AB EBV EA IGG AB Diagnosis  NEG NEG NEG NEG EBV Sero-negative  NEG NEG POS NEG Suspected primary infection (Early Phase)  POS NEG POS POS/NEG Past EBV infection ( Convalescence)  POS POS NEG POS/NEG Past EBV infection  POS POS POS/NEG POS Reactivated Infection        Cytomegalovirus IgG Antibody, Serum (01.25.20 @ 12:09)    CMV IgG Antibody: <0.20 U/mL    CMV IgG Interpretation: Negative: Method: Liason Chemiluminescent Immunoassay  Reference Range: (values expressed as U/mL)             Negative        < 0.60        U/mL             Equivocal      0.60 - 0.69  U/mL             Positive          >= 0.70      U/mL  The presence of Cytomegalovirus IgG antibody or seroconversion may  indicate recent antigenic stimulation but are not per se confirmatory for  either recent primary infection or reactivation of a pre-existing latent  process with active viral replication.  The titer of asingle specimen  should not be used to aid in the diagnosis of recent infection.  The  assay for the presence of anti-CMV IgM antibody should be used to  diagnose recent infection.          Cytomegalovirus IgM Antibody, Serum (01.25.20 @ 12:09)    CMV IgM Antibody: <8.0 AU/mL    CMV IgM Interpretation: Negative: Method: Liaison Chemiluminescent Immunoassay  Reference ranges: (values expressed as AU/mL)              Negative     < 30.0 AU/mL              Equivocal     30.0 - 34.9 AU/mL              Positive      > 35.0 AU/mL                  < from: CT Chest No Cont (01.24.20 @ 20:27) >    EXAM:  CT CHEST                            PROCEDURE DATE:  01/24/2020          INTERPRETATION:  CLINICAL INFORMATION: Patient on ventilator with respiratory symptoms and fever of 103. Assess for infiltrates and pleural effusions.    COMPARISON: None.    PROCEDURE:   CT of the Chest was performed without intravenous contrast. This limits evaluation of vascular structures.  Sagittal and coronal reformats were performed.      FINDINGS:    LUNGS AND AIRWAYS: A tracheostomy is present. Motion artifact slightly limits evaluation although there is suggestion of a focal density in the right lateral wall of the right proximal mainstem bronchus. This may represent secretions although other etiologies cannot be excluded. Again, this is limited in evaluation. There are faint bilateral groundglass opacities and groundglass nodular opacities in the upper lobes. Mild interlobular septal thickening is seen in the upper lobes. Minimal patchy groundglass opacities are present in the lingula. There is atelectasis of the left lower lobe and atelectatic changes at the right lung base.    PLEURA: Small bilateral pleural effusions, left greater than right.    MEDIASTINUM AND ALEXIS: Very limited in evaluation without contrast and due to the pleural fluid. I cannot rule out subcarinal lymphadenopathy.    VESSELS: Within normal limits.    HEART: Heart size is normal. There is a large pericardial effusion.    CHEST WALL AND LOWER NECK: Within normal limits.    VISUALIZED UPPER ABDOMEN: There is a percutaneous tube in the stomach which continues into the jejunum. The distal tip is not included on the examination.    BONES: Scoliosis    IMPRESSION:     Large pericardial effusion. This was discussed with ANNEMARIE Kaur at 8:44 PM on 1/24/2020.    Faint groundglass opacities and nodular groundglass opacities. Mild interlobular septal thickening. This may be due to fluid overload. Underlying infection cannot be excluded. Clinical correlation follow-up is recommended. Nodular opacities in the lingula which may be due to infection. Atelectasis of the left lower lobe.    Small bilateral pleural effusions.    Motion artifact limits evaluation of the airways although there is suspicion for a nodular density in the right mainstem bronchus proximally along theright side. This may be due to secretions although other etiologies cannot be excluded. Clinical correlation is recommended.`    Limited evaluation for lymphadenopathy as above.    < end of copied text >        < from: Transthoracic Echocardiogram (01.24.20 @ 22:41) >     Impression     Summary     Moderate to large pericardial effusion. Effusion is largest in size   posteriorly & adjacent to RV & RA with minimal effusion at the apex. There   is no echocardiographic evidence of tamponade physiology.   The left ventricle is normal in size, wallthickness, wall motion and   contractility.   Estimated left ventricular ejection fraction is 55-60 %.    < end of copied text >                          Radiology: all available radiological tests reviewed    Advanced directives addressed: full resuscitation

## 2020-01-26 NOTE — PROGRESS NOTE ADULT - PROBLEM SELECTOR PLAN 1
Incidental finding on CT scan.  Moderate to large in size.  No evidence of tamponade physiology on echo yesterday or today.  Etiology unclear:  No evidence of malignancy, uremia, autoimmune disease.  TSH  neg ELIZABETH pending.  EBV, CMV neg, parvovirus, cocksackie pending.  unlikely bacterial given lack of fulminant presentation.  Limited echo planned for mon to assess for increase in size and/or tamponade physiology.  If unchanged in size would favor observation rather than drainage.

## 2020-01-26 NOTE — PROGRESS NOTE ADULT - SUBJECTIVE AND OBJECTIVE BOX
CHAMP CALDWELL  MRN: [id number]    S: 1/25/2020: Chart reviewed. Patient is comfortable on vent this AM. O2 sat 100%% on FiO2 50 %.    1/26/2020: Awake. Comfortable on vent. Tmax 101.3. O2 sat 100%. Scant secretions. Tolerating tube feedings. Hemodynamically stable. Patient's Mom at the bedside.     PAST MEDICAL & SURGICAL HISTORY:  Hypokalemia  Kyphosis  Scoliosis  Hypotonia  Asthma  Seizures  Cerebral Palsy  Gastroesophageal Reflux Disease  Hypothyroidism: Treated with synthroid in past. Stopped treatment in 2008 due to normal thyroid function.  Developmental Delay  Tracheostomy in place  Peg (Percutaneous Endoscopic Gastrostomy) Adjustment/Replacement/Removal  S/P Tonsillectomy and Adenoidectomy  Strabismus      O: T(C): 38.5 (01-26-20 @ 05:00), Max: 38.5 (01-26-20 @ 05:00)  HR: 99 (01-26-20 @ 12:00) (77 - 118)  BP: 104/61 (01-26-20 @ 12:00) (55/45 - 110/75)  RR: 18 (01-26-20 @ 12:00) (11 - 26)  SpO2: 97% (01-26-20 @ 12:00) (96% - 100%)  Wt(kg): --    PHYSICAL EXAM:      GENERAL: comfortable    NEURO: awake; non-verbal    NECK: no JVD    CHEST: equal bilat. No wheezing    CARDIAC: RR    EXT: no edema    ABG - ( 25 Jan 2020 05:09 )  pH, Arterial: 7.42  pH, Blood: x     /  pCO2: 42    /  pO2: 111   / HCO3: 27    / Base Excess: 3.1   /  SaO2: 98            LABS:                            10.7   10.00 )-----------( 188      ( 26 Jan 2020 06:45 )             34.3       01-26    139  |  108  |  17  ----------------------------<  89  4.0   |  26  |  0.47<L>    Ca    8.1<L>      26 Jan 2020 06:45  Phos  2.5     01-25  Mg     2.1     01-25    TPro  6.1  /  Alb  1.9<L>  /  TBili  0.2  /  DBili  x   /  AST  18  /  ALT  30  /  AlkPhos  124<H>  01-26    RADIOLOGY:      EXAM:  CT CHEST                        PROCEDURE DATE:  01/24/2020      INTERPRETATION:  CLINICAL INFORMATION: Patient on ventilator with respiratory symptoms and fever of 103. Assess for infiltrates and pleural effusions.    COMPARISON: None.    PROCEDURE:   CT of the Chest was performed without intravenous contrast. This limits evaluation of vascular structures.  Sagittal and coronal reformats were performed.      FINDINGS:    LUNGS AND AIRWAYS: A tracheostomy is present. Motion artifact slightly limits evaluation although there is suggestion of a focal density in the right lateral wall of the right proximal mainstem bronchus. This may represent secretions although other etiologies cannot be excluded. Again, this is limited in evaluation. There are faint bilateral groundglass opacities and groundglass nodular opacities in the upper lobes. Mild interlobular septal thickening is seen in the upper lobes. Minimal patchy groundglass opacities are present in the lingula. There is atelectasis of the left lower lobe and atelectatic changes at the right lung base.    PLEURA: Small bilateral pleural effusions, left greater than right.    MEDIASTINUM AND ALEXIS: Very limited in evaluation without contrast and due to the pleural fluid. I cannot rule out subcarinal lymphadenopathy.    VESSELS: Within normal limits.    HEART: Heart size is normal. There is a large pericardial effusion.    CHEST WALL AND LOWER NECK: Within normal limits.    VISUALIZED UPPER ABDOMEN: There is a percutaneous tube in the stomach which continues into the jejunum. The distal tip is not included on the examination.    BONES: Scoliosis    IMPRESSION:     Large pericardial effusion. This was discussed with ANNEMARIE Kaur at 8:44 PM on 1/24/2020.    Faint groundglass opacities and nodular groundglass opacities. Mild interlobular septal thickening. This may be due to fluid overload. Underlying infection cannot be excluded. Clinical correlation follow-up is recommended. Nodular opacities in the lingula which may be due to infection. Atelectasis of the left lower lobe.    Small bilateral pleural effusions.    Motion artifact limits evaluation of the airways although there is suspicion for a nodular density in the right mainstem bronchus proximally along theright side. This may be due to secretions although other etiologies cannot be excluded. Clinical correlation is recommended.`    Limited evaluation for lymphadenopathy as above.    The above findings were discussed with ANNEMARIE Kaur at 8:44 PM on 1/24/2020.    KAT FUNT             < from: Transthoracic Echocardiogram (01.24.20 @ 22:41) >   Summary     Moderate to large pericardial effusion. Effusion is largest in size   posteriorly & adjacent to RV & RA with minimal effusion at the apex. There   is no echocardiographic evidence of tamponade physiology.   The left ventricle is normal in size, wallthickness, wall motion and   contractility.   Estimated left ventricular ejection fraction is 55-60 %.     Signature     ----------------------------------------------------------------   Electronically signed by Parvez Meyer MD(Interpreting   physician) on 01/24/2020 11:16 PM   ----------------------------------------------------------------      MEDICATIONS  (STANDING):  ALBUTerol    90 MICROgram(s) HFA Inhaler 2 Puff(s) Inhalation every 6 hours  budesonide  80 MICROgram(s)/formoterol 4.5 MICROgram(s) Inhaler 2 Puff(s) Inhalation two times a day  chlorhexidine 0.12% Liquid 15 milliLiter(s) Oral Mucosa every 12 hours  fluticasone propionate 50 MICROgram(s)/spray Nasal Spray 1 Spray(s) Both Nostrils daily  heparin  Injectable 5000 Unit(s) SubCutaneous every 12 hours  ipratropium 17 MICROgram(s) HFA Inhaler 2 Puff(s) Inhalation every 6 hours  levETIRAcetam  Oral Liquid - Peds 500 milliGRAM(s) Oral daily  levETIRAcetam  Oral Liquid - Peds 700 milliGRAM(s) Oral at bedtime  meropenem  IVPB 1000 milliGRAM(s) IV Intermittent every 8 hours  pantoprazole   Suspension 40 milliGRAM(s) Oral daily  sodium chloride 0.65% Nasal 2 Spray(s) Both Nostrils daily    MEDICATIONS  (PRN):  acetaminophen    Suspension .. 500 milliGRAM(s) Oral every 6 hours PRN Temp greater or equal to 38C (100.4F), Mild Pain (1 - 3)  sucralfate Oral Liquid - Peds 1000 milliGRAM(s) Oral Before meals and at bedtime PRN gastric bleeding        A/P: 1. Acute febrile illness with bilateral ground glass opacities on chest CT. Continue broad spectrum antibiotics per ID. Follow up cultures. Continue vent support and O2 supplement per CCM. Add chest PT - patient on chest PT at home twice daily with a percussion vest.      2. Pericardial effusion. Follow up ECHO in AM. Currently no hemodynamic compromise.     3. Nutritional support. Per CCM. Tolerating tube feeds.      4. Hx of asthma. Inhaled bronchodilators/budesonide. No evidence of exacerbation; currently no indication for systemic steroids.     5. Small bilateral pleural effusions. Follow up CXR.     6. Chronic respiratory failure. Patient is on vent at home for @ 10 hours overnight; off vent during the day. Vent management whil;e in CCU per CCM.      7. Hx of seizures. On Levetiracetam.     8. DVT prophylaxis. S.C. heparin.     Reviewed plan with patient's mother at bedside.

## 2020-01-26 NOTE — PROGRESS NOTE ADULT - SUBJECTIVE AND OBJECTIVE BOX
23 yo F with hx of GERD, cerebral palsy s/p trach and PEG, seizures, asthma and scoliosis presents to ED from Dr. Harris's office for further evaluation.   Pt reportedly with lethargy for the past few days and went to Dr. Harris's office, where she was found to be short of breath.   CXR done in office as per Dr. Harris showed b/l worsening airspace disease and O2 sat was 90% on vent.  In the ED patient was febrile to 104.8 F, BP- 95/57 and tachycardic to 140s.   Patient received 1.1 NS bolus x1 in the ED, WBC 14.20, lactate 1.3. (2020 18:12)    cardiology consulted for pericardial effusion.  CT scan ordered to assess pleural effusion & for infiltrate.  Incidental finding of "large pericardial effusion" reported.  Echocardiography performed at bedside shows moderate to large pericardial effusion w/ no tamponade physiology - see full report.  Pt currently on vent.  she is nonverbal at baseline.  family at bedside.    20: hemodynamically stable overnight.  no events on telemetry.  Discussed plan of care with family at bedside.  20: no events overnight.  repeat limited echo w/ effusion unchanged in size, no evidence of tamponade physiology.    MEDICATIONS:    MEDICATIONS  (STANDING):  ALBUTerol    90 MICROgram(s) HFA Inhaler 2 Puff(s) Inhalation every 6 hours  budesonide  80 MICROgram(s)/formoterol 4.5 MICROgram(s) Inhaler 2 Puff(s) Inhalation two times a day  chlorhexidine 0.12% Liquid 15 milliLiter(s) Oral Mucosa every 12 hours  fluticasone propionate 50 MICROgram(s)/spray Nasal Spray 1 Spray(s) Both Nostrils daily  heparin  Injectable 5000 Unit(s) SubCutaneous every 12 hours  ipratropium 17 MICROgram(s) HFA Inhaler 2 Puff(s) Inhalation every 6 hours  levETIRAcetam  Oral Liquid - Peds 500 milliGRAM(s) Oral daily  levETIRAcetam  Oral Liquid - Peds 700 milliGRAM(s) Oral at bedtime  meropenem  IVPB 1000 milliGRAM(s) IV Intermittent every 8 hours  pantoprazole   Suspension 40 milliGRAM(s) Oral daily  sodium chloride 0.65% Nasal 2 Spray(s) Both Nostrils daily    MEDICATIONS  (PRN):  acetaminophen    Suspension .. 500 milliGRAM(s) Oral every 6 hours PRN Temp greater or equal to 38C (100.4F), Mild Pain (1 - 3)  sucralfate Oral Liquid - Peds 1000 milliGRAM(s) Oral Before meals and at bedtime PRN gastric bleeding    Vital Signs Last 24 Hrs  T(C): 38.5 (2020 05:00), Max: 38.5 (2020 05:00)  T(F): 101.3 (2020 05:00), Max: 101.3 (2020 05:00)  HR: 96 (2020 05:36) (77 - 118)  BP: 97/53 (2020 05:00) (55/45 - 110/75)  BP(mean): 63 (2020 05:00) (47 - 84)  RR: 20 (2020 05:00) (11 - 26)  SpO2: 97% (2020 05:36) (96% - 100%)Daily     Daily Weight in k (2020 12:40)I&O's Summary    2020 07:01  -  2020 07:00  --------------------------------------------------------  IN: 100 mL / OUT: 0 mL / NET: 100 mL      PHYSICAL EXAM:    HEENT: PERR, EOMI,  No oral cyananosis.  Neck:  supple,  No JVD  Respiratory: coarse breath sounds bilaterally, No wheezing, rales or rhonchi  Cardiovascular: S1 and S2, regular rate and rhythm, no Murmurs, gallops or rubs  Gastrointestinal: Bowel Sounds present, soft, nontender.   Extremities: No peripheral edema. No clubbing or cyanosis.  Vascular: 2+ peripheral pulses        LABS: All Labs Reviewed:                        10.7   10.00 )-----------( 188      ( 2020 06:45 )             34.3                         11.8   9.67  )-----------( 149      ( 2020 06:40 )             37.3                         14.2   14.20 )-----------( 181      ( 2020 17:47 )             43.4     2020 06:45    139    |  108    |  17     ----------------------------<  89     4.0     |  26     |  0.47   2020 06:40    139    |  108    |  11     ----------------------------<  97     3.7     |  26     |  0.39   2020 01:06    138    |  106    |  11     ----------------------------<  95     3.8     |  28     |  0.44     Ca    8.1        2020 06:45  Ca    8.4        2020 06:40  Ca    8.3        2020 01:06  Phos  2.5       2020 06:40  Mg     2.1       2020 06:40    TPro  6.1    /  Alb  1.9    /  TBili  0.2    /  DBili  x      /  AST  18     /  ALT  30     /  AlkPhos  124    2020 06:45  TPro  6.0    /  Alb  2.0    /  TBili  0.3    /  DBili  x      /  AST  27     /  ALT  40     /  AlkPhos  114    2020 06:40  TPro  6.2    /  Alb  2.1    /  TBili  0.3    /  DBili  x      /  AST  35     /  ALT  43     /  AlkPhos  111    2020 01:06    PT/INR - ( 2020 17:47 )   PT: 14.0 sec;   INR: 1.25 ratio      ECGs , , no ST elevation, FL depression.     PTT - ( 2020 17:47 )  PTT:33.6 sec    < from: Transthoracic Echocardiogram (20 @ 22:41) >   Summary     Moderate to large pericardial effusion. Effusion is largest in size   posteriorly & adjacent to RV & RA with minimal effusion at the apex. There   is no echocardiographic evidence of tamponade physiology.   The left ventricle is normal in size, wallthickness, wall motion and   contractility.   Estimated left ventricular ejection fraction is 55-60 %.     Signature     ----------------------------------------------------------------   Electronically signed by Parvez Meyre MD(Interpreting   physician) on 2020 11:16 PM   ----------------------------------------------------------------    < end of copied text >      < from: Transthoracic Echocardiogram Follow Up (20 @ 13:55) >   Impression     Summary     Limited study to asses pericardial effusion.   The left ventricle is normal in size, wall thickness, wall motion and   contractility.   Estimated left ventricular ejection fraction is 65 %.   Normal appearing right atrium.   Normal appearing right ventricle structure and function.   No diastolic collapse of the right ventricle is noted.   Mitral inflow variation is within normal limits at 22%.   Normal mitralvalve excursion   A moderate to large pericardial effusion is present.   Pleural effusion cannot be ruled out.     Signature     ----------------------------------------------------------------   Electronically signed by Crispin Aparicio MD(Interpreting   physician) on 2020 05:10 PM   ----------------------------------------------------------------    < end of copied text >

## 2020-01-26 NOTE — PROGRESS NOTE ADULT - SUBJECTIVE AND OBJECTIVE BOX
CC:  Respiratory Failure     HPI:    25 y/o female GERD, cerebral palsy s/p trach and PEG, seizures, asthma and scoliosis presents to ED from Dr. Harris's office for further evaluation. Pt admitted with PNA sepsis as there is GGO on chest CT and pericardial effusion.  TTE revealed mod-large pericardial effusion wo collapse.      :  CCU D # 3.  Pt seen and examined in CCU--mom at bedside.  Tm 101.3  Tolerating TF.        PMH:  As above.     PSH:  As above.     FH: Non Contributory other than those listed in HPI    Social History:  NC to HPI    MEDICATIONS  (STANDING):  ALBUTerol    90 MICROgram(s) HFA Inhaler 2 Puff(s) Inhalation every 6 hours  budesonide  80 MICROgram(s)/formoterol 4.5 MICROgram(s) Inhaler 2 Puff(s) Inhalation two times a day  chlorhexidine 0.12% Liquid 15 milliLiter(s) Oral Mucosa every 12 hours  fluticasone propionate 50 MICROgram(s)/spray Nasal Spray 1 Spray(s) Both Nostrils daily  heparin  Injectable 5000 Unit(s) SubCutaneous every 12 hours  ipratropium 17 MICROgram(s) HFA Inhaler 2 Puff(s) Inhalation every 6 hours  levETIRAcetam  Oral Liquid - Peds 500 milliGRAM(s) Oral daily  levETIRAcetam  Oral Liquid - Peds 700 milliGRAM(s) Oral at bedtime  meropenem  IVPB 1000 milliGRAM(s) IV Intermittent every 8 hours  pantoprazole   Suspension 40 milliGRAM(s) Oral daily  sodium chloride 0.65% Nasal 2 Spray(s) Both Nostrils daily    MEDICATIONS  (PRN):  acetaminophen    Suspension .. 500 milliGRAM(s) Oral every 6 hours PRN Temp greater or equal to 38C (100.4F), Mild Pain (1 - 3)  sucralfate Oral Liquid - Peds 1000 milliGRAM(s) Oral Before meals and at bedtime PRN gastric bleeding      Allergies: NKDA    ROS:  SEE BELOW        ICU Vital Signs Last 24 Hrs  T(C): 38.5 (2020 05:00), Max: 38.5 (2020 05:00)  T(F): 101.3 (2020 05:00), Max: 101.3 (2020 05:00)  HR: 96 (2020 05:36) (77 - 118)  BP: 97/53 (2020 05:00) (55/45 - 110/75)  BP(mean): 63 (2020 05:00) (47 - 84)  ABP: --  ABP(mean): --  RR: 20 (2020 05:00) (11 - 26)  SpO2: 97% (2020 05:36) (96% - 100%)      Mode: AC/ CMV (Assist Control/ Continuous Mandatory Ventilation)  RR (machine): 17  FiO2: 50  PEEP: 8  ITime: 1  PC: 15  PIP: 23      I&O's Summary    2020 07:01  -  2020 07:00  --------------------------------------------------------  IN: 100 mL / OUT: 0 mL / NET: 100 mL        Physical Exam:  SEE BELOW                          10.7   10.00 )-----------( 188      ( 2020 06:45 )             34.3       01-26    139  |  108  |  17  ----------------------------<  89  4.0   |  26  |  0.47<L>    Ca    8.1<L>      2020 06:45  Phos  2.5     01-25  Mg     2.1     -25    TPro  6.1  /  Alb  1.9<L>  /  TBili  0.2  /  DBili  x   /  AST  18  /  ALT  30  /  AlkPhos  124<H>  01-26            ABG - ( 2020 05:09 )  pH, Arterial: 7.42  pH, Blood: x     /  pCO2: 42    /  pO2: 111   / HCO3: 27    / Base Excess: 3.1   /  SaO2: 98                  Urinalysis Basic - ( 2020 17:47 )    Color: Yellow / Appearance: Clear / S.010 / pH: x  Gluc: x / Ketone: Trace  / Bili: Negative / Urobili: Negative mg/dL   Blood: x / Protein: 15 mg/dL / Nitrite: Negative   Leuk Esterase: Negative / RBC: >50 /HPF / WBC 0-2   Sq Epi: x / Non Sq Epi: Occasional / Bacteria: Occasional        DVT Prophylaxis:                                                            Contraindication:     Advanced Directives:    Discussed with:    Visit Information:  Time spent excluding procedure:      ** Time is exclusive of billed procedures and/or teaching and/or routine family updates.

## 2020-01-27 LAB
CK MB BLD-MCNC: ABNORMAL TITER
CMV DNA CSF QL NAA+PROBE: SIGNIFICANT CHANGE UP
CMV DNA SPEC NAA+PROBE-LOG#: SIGNIFICANT CHANGE UP LOG10IU/ML
COXSACKIE TYPE A-24: ABNORMAL TITER
CV A24 IGG TITR SER IF: ABNORMAL TITER
CV A7 AB SER-ACNC: ABNORMAL TITER
CV A9 AB TITR FLD: ABNORMAL TITER

## 2020-01-27 PROCEDURE — 99233 SBSQ HOSP IP/OBS HIGH 50: CPT

## 2020-01-27 PROCEDURE — 93308 TTE F-UP OR LMTD: CPT | Mod: 26

## 2020-01-27 RX ORDER — ASPIRIN/CALCIUM CARB/MAGNESIUM 324 MG
650 TABLET ORAL THREE TIMES A DAY
Refills: 0 | Status: DISCONTINUED | OUTPATIENT
Start: 2020-01-27 | End: 2020-01-28

## 2020-01-27 RX ORDER — IPRATROPIUM BROMIDE 0.2 MG/ML
1 SOLUTION, NON-ORAL INHALATION
Refills: 0 | Status: DISCONTINUED | OUTPATIENT
Start: 2020-01-27 | End: 2020-01-27

## 2020-01-27 RX ORDER — COLCHICINE 0.6 MG
0.6 TABLET ORAL
Refills: 0 | Status: DISCONTINUED | OUTPATIENT
Start: 2020-01-27 | End: 2020-01-28

## 2020-01-27 RX ORDER — CHLORHEXIDINE GLUCONATE 213 G/1000ML
15 SOLUTION TOPICAL EVERY 12 HOURS
Refills: 0 | Status: DISCONTINUED | OUTPATIENT
Start: 2020-01-27 | End: 2020-01-29

## 2020-01-27 RX ADMIN — CHLORHEXIDINE GLUCONATE 15 MILLILITER(S): 213 SOLUTION TOPICAL at 19:06

## 2020-01-27 RX ADMIN — Medication 500 MILLIGRAM(S): at 18:52

## 2020-01-27 RX ADMIN — Medication 500 MILLIGRAM(S): at 00:02

## 2020-01-27 RX ADMIN — HEPARIN SODIUM 5000 UNIT(S): 5000 INJECTION INTRAVENOUS; SUBCUTANEOUS at 18:53

## 2020-01-27 RX ADMIN — Medication 2 PUFF(S): at 08:42

## 2020-01-27 RX ADMIN — Medication 2 PUFF(S): at 01:03

## 2020-01-27 RX ADMIN — MEROPENEM 100 MILLIGRAM(S): 1 INJECTION INTRAVENOUS at 05:50

## 2020-01-27 RX ADMIN — MEROPENEM 100 MILLIGRAM(S): 1 INJECTION INTRAVENOUS at 14:59

## 2020-01-27 RX ADMIN — Medication 650 MILLIGRAM(S): at 21:31

## 2020-01-27 RX ADMIN — ALBUTEROL 2 PUFF(S): 90 AEROSOL, METERED ORAL at 19:43

## 2020-01-27 RX ADMIN — BUDESONIDE AND FORMOTEROL FUMARATE DIHYDRATE 2 PUFF(S): 160; 4.5 AEROSOL RESPIRATORY (INHALATION) at 19:43

## 2020-01-27 RX ADMIN — MEROPENEM 100 MILLIGRAM(S): 1 INJECTION INTRAVENOUS at 21:24

## 2020-01-27 RX ADMIN — ALBUTEROL 2 PUFF(S): 90 AEROSOL, METERED ORAL at 08:43

## 2020-01-27 RX ADMIN — LEVETIRACETAM 500 MILLIGRAM(S): 250 TABLET, FILM COATED ORAL at 10:20

## 2020-01-27 RX ADMIN — CHLORHEXIDINE GLUCONATE 15 MILLILITER(S): 213 SOLUTION TOPICAL at 03:21

## 2020-01-27 RX ADMIN — Medication 2 SPRAY(S): at 18:45

## 2020-01-27 RX ADMIN — CHLORHEXIDINE GLUCONATE 15 MILLILITER(S): 213 SOLUTION TOPICAL at 05:50

## 2020-01-27 RX ADMIN — Medication 0.6 MILLIGRAM(S): at 21:30

## 2020-01-27 RX ADMIN — ALBUTEROL 2 PUFF(S): 90 AEROSOL, METERED ORAL at 14:34

## 2020-01-27 RX ADMIN — ALBUTEROL 2 PUFF(S): 90 AEROSOL, METERED ORAL at 01:03

## 2020-01-27 RX ADMIN — BUDESONIDE AND FORMOTEROL FUMARATE DIHYDRATE 2 PUFF(S): 160; 4.5 AEROSOL RESPIRATORY (INHALATION) at 08:43

## 2020-01-27 RX ADMIN — LEVETIRACETAM 700 MILLIGRAM(S): 250 TABLET, FILM COATED ORAL at 22:38

## 2020-01-27 RX ADMIN — PANTOPRAZOLE SODIUM 40 MILLIGRAM(S): 20 TABLET, DELAYED RELEASE ORAL at 13:18

## 2020-01-27 NOTE — PROGRESS NOTE ADULT - SUBJECTIVE AND OBJECTIVE BOX
CC:  Respiratory Failure     HPI:    25 y/o female GERD, cerebral palsy s/p trach and PEG, seizures, asthma and scoliosis presents to ED from Dr. Harris's office for further evaluation. Pt admitted with PNA sepsis as there is GGO on chest CT and pericardial effusion.  TTE revealed mod-large pericardial effusion wo collapse.      1/26:  CCU D # 3.  Pt seen and examined in CCU--mom at bedside.  Tm 101.3  Tolerating TF.      1/27: Patient seen, She continues with temps, WBC trending down, Repeat ECHO today shows decreased size of pericardial effusion         PAST MEDICAL & SURGICAL HISTORY:  Hypokalemia  Kyphosis  Scoliosis  Hypotonia  Asthma  Seizures  Cerebral Palsy  Gastroesophageal Reflux Disease  Hypothyroidism: Treated with synthroid in past. Stopped treatment in 2008 due to normal thyroid function.  Developmental Delay  Tracheostomy in place  Peg (Percutaneous Endoscopic Gastrostomy) Adjustment/Replacement/Removal  S/P Tonsillectomy and Adenoidectomy  Strabismus      FAMILY HISTORY:      Social Hx:    Allergies    Bactrim (Unknown)  carbamazepine (Unknown)  cephalosporins (Rash)  Corn (Unknown)  Depakote (Unknown)  diphenhydrAMINE (Seizure)  eggs (Unknown)  EGGS,  NUTS (Anaphylaxis)  Erythromycin Base (Unknown)  metoclopramide (Unknown)  Milk (Unknown)  MILK, SHELLFISH, WHEAT (Unknown)  Nuts (Unknown)  oxcarbazepine (Unknown)  peanuts (Unknown)  Potatoes (Unknown)  SEASONAL, POLLEN, GRASS, PET DANDER, FEATHERS (Unknown)  Sesame (Unknown)  shellfish (Unknown)  Trileptal (Unknown)  valproic acid (Unknown)  vancomycin (Unknown)  Wheat (Unknown)    Intolerances            ICU Vital Signs Last 24 Hrs  T(C): 38.6 (27 Jan 2020 06:31), Max: 38.6 (27 Jan 2020 00:02)  T(F): 101.5 (27 Jan 2020 06:31), Max: 101.5 (27 Jan 2020 00:02)  HR: 85 (27 Jan 2020 08:49) (73 - 106)  BP: 102/63 (27 Jan 2020 06:00) (71/58 - 110/55)  BP(mean): 71 (27 Jan 2020 06:00) (57 - 79)  ABP: --  ABP(mean): --  RR: 28 (27 Jan 2020 06:00) (11 - 28)  SpO2: 100% (27 Jan 2020 08:49) (97% - 100%)      Mode: AC/ CMV (Assist Control/ Continuous Mandatory Ventilation)  RR (machine): 17  FiO2: 50  PEEP: 8  ITime: 1  MAP: 15  PC: 15  PIP: 23      I&O's Summary    26 Jan 2020 07:01  -  27 Jan 2020 07:00  --------------------------------------------------------  IN: 880 mL / OUT: 0 mL / NET: 880 mL                              10.7   10.00 )-----------( 188      ( 26 Jan 2020 06:45 )             34.3       01-26    139  |  108  |  17  ----------------------------<  89  4.0   |  26  |  0.47<L>    Ca    8.1<L>      26 Jan 2020 06:45    TPro  6.1  /  Alb  1.9<L>  /  TBili  0.2  /  DBili  x   /  AST  18  /  ALT  30  /  AlkPhos  124<H>  01-26                    MEDICATIONS  (STANDING):  ALBUTerol    90 MICROgram(s) HFA Inhaler 2 Puff(s) Inhalation every 6 hours  budesonide  80 MICROgram(s)/formoterol 4.5 MICROgram(s) Inhaler 2 Puff(s) Inhalation two times a day  chlorhexidine 0.12% Liquid 15 milliLiter(s) Oral Mucosa every 12 hours  fluticasone propionate 50 MICROgram(s)/spray Nasal Spray 1 Spray(s) Both Nostrils daily  heparin  Injectable 5000 Unit(s) SubCutaneous every 12 hours  ipratropium 17 MICROgram(s) HFA Inhaler 2 Puff(s) Inhalation every 6 hours  levETIRAcetam  Oral Liquid - Peds 500 milliGRAM(s) Oral daily  levETIRAcetam  Oral Liquid - Peds 700 milliGRAM(s) Oral at bedtime  meropenem  IVPB 1000 milliGRAM(s) IV Intermittent every 8 hours  pantoprazole   Suspension 40 milliGRAM(s) Oral daily  sodium chloride 0.65% Nasal 2 Spray(s) Both Nostrils daily    MEDICATIONS  (PRN):  acetaminophen    Suspension .. 500 milliGRAM(s) Oral every 6 hours PRN Temp greater or equal to 38C (100.4F), Mild Pain (1 - 3)  sucralfate Oral Liquid - Peds 1000 milliGRAM(s) Oral Before meals and at bedtime PRN gastric bleeding      DVT Prophylaxis: H    Advanced Directives:  Discussed with:    Visit Information:    ** Time is exclusive of billed procedures and/or teaching and/or routine family updates.

## 2020-01-27 NOTE — PROGRESS NOTE ADULT - SUBJECTIVE AND OBJECTIVE BOX
Date of service: 01-27-20 @ 12:14      Patient intermittently febrile; was not able to be taken off vent earlier    ROS unable to obtain secondary to patient medical condition     MEDICATIONS  (STANDING):  ALBUTerol    90 MICROgram(s) HFA Inhaler 2 Puff(s) Inhalation every 6 hours  budesonide  80 MICROgram(s)/formoterol 4.5 MICROgram(s) Inhaler 2 Puff(s) Inhalation two times a day  fluticasone propionate 50 MICROgram(s)/spray Nasal Spray 1 Spray(s) Both Nostrils daily  heparin  Injectable 5000 Unit(s) SubCutaneous every 12 hours  ipratropium 17 MICROgram(s) HFA Inhaler - Peds 1 Puff(s) Inhalation two times a day  levETIRAcetam  Oral Liquid - Peds 500 milliGRAM(s) Oral daily  levETIRAcetam  Oral Liquid - Peds 700 milliGRAM(s) Oral at bedtime  meropenem  IVPB 1000 milliGRAM(s) IV Intermittent every 8 hours  pantoprazole   Suspension 40 milliGRAM(s) Oral daily  sodium chloride 0.65% Nasal 2 Spray(s) Both Nostrils daily    MEDICATIONS  (PRN):  acetaminophen    Suspension .. 500 milliGRAM(s) Oral every 6 hours PRN Temp greater or equal to 38C (100.4F), Mild Pain (1 - 3)  sucralfate Oral Liquid - Peds 1000 milliGRAM(s) Oral Before meals and at bedtime PRN gastric bleeding      Vital Signs Last 24 Hrs  T(C): 38.6 (27 Jan 2020 06:31), Max: 38.6 (27 Jan 2020 00:02)  T(F): 101.5 (27 Jan 2020 06:31), Max: 101.5 (27 Jan 2020 00:02)  HR: 89 (27 Jan 2020 11:00) (73 - 106)  BP: 86/62 (27 Jan 2020 11:00) (71/58 - 110/55)  BP(mean): 68 (27 Jan 2020 11:00) (57 - 79)  RR: 20 (27 Jan 2020 11:00) (12 - 28)  SpO2: 96% (27 Jan 2020 11:00) (96% - 100%)    Physical Exam:          Constitutional: frail looking  HEENT: NC/AT, EOMI, PERRLA, conjunctivae clear; ears and nose atraumatic; pharynx clear  Neck: trach in place  Back: no tenderness  Respiratory: respiratory effort normal; scattered coarse breath sounds  Cardiovascular: S1S2 regular, no murmurs  Abdomen: soft, not tender, not distended, positive BS; no liver or spleen organomegaly; peg in place  Genitourinary: no suprapubic tenderness  Musculoskeletal: no muscle tenderness, no joint swelling or tenderness  Neurological/ Psychiatric: awake, alert, flaccid lower extremities  Skin: no rashes; no palpable lesions    Labs: all available labs reviewed                 Labs:                        10.7   10.00 )-----------( 188      ( 26 Jan 2020 06:45 )             34.3     01-26    139  |  108  |  17  ----------------------------<  89  4.0   |  26  |  0.47<L>    Ca    8.1<L>      26 Jan 2020 06:45    TPro  6.1  /  Alb  1.9<L>  /  TBili  0.2  /  DBili  x   /  AST  18  /  ALT  30  /  AlkPhos  124<H>  01-26           Cultures:       Culture - Sputum (collected 01-26-20 @ 11:40)  Source: .Sputum Sputum  Gram Stain (01-26-20 @ 19:35):    Rare Squamous epithelial cells per low power field    Few polymorphonuclear leukocytes per low power field    Rare Gram Variable Rods per oil power field    Culture - Urine (collected 01-24-20 @ 19:27)  Source: .Urine None  Final Report (01-25-20 @ 22:52):    No growth    Culture - Blood (collected 01-24-20 @ 17:47)  Source: .Blood None  Preliminary Report (01-26-20 @ 01:02):    No growth to date.    Culture - Blood (collected 01-24-20 @ 17:47)  Source: .Blood None  Preliminary Report (01-26-20 @ 01:02):    No growth to date.                Tor[place]-Barr Virus Serologic Test (01.25.20 @ 12:09)    EBV VCA IgM EIA: <10.0 U/mL    EBV EA Ab EIA: <5.0 U/mL    EBV VCA IgG EIA: <10.0 U/mL    EBV Interpretation: See Note: INTERPRETATION OF TORITO BARR VIRUS (EBV) ANTIBODY RESULTS  EBV VCA IGG AB EBV NA IGG AB EBV VCA IGM AB EBV EA IGG AB Diagnosis  NEG NEG NEG NEG EBV Sero-negative  NEG NEG POS NEG Suspected primary infection (Early Phase)  POS NEG POS POS/NEG Past EBV infection ( Convalescence)  POS POS NEG POS/NEG Past EBV infection  POS POS POS/NEG POS Reactivated Infection        Cytomegalovirus IgG Antibody, Serum (01.25.20 @ 12:09)    CMV IgG Antibody: <0.20 U/mL    CMV IgG Interpretation: Negative: Method: Liason Chemiluminescent Immunoassay  Reference Range: (values expressed as U/mL)             Negative        < 0.60        U/mL             Equivocal      0.60 - 0.69  U/mL             Positive          >= 0.70      U/mL  The presence of Cytomegalovirus IgG antibody or seroconversion may  indicate recent antigenic stimulation but are not per se confirmatory for  either recent primary infection or reactivation of a pre-existing latent  process with active viral replication.  The titer of asingle specimen  should not be used to aid in the diagnosis of recent infection.  The  assay for the presence of anti-CMV IgM antibody should be used to  diagnose recent infection.          Cytomegalovirus IgM Antibody, Serum (01.25.20 @ 12:09)    CMV IgM Antibody: <8.0 AU/mL    CMV IgM Interpretation: Negative: Method: Liaison Chemiluminescent Immunoassay  Reference ranges: (values expressed as AU/mL)              Negative     < 30.0 AU/mL              Equivocal     30.0 - 34.9 AU/mL              Positive      > 35.0 AU/mL                  < from: CT Chest No Cont (01.24.20 @ 20:27) >    EXAM:  CT CHEST                            PROCEDURE DATE:  01/24/2020          INTERPRETATION:  CLINICAL INFORMATION: Patient on ventilator with respiratory symptoms and fever of 103. Assess for infiltrates and pleural effusions.    COMPARISON: None.    PROCEDURE:   CT of the Chest was performed without intravenous contrast. This limits evaluation of vascular structures.  Sagittal and coronal reformats were performed.      FINDINGS:    LUNGS AND AIRWAYS: A tracheostomy is present. Motion artifact slightly limits evaluation although there is suggestion of a focal density in the right lateral wall of the right proximal mainstem bronchus. This may represent secretions although other etiologies cannot be excluded. Again, this is limited in evaluation. There are faint bilateral groundglass opacities and groundglass nodular opacities in the upper lobes. Mild interlobular septal thickening is seen in the upper lobes. Minimal patchy groundglass opacities are present in the lingula. There is atelectasis of the left lower lobe and atelectatic changes at the right lung base.    PLEURA: Small bilateral pleural effusions, left greater than right.    MEDIASTINUM AND ALEXIS: Very limited in evaluation without contrast and due to the pleural fluid. I cannot rule out subcarinal lymphadenopathy.    VESSELS: Within normal limits.    HEART: Heart size is normal. There is a large pericardial effusion.    CHEST WALL AND LOWER NECK: Within normal limits.    VISUALIZED UPPER ABDOMEN: There is a percutaneous tube in the stomach which continues into the jejunum. The distal tip is not included on the examination.    BONES: Scoliosis    IMPRESSION:     Large pericardial effusion. This was discussed with ANNEMARIE Kaur at 8:44 PM on 1/24/2020.    Faint groundglass opacities and nodular groundglass opacities. Mild interlobular septal thickening. This may be due to fluid overload. Underlying infection cannot be excluded. Clinical correlation follow-up is recommended. Nodular opacities in the lingula which may be due to infection. Atelectasis of the left lower lobe.    Small bilateral pleural effusions.    Motion artifact limits evaluation of the airways although there is suspicion for a nodular density in the right mainstem bronchus proximally along theright side. This may be due to secretions although other etiologies cannot be excluded. Clinical correlation is recommended.`    Limited evaluation for lymphadenopathy as above.    < end of copied text >        < from: Transthoracic Echocardiogram (01.24.20 @ 22:41) >     Impression     Summary     Moderate to large pericardial effusion. Effusion is largest in size   posteriorly & adjacent to RV & RA with minimal effusion at the apex. There   is no echocardiographic evidence of tamponade physiology.   The left ventricle is normal in size, wallthickness, wall motion and   contractility.   Estimated left ventricular ejection fraction is 55-60 %.    < end of copied text >                          Radiology: all available radiological tests reviewed    Advanced directives addressed: full resuscitation

## 2020-01-27 NOTE — PROGRESS NOTE ADULT - SUBJECTIVE AND OBJECTIVE BOX
25 yo F with hx of GERD, cerebral palsy s/p trach and PEG, seizures, asthma and scoliosis presents to ED from Dr. Harris's office for further evaluation.   Pt reportedly with lethargy for the past few days and went to Dr. Harris's office, where she was found to be short of breath.   CXR done in office as per Dr. Harris showed b/l worsening airspace disease and O2 sat was 90% on vent.  In the ED patient was febrile to 104.8 F, BP- 95/57 and tachycardic to 140s.   Patient received 1.1 NS bolus x1 in the ED, WBC 14.20, lactate 1.3. (2020 18:12)    cardiology consulted for pericardial effusion.  CT scan ordered to assess pleural effusion & for infiltrate.  Incidental finding of "large pericardial effusion" reported.  Echocardiography performed at bedside shows moderate to large pericardial effusion w/ no tamponade physiology - see full report.  Pt currently on vent.  she is nonverbal at baseline.  family at bedside.    20: hemodynamically stable overnight.  no events on telemetry.  Discussed plan of care with family at bedside.  20: no events overnight.  repeat limited echo w/ effusion unchanged in size, no evidence of tamponade physiology.  20: no events overnight. no changes hemodynamically.  Pt nonverbal.  family at bedside discussed management.    MEDICATIONS:    MEDICATIONS  (STANDING):  ALBUTerol    90 MICROgram(s) HFA Inhaler 2 Puff(s) Inhalation every 6 hours  budesonide  80 MICROgram(s)/formoterol 4.5 MICROgram(s) Inhaler 2 Puff(s) Inhalation two times a day  chlorhexidine 0.12% Liquid 15 milliLiter(s) Oral Mucosa every 12 hours  fluticasone propionate 50 MICROgram(s)/spray Nasal Spray 1 Spray(s) Both Nostrils daily  heparin  Injectable 5000 Unit(s) SubCutaneous every 12 hours  ipratropium 17 MICROgram(s) HFA Inhaler 2 Puff(s) Inhalation every 6 hours  levETIRAcetam  Oral Liquid - Peds 500 milliGRAM(s) Oral daily  levETIRAcetam  Oral Liquid - Peds 700 milliGRAM(s) Oral at bedtime  meropenem  IVPB 1000 milliGRAM(s) IV Intermittent every 8 hours  pantoprazole   Suspension 40 milliGRAM(s) Oral daily  sodium chloride 0.65% Nasal 2 Spray(s) Both Nostrils daily    MEDICATIONS  (PRN):  acetaminophen    Suspension .. 500 milliGRAM(s) Oral every 6 hours PRN Temp greater or equal to 38C (100.4F), Mild Pain (1 - 3)  sucralfate Oral Liquid - Peds 1000 milliGRAM(s) Oral Before meals and at bedtime PRN gastric bleeding        Vital Signs Last 24 Hrs  T(C): 38.6 (2020 06:31), Max: 38.6 (2020 00:02)  T(F): 101.5 (2020 06:31), Max: 101.5 (2020 00:02)  HR: 89 (2020 06:00) (73 - 106)  BP: 102/63 (2020 06:00) (71/58 - 110/55)  BP(mean): 71 (2020 06:00) (57 - 79)  RR: 28 (2020 06:00) (11 - 28)  SpO2: 98% (2020 06:00) (97% - 100%)Daily     Daily Weight in k.7 (2020 06:31)I&O's Summary    2020 07:01  -  2020 07:00  --------------------------------------------------------  IN: 880 mL / OUT: 0 mL / NET: 880 mL        PHYSICAL EXAM:    HEENT: PERR, EOMI,  No oral cyananosis.  Neck:  supple,  No JVD  Respiratory: coarse breath sounds bilaterally, No wheezing, rales or rhonchi  Cardiovascular: S1 and S2, regular rate and rhythm, no Murmurs, gallops or rubs  Gastrointestinal: Bowel Sounds present, soft, nontender.   Extremities: No peripheral edema. No clubbing or cyanosis.  Vascular: 2+ peripheral pulses      LABS: All Labs Reviewed:                        10.7   10.00 )-----------( 188      ( 2020 06:45 )             34.3                         11.8   9.67  )-----------( 149      ( 2020 06:40 )             37.3                         14.2   14.20 )-----------( 181      ( 2020 17:47 )             43.4     2020 06:45    139    |  108    |  17     ----------------------------<  89     4.0     |  26     |  0.47   2020 06:40    139    |  108    |  11     ----------------------------<  97     3.7     |  26     |  0.39   2020 01:06    138    |  106    |  11     ----------------------------<  95     3.8     |  28     |  0.44     Ca    8.1        2020 06:45  Ca    8.4        2020 06:40  Ca    8.3        2020 01:06  Phos  2.5       2020 06:40  Mg     2.1       2020 06:40    TPro  6.1    /  Alb  1.9    /  TBili  0.2    /  DBili  x      /  AST  18     /  ALT  30     /  AlkPhos  124    2020 06:45  TPro  6.0    /  Alb  2.0    /  TBili  0.3    /  DBili  x      /  AST  27     /  ALT  40     /  AlkPhos  114    2020 06:40  TPro  6.2    /  Alb  2.1    /  TBili  0.3    /  DBili  x      /  AST  35     /  ALT  43     /  AlkPhos  111    2020 01:06      Blood Culture: Organism --  Gram Stain Blood -- Gram Stain   Rare Squamous epithelial cells per low power field  Few polymorphonuclear leukocytes per low power field  Rare Gram Variable Rods per oil power field  Specimen Source .Sputum Sputum  Culture-Blood --    Organism --  Gram Stain Blood -- Gram Stain --  Specimen Source .Urine None  Culture-Blood --    Organism --  Gram Stain Blood -- Gram Stain --  Specimen Source .Blood None  Culture-Blood --     @ 06:40  TSH: 1.56  ELIZABETH-pending  < from: Transthoracic Echocardiogram Follow Up (20 @ 13:55) >   Impression     Summary     Limited study to asses pericardial effusion.   The left ventricle is normal in size, wall thickness, wall motion and   contractility.   Estimated left ventricular ejection fraction is 65 %.   Normal appearing right atrium.   Normal appearing right ventricle structure and function.   No diastolic collapse of the right ventricle is noted.   Mitral inflow variation is within normal limits at 22%.   Normal mitralvalve excursion   A moderate to large pericardial effusion is present.   Pleural effusion cannot be ruled out.     Signature< from: Transthoracic Echocardiogram (20 @ 22:41) >   Summary     Moderate to large pericardial effusion. Effusion is largest in size   posteriorly & adjacent to RV & RA with minimal effusion at the apex. There   is no echocardiographic evidence of tamponade physiology.   The left ventricle is normal in size, wallthickness, wall motion and   contractility.   Estimated left ventricular ejection fraction is 55-60 %.     Signature     ----------------------------------------------------------------   Electronically signed by Parvez Meyer MD(Interpreting   physician) on 2020 11:16 PM   ----------------------------------------------------------------    < end of copied text >       ----------------------------------------------------------------   Electronically signed by Crispin Aparicio MD(Interpreting   physician) on 2020 05:10 PM   ----------------------------------------------------------------    < end of copied text >      EKG:  NSR, no ST-T [place] changes.    ECHO:

## 2020-01-27 NOTE — PROGRESS NOTE ADULT - SUBJECTIVE AND OBJECTIVE BOX
Patient is a 24y old  Female who presents with a chief complaint of tachycardia, shortness of breath (26 Jan 2020 12:58)    PAST MEDICAL & SURGICAL HISTORY:  Hypokalemia  Kyphosis  Scoliosis  Hypotonia  Asthma  Seizures  Cerebral Palsy  Gastroesophageal Reflux Disease  Hypothyroidism: Treated with synthroid in past. Stopped treatment in 2008 due to normal thyroid function.  Developmental Delay  Tracheostomy in place  Peg (Percutaneous Endoscopic Gastrostomy) Adjustment/Replacement/Removal  S/P Tonsillectomy and Adenoidectomy  Strabismus    CHAMP CALDWELL   24y    Female    BRIEF HOSPITAL COURSE:    Review of Systems:     UATO                   All other ROS are negative.    Allergies    Bactrim (Unknown)  carbamazepine (Unknown)  cephalosporins (Rash)  Corn (Unknown)  Depakote (Unknown)  diphenhydrAMINE (Seizure)  eggs (Unknown)  EGGS,  NUTS (Anaphylaxis)  Erythromycin Base (Unknown)  metoclopramide (Unknown)  Milk (Unknown)  MILK, SHELLFISH, WHEAT (Unknown)  Nuts (Unknown)  oxcarbazepine (Unknown)  peanuts (Unknown)  Potatoes (Unknown)  SEASONAL, POLLEN, GRASS, PET DANDER, FEATHERS (Unknown)  Sesame (Unknown)  shellfish (Unknown)  Trileptal (Unknown)  valproic acid (Unknown)  vancomycin (Unknown)  Wheat (Unknown)    Intolerances          ICU Vital Signs Last 24 Hrs  T(C): 38.6 (27 Jan 2020 00:02), Max: 38.6 (27 Jan 2020 00:02)  T(F): 101.5 (27 Jan 2020 00:02), Max: 101.5 (27 Jan 2020 00:02)  HR: 78 (27 Jan 2020 03:00) (73 - 118)  BP: 100/69 (27 Jan 2020 03:00) (71/58 - 110/55)  BP(mean): 76 (27 Jan 2020 03:00) (57 - 79)  ABP: --  ABP(mean): --  RR: 16 (27 Jan 2020 03:00) (11 - 22)  SpO2: 100% (27 Jan 2020 03:00) (96% - 100%)    Physical Examination:    General:  NAD    HEENT: trach site clean    PULM: bilateral BS     CVS:  s1 s2 reg    ABD: Peg    EXT:  warm     SKIN:  warm    Neuro: CP    ABG - ( 25 Jan 2020 05:09 )  pH, Arterial: 7.42  pH, Blood: x     /  pCO2: 42    /  pO2: 111   / HCO3: 27    / Base Excess: 3.1   /  SaO2: 98                Mode: AC/ CMV (Assist Control/ Continuous Mandatory Ventilation)  RR (machine): 17  FiO2: 50  PEEP: 8  ITime: 1  MAP: 15  PC: 15  PIP: 23    Mode: AC/ CMV (Assist Control/ Continuous Mandatory Ventilation), RR (machine): 17, FiO2: 50, PEEP: 8, ITime: 1, MAP: 15, PC: 15, PIP: 23  LABS:                        10.7   10.00 )-----------( 188      ( 26 Jan 2020 06:45 )             34.3     01-26    139  |  108  |  17  ----------------------------<  89  4.0   |  26  |  0.47<L>    Ca    8.1<L>      26 Jan 2020 06:45  Phos  2.5     01-25  Mg     2.1     01-25    TPro  6.1  /  Alb  1.9<L>  /  TBili  0.2  /  DBili  x   /  AST  18  /  ALT  30  /  AlkPhos  124<H>  01-26          CAPILLARY BLOOD GLUCOSE      CULTURES:  Culture Results:   No growth (01-24 @ 19:27)  Culture Results:   No growth to date. (01-24 @ 17:47)  Culture Results:   No growth to date. (01-24 @ 17:47)  Rapid RVP Result: NotDetec (01-24 @ 17:47)      Medications:  MEDICATIONS  (STANDING):  ALBUTerol    90 MICROgram(s) HFA Inhaler 2 Puff(s) Inhalation every 6 hours  budesonide  80 MICROgram(s)/formoterol 4.5 MICROgram(s) Inhaler 2 Puff(s) Inhalation two times a day  chlorhexidine 0.12% Liquid 15 milliLiter(s) Oral Mucosa every 12 hours  fluticasone propionate 50 MICROgram(s)/spray Nasal Spray 1 Spray(s) Both Nostrils daily  heparin  Injectable 5000 Unit(s) SubCutaneous every 12 hours  ipratropium 17 MICROgram(s) HFA Inhaler 2 Puff(s) Inhalation every 6 hours  levETIRAcetam  Oral Liquid - Peds 500 milliGRAM(s) Oral daily  levETIRAcetam  Oral Liquid - Peds 700 milliGRAM(s) Oral at bedtime  meropenem  IVPB 1000 milliGRAM(s) IV Intermittent every 8 hours  pantoprazole   Suspension 40 milliGRAM(s) Oral daily  sodium chloride 0.65% Nasal 2 Spray(s) Both Nostrils daily    MEDICATIONS  (PRN):  acetaminophen    Suspension .. 500 milliGRAM(s) Oral every 6 hours PRN Temp greater or equal to 38C (100.4F), Mild Pain (1 - 3)  sucralfate Oral Liquid - Peds 1000 milliGRAM(s) Oral Before meals and at bedtime PRN gastric bleeding        01-25 @ 07:01  -  01-26 @ 07:00  --------------------------------------------------------  IN: 100 mL / OUT: 0 mL / NET: 100 mL        RADIOLOGY/IMAGING/ECHO    < from: CT Chest No Cont (01.24.20 @ 20:27) >  IMPRESSION:     Large pericardial effusion. This was discussed with ANNEMARIE Kaur at 8:44 PM on 1/24/2020.    Faint groundglass opacities and nodular groundglass opacities. Mild interlobular septal thickening. This may be due to fluid overload. Underlying infection cannot be excluded. Clinical correlation follow-up is recommended. Nodular opacities in the lingula which may be due to infection. Atelectasis of the left lower lobe.    Small bilateral pleural effusions.    Motion artifact limits evaluation of the airways although there is suspicion for a nodular density in the right mainstem bronchus proximally along theright side. This may be due to secretions although other etiologies cannot be excluded. Clinical correlation is recommended.`    Limited evaluation for lymphadenopathy as above.    The above findings were discussed with ANNEMARIE Kaur at 8:44 PM on 1/24/2020.        Assessment/Plan:    25 F CP  SZ DO asthma GERD chronic trach/PEG,  admit with fever 104  Rx fro PNA   found with large pericardial effusion without tamponade on echo.   No + cultures RVP neg.  Still febrile today.  WBC improved.     Clinically stable.    Most but not all viral serologies are back,  The ones that are, are negative.    Repeat echo.  Drainage of effusion  vs conservative mgmt to be determined.     On carbapenem,  for presumed asp PNA ID to determine duration of therapy.      DVT P.

## 2020-01-27 NOTE — PROGRESS NOTE ADULT - PROBLEM SELECTOR PLAN 1
Incidental finding on CT scan.  Moderate to large in size.  No evidence of tamponade physiology on echo jan 24th or Jan 25th.  Hemodynamically stable Etiology unclear:  No evidence of malignancy, uremia, autoimmune disease.  TSH  neg ELIZABETH pending.  EBV, CMV neg, parvovirus, cocksackie pending.  unlikely bacterial given lack of fulminant presentation.  Limited echo planned today to assess for increase in size and/or tamponade physiology.  If unchanged in size would favor observation rather than drainage.

## 2020-01-27 NOTE — PROGRESS NOTE ADULT - SUBJECTIVE AND OBJECTIVE BOX
Subjective:  vitals improved. no distress. did not tolerate trach collar trial  suctioning bloody secretions    Review of Systems:  All 10 systems reviewed in detailed and found to be negative with the exception of what has already been described above    Allergies:  Bactrim (Unknown)  carbamazepine (Unknown)  cephalosporins (Rash)  Corn (Unknown)  Depakote (Unknown)  diphenhydrAMINE (Seizure)  eggs (Unknown)  EGGS,  NUTS (Anaphylaxis)  Erythromycin Base (Unknown)  metoclopramide (Unknown)  Milk (Unknown)  MILK, SHELLFISH, WHEAT (Unknown)  Nuts (Unknown)  oxcarbazepine (Unknown)  peanuts (Unknown)  Potatoes (Unknown)  SEASONAL, POLLEN, GRASS, PET DANDER, FEATHERS (Unknown)  Sesame (Unknown)  shellfish (Unknown)  Trileptal (Unknown)  valproic acid (Unknown)  vancomycin (Unknown)  Wheat (Unknown)    Meds  MEDICATIONS  (STANDING):  ALBUTerol    90 MICROgram(s) HFA Inhaler 2 Puff(s) Inhalation every 6 hours  budesonide  80 MICROgram(s)/formoterol 4.5 MICROgram(s) Inhaler 2 Puff(s) Inhalation two times a day  chlorhexidine 0.12% Liquid 15 milliLiter(s) Oral Mucosa every 12 hours  fluticasone propionate 50 MICROgram(s)/spray Nasal Spray 1 Spray(s) Both Nostrils daily  heparin  Injectable 5000 Unit(s) SubCutaneous every 12 hours  levETIRAcetam  Oral Liquid - Peds 500 milliGRAM(s) Oral daily  levETIRAcetam  Oral Liquid - Peds 700 milliGRAM(s) Oral at bedtime  meropenem  IVPB 1000 milliGRAM(s) IV Intermittent every 8 hours  pantoprazole   Suspension 40 milliGRAM(s) Oral daily  sodium chloride 0.65% Nasal 2 Spray(s) Both Nostrils daily    MEDICATIONS  (PRN):  acetaminophen    Suspension .. 500 milliGRAM(s) Oral every 6 hours PRN Temp greater or equal to 38C (100.4F), Mild Pain (1 - 3)  sucralfate Oral Liquid - Peds 1000 milliGRAM(s) Oral Before meals and at bedtime PRN gastric bleeding    Physical Exam  T(C): 38.6 (01-27-20 @ 06:31), Max: 38.6 (01-27-20 @ 00:02)  HR: 95 (01-27-20 @ 16:00) (70 - 102)  BP: 101/61 (01-27-20 @ 16:00) (71/58 - 106/67)  RR: 16 (01-27-20 @ 16:00) (12 - 28)  SpO2: 96% (01-27-20 @ 16:00) (95% - 100%)  Gen: Trach, no distress, on vent  HEENT: Microcephalic  Cardio: Tachycardic  Resp: Coarse breath sounds  GI: PEG tube  Ext: No cyanosis, clubbing or edema  Neuro: Nonfocal    Labs:                        10.7   10.00 )-----------( 188      ( 26 Jan 2020 06:45 )             34.3     01-26    139  |  108  |  17  ----------------------------<  89  4.0   |  26  |  0.47<L>    Ca    8.1<L>      26 Jan 2020 06:45    TPro  6.1  /  Alb  1.9<L>  /  TBili  0.2  /  DBili  x   /  AST  18  /  ALT  30  /  AlkPhos  124<H>  01-26    < from: CT Chest No Cont (01.24.20 @ 20:27) >  COMPARISON: None.    PROCEDURE:   CT of the Chest was performed without intravenous contrast. This limits evaluation of vascular structures.  Sagittal and coronal reformats were performed.      FINDINGS:    LUNGS AND AIRWAYS: A tracheostomy is present. Motion artifact slightly limits evaluation although there is suggestion of a focal density in the right lateral wall of the right proximal mainstem bronchus. This may represent secretions although other etiologies cannot be excluded. Again, this is limited in evaluation. There are faint bilateral groundglass opacities and groundglass nodular opacities in the upper lobes. Mild interlobular septal thickening is seen in the upper lobes. Minimal patchy groundglass opacities are present in the lingula. There is atelectasis of the left lower lobe and atelectatic changes at the right lung base.    PLEURA: Small bilateral pleural effusions, left greater than right.    MEDIASTINUM AND ALEXIS: Very limited in evaluation without contrast and due to the pleural fluid. I cannot rule out subcarinal lymphadenopathy.    VESSELS: Within normal limits.    HEART: Heart size is normal. There is a large pericardial effusion.    CHEST WALL AND LOWER NECK: Within normal limits.    VISUALIZED UPPER ABDOMEN: There is a percutaneous tube in the stomach which continues into the jejunum. The distal tip is not included on the examination.    BONES: Scoliosis    IMPRESSION:     Large pericardial effusion. This was discussed with ANNEMARIE Kaur at 8:44 PM on 1/24/2020.    Faint groundglass opacities and nodular groundglass opacities. Mild interlobular septal thickening. This may be due to fluid overload. Underlying infection cannot be excluded. Clinical correlation follow-up is recommended. Nodular opacities in the lingula which may be due to infection. Atelectasis of the left lower lobe.    Small bilateral pleural effusions.    Motion artifact limits evaluation of the airways although there is suspicion for a nodular density in the right mainstem bronchus proximally along theright side. This may be due to secretions although other etiologies cannot be excluded. Clinical correlation is recommended.`    Limited evaluation for lymphadenopathy as above.      Sputum cultures:  6/25/19  Serratia marcescens -       Resistant to: Augmentin, cefazolin, cefuroxime, tetracyclines       Intermediate: Tobramycin       Susceptible: Cefepime, ceftriaxone, cipro, ertapenem, gentamicin, levofloxacin, meropenem, bactrim    Achromobacter xyloxidans -       Resistant to: Amikacin, aztronam, cefepime, cipro, gentamicin, tobramycin       Intermediate: Cefotaxime, ceftriaxone, levofloxacin       Susceptible: Ceftazidime, imipenem, meropenem, zosyn, ticarcillin/clavulonate, bactrim    7/13/19  Serratia marcescens -       Resistant to: Augmentin, cefazolin, cefuroxime, tetracyclines       Susceptible: Cefepime, ceftriaxone, cipro, ertapenem, gentamicin, levofloxacin, meropenem, bactrim, tobramycin    Burkholderia cepacia       Susceptible to: Ceftazidime, chloramphenicol, levofloxacin, meropenem, bactrim      < from: Transthoracic Echocardiogram Follow Up (01.27.20 @ 09:17) >   Findings     Left Ventricle   Left ventricular wall motion is normal.   The left ventricle is normal in size.   Estimated left ventricular ejection fraction is 60 %.     Pericardial Effusion   There is echocardiographic evidence of tamponade physiology with variation   of mitral inflow with the respiratory cycle. A large pericardial effusion   is present.     Impression     Summary     Left ventricular wall motion is normal.   The left ventricle is normal in size.   Estimated left ventricular ejection fraction is 60 %.   There is echocardiographic evidence of tamponade physiology with variation   of mitral inflow with the respiratory cycle. A large pericardial effusion   is present.    < end of copied text >

## 2020-01-27 NOTE — CHART NOTE - NSCHARTNOTEFT_GEN_A_CORE
Tube feeding Recommendations:      Suggest changing current TF regimen C[employer] Peptide 1.5 @ 25ml (providing 825calories/ 41gm protein/ 374 free water). Current regimen not meeting estimated calorie/protein needs. Continuous water flush 40cc/hr ( 880ml ). Water flush + free water from enteral formula exceeds current hydration needs. Spoke with parents and discussed changing rate of enteral feed and hydration. They both verbalized understanding. Will follow up daily to monitor tolerance.       Estimated needs: Based on 35Kg    (25-30 calories/kg) 875-1050calories/daily  (1.4-1.6gm protein/kg) 49-56 gm protein/daily  (30-35ml/kg) 2127-4027 ml/ daily      Recommendations:  1) [employer] Peptide 1.5 @ 30ml x 22 hours (average enteral feeds on hold in critical care for 2 hours due to procedures and pt care). Enteral feed will provide 990 calories/ 49gm protein/ 449ml free water)    2) continuous water flush @ 30cc/hr x 22 hrs (total volume 660ml) Total volume including free water from enteral feed formula=1109ml/daily    3) monitor labs/lytes replete prn    4) monitor daily weights    5) strict I & O's     6) Maintain aspiration precautions, back of bed >35 degrees.

## 2020-01-28 LAB
ANION GAP SERPL CALC-SCNC: 7 MMOL/L — SIGNIFICANT CHANGE UP (ref 5–17)
BUN SERPL-MCNC: 9 MG/DL — SIGNIFICANT CHANGE UP (ref 7–23)
CALCIUM SERPL-MCNC: 8.5 MG/DL — SIGNIFICANT CHANGE UP (ref 8.5–10.1)
CHLORIDE SERPL-SCNC: 111 MMOL/L — HIGH (ref 96–108)
CO2 SERPL-SCNC: 25 MMOL/L — SIGNIFICANT CHANGE UP (ref 22–31)
CREAT SERPL-MCNC: 0.45 MG/DL — LOW (ref 0.5–1.3)
CULTURE RESULTS: SIGNIFICANT CHANGE UP
GLUCOSE SERPL-MCNC: 108 MG/DL — HIGH (ref 70–99)
HCT VFR BLD CALC: 35.2 % — SIGNIFICANT CHANGE UP (ref 34.5–45)
HGB BLD-MCNC: 11.4 G/DL — LOW (ref 11.5–15.5)
MAGNESIUM SERPL-MCNC: 1.9 MG/DL — SIGNIFICANT CHANGE UP (ref 1.6–2.6)
MCHC RBC-ENTMCNC: 32 PG — SIGNIFICANT CHANGE UP (ref 27–34)
MCHC RBC-ENTMCNC: 32.4 GM/DL — SIGNIFICANT CHANGE UP (ref 32–36)
MCV RBC AUTO: 98.9 FL — SIGNIFICANT CHANGE UP (ref 80–100)
PHOSPHATE SERPL-MCNC: 2.1 MG/DL — LOW (ref 2.5–4.5)
PLATELET # BLD AUTO: 264 K/UL — SIGNIFICANT CHANGE UP (ref 150–400)
POTASSIUM SERPL-MCNC: 3 MMOL/L — LOW (ref 3.5–5.3)
POTASSIUM SERPL-SCNC: 3 MMOL/L — LOW (ref 3.5–5.3)
RBC # BLD: 3.56 M/UL — LOW (ref 3.8–5.2)
RBC # FLD: 12.3 % — SIGNIFICANT CHANGE UP (ref 10.3–14.5)
SODIUM SERPL-SCNC: 143 MMOL/L — SIGNIFICANT CHANGE UP (ref 135–145)
SPECIMEN SOURCE: SIGNIFICANT CHANGE UP
WBC # BLD: 7.61 K/UL — SIGNIFICANT CHANGE UP (ref 3.8–10.5)
WBC # FLD AUTO: 7.61 K/UL — SIGNIFICANT CHANGE UP (ref 3.8–10.5)

## 2020-01-28 PROCEDURE — 99233 SBSQ HOSP IP/OBS HIGH 50: CPT

## 2020-01-28 PROCEDURE — 93971 EXTREMITY STUDY: CPT | Mod: 26,RT

## 2020-01-28 RX ORDER — SODIUM,POTASSIUM PHOSPHATES 278-250MG
2 POWDER IN PACKET (EA) ORAL
Refills: 0 | Status: DISCONTINUED | OUTPATIENT
Start: 2020-01-28 | End: 2020-01-28

## 2020-01-28 RX ORDER — POTASSIUM CHLORIDE 20 MEQ
40 PACKET (EA) ORAL ONCE
Refills: 0 | Status: COMPLETED | OUTPATIENT
Start: 2020-01-28 | End: 2020-01-28

## 2020-01-28 RX ORDER — COLCHICINE 0.6 MG
0.6 TABLET ORAL
Refills: 0 | Status: DISCONTINUED | OUTPATIENT
Start: 2020-01-28 | End: 2020-01-29

## 2020-01-28 RX ORDER — ASPIRIN/CALCIUM CARB/MAGNESIUM 324 MG
325 TABLET ORAL
Refills: 0 | Status: DISCONTINUED | OUTPATIENT
Start: 2020-01-28 | End: 2020-01-29

## 2020-01-28 RX ORDER — CALCIUM CARBONATE 500(1250)
200 TABLET ORAL
Refills: 0 | Status: DISCONTINUED | OUTPATIENT
Start: 2020-01-28 | End: 2020-01-29

## 2020-01-28 RX ORDER — MAGNESIUM OXIDE 400 MG ORAL TABLET 241.3 MG
400 TABLET ORAL
Refills: 0 | Status: DISCONTINUED | OUTPATIENT
Start: 2020-01-28 | End: 2020-01-29

## 2020-01-28 RX ORDER — CHOLECALCIFEROL (VITAMIN D3) 125 MCG
400 CAPSULE ORAL DAILY
Refills: 0 | Status: DISCONTINUED | OUTPATIENT
Start: 2020-01-28 | End: 2020-01-29

## 2020-01-28 RX ORDER — POTASSIUM CHLORIDE 20 MEQ
15 PACKET (EA) ORAL
Refills: 0 | Status: DISCONTINUED | OUTPATIENT
Start: 2020-01-28 | End: 2020-01-29

## 2020-01-28 RX ADMIN — ALBUTEROL 2 PUFF(S): 90 AEROSOL, METERED ORAL at 02:01

## 2020-01-28 RX ADMIN — BUDESONIDE AND FORMOTEROL FUMARATE DIHYDRATE 2 PUFF(S): 160; 4.5 AEROSOL RESPIRATORY (INHALATION) at 20:38

## 2020-01-28 RX ADMIN — LEVETIRACETAM 500 MILLIGRAM(S): 250 TABLET, FILM COATED ORAL at 11:10

## 2020-01-28 RX ADMIN — MEROPENEM 100 MILLIGRAM(S): 1 INJECTION INTRAVENOUS at 21:36

## 2020-01-28 RX ADMIN — ALBUTEROL 2 PUFF(S): 90 AEROSOL, METERED ORAL at 20:37

## 2020-01-28 RX ADMIN — MAGNESIUM OXIDE 400 MG ORAL TABLET 400 MILLIGRAM(S): 241.3 TABLET ORAL at 18:09

## 2020-01-28 RX ADMIN — MEROPENEM 100 MILLIGRAM(S): 1 INJECTION INTRAVENOUS at 05:32

## 2020-01-28 RX ADMIN — ALBUTEROL 2 PUFF(S): 90 AEROSOL, METERED ORAL at 10:34

## 2020-01-28 RX ADMIN — Medication 0.6 MILLIGRAM(S): at 05:33

## 2020-01-28 RX ADMIN — PANTOPRAZOLE SODIUM 40 MILLIGRAM(S): 20 TABLET, DELAYED RELEASE ORAL at 11:10

## 2020-01-28 RX ADMIN — CHLORHEXIDINE GLUCONATE 15 MILLILITER(S): 213 SOLUTION TOPICAL at 05:33

## 2020-01-28 RX ADMIN — LEVETIRACETAM 700 MILLIGRAM(S): 250 TABLET, FILM COATED ORAL at 21:36

## 2020-01-28 RX ADMIN — Medication 1 SPRAY(S): at 11:10

## 2020-01-28 RX ADMIN — BUDESONIDE AND FORMOTEROL FUMARATE DIHYDRATE 2 PUFF(S): 160; 4.5 AEROSOL RESPIRATORY (INHALATION) at 10:36

## 2020-01-28 RX ADMIN — CHLORHEXIDINE GLUCONATE 15 MILLILITER(S): 213 SOLUTION TOPICAL at 17:30

## 2020-01-28 RX ADMIN — MEROPENEM 100 MILLIGRAM(S): 1 INJECTION INTRAVENOUS at 17:25

## 2020-01-28 RX ADMIN — Medication 15 MILLIEQUIVALENT(S): at 17:30

## 2020-01-28 RX ADMIN — Medication 325 MILLIGRAM(S): at 17:30

## 2020-01-28 RX ADMIN — HEPARIN SODIUM 5000 UNIT(S): 5000 INJECTION INTRAVENOUS; SUBCUTANEOUS at 05:33

## 2020-01-28 RX ADMIN — Medication 0.6 MILLIGRAM(S): at 17:30

## 2020-01-28 RX ADMIN — Medication 650 MILLIGRAM(S): at 05:33

## 2020-01-28 RX ADMIN — Medication 2 SPRAY(S): at 11:11

## 2020-01-28 NOTE — PROGRESS NOTE ADULT - PROBLEM SELECTOR PLAN 1
Incidental finding on CT scan Jan 24th.  Moderate to large in size.  No evidence of tamponade physiology on multiple echo studies.  Hemodynamically stable. Etiology unclear:  No evidence of malignancy, uremia, autoimmune disease.  TSH  neg ELIZABETH pending.  EBV, CMV neg, parvovirus, cocksackie pending.  unlikely bacterial given lack of fulminant presentation.  Echo today w/ no tamponade physiology & substantial decrease in size of pericardial effusion.   TID (2 weeks) & colchicine 0.5 BID (3-6 months) started yesterday.  Will continue to follow, await rheum input re: etiology of effusion.      Limited echo planned today to assess for increase in size and/or tamponade physiology.  If unchanged in size would favor observation rather than drainage. Incidental finding on CT scan Jan 24th.  Moderate to large in size.  No evidence of tamponade physiology on multiple echo studies.  Hemodynamically stable. Etiology unclear:  No evidence of malignancy, uremia, autoimmune disease.  TSH  neg ELIZABETH pending.  EBV, CMV neg, parvovirus, cocksackie pending.  unlikely bacterial given lack of fulminant presentation.  Echo today w/ no tamponade physiology & substantial decrease in size of pericardial effusion.  Will initiate high dose NSAID & colchicine for possible pericardial inflammatory cause of effusion.  Family expressed concern re: gastric ulcers w/ high dose NSAIDS.  Will give  BID (1 weeks) & colchicine 0.5 BID (3-6 months).  Will continue to follow, await rheum input re: etiology of effusion.

## 2020-01-28 NOTE — PROGRESS NOTE ADULT - SUBJECTIVE AND OBJECTIVE BOX
23 yo F with hx of GERD, cerebral palsy s/p trach and PEG, seizures, asthma and scoliosis presents to ED from Dr. Harris's office for further evaluation.   Pt reportedly with lethargy for the past few days and went to Dr. Harris's office, where she was found to be short of breath.   CXR done in office as per Dr. Harris showed b/l worsening airspace disease and O2 sat was 90% on vent.  In the ED patient was febrile to 104.8 F, BP- 95/57 and tachycardic to 140s.   Patient received 1.1 NS bolus x1 in the ED, WBC 14.20, lactate 1.3. (2020 18:12)    cardiology consulted for pericardial effusion.  CT scan ordered to assess pleural effusion & for infiltrate.  Incidental finding of "large pericardial effusion" reported.  Echocardiography performed at bedside shows moderate to large pericardial effusion w/ no tamponade physiology - see full report.  Pt currently on vent.  she is nonverbal at baseline.  family at bedside.    20: hemodynamically stable overnight.  no events on telemetry.  Discussed plan of care with family at bedside.  20: no events overnight.  repeat limited echo w/ effusion unchanged in size, no evidence of tamponade physiology.  20: no events overnight. no changes hemodynamically.  Pt nonverbal.  family at bedside discussed management.  20: no events overnight.  HRs 60s.  Pt nonverbal, discussed today's echo exam w/ pts parents at bedside.      MEDICATIONS:    MEDICATIONS  (STANDING):  ALBUTerol    90 MICROgram(s) HFA Inhaler 2 Puff(s) Inhalation every 6 hours  aspirin 650 milliGRAM(s) Oral three times a day  budesonide  80 MICROgram(s)/formoterol 4.5 MICROgram(s) Inhaler 2 Puff(s) Inhalation two times a day  chlorhexidine 0.12% Liquid 15 milliLiter(s) Oral Mucosa every 12 hours  fluticasone propionate 50 MICROgram(s)/spray Nasal Spray 1 Spray(s) Both Nostrils daily  levETIRAcetam  Oral Liquid - Peds 500 milliGRAM(s) Oral daily  levETIRAcetam  Oral Liquid - Peds 700 milliGRAM(s) Oral at bedtime  meropenem  IVPB 1000 milliGRAM(s) IV Intermittent every 8 hours  pantoprazole   Suspension 40 milliGRAM(s) Oral daily  potassium phosphate / sodium phosphate powder 2 Packet(s) Oral two times a day  sodium chloride 0.65% Nasal 2 Spray(s) Both Nostrils daily    MEDICATIONS  (PRN):  acetaminophen    Suspension .. 500 milliGRAM(s) Oral every 6 hours PRN Temp greater or equal to 38C (100.4F), Mild Pain (1 - 3)  sucralfate Oral Liquid - Peds 1000 milliGRAM(s) Oral Before meals and at bedtime PRN gastric bleeding      REVIEW OF SYSTEMS:    CONSTITUTIONAL: No weakness, fevers or chills  EYES/ENT: No visual changes;  No vertigo or throat pain   NECK: No pain or stiffness  RESPIRATORY: No cough, wheezing, hemoptysis; No shortness of breath  CARDIOVASCULAR: No chest pain or palpitations  GASTROINTESTINAL: No abdominal or epigastric pain. No nausea, vomiting, or hematemesis; No diarrhea or constipation. No melena or hematochezia.  GENITOURINARY: No dysuria, frequency or hematuria  NEUROLOGICAL: No numbness or weakness  SKIN: No itching, burning, rashes, or lesions   All other review of systems is negative unless indicated above    Vital Signs Last 24 Hrs  T(C): 38.2 (2020 05:00), Max: 38.7 (2020 20:57)  T(F): 100.7 (2020 05:00), Max: 101.7 (2020 20:57)  HR: 69 (2020 09:00) (65 - 95)  BP: 91/67 (2020 09:00) (83/44 - 112/61)  BP(mean): 66 (2020 09:00) (52 - 83)  RR: 20 (2020 09:00) (16 - 23)  SpO2: 98% (2020 06:00) (94% - 99%)Daily     Daily Weight in k.6 (2020 00:00)I&O's Summary    2020 07:01  -  2020 07:00  --------------------------------------------------------  IN: 1180 mL / OUT: 0 mL / NET: 1180 mL        PHYSICAL EXAM:    HEENT: PERR, EOMI,  No oral cyananosis.  Neck:  supple,  No JVD  Respiratory: coarse breath sounds bilaterally, No wheezing, rales or rhonchi  Cardiovascular: S1 and S2, regular rate and rhythm, no Murmurs, gallops or rubs  Gastrointestinal: Bowel Sounds present, soft, nontender.   Extremities: No peripheral edema. No clubbing or cyanosis.  Vascular: 2+ peripheral pulses        LABS: All Labs Reviewed:                        11.4   7.61  )-----------( 264      ( 2020 06:43 )             35.2                         10.7   10.00 )-----------( 188      ( 2020 06:45 )               34.3     2020 06:43    143    |  111    |  9      ----------------------------<  108    3.0     |  25     |  0.45   2020 06:45    139    |  108    |  17     ----------------------------<  89     4.0     |  26     |  0.47     Ca    8.5        2020 06:43  Ca    8.1        2020 06:45  Phos  2.1       2020 06:43  Mg     1.9       2020 06:43    TPro  6.1    /  Alb  1.9    /  TBili  0.2    /  DBili  x      /  AST  18     /  ALT  30     /  AlkPhos  124    2020 06:45    < from: Transthoracic Echocardiogram (20 @ 09:11) >   Summary     Limited study performed to evaluate pericardial effusion and tamponade   physiology.   Effusion has decreased in size compared to prior exams & is currently mild   to moderate in size.   No evidence of tamponade physiology.     Signature     ----------------------------------------------------------------   Electronically signed by Parvez Meyer MD(Interpreting   physician) on 2020 10:16 AM   ----------------------------------------------------------------    < end of copied text >

## 2020-01-28 NOTE — PROGRESS NOTE ADULT - SUBJECTIVE AND OBJECTIVE BOX
CC:  Respiratory Failure     HPI:    23 y/o female GERD, cerebral palsy s/p trach and PEG, seizures, asthma and scoliosis presents to ED from Dr. Harris's office for further evaluation. Pt admitted with PNA sepsis as there is GGO on chest CT and pericardial effusion.  TTE revealed mod-large pericardial effusion wo collapse.      1/26:  CCU D # 3.  Pt seen and examined in CCU--mom at bedside.  Tm 101.3  Tolerating TF.      1/27: Patient seen, She continues with temps, WBC trending down, Repeat ECHO today shows decreased size of pericardial effusion    1/28: Patient with a new rash where the BP cuff was on her right arm as well as swelling.  She is also not moving the are as well.  Patient has not had a BM in a couple days as well.  Repeat ECHO PND today again.  Family refusing SQH as the patient is on high dose ASA          PAST MEDICAL & SURGICAL HISTORY:  Hypokalemia  Kyphosis  Scoliosis  Hypotonia  Asthma  Seizures  Cerebral Palsy  Gastroesophageal Reflux Disease  Hypothyroidism: Treated with synthroid in past. Stopped treatment in 2008 due to normal thyroid function.  Developmental Delay  Tracheostomy in place  Peg (Percutaneous Endoscopic Gastrostomy) Adjustment/Replacement/Removal  S/P Tonsillectomy and Adenoidectomy  Strabismus      FAMILY HISTORY:      Social Hx:    Allergies    Bactrim (Unknown)  carbamazepine (Unknown)  cephalosporins (Rash)  Corn (Unknown)  Depakote (Unknown)  diphenhydrAMINE (Seizure)  eggs (Unknown)  EGGS,  NUTS (Anaphylaxis)  Erythromycin Base (Unknown)  metoclopramide (Unknown)  Milk (Unknown)  MILK, SHELLFISH, WHEAT (Unknown)  Nuts (Unknown)  oxcarbazepine (Unknown)  peanuts (Unknown)  Potatoes (Unknown)  SEASONAL, POLLEN, GRASS, PET DANDER, FEATHERS (Unknown)  Sesame (Unknown)  shellfish (Unknown)  Trileptal (Unknown)  valproic acid (Unknown)  vancomycin (Unknown)  Wheat (Unknown)    Intolerances            ICU Vital Signs Last 24 Hrs  T(C): 38.2 (28 Jan 2020 05:00), Max: 38.7 (27 Jan 2020 20:57)  T(F): 100.7 (28 Jan 2020 05:00), Max: 101.7 (27 Jan 2020 20:57)  HR: 79 (28 Jan 2020 06:00) (65 - 95)  BP: 112/61 (28 Jan 2020 06:00) (83/44 - 112/61)  BP(mean): 73 (28 Jan 2020 06:00) (52 - 83)  ABP: --  ABP(mean): --  RR: 16 (28 Jan 2020 06:00) (16 - 23)  SpO2: 98% (28 Jan 2020 06:00) (94% - 99%)      Mode: AC/ CMV (Assist Control/ Continuous Mandatory Ventilation)  RR (machine): 16  TV (machine): 400  FiO2: 40  PEEP: 8  ITime: 1  PC: 15  PIP: 35      I&O's Summary    27 Jan 2020 07:01  -  28 Jan 2020 07:00  --------------------------------------------------------  IN: 1180 mL / OUT: 0 mL / NET: 1180 mL                              11.4   7.61  )-----------( 264      ( 28 Jan 2020 06:43 )             35.2       01-28    143  |  111<H>  |  9   ----------------------------<  108<H>  3.0<L>   |  25  |  0.45<L>    Ca    8.5      28 Jan 2020 06:43  Phos  2.1     01-28  Mg     1.9     01-28                      MEDICATIONS  (STANDING):  ALBUTerol    90 MICROgram(s) HFA Inhaler 2 Puff(s) Inhalation every 6 hours  aspirin 650 milliGRAM(s) Oral three times a day  budesonide  80 MICROgram(s)/formoterol 4.5 MICROgram(s) Inhaler 2 Puff(s) Inhalation two times a day  chlorhexidine 0.12% Liquid 15 milliLiter(s) Oral Mucosa every 12 hours  fluticasone propionate 50 MICROgram(s)/spray Nasal Spray 1 Spray(s) Both Nostrils daily  levETIRAcetam  Oral Liquid - Peds 500 milliGRAM(s) Oral daily  levETIRAcetam  Oral Liquid - Peds 700 milliGRAM(s) Oral at bedtime  meropenem  IVPB 1000 milliGRAM(s) IV Intermittent every 8 hours  pantoprazole   Suspension 40 milliGRAM(s) Oral daily  potassium phosphate / sodium phosphate powder 2 Packet(s) Oral two times a day  sodium chloride 0.65% Nasal 2 Spray(s) Both Nostrils daily    MEDICATIONS  (PRN):  acetaminophen    Suspension .. 500 milliGRAM(s) Oral every 6 hours PRN Temp greater or equal to 38C (100.4F), Mild Pain (1 - 3)  sucralfate Oral Liquid - Peds 1000 milliGRAM(s) Oral Before meals and at bedtime PRN gastric bleeding      DVT Prophylaxis: V    Advanced Directives:  Discussed with:    Visit Information:    ** Time is exclusive of billed procedures and/or teaching and/or routine family updates.

## 2020-01-28 NOTE — PROGRESS NOTE ADULT - SUBJECTIVE AND OBJECTIVE BOX
Subjective:  Developed a rash at site of blood pressure cuff.  Family does not want heparin.  Due for GJ tube replacement.  Family wants to try trach using her own apparatus.  No hemoptysis    Review of Systems:  All 10 systems reviewed in detailed and found to be negative with the exception of what has already been described above    Allergies:  Bactrim (Unknown)  carbamazepine (Unknown)  cephalosporins (Rash)  Corn (Unknown)  Depakote (Unknown)  diphenhydrAMINE (Seizure)  eggs (Unknown)  EGGS,  NUTS (Anaphylaxis)  Erythromycin Base (Unknown)  metoclopramide (Unknown)  Milk (Unknown)  MILK, SHELLFISH, WHEAT (Unknown)  Nuts (Unknown)  oxcarbazepine (Unknown)  peanuts (Unknown)  Potatoes (Unknown)  SEASONAL, POLLEN, GRASS, PET DANDER, FEATHERS (Unknown)  Sesame (Unknown)  shellfish (Unknown)  Trileptal (Unknown)  valproic acid (Unknown)  vancomycin (Unknown)  Wheat (Unknown)    Meds  MEDICATIONS  (STANDING):  ALBUTerol    90 MICROgram(s) HFA Inhaler 2 Puff(s) Inhalation every 6 hours  aspirin 650 milliGRAM(s) Oral three times a day  budesonide  80 MICROgram(s)/formoterol 4.5 MICROgram(s) Inhaler 2 Puff(s) Inhalation two times a day  chlorhexidine 0.12% Liquid 15 milliLiter(s) Oral Mucosa every 12 hours  colchicine 0.6 milliGRAM(s) Oral two times a day  fluticasone propionate 50 MICROgram(s)/spray Nasal Spray 1 Spray(s) Both Nostrils daily  levETIRAcetam  Oral Liquid - Peds 500 milliGRAM(s) Oral daily  levETIRAcetam  Oral Liquid - Peds 700 milliGRAM(s) Oral at bedtime  meropenem  IVPB 1000 milliGRAM(s) IV Intermittent every 8 hours  pantoprazole   Suspension 40 milliGRAM(s) Oral daily  potassium phosphate / sodium phosphate powder 2 Packet(s) Oral two times a day  sodium chloride 0.65% Nasal 2 Spray(s) Both Nostrils daily    MEDICATIONS  (PRN):  acetaminophen    Suspension .. 500 milliGRAM(s) Oral every 6 hours PRN Temp greater or equal to 38C (100.4F), Mild Pain (1 - 3)  sucralfate Oral Liquid - Peds 1000 milliGRAM(s) Oral Before meals and at bedtime PRN gastric bleeding    Physical Exam  T(C): 36.6 (01-28-20 @ 12:49), Max: 38.7 (01-27-20 @ 20:57)  HR: 69 (01-28-20 @ 09:00) (65 - 95)  BP: 91/67 (01-28-20 @ 09:00) (83/44 - 112/61)  RR: 20 (01-28-20 @ 09:00) (16 - 23)  SpO2: 98% (01-28-20 @ 06:00) (94% - 99%)  Gen: Trach, no distress, on vent  HEENT: Microcephalic  Cardio:  regular rate  Resp: Coarse breath sounds  GI:  GJ tube  Ext: No cyanosis, clubbing or edema  Neuro: Nonfocal    Labs:                        11.4   7.61  )-----------( 264      ( 28 Jan 2020 06:43 )             35.2     01-28    143  |  111<H>  |  9   ----------------------------<  108<H>  3.0<L>   |  25  |  0.45<L>    Ca    8.5      28 Jan 2020 06:43  Phos  2.1     01-28  Mg     1.9     01-28    d. Underlying infection cannot be excluded. Clinical correlation follow-up is recommended. Nodular opacities in the lingula which may be due to infection. Atelectasis of the left lower lobe.    Small bilateral pleural effusions.    Motion artifact limits evaluation of the airways although there is suspicion for a nodular density in the right mainstem bronchus proximally along theright side. This may be due to secretions although other etiologies cannot be excluded. Clinical correlation is recommended.`    Limited evaluation for lymphadenopathy as above.      Sputum cultures:  6/25/19  Serratia marcescens -       Resistant to: Augmentin, cefazolin, cefuroxime, tetracyclines       Intermediate: Tobramycin       Susceptible: Cefepime, ceftriaxone, cipro, ertapenem, gentamicin, levofloxacin, meropenem, bactrim    Achromobacter xyloxidans -       Resistant to: Amikacin, aztronam, cefepime, cipro, gentamicin, tobramycin       Intermediate: Cefotaxime, ceftriaxone, levofloxacin       Susceptible: Ceftazidime, imipenem, meropenem, zosyn, ticarcillin/clavulonate, bactrim    7/13/19  Serratia marcescens -       Resistant to: Augmentin, cefazolin, cefuroxime, tetracyclines       Susceptible: Cefepime, ceftriaxone, cipro, ertapenem, gentamicin, levofloxacin, meropenem, bactrim, tobramycin    Burkholderia cepacia       Susceptible to: Ceftazidime, chloramphenicol, levofloxacin, meropenem, bactrim      < from: Transthoracic Echocardiogram Follow Up (01.27.20 @ 09:17) >   Findings     Left Ventricle   Left ventricular wall motion is normal.   The left ventricle is normal in size.   Estimated left ventricular ejection fraction is 60 %.     Pericardial Effusion   There is echocardiographic evidence of tamponade physiology with variation   of mitral inflow with the respiratory cycle. A large pericardial effusion   is present.     Impression     Summary     Left ventricular wall motion is normal.   The left ventricle is normal in size.   Estimated left ventricular ejection fraction is 60 %.   There is echocardiographic evidence of tamponade physiology with variation   of mitral inflow with the respiratory cycle. A large pericardial effusion   is present. Subjective:  Developed a rash at site of blood pressure cuff.  Family does not want heparin.  Due for GJ tube replacement.  Family wants to try trach using her own apparatus.  No hemoptysis    Review of Systems:  All 10 systems reviewed in detailed and found to be negative with the exception of what has already been described above    Allergies:  Bactrim (Unknown)  carbamazepine (Unknown)  cephalosporins (Rash)  Corn (Unknown)  Depakote (Unknown)  diphenhydrAMINE (Seizure)  eggs (Unknown)  EGGS,  NUTS (Anaphylaxis)  Erythromycin Base (Unknown)  metoclopramide (Unknown)  Milk (Unknown)  MILK, SHELLFISH, WHEAT (Unknown)  Nuts (Unknown)  oxcarbazepine (Unknown)  peanuts (Unknown)  Potatoes (Unknown)  SEASONAL, POLLEN, GRASS, PET DANDER, FEATHERS (Unknown)  Sesame (Unknown)  shellfish (Unknown)  Trileptal (Unknown)  valproic acid (Unknown)  vancomycin (Unknown)  Wheat (Unknown)    Meds  MEDICATIONS  (STANDING):  ALBUTerol    90 MICROgram(s) HFA Inhaler 2 Puff(s) Inhalation every 6 hours  aspirin 650 milliGRAM(s) Oral three times a day  budesonide  80 MICROgram(s)/formoterol 4.5 MICROgram(s) Inhaler 2 Puff(s) Inhalation two times a day  chlorhexidine 0.12% Liquid 15 milliLiter(s) Oral Mucosa every 12 hours  colchicine 0.6 milliGRAM(s) Oral two times a day  fluticasone propionate 50 MICROgram(s)/spray Nasal Spray 1 Spray(s) Both Nostrils daily  levETIRAcetam  Oral Liquid - Peds 500 milliGRAM(s) Oral daily  levETIRAcetam  Oral Liquid - Peds 700 milliGRAM(s) Oral at bedtime  meropenem  IVPB 1000 milliGRAM(s) IV Intermittent every 8 hours  pantoprazole   Suspension 40 milliGRAM(s) Oral daily  potassium phosphate / sodium phosphate powder 2 Packet(s) Oral two times a day  sodium chloride 0.65% Nasal 2 Spray(s) Both Nostrils daily    MEDICATIONS  (PRN):  acetaminophen    Suspension .. 500 milliGRAM(s) Oral every 6 hours PRN Temp greater or equal to 38C (100.4F), Mild Pain (1 - 3)  sucralfate Oral Liquid - Peds 1000 milliGRAM(s) Oral Before meals and at bedtime PRN gastric bleeding    Physical Exam  T(C): 36.6 (01-28-20 @ 12:49), Max: 38.7 (01-27-20 @ 20:57)  HR: 69 (01-28-20 @ 09:00) (65 - 95)  BP: 91/67 (01-28-20 @ 09:00) (83/44 - 112/61)  RR: 20 (01-28-20 @ 09:00) (16 - 23)  SpO2: 98% (01-28-20 @ 06:00) (94% - 99%)  Gen: Trach, no distress, on vent  HEENT: Microcephalic  Cardio:  regular rate  Resp: Coarse breath sounds  GI:  GJ tube  Ext: No cyanosis, clubbing or edema  Neuro: Nonfocal  Skin: Rash RUE where BP cuff was    Labs:                        11.4   7.61  )-----------( 264      ( 28 Jan 2020 06:43 )             35.2     01-28    143  |  111<H>  |  9   ----------------------------<  108<H>  3.0<L>   |  25  |  0.45<L>    Ca    8.5      28 Jan 2020 06:43  Phos  2.1     01-28  Mg     1.9     01-28    d. Underlying infection cannot be excluded. Clinical correlation follow-up is recommended. Nodular opacities in the lingula which may be due to infection. Atelectasis of the left lower lobe.    Small bilateral pleural effusions.    Motion artifact limits evaluation of the airways although there is suspicion for a nodular density in the right mainstem bronchus proximally along theright side. This may be due to secretions although other etiologies cannot be excluded. Clinical correlation is recommended.`    Limited evaluation for lymphadenopathy as above.      Sputum cultures:  6/25/19  Serratia marcescens -       Resistant to: Augmentin, cefazolin, cefuroxime, tetracyclines       Intermediate: Tobramycin       Susceptible: Cefepime, ceftriaxone, cipro, ertapenem, gentamicin, levofloxacin, meropenem, bactrim    Achromobacter xyloxidans -       Resistant to: Amikacin, aztronam, cefepime, cipro, gentamicin, tobramycin       Intermediate: Cefotaxime, ceftriaxone, levofloxacin       Susceptible: Ceftazidime, imipenem, meropenem, zosyn, ticarcillin/clavulonate, bactrim    7/13/19  Serratia marcescens -       Resistant to: Augmentin, cefazolin, cefuroxime, tetracyclines       Susceptible: Cefepime, ceftriaxone, cipro, ertapenem, gentamicin, levofloxacin, meropenem, bactrim, tobramycin    Burkholderia cepacia       Susceptible to: Ceftazidime, chloramphenicol, levofloxacin, meropenem, bactrim      < from: Transthoracic Echocardiogram Follow Up (01.27.20 @ 09:17) >   Findings     Left Ventricle   Left ventricular wall motion is normal.   The left ventricle is normal in size.   Estimated left ventricular ejection fraction is 60 %.     Pericardial Effusion   There is echocardiographic evidence of tamponade physiology with variation   of mitral inflow with the respiratory cycle. A large pericardial effusion   is present.     Impression     Summary     Left ventricular wall motion is normal.   The left ventricle is normal in size.   Estimated left ventricular ejection fraction is 60 %.   There is echocardiographic evidence of tamponade physiology with variation   of mitral inflow with the respiratory cycle. A large pericardial effusion   is present.

## 2020-01-28 NOTE — CONSULT NOTE ADULT - SUBJECTIVE AND OBJECTIVE BOX
HPI:  23 yo F with hx of GERD, cerebral palsy s/p trach and PEG, seizures, asthma and scoliosis presents with cough and shortness of breath and fever and diagnosed with pneumonia on abx  she is due according to her mother for a replacement of her gj tube  pt is nonverbal      PAST MEDICAL & SURGICAL HISTORY:  Hypokalemia  Kyphosis  Scoliosis  Hypotonia  Asthma  Seizures  Cerebral Palsy  Gastroesophageal Reflux Disease  Hypothyroidism: Treated with synthroid in past. Stopped treatment in 2008 due to normal thyroid function.  Developmental Delay  Tracheostomy in place  Peg (Percutaneous Endoscopic Gastrostomy) Adjustment/Replacement/Removal  S/P Tonsillectomy and Adenoidectomy  Strabismus      Allergies    Bactrim (Unknown)  carbamazepine (Unknown)  cephalosporins (Rash)  Corn (Unknown)  Depakote (Unknown)  diphenhydrAMINE (Seizure)  eggs (Unknown)  EGGS,  NUTS (Anaphylaxis)  Erythromycin Base (Unknown)  metoclopramide (Unknown)  Milk (Unknown)  MILK, SHELLFISH, WHEAT (Unknown)  Nuts (Unknown)  oxcarbazepine (Unknown)  peanuts (Unknown)  Potatoes (Unknown)  SEASONAL, POLLEN, GRASS, PET DANDER, FEATHERS (Unknown)  Sesame (Unknown)  shellfish (Unknown)  Trileptal (Unknown)  valproic acid (Unknown)  vancomycin (Unknown)  Wheat (Unknown)    Intolerances        MEDICATIONS  (STANDING):  ALBUTerol    90 MICROgram(s) HFA Inhaler 2 Puff(s) Inhalation every 6 hours  aspirin 650 milliGRAM(s) Oral three times a day  budesonide  80 MICROgram(s)/formoterol 4.5 MICROgram(s) Inhaler 2 Puff(s) Inhalation two times a day  chlorhexidine 0.12% Liquid 15 milliLiter(s) Oral Mucosa every 12 hours  colchicine 0.6 milliGRAM(s) Oral two times a day  fluticasone propionate 50 MICROgram(s)/spray Nasal Spray 1 Spray(s) Both Nostrils daily  levETIRAcetam  Oral Liquid - Peds 500 milliGRAM(s) Oral daily  levETIRAcetam  Oral Liquid - Peds 700 milliGRAM(s) Oral at bedtime  meropenem  IVPB 1000 milliGRAM(s) IV Intermittent every 8 hours  pantoprazole   Suspension 40 milliGRAM(s) Oral daily  potassium phosphate / sodium phosphate powder 2 Packet(s) Oral two times a day  sodium chloride 0.65% Nasal 2 Spray(s) Both Nostrils daily    MEDICATIONS  (PRN):  acetaminophen    Suspension .. 500 milliGRAM(s) Oral every 6 hours PRN Temp greater or equal to 38C (100.4F), Mild Pain (1 - 3)  sucralfate Oral Liquid - Peds 1000 milliGRAM(s) Oral Before meals and at bedtime PRN gastric bleeding      FAMILY HISTORY:  n/a    Social History: n/a    REVIEW OF SYSTEMS      unabe to obtain    PHYSICAL EXAM:    Vital Signs Last 24 Hrs  T(C): 38.2 (28 Jan 2020 05:00), Max: 38.7 (27 Jan 2020 20:57)  T(F): 100.7 (28 Jan 2020 05:00), Max: 101.7 (27 Jan 2020 20:57)  HR: 69 (28 Jan 2020 09:00) (65 - 95)  BP: 91/67 (28 Jan 2020 09:00) (83/44 - 112/61)  BP(mean): 66 (28 Jan 2020 09:00) (52 - 83)  RR: 20 (28 Jan 2020 09:00) (16 - 23)  SpO2: 98% (28 Jan 2020 06:00) (94% - 99%)    Constitutional: no acute distress    ENMT: NC/AT scl anicteric opm pink no lesions     Neck: supple. No jvd or LN    Respiratory: reduced bs bases     Cardiovascular: RRR s1s2     Gastrointestinal: Pos bs , soft , not tender, no hepatosplenomegaly,  no mass; intact gj tube       Back: No CVA tenderness    Extremities: NO cce     Neurological: Alert and oriented x 3     Skin: No rash or jaundice     Date/Time:01-28 @ 06:43    Albumin: --  ALT/SGPT: --  Alk Phos: --  AST/SGOT: --  Bilirubin Direct: --  Bilirubin Total: --  Ca: 8.5  eGFR : 165  eGFR Non-: 142  Lipase: --  Amylase: --  INR: --  PTT: --      01-28    143  |  111<H>  |  9   ----------------------------<  108<H>  3.0<L>   |  25  |  0.45<L>    Ca    8.5      28 Jan 2020 06:43  Phos  2.1     01-28  Mg     1.9     01-28                              11.4   7.61  )-----------( 264      ( 28 Jan 2020 06:43 )             35.2

## 2020-01-29 ENCOUNTER — TRANSCRIPTION ENCOUNTER (OUTPATIENT)
Age: 25
End: 2020-01-29

## 2020-01-29 VITALS — HEART RATE: 83 BPM | RESPIRATION RATE: 30 BRPM | OXYGEN SATURATION: 97 %

## 2020-01-29 DIAGNOSIS — Z00.00 ENCOUNTER FOR GENERAL ADULT MEDICAL EXAMINATION W/OUT ABNORMAL FINDINGS: ICD-10-CM

## 2020-01-29 LAB
ANION GAP SERPL CALC-SCNC: 5 MMOL/L — SIGNIFICANT CHANGE UP (ref 5–17)
B19V IGG SER-ACNC: 0.2 INDEX — SIGNIFICANT CHANGE UP (ref 0–0.8)
B19V IGG+IGM SER-IMP: NEGATIVE — SIGNIFICANT CHANGE UP
B19V IGG+IGM SER-IMP: SIGNIFICANT CHANGE UP
B19V IGM FLD-ACNC: 0.1 INDEX — SIGNIFICANT CHANGE UP (ref 0–0.8)
B19V IGM SER-ACNC: NEGATIVE — SIGNIFICANT CHANGE UP
BUN SERPL-MCNC: 10 MG/DL — SIGNIFICANT CHANGE UP (ref 7–23)
CALCIUM SERPL-MCNC: 8.3 MG/DL — LOW (ref 8.5–10.1)
CHLORIDE SERPL-SCNC: 113 MMOL/L — HIGH (ref 96–108)
CO2 SERPL-SCNC: 20 MMOL/L — LOW (ref 22–31)
CREAT SERPL-MCNC: 0.44 MG/DL — LOW (ref 0.5–1.3)
CRP SERPL-MCNC: 3.91 MG/DL — HIGH (ref 0–0.4)
CV B1 AB TITR FLD: HIGH
CV B2 AB TITR FLD: NEGATIVE — SIGNIFICANT CHANGE UP
CV B3 AB TITR FLD: NEGATIVE — SIGNIFICANT CHANGE UP
CV B4 AB TITR FLD: HIGH
CV B5 AB TITR FLD: HIGH
CV B6 AB TITR FLD: NEGATIVE — SIGNIFICANT CHANGE UP
ERYTHROCYTE [SEDIMENTATION RATE] IN BLOOD: 58 MM/HR — HIGH (ref 0–15)
GLUCOSE SERPL-MCNC: 105 MG/DL — HIGH (ref 70–99)
HCT VFR BLD CALC: 39.9 % — SIGNIFICANT CHANGE UP (ref 34.5–45)
HGB BLD-MCNC: 12.2 G/DL — SIGNIFICANT CHANGE UP (ref 11.5–15.5)
MAGNESIUM SERPL-MCNC: 2.1 MG/DL — SIGNIFICANT CHANGE UP (ref 1.6–2.6)
MCHC RBC-ENTMCNC: 30.6 GM/DL — LOW (ref 32–36)
MCHC RBC-ENTMCNC: 31.7 PG — SIGNIFICANT CHANGE UP (ref 27–34)
MCV RBC AUTO: 103.6 FL — HIGH (ref 80–100)
PHOSPHATE SERPL-MCNC: 2.4 MG/DL — LOW (ref 2.5–4.5)
PLATELET # BLD AUTO: 289 K/UL — SIGNIFICANT CHANGE UP (ref 150–400)
POTASSIUM SERPL-MCNC: 3.5 MMOL/L — SIGNIFICANT CHANGE UP (ref 3.5–5.3)
POTASSIUM SERPL-SCNC: 3.5 MMOL/L — SIGNIFICANT CHANGE UP (ref 3.5–5.3)
RBC # BLD: 3.85 M/UL — SIGNIFICANT CHANGE UP (ref 3.8–5.2)
RBC # FLD: 12.4 % — SIGNIFICANT CHANGE UP (ref 10.3–14.5)
SODIUM SERPL-SCNC: 138 MMOL/L — SIGNIFICANT CHANGE UP (ref 135–145)
WBC # BLD: 6.5 K/UL — SIGNIFICANT CHANGE UP (ref 3.8–10.5)
WBC # FLD AUTO: 6.5 K/UL — SIGNIFICANT CHANGE UP (ref 3.8–10.5)

## 2020-01-29 PROCEDURE — 99233 SBSQ HOSP IP/OBS HIGH 50: CPT

## 2020-01-29 PROCEDURE — 99221 1ST HOSP IP/OBS SF/LOW 40: CPT

## 2020-01-29 RX ORDER — COLCHICINE 0.6 MG
1 TABLET ORAL
Qty: 0 | Refills: 0 | DISCHARGE
Start: 2020-01-29

## 2020-01-29 RX ORDER — COLCHICINE 0.6 MG
0.6 TABLET ORAL
Qty: 108 | Refills: 1
Start: 2020-01-29 | End: 2020-07-26

## 2020-01-29 RX ORDER — CHOLECALCIFEROL (VITAMIN D3) 125 MCG
3000 CAPSULE ORAL DAILY
Refills: 0 | Status: DISCONTINUED | OUTPATIENT
Start: 2020-01-29 | End: 2020-01-29

## 2020-01-29 RX ORDER — ASPIRIN/CALCIUM CARB/MAGNESIUM 324 MG
1 TABLET ORAL
Qty: 0 | Refills: 0 | DISCHARGE
Start: 2020-01-29

## 2020-01-29 RX ORDER — ASPIRIN/CALCIUM CARB/MAGNESIUM 324 MG
1 TABLET ORAL
Qty: 14 | Refills: 0
Start: 2020-01-29 | End: 2020-02-04

## 2020-01-29 RX ADMIN — Medication 325 MILLIGRAM(S): at 06:30

## 2020-01-29 RX ADMIN — Medication 3000 UNIT(S): at 10:41

## 2020-01-29 RX ADMIN — MEROPENEM 100 MILLIGRAM(S): 1 INJECTION INTRAVENOUS at 06:29

## 2020-01-29 RX ADMIN — PANTOPRAZOLE SODIUM 40 MILLIGRAM(S): 20 TABLET, DELAYED RELEASE ORAL at 13:08

## 2020-01-29 RX ADMIN — Medication 1 APPLICATION(S): at 14:46

## 2020-01-29 RX ADMIN — MAGNESIUM OXIDE 400 MG ORAL TABLET 400 MILLIGRAM(S): 241.3 TABLET ORAL at 10:41

## 2020-01-29 RX ADMIN — BUDESONIDE AND FORMOTEROL FUMARATE DIHYDRATE 2 PUFF(S): 160; 4.5 AEROSOL RESPIRATORY (INHALATION) at 08:42

## 2020-01-29 RX ADMIN — ALBUTEROL 2 PUFF(S): 90 AEROSOL, METERED ORAL at 01:21

## 2020-01-29 RX ADMIN — MEROPENEM 100 MILLIGRAM(S): 1 INJECTION INTRAVENOUS at 13:54

## 2020-01-29 RX ADMIN — Medication 0.6 MILLIGRAM(S): at 06:30

## 2020-01-29 RX ADMIN — ALBUTEROL 2 PUFF(S): 90 AEROSOL, METERED ORAL at 08:43

## 2020-01-29 RX ADMIN — Medication 15 MILLIEQUIVALENT(S): at 06:30

## 2020-01-29 RX ADMIN — LEVETIRACETAM 500 MILLIGRAM(S): 250 TABLET, FILM COATED ORAL at 09:31

## 2020-01-29 RX ADMIN — CHLORHEXIDINE GLUCONATE 15 MILLILITER(S): 213 SOLUTION TOPICAL at 06:31

## 2020-01-29 RX ADMIN — ALBUTEROL 2 PUFF(S): 90 AEROSOL, METERED ORAL at 13:28

## 2020-01-29 NOTE — PROGRESS NOTE ADULT - REASON FOR ADMISSION
tachycardia, shortness of breath

## 2020-01-29 NOTE — PROGRESS NOTE ADULT - SUBJECTIVE AND OBJECTIVE BOX
PCP:    REQUESTING PHYSICIAN:    REASON FOR CONSULT:    CHIEF COMPLAINT:    HPI:25 yo F with hx of GERD, cerebral palsy s/p trach and PEG, seizures, asthma and scoliosis presents to ED from Dr. Harris's office for further evaluation.   Pt reportedly with lethargy for the past few days and went to Dr. Harris's office, where she was found to be short of breath.   CXR done in office as per Dr. Harris showed b/l worsening airspace disease and O2 sat was 90% on vent.  In the ED patient was febrile to 104.8 F, BP- 95/57 and tachycardic to 140s.   Patient received 1.1 NS bolus x1 in the ED, WBC 14.20, lactate 1.3. (2020 18:12)    cardiology consulted for pericardial effusion.  CT scan ordered to assess pleural effusion & for infiltrate.  Incidental finding of "large pericardial effusion" reported.  Echocardiography performed at bedside shows moderate to large pericardial effusion w/ no tamponade physiology - see full report.  Pt currently on vent.  she is nonverbal at baseline.  family at bedside.    20: hemodynamically stable overnight.  no events on telemetry.  Discussed plan of care with family at bedside.  20: no events overnight.  repeat limited echo w/ effusion unchanged in size, no evidence of tamponade physiology.  20: no events overnight. no changes hemodynamically.  Pt nonverbal.  family at bedside discussed management.  20: no events overnight.  HRs 60s.  Pt nonverbal, discussed today's echo exam w/ pts parents at bedside.  20: Non verbal appears in no distress. VSS    PAST MEDICAL & SURGICAL HISTORY:  Hypokalemia  Kyphosis  Scoliosis  Hypotonia  Asthma  Seizures  Cerebral Palsy  Gastroesophageal Reflux Disease  Hypothyroidism: Treated with synthroid in past. Stopped treatment in  due to normal thyroid function.  Developmental Delay  Tracheostomy in place  Peg (Percutaneous Endoscopic Gastrostomy) Adjustment/Replacement/Removal  S/P Tonsillectomy and Adenoidectomy  Strabismus      SOCIAL HISTORY:    FAMILY HISTORY:      ALLERGIES:  Allergies    Bactrim (Unknown)  carbamazepine (Unknown)  cephalosporins (Rash)  Corn (Unknown)  Depakote (Unknown)  diphenhydrAMINE (Seizure)  eggs (Unknown)  EGGS,  NUTS (Anaphylaxis)  Erythromycin Base (Unknown)  metoclopramide (Unknown)  Milk (Unknown)  MILK, SHELLFISH, WHEAT (Unknown)  Nuts (Unknown)  oxcarbazepine (Unknown)  peanuts (Unknown)  Potatoes (Unknown)  SEASONAL, POLLEN, GRASS, PET DANDER, FEATHERS (Unknown)  Sesame (Unknown)  shellfish (Unknown)  Trileptal (Unknown)  valproic acid (Unknown)  vancomycin (Unknown)  Wheat (Unknown)    Intolerances        MEDICATIONS:    MEDICATIONS  (STANDING):  ALBUTerol    90 MICROgram(s) HFA Inhaler 2 Puff(s) Inhalation every 6 hours  aspirin 325 milliGRAM(s) Oral two times a day  budesonide  80 MICROgram(s)/formoterol 4.5 MICROgram(s) Inhaler 2 Puff(s) Inhalation two times a day  calcium carbonate Oral Chewable Tablet - Peds 200 milliGRAM(s) Elemental Calcium Chew two times a day  chlorhexidine 0.12% Liquid 15 milliLiter(s) Oral Mucosa every 12 hours  cholecalciferol Oral Liquid - Peds 3000 Unit(s) Enteral Tube daily  colchicine 0.6 milliGRAM(s) Oral two times a day  fluticasone propionate 50 MICROgram(s)/spray Nasal Spray 1 Spray(s) Both Nostrils daily  levETIRAcetam  Oral Liquid - Peds 500 milliGRAM(s) Oral daily  levETIRAcetam  Oral Liquid - Peds 700 milliGRAM(s) Oral at bedtime  magnesium oxide 400 milliGRAM(s) Oral two times a day with meals  meropenem  IVPB 1000 milliGRAM(s) IV Intermittent every 8 hours  pantoprazole   Suspension 40 milliGRAM(s) Oral daily  potassium chloride    Tablet ER 15 milliEquivalent(s) Oral two times a day  sodium chloride 0.65% Nasal 2 Spray(s) Both Nostrils daily    MEDICATIONS  (PRN):  acetaminophen    Suspension .. 500 milliGRAM(s) Oral every 6 hours PRN Temp greater or equal to 38C (100.4F), Mild Pain (1 - 3)  sucralfate Oral Liquid - Peds 1000 milliGRAM(s) Oral Before meals and at bedtime PRN gastric bleeding        Vital Signs Last 24 Hrs  T(C): 37.2 (2020 06:00), Max: 37.2 (2020 16:18)  T(F): 98.9 (2020 06:00), Max: 98.9 (2020 16:18)  HR: 62 (2020 08:00) (57 - 78)  BP: 88/57 (2020 03:00) (84/56 - 117/64)  BP(mean): 65 (2020 03:00) (62 - 79)  RR: 16 (2020 08:00) (7 - 29)  SpO2: 99% (2020 08:00) (93% - 99%)Daily     Daily Weight in k.6 (2020 00:00)I&O's Summary    2020 07:01  -  2020 07:00  --------------------------------------------------------  IN: 710 mL / OUT: 0 mL / NET: 710 mL        PHYSICAL EXAM:    Constitutional: NAD, awake and alert, well-developed  HEENT: PERR, EOMI,  No oral cyananosis.  Neck:  supple,  No JVD  Respiratory: Breath sounds are clear bilaterally, No wheezing, rales or rhonchi  Cardiovascular: S1 and S2, regular rate and rhythm, no Murmurs, gallops or rubs  Gastrointestinal: Bowel Sounds present, soft, nontender.   Extremities: No peripheral edema. No clubbing or cyanosis.  Vascular: 2+ peripheral pulses  Neurological: CP  Musculoskeletal: no calf tenderness.  Skin: No rashes.      LABS: All Labs Reviewed:                        12.2   6.50  )-----------( 289      ( 2020 07:14 )             39.9                         11.4   7.61  )-----------( 264      ( 2020 06:43 )             35.2     2020 07:14    138    |  113    |  10     ----------------------------<  105    3.5     |  20     |  0.44   2020 06:43    143    |  111    |  9      ----------------------------<  108    3.0     |  25     |  0.45     Ca    8.3        2020 07:14  Ca    8.5        2020 06:43  Phos  2.4       2020 07:14  Phos  2.1       2020 06:43  Mg     2.1       2020 07:14  Mg     1.9       2020 06:43            Blood Culture: Organism --  Gram Stain Blood -- Gram Stain   Rare Squamous epithelial cells per low power field  Few polymorphonuclear leukocytes per low power field  Rare Gram Variable Rods per oil power field  Specimen Source .Sputum Sputum  Culture-Blood --    Organism --  Gram Stain Blood -- Gram Stain --  Specimen Source .Urine None  Culture-Blood --    Organism --  Gram Stain Blood -- Gram Stain --  Specimen Source .Blood None  Culture-Blood --            RADIOLOGY/EKG: < from: 12 Lead ECG (20 @ 08:20) >  Diagnosis Line Normal sinus rhythm  Nonspecific ST and T wave abnormality  Abnormal ECG  When compared with ECG of 2020 17:12,  No significant change was found  Confirmed by DMITRI HANCOCK MD ([id number]) on 2020 7:09:18 PM    < end of copied text >        ECHO/CARDIAC CATHTERIZATION/STRESS TEST:  < from: Transthoracic Echocardiogram (20 @ 09:11) >  Impression     Summary     Limited study performed to evaluate pericardial effusion and tamponade   physiology.   Effusion has decreased in size compared to prior exams & is currently mild   to moderate in size.   No evidence of tamponade physiology.     Signature     ----------------------------------------------------------------   Electronically signed by Parvez Meyer MD(Interpreting   physician) on 2020 10:16 AM   ----------------------------------------------------------------    < end of copied text >

## 2020-01-29 NOTE — CONSULT NOTE ADULT - ASSESSMENT
Impression:  Allergic contact dermatitis to blood pressure cuff.  These disposable cuffs have a white cotton lining - ? preservatives, dyes, bleaching agents or fragrances that may be added.    Recommend:  TAC 0.1% ointment bid for a week or so, until the inflammation is improved.  Avoid disposable cuffs in future.  If ongoing problem, will consider allergy testing via patch testing, although not necessary if avoidance is effective.    Discussed at length with patients mother.  Discussed with RN.    MD Ayo

## 2020-01-29 NOTE — PROGRESS NOTE ADULT - SUBJECTIVE AND OBJECTIVE BOX
Subjective:  Abx d/alison, as patient had coxsackie  On vent overnight  Parents decided to take patient home and place her on home vent    Review of Systems:  All 10 systems reviewed in detailed and found to be negative with the exception of what has already been described above    Allergies:  Bactrim (Unknown)  carbamazepine (Unknown)  cephalosporins (Rash)  Corn (Unknown)  Depakote (Unknown)  diphenhydrAMINE (Seizure)  DISPOSABLE BLOOD PRESSURE CUFF - Red blistered skin where disposable blood pressure cuff was on right arm. (CONTACT DERMATITIS) (Rash; Blisters; Swelling;)  eggs (Unknown)  EGGS,  NUTS (Anaphylaxis)  Erythromycin Base (Unknown)  metoclopramide (Unknown)  Milk (Unknown)  MILK, SHELLFISH, WHEAT (Unknown)  Nuts (Unknown)  oxcarbazepine (Unknown)  peanuts (Unknown)  Potatoes (Unknown)  SEASONAL, POLLEN, GRASS, PET DANDER, FEATHERS (Unknown)  Sesame (Unknown)  shellfish (Unknown)  Trileptal (Unknown)  valproic acid (Unknown)  vancomycin (Unknown)  Wheat (Unknown)    Meds  MEDICATIONS  (STANDING):  ALBUTerol    90 MICROgram(s) HFA Inhaler 2 Puff(s) Inhalation every 6 hours  aspirin 325 milliGRAM(s) Oral two times a day  budesonide  80 MICROgram(s)/formoterol 4.5 MICROgram(s) Inhaler 2 Puff(s) Inhalation two times a day  calcium carbonate Oral Chewable Tablet - Peds 200 milliGRAM(s) Elemental Calcium Chew two times a day  chlorhexidine 0.12% Liquid 15 milliLiter(s) Oral Mucosa every 12 hours  cholecalciferol Oral Liquid - Peds 3000 Unit(s) Enteral Tube daily  colchicine 0.6 milliGRAM(s) Oral two times a day  fluticasone propionate 50 MICROgram(s)/spray Nasal Spray 1 Spray(s) Both Nostrils daily  levETIRAcetam  Oral Liquid - Peds 500 milliGRAM(s) Oral daily  levETIRAcetam  Oral Liquid - Peds 700 milliGRAM(s) Oral at bedtime  magnesium oxide 400 milliGRAM(s) Oral two times a day with meals  meropenem  IVPB 1000 milliGRAM(s) IV Intermittent every 8 hours  pantoprazole   Suspension 40 milliGRAM(s) Oral daily  potassium chloride    Tablet ER 15 milliEquivalent(s) Oral two times a day  sodium chloride 0.65% Nasal 2 Spray(s) Both Nostrils daily  triamcinolone 0.1% Ointment 1 Application(s) Topical two times a day    MEDICATIONS  (PRN):  acetaminophen    Suspension .. 500 milliGRAM(s) Oral every 6 hours PRN Temp greater or equal to 38C (100.4F), Mild Pain (1 - 3)  sucralfate Oral Liquid - Peds 1000 milliGRAM(s) Oral Before meals and at bedtime PRN gastric bleeding    Physical Exam  T(C): 35.6 (01-29-20 @ 12:42), Max: 37.2 (01-29-20 @ 06:00)  HR: 83 (01-29-20 @ 15:00) (57 - 83)  BP: 110/60 (01-29-20 @ 11:00) (84/56 - 117/64)  RR: 30 (01-29-20 @ 15:00) (7 - 30)  SpO2: 97% (01-29-20 @ 15:00) (93% - 99%)  Gen: Trach, no distress, on vent  HEENT: Microcephalic  Cardio:  regular rate  Resp: Coarse breath sounds  GI:  GJ tube  Ext: No cyanosis, clubbing or edema  Neuro: Nonfocal  Skin: Rash RUE where BP cuff was    Labs:                        12.2   6.50  )-----------( 289      ( 29 Jan 2020 07:14 )             39.9     01-29    138  |  113<H>  |  10  ----------------------------<  105<H>  3.5   |  20<L>  |  0.44<L>    Ca    8.3<L>      29 Jan 2020 07:14  Phos  2.4     01-29  Mg     2.1     01-29    Sputum cultures:  6/25/19  Serratia marcescens -       Resistant to: Augmentin, cefazolin, cefuroxime, tetracyclines       Intermediate: Tobramycin       Susceptible: Cefepime, ceftriaxone, cipro, ertapenem, gentamicin, levofloxacin, meropenem, bactrim    Achromobacter xyloxidans -       Resistant to: Amikacin, aztronam, cefepime, cipro, gentamicin, tobramycin       Intermediate: Cefotaxime, ceftriaxone, levofloxacin       Susceptible: Ceftazidime, imipenem, meropenem, zosyn, ticarcillin/clavulonate, bactrim    7/13/19  Serratia marcescens -       Resistant to: Augmentin, cefazolin, cefuroxime, tetracyclines       Susceptible: Cefepime, ceftriaxone, cipro, ertapenem, gentamicin, levofloxacin, meropenem, bactrim, tobramycin    Burkholderia cepacia       Susceptible to: Ceftazidime, chloramphenicol, levofloxacin, meropenem, bactrim      < from: Transthoracic Echocardiogram Follow Up (01.27.20 @ 09:17) >   Findings     Left Ventricle   Left ventricular wall motion is normal.   The left ventricle is normal in size.   Estimated left ventricular ejection fraction is 60 %.     Pericardial Effusion   There is echocardiographic evidence of tamponade physiology with variation   of mitral inflow with the respiratory cycle. A large pericardial effusion   is present.     Impression     Summary     Left ventricular wall motion is normal.   The left ventricle is normal in size.   Estimated left ventricular ejection fraction is 60 %.   There is echocardiographic evidence of tamponade physiology with variation   of mitral inflow with the respiratory cycle. A large pericardial effusion   is present.    < from: CT Chest No Cont (01.24.20 @ 20:27) >  PROCEDURE DATE:  01/24/2020          INTERPRETATION:  CLINICAL INFORMATION: Patient on ventilator with respiratory symptoms and fever of 103. Assess for infiltrates and pleural effusions.    COMPARISON: None.    PROCEDURE:   CT of the Chest was performed without intravenous contrast. This limits evaluation of vascular structures.  Sagittal and coronal reformats were performed.      FINDINGS:    LUNGS AND AIRWAYS: A tracheostomy is present. Motion artifact slightly limits evaluation although there is suggestion of a focal density in the right lateral wall of the right proximal mainstem bronchus. This may represent secretions although other etiologies cannot be excluded. Again, this is limited in evaluation. There are faint bilateral groundglass opacities and groundglass nodular opacities in the upper lobes. Mild interlobular septal thickening is seen in the upper lobes. Minimal patchy groundglass opacities are present in the lingula. There is atelectasis of the left lower lobe and atelectatic changes at the right lung base.    PLEURA: Small bilateral pleural effusions, left greater than right.    MEDIASTINUM AND ALEXIS: Very limited in evaluation without contrast and due to the pleural fluid. I cannot rule out subcarinal lymphadenopathy.    VESSELS: Within normal limits.    HEART: Heart size is normal. There is a large pericardial effusion.    CHEST WALL AND LOWER NECK: Within normal limits.    VISUALIZED UPPER ABDOMEN: There is a percutaneous tube in the stomach which continues into the jejunum. The distal tip is not included on the examination.    BONES: Scoliosis    IMPRESSION:     Large pericardial effusion. This was discussed with ANNEMARIE Kaur at 8:44 PM on 1/24/2020.    Faint groundglass opacities and nodular groundglass opacities. Mild interlobular septal thickening. This may be due to fluid overload. Underlying infection cannot be excluded. Clinical correlation follow-up is recommended. Nodular opacities in the lingula which may be due to infection. Atelectasis of the left lower lobe.    Small bilateral pleural effusions.    Motion artifact limits evaluation of the airways although there is suspicion for a nodular density in the right mainstem bronchus proximally along theright side. This may be due to secretions although other etiologies cannot be excluded. Clinical correlation is recommended.`    Limited evaluation for lymphadenopathy as above.    The above findings were discussed with ANNEMARIE Kaur at 8:44 PM on 1/24/2020.    < end of copied text >

## 2020-01-29 NOTE — DISCHARGE NOTE PROVIDER - NSDCMRMEDTOKEN_GEN_ALL_CORE_FT
acetaminophen 160 mg/5 mL oral suspension: 9.375 milliliter(s) orally every 6 hours, As Needed - for fever, for mild pain - 3)  Adult Aspirin 325 mg oral tablet: 1 tab(s) orally 2 times a day   aspirin 325 mg oral tablet: 1 tab(s) orally 2 times a day  Ayr Saline 0.65% nasal solution: 2 drop(s) in each nostril once a day  Bethkis 75 mg/mL inhalation solution: 4 milliliter(s) inhaled 2 times a day  ****28 days on and 28 days off, alternates with colistimethate***  calcium carbonate 400 mg/5 mL oral suspension: 2.5 milliliter(s) orally 2 times a day  Carafate 1 g/10 mL oral suspension: 10 milliliter(s) orally 4 times a day (before meals and at bedtime), As Needed - for gastric bleeding  cholecalciferol 400 intl units (10 mcg)/0.025 mL oral liquid: 3 drop(s) orally once a day  Ciprodex 0.3%-0.1% otic suspension: 2 drop(s) to each affected ear 2 times a day  ****parents holding****  colchicine 0.6 mg oral capsule: 0.6 cap(s) orally 2 times a day   colchicine 0.6 mg oral tablet: 1 tab(s) orally 2 times a day  colistimethate 150 mg (colistin base) injection: 150 milligram(s) inhaled 2 times a day  ***Starts February 3, 2020***  Diastat 10 mg rectal kit: 1 application rectal once a day, As Needed - for seizures lasting three minutes or more  EPINEPHrine 0.3 mg injectable kit: 1 dose(s) injectable once a day, As Needed - severe allergic reactions  Fish Oil 1600 mg/5 mL oral liquid: 1.5 milliliter(s) orally 3 times a day  fluticasone 27.5 mcg/inh nasal spray: 1 spray(s) in each nostril once a day  lactulose 10 g/15 mL oral solution: 15 milliliter(s) by gastrostomy tube once a day  levalbuterol 1.25 mg/3 mL inhalation solution: 3 milliliter(s) inhaled 2 times a day  levETIRAcetam 100 mg/mL oral solution: 5 milliliter(s) orally once a day (in the morning)  levETIRAcetam 100 mg/mL oral solution: 7 milliliter(s) orally once a day (at bedtime)  magnesium oxide 400 mg (241.3 mg elemental magnesium) oral tablet: 1 tab(s) orally every 12 hours  ****crushed****  Methyl B-12 500 mcg oral lozenge: 1 lozenge orally once a day  ***crushed***  Motrin Childrens 100 mg/5 mL oral suspension: 10 milliliter(s) orally every 6 hours, As Needed - for fever, for mild pain  Poly Vit Drops oral liquid: 2 milliliter(s) orally once a day  potassium chloride 20 mEq/15 mL oral liquid: 7.5 milliliter(s) orally 2 times a day  Probiotic Formula: 2.5 milliliter(s) orally once a day  Protonix 40 mg oral granule, enteric coated: 0.5 packet orally once a day  ***mixed in apple sauce***  Pulmicort Respules 1 mg/2 mL inhalation suspension: 2 milliliter(s) inhaled 2 times a day  sodium chloride 7% inhalation solution: 1 milliliter(s) inhaled 2 times a day

## 2020-01-29 NOTE — PROGRESS NOTE ADULT - SUBJECTIVE AND OBJECTIVE BOX
CC:  Respiratory Failure     HPI:    23 y/o female GERD, cerebral palsy s/p trach and PEG, seizures, asthma and scoliosis presents to ED from Dr. Harris's office for further evaluation. Pt admitted with PNA sepsis as there is GGO on chest CT and pericardial effusion.  TTE revealed mod-large pericardial effusion wo collapse.      1/26:  CCU D # 3.  Pt seen and examined in CCU--mom at bedside.  Tm 101.3  Tolerating TF.      1/27: Patient seen, She continues with temps, WBC trending down, Repeat ECHO today shows decreased size of pericardial effusion    1/28: Patient with a new rash where the BP cuff was on her right arm as well as swelling.  She is also not moving the are as well.  Patient has not had a BM in a couple days as well.  Repeat ECHO PND today again.  Family refusing SQH as the patient is on high dose ASA     1/29: Patient remained on the Vent over night, Scant bloody secretions likely from suctioning, Rash on her TUE was from THE BP cuff is less red but starting to weep, No Fevers, Addressed the patient supplements with pharmacy and the parents and the parents expect to go home today she we will not administer.  Derm to see the patient for he contact dermatitis.  parents no longer want the PEG changed this hospitalization and will do it as an out patient. Patient had 2 BM in the past 24 H         PAST MEDICAL & SURGICAL HISTORY:  Hypokalemia  Kyphosis  Scoliosis  Hypotonia  Asthma  Seizures  Cerebral Palsy  Gastroesophageal Reflux Disease  Hypothyroidism: Treated with synthroid in past. Stopped treatment in 2008 due to normal thyroid function.  Developmental Delay  Tracheostomy in place  Peg (Percutaneous Endoscopic Gastrostomy) Adjustment/Replacement/Removal  S/P Tonsillectomy and Adenoidectomy  Strabismus      FAMILY HISTORY:      Social Hx:    Allergies    Bactrim (Unknown)  carbamazepine (Unknown)  cephalosporins (Rash)  Corn (Unknown)  Depakote (Unknown)  diphenhydrAMINE (Seizure)  eggs (Unknown)  EGGS,  NUTS (Anaphylaxis)  Erythromycin Base (Unknown)  metoclopramide (Unknown)  Milk (Unknown)  MILK, SHELLFISH, WHEAT (Unknown)  Nuts (Unknown)  oxcarbazepine (Unknown)  peanuts (Unknown)  Potatoes (Unknown)  SEASONAL, POLLEN, GRASS, PET DANDER, FEATHERS (Unknown)  Sesame (Unknown)  shellfish (Unknown)  Trileptal (Unknown)  valproic acid (Unknown)  vancomycin (Unknown)  Wheat (Unknown)    Intolerances            ICU Vital Signs Last 24 Hrs  T(C): 37.2 (29 Jan 2020 06:00), Max: 37.2 (28 Jan 2020 16:18)  T(F): 98.9 (29 Jan 2020 06:00), Max: 98.9 (28 Jan 2020 16:18)  HR: 62 (29 Jan 2020 08:00) (57 - 78)  BP: 88/57 (29 Jan 2020 03:00) (84/56 - 117/64)  BP(mean): 65 (29 Jan 2020 03:00) (62 - 79)  ABP: --  ABP(mean): --  RR: 16 (29 Jan 2020 08:00) (7 - 29)  SpO2: 99% (29 Jan 2020 08:00) (93% - 99%)      Mode: AC/ CMV (Assist Control/ Continuous Mandatory Ventilation)  RR (machine): 16  TV (machine): 400  FiO2: 30  PEEP: 8  ITime: 1  PIP: 34      I&O's Summary    28 Jan 2020 07:01  -  29 Jan 2020 07:00  --------------------------------------------------------  IN: 710 mL / OUT: 0 mL / NET: 710 mL                              12.2   6.50  )-----------( 289      ( 29 Jan 2020 07:14 )             39.9       01-29    138  |  113<H>  |  10  ----------------------------<  105<H>  3.5   |  20<L>  |  0.44<L>    Ca    8.3<L>      29 Jan 2020 07:14  Phos  2.4     01-29  Mg     2.1     01-29                      MEDICATIONS  (STANDING):  ALBUTerol    90 MICROgram(s) HFA Inhaler 2 Puff(s) Inhalation every 6 hours  aspirin 325 milliGRAM(s) Oral two times a day  budesonide  80 MICROgram(s)/formoterol 4.5 MICROgram(s) Inhaler 2 Puff(s) Inhalation two times a day  calcium carbonate Oral Chewable Tablet - Peds 200 milliGRAM(s) Elemental Calcium Chew two times a day  chlorhexidine 0.12% Liquid 15 milliLiter(s) Oral Mucosa every 12 hours  cholecalciferol Oral Liquid - Peds 3000 Unit(s) Enteral Tube daily  colchicine 0.6 milliGRAM(s) Oral two times a day  fluticasone propionate 50 MICROgram(s)/spray Nasal Spray 1 Spray(s) Both Nostrils daily  levETIRAcetam  Oral Liquid - Peds 500 milliGRAM(s) Oral daily  levETIRAcetam  Oral Liquid - Peds 700 milliGRAM(s) Oral at bedtime  magnesium oxide 400 milliGRAM(s) Oral two times a day with meals  meropenem  IVPB 1000 milliGRAM(s) IV Intermittent every 8 hours  pantoprazole   Suspension 40 milliGRAM(s) Oral daily  potassium chloride    Tablet ER 15 milliEquivalent(s) Oral two times a day  sodium chloride 0.65% Nasal 2 Spray(s) Both Nostrils daily    MEDICATIONS  (PRN):  acetaminophen    Suspension .. 500 milliGRAM(s) Oral every 6 hours PRN Temp greater or equal to 38C (100.4F), Mild Pain (1 - 3)  sucralfate Oral Liquid - Peds 1000 milliGRAM(s) Oral Before meals and at bedtime PRN gastric bleeding      DVT Prophylaxis: V-- Family refusing H    Advanced Directives:  Discussed with:    Visit Information:    ** Time is exclusive of billed procedures and/or teaching and/or routine family updates.

## 2020-01-29 NOTE — PROGRESS NOTE ADULT - ASSESSMENT
23 yo F with hx of GERD, cerebral palsy s/p trach and PEG, seizures, asthma and scoliosis admitted on 1/24 from her pulmonologist office for evaluation of increased lethargy, shortness of breath and worsening oxygenation. The family at bedside noted blood tinged trach secretions and no fever at home, however upon admission was febrile to 104.8. Family also noted that patient heart rate was very elevated as well. No prior diarrheal illnesses, in fact patient can be constipated at times. Last hospitalization was 2017 at [facility]. History per medical record and family at bedside.     1. Patient admitted with pneumonia, possibly aspiration pneumonia, given that patient has tracheostomy; also noted with leukocytosis most likely reactive to infection; also noted with large pericardial effusion  - follow up cultures   - vent per icu  - iv hydration and supportive care   - serial cbc and monitor temperature   - reviewed prior medical records to evaluate for resistant or atypical pathogens and noted with Serratia, Pseudomonas, Achromobacter in past   - tube feedings  - day #2 meropenem one gram q 8 hours; continue antiseizure meds; mother notes patient has tolerated this antibiotic in past  2. Unclear etiology of large pericardial effusion, secondary to pneumonia?, though not usual   - will rule out viral etiology, will order EBV, CMV, Coxsackie and Echovirus serologies; will also add Parvovirus serology---- to date EBV and CMV are both negative  - repeat echo per Cardiology, hopefully will not need pericardiocentesis; patient hemodynamically stable  3. other issues: per medicine
23yo female with PMHx asthma, cerebral palsy, developmental delay, seizures, scoliosis, hypothyroidism, GERD, s/p trach, PEG, on trilogy at home, with multiple hospitalizations for pneumonia, a/w Fever, GGO on CT chest, and pericardial effusion    Currently afebrile, HD stable, comfortable in NAD, on PC on vent    Cerebral Palsy  Developmental delay  Seizure disorder  Hypothyroidism  s/p PEG  s/p Trach  Pericardial effusion  PNA      PLAN:  - cont with vent support, duonebs PRN, Xopenex, , ipratropium, budesonide, follow up pulm  - Abx, Aztreonam, will need gram pos coverage, ID consult placed  - pericardial effusion,  follow up cardiology, may need repeat ECHO, no acute intervention for now, HD stable, no evidence of tamponade on yesterdays ECHO  - FS  control  - Nutrition, will need nutrition consult, consult placed, awaiting recs, consider prostat, check prealbumin  - Cont with Keppra  - GI ppx  - DVT ppx, HSQ  - Monitor in MICU
24y old Female with PMH asthma, cerebral palsy, developmental delay, seizures, scoliosis, hypothyroidism, GERD, s/p trach, PEG, on trilogy at home, with multiple hospitalizations in the past at [facility] PICU pneumonia, colonized with serratia marcescens, but sputum cultures also grew out achromobacter xylosoxidans and burkolderia cepacia in the past, who presented to clinic 1/24/2020 for worsening SOB and lethargy for the past few days found to have faint groundglass opacities, atelectasis, small bilateral effusions, and pericardial effusion  1. Pericardial effusion: improved. no plans for drainage  2. Groundglass opacities, pneumonia, chronic colonization: On meropenem. Follow up cultures  -Hold teofilo, hypertonic saline now while on the vent  -sputum cultures, blood cultures  -chest pt  -hold iprtropium  -wean vent as able  3. Asthma: continue xopenex, budesonide once off vent  4. Seizures: levetiracetam.  5. PEG tube/malnutrition: Albumin 2.5. It was 3.4 a few weeks prior as per mother  -Nutrition consult  -Tube feeds  6. Pleural effusions: May be secondary to hypoalbuminemia. Too small to tap  7. bloody secretions: may be secondary to pneumonia vs irritation from suction vs dryness. stop iptratropium. continue treatment as above
24y old Female with PMH asthma, cerebral palsy, developmental delay, seizures, scoliosis, hypothyroidism, GERD, s/p trach, PEG, on trilogy at home, with multiple hospitalizations in the past at [facility] PICU pneumonia, colonized with serratia marcescens, but sputum cultures also grew out achromobacter xylosoxidans and burkolderia cepacia in the past, who presented to clinic 1/24/2020 for worsening SOB and lethargy for the past few days found to have faint groundglass opacities, atelectasis, small bilateral effusions, and pericardial effusion  1. Pericardial effusion: improved. no plans for drainage, Cardiology following  2. Groundglass opacities, pneumonia, chronic colonization: On meropenem. Follow up cultures  -Hold teofilo, hypertonic saline now while on the vent  -sputum cultures, blood cultures  -chest pt  -hold iprtropium  -wean vent as able  3. Asthma: continue xopenex, budesonide once off vent  4. Seizures: levetiracetam.  5.  GJ tube/malnutrition: Albumin 2.5. It was 3.4 a few weeks prior as per mother  -Nutrition consult  -Tube feeds  6. Pleural effusions: May be secondary to hypoalbuminemia. Too small to tap  7. bloody secretions: may be secondary to pneumonia vs irritation from suction vs dryness.  improved today
24y old Female with PMH asthma, cerebral palsy, developmental delay, seizures, scoliosis, hypothyroidism, GERD, s/p trach, PEG, on trilogy at home, with multiple hospitalizations in the past at [facility] PICU pneumonia, colonized with serratia marcescens, but sputum cultures also grew out achromobacter xylosoxidans and burkolderia cepacia in the past, who presented to clinic 1/24/2020 for worsening SOB and lethargy for the past few days found to have faint groundglass opacities, atelectasis, small bilateral effusions, and pericardial effusion found to have coxsachi  1. Pericardial effusion: improved. no plans for drainage, Cardiology follow up as outpatient  2. Groundglass opacities, pneumonia, chronic colonization: Treated with meropenem. Tested positive for coxsackie  -Family ultimately decided to take her home and put her on the vent at home. Discussed risks of this. They will call and let me know how she is doing if she worsens.  3. Asthma: continue xopenex, budesonide once off vent  4. Seizures: levetiracetam.  5.  GJ tube/malnutrition: Albumin 2.5. It was 3.4 a few weeks prior as per mother  -Nutrition consulted  -Tube feeds  6. Pleural effusions: May be secondary to hypoalbuminemia. Too small to tap  7. bloody secretions: resolved
25 yo F with hx of GERD, cerebral palsy s/p trach and PEG, seizures, asthma and scoliosis admitted on 1/24 from her pulmonologist office for evaluation of increased lethargy, shortness of breath and worsening oxygenation. The family at bedside noted blood tinged trach secretions and no fever at home, however upon admission was febrile to 104.8. Family also noted that patient heart rate was very elevated as well. No prior diarrheal illnesses, in fact patient can be constipated at times. Last hospitalization was 2017 at [facility]. History per medical record and family at bedside.     1. Patient admitted with pneumonia, possibly aspiration pneumonia, given that patient has tracheostomy; also noted with leukocytosis most likely reactive to infection; also noted with large pericardial effusion  - follow up cultures   - vent per icu  - iv hydration and supportive care   - serial cbc and monitor temperature   - reviewed prior medical records to evaluate for resistant or atypical pathogens and noted with Serratia, Pseudomonas, Achromobacter in past   - tube feedings  - day #3 meropenem one gram q 8 hours; continue antiseizure meds; mother notes patient has tolerated this antibiotic in past  2. Unclear etiology of large pericardial effusion, secondary to pneumonia?, though not usual   - will rule out viral etiology, will order EBV, CMV, Coxsackie and Echovirus serologies; will also add Parvovirus serology---- to date EBV and CMV are both negative  - repeat echo per Cardiology, hopefully will not need pericardiocentesis; patient hemodynamically stable  3. other issues: per medicine
25 yo F with hx of GERD, cerebral palsy s/p trach and PEG, seizures, asthma and scoliosis admitted on 1/24 from her pulmonologist office for evaluation of increased lethargy, shortness of breath and worsening oxygenation. The family at bedside noted blood tinged trach secretions and no fever at home, however upon admission was febrile to 104.8. Family also noted that patient heart rate was very elevated as well. No prior diarrheal illnesses, in fact patient can be constipated at times. Last hospitalization was 2017 at [facility]. History per medical record and family at bedside.     1. Patient admitted with pneumonia, possibly aspiration pneumonia, given that patient has tracheostomy; also noted with leukocytosis most likely reactive to infection; also noted with large pericardial effusion  - follow up cultures   - vent per icu  - iv hydration and supportive care   - serial cbc and monitor temperature   - reviewed prior medical records to evaluate for resistant or atypical pathogens and noted with Serratia, Pseudomonas, Achromobacter in past   - tube feedings  - day #5 meropenem one gram q 8 hours; continue antiseizure meds; mother notes patient has tolerated this antibiotic in past; okay to discontinue and discharge on no meds as all rx was iv  2. Unclear etiology of large pericardial effusion, secondary to pneumonia?, though not usual   - will rule out viral etiology, will order EBV, CMV, Coxsackie and Echovirus serologies; will also add Parvovirus serology---- to date EBV and CMV are both negative; found to have positive Coxsackie A as cause of pericardial effusion  - repeat echo per Cardiology, hopefully will not need pericardiocentesis; patient hemodynamically stable  3. other issues: per medicine
IMP:    25 y/o female GERD, cerebral palsy s/p trach and PEG, seizures, asthma and scoliosis admitted with PNA with likely MDRO-- hemodynamically stable  Chronic resp failure  Functional Quad    Plan:    Will try TC today as she is normally on and Vent over night  Cont with gage   Viral serologies and ELIZABETH PND  May need to call rheum  TF to goal  HOB > 30  Cont with AED  Cont with BD--steroids--stable asthma  DVT prophy--sqhep    D/W family and staff and cardio
IMP:    25 y/o female GERD, cerebral palsy s/p trach and PEG, seizures, asthma and scoliosis admitted with PNA with likely MDRO-- hemodynamically stable  Chronic resp failure  Functional Quad    Plan:    Will not try TC today as she may go home  D/W Cardio-- The patient can follow up in one week for an ECHO and see Dr. Meyer.   BID x 1 week until he sees Dr. Meyer and Colchiceine 0.5 bid for 3-6 months   She will finish gage today as per ID and she will not need any antibiotics on D/C  Viral serologies--- + coxsackie and ELIZABETH PND--I Called the lab on 1/29 to she why it is taking so long and I im awaiting a call back  May need to call rheum if ELIZABETH positive-  If they are not back upon D/C she can have it F/U with Cardio in 1 week  TF to goal  Family will have the Feeding tube changed as an out patient  She will see Derm Dr. Garcia prior to D/C today to see if the dermatitis need any specific treatment  HOB > 30  Cont with AED  Cont with BD--steroids--stable asthma  DVT prophy--sqhep    D/W family and staff and cardio    D/C > 30 min
IMP:    25 y/o female GERD, cerebral palsy s/p trach and PEG, seizures, asthma and scoliosis admitted with PNA with likely MDRO-- hemodynamically stable  Chronic resp failure  Functional Quad    Plan:    Will not try TC today as shee seems in pain  Cont with gage as per ID  Viral serologies--- + coxsackie and ELIZABETH PND  May need to call rheum if ELIZBAETH positive  Family to bring in the patient own K+ and B-12  TF to goal  HOB > 30  Cont with AED  Cont with BD--steroids--stable asthma  DVT prophy--sqhep    D/W family and staff and cardio
IMP:    23 y/o female GERD, cerebral palsy s/p trach and PEG, seizures, asthma and scoliosis admitted with PNA with likely MDRO-- hemodynamically stable  Chronic resp failure  Functional Quad    Plan:    Full vent support--vent dependent   Cont with gage (2)  TF to goal  HOB > 30  Cont with AED  Cont with BD--no steroids--stable asthma  DVT prophy--sqhep    CCU care--d/w CCU staff and mom at bedside-- All concerns addressed

## 2020-01-29 NOTE — PHYSICAL THERAPY INITIAL EVALUATION ADULT - MUSCLE TONE ASSESSMENT, REHAB EVAL
pt with mixed tone throughout.  when pt was awake pt had increased tone with ROM, when she fell asleep pts tone decreased and allowed for smoother ROM

## 2020-01-29 NOTE — PROGRESS NOTE ADULT - PROBLEM SELECTOR PLAN 1
Continue ASA bid and colchicine. Pt is hemodynamically stable. Pt will have repeat Echo as opt. D/W Dr. Meyer

## 2020-01-29 NOTE — PHYSICAL THERAPY INITIAL EVALUATION ADULT - ADDITIONAL COMMENTS
per mom, pt transfers dependent A of 2.  they have their needed +DME at home.  pt receives PT/OT at home which they plan on continuing at home upon d/c.  mom reports the goals of PT have been ROM.

## 2020-01-29 NOTE — DISCHARGE NOTE PROVIDER - NSDCCPCAREPLAN_GEN_ALL_CORE_FT
PRINCIPAL DISCHARGE DIAGNOSIS  Diagnosis: Pneumonia due to infectious organism, unspecified laterality, unspecified part of lung  Assessment and Plan of Treatment:       SECONDARY DISCHARGE DIAGNOSES  Diagnosis: Sepsis, due to unspecified organism, unspecified whether acute organ dysfunction present  Assessment and Plan of Treatment:

## 2020-01-29 NOTE — DISCHARGE NOTE NURSING/CASE MANAGEMENT/SOCIAL WORK - NSDCFUADDAPPT_GEN_ALL_CORE_FT
Appt for ECHO at [facility] 3rd Cardiac Services @9:15am 02/06/2020  F/U appt With Dr Alicia on 02/06/2020 @10:30 in Cardiac services 3rd Floor- [phone]

## 2020-01-29 NOTE — DISCHARGE NOTE PROVIDER - HOSPITAL COURSE
Patient admitted with SOB and likely Pneumonia but was found to have a large pericardial effusion.  Serologies for coxsackie were positive.  She was started on ASA and colchicine.  The echo showed some improvement on repeat.  She developed contact dermatitis from a BP cuff on her Right arm.  Dopplers were negative.  She will be seen by derm prior to discharge.      She will follow up with Cardio in 1 week here  at [facility] for an echo and appt with Cardio.  She will continue on all her home medications as she was on

## 2020-01-29 NOTE — DISCHARGE NOTE NURSING/CASE MANAGEMENT/SOCIAL WORK - PATIENT PORTAL LINK FT
You can access the FollowMyHealth Patient Portal offered by [facility] by registering at the following website: [email or web address]. By joining [facility]’s FollowMyHealth portal, you will also be able to view your health information using other applications (apps) compatible with our system.

## 2020-01-29 NOTE — PROGRESS NOTE ADULT - SUBJECTIVE AND OBJECTIVE BOX
Date of service: 01-29-20 @ 11:37      Patient lying in bed; on vent, awake, alert; afebrile      ROS unable to obtain secondary to patient medical condition     MEDICATIONS  (STANDING):  ALBUTerol    90 MICROgram(s) HFA Inhaler 2 Puff(s) Inhalation every 6 hours  aspirin 325 milliGRAM(s) Oral two times a day  budesonide  80 MICROgram(s)/formoterol 4.5 MICROgram(s) Inhaler 2 Puff(s) Inhalation two times a day  calcium carbonate Oral Chewable Tablet - Peds 200 milliGRAM(s) Elemental Calcium Chew two times a day  chlorhexidine 0.12% Liquid 15 milliLiter(s) Oral Mucosa every 12 hours  cholecalciferol Oral Liquid - Peds 3000 Unit(s) Enteral Tube daily  colchicine 0.6 milliGRAM(s) Oral two times a day  fluticasone propionate 50 MICROgram(s)/spray Nasal Spray 1 Spray(s) Both Nostrils daily  levETIRAcetam  Oral Liquid - Peds 500 milliGRAM(s) Oral daily  levETIRAcetam  Oral Liquid - Peds 700 milliGRAM(s) Oral at bedtime  magnesium oxide 400 milliGRAM(s) Oral two times a day with meals  meropenem  IVPB 1000 milliGRAM(s) IV Intermittent every 8 hours  pantoprazole   Suspension 40 milliGRAM(s) Oral daily  potassium chloride    Tablet ER 15 milliEquivalent(s) Oral two times a day  sodium chloride 0.65% Nasal 2 Spray(s) Both Nostrils daily    MEDICATIONS  (PRN):  acetaminophen    Suspension .. 500 milliGRAM(s) Oral every 6 hours PRN Temp greater or equal to 38C (100.4F), Mild Pain (1 - 3)  sucralfate Oral Liquid - Peds 1000 milliGRAM(s) Oral Before meals and at bedtime PRN gastric bleeding      Vital Signs Last 24 Hrs  T(C): 37.2 (29 Jan 2020 06:00), Max: 37.2 (28 Jan 2020 16:18)  T(F): 98.9 (29 Jan 2020 06:00), Max: 98.9 (28 Jan 2020 16:18)  HR: 62 (29 Jan 2020 08:00) (57 - 78)  BP: 88/57 (29 Jan 2020 03:00) (84/56 - 117/64)  BP(mean): 65 (29 Jan 2020 03:00) (62 - 79)  RR: 16 (29 Jan 2020 08:00) (7 - 29)  SpO2: 99% (29 Jan 2020 08:00) (93% - 99%)    Physical Exam:          Constitutional: frail looking  HEENT: NC/AT, EOMI, PERRLA, conjunctivae clear; ears and nose atraumatic; pharynx clear  Neck: trach in place  Back: no tenderness  Respiratory: respiratory effort normal; scattered coarse breath sounds  Cardiovascular: S1S2 regular, no murmurs  Abdomen: soft, not tender, not distended, positive BS; no liver or spleen organomegaly; peg in place  Genitourinary: no suprapubic tenderness  Musculoskeletal: no muscle tenderness, no joint swelling or tenderness  Neurological/ Psychiatric: awake, alert, flaccid lower extremities  Skin: no rashes; no palpable lesions    Labs: all available labs reviewed               Labs:                        12.2   6.50  )-----------( 289      ( 29 Jan 2020 07:14 )             39.9     01-29    138  |  113<H>  |  10  ----------------------------<  105<H>  3.5   |  20<L>  |  0.44<L>    Ca    8.3<L>      29 Jan 2020 07:14  Phos  2.4     01-29  Mg     2.1     01-29             Cultures:       Culture - Sputum (collected 01-26-20 @ 11:40)  Source: .Sputum Sputum  Gram Stain (01-26-20 @ 19:35):    Rare Squamous epithelial cells per low power field    Few polymorphonuclear leukocytes per low power field    Rare Gram Variable Rods per oil power field  Final Report (01-28-20 @ 20:33):    Normal Respiratory Ynes present    Culture - Urine (collected 01-24-20 @ 19:27)  Source: .Urine None  Final Report (01-25-20 @ 22:52):    No growth    Culture - Blood (collected 01-24-20 @ 17:47)  Source: .Blood None  Preliminary Report (01-26-20 @ 01:02):    No growth to date.    Culture - Blood (collected 01-24-20 @ 17:47)  Source: .Blood None  Preliminary Report (01-26-20 @ 01:02):    No growth to date.          Coxsackievirus A Antibody Assay (01.25.20 @ 12:09)    Coxsackie Type A-7: >1:1600 titer    Coxsackie Type A-9: >1:1600 titer    Coxsackie Type A-16: >1:1600 titer    Coxsackie Type A-24: >1:1600: Performed At: [address] [id number]  Delfin Vital MD Ph:[phone] titer      Coxsackie B Virus Antibody (01.25.20 @ 12:09)    Coxsackie Type B1 Antibodies: 1:32    Coxsackie Type B2 Antibodies: Negative    Coxsackie Type B3 Antibodies: Negative    Coxsackie Type B4 Antibodies: 1:32    Coxsackie Type B5 Antibodies: 1:16    Coxsackie Type B6 Antibodies: Negative: Performed At: [address] [id number]  Delfin Vital MD Ph:[phone]      Parvovirus IgG/IgM Antibodies (01.25.20 @ 12:09)    Parvovirus IgG Antibody: 0.2 Index    Parvovirus IgM Antibody: 0.1 Index    Parvovirus IgM Interpretation: Negative    Parvovirus Interpretation: Implies no past infection, patient may be susceptible to B19V infection.            Torito-Barr Virus Serologic Test (01.25.20 @ 12:09)    EBV VCA IgM EIA: <10.0 U/mL    EBV EA Ab EIA: <5.0 U/mL    EBV VCA IgG EIA: <10.0 U/mL    EBV Interpretation: See Note: INTERPRETATION OF TORITO BARR VIRUS (EBV) ANTIBODY RESULTS  EBV VCA IGG AB EBV NA IGG AB EBV VCA IGM AB EBV EA IGG AB Diagnosis  NEG NEG NEG NEG EBV Sero-negative  NEG NEG POS NEG Suspected primary infection (Early Phase)  POS NEG POS POS/NEG Past EBV infection ( Convalescence)  POS POS NEG POS/NEG Past EBV infection  POS POS POS/NEG POS Reactivated Infection        Cytomegalovirus IgG Antibody, Serum (01.25.20 @ 12:09)    CMV IgG Antibody: <0.20 U/mL    CMV IgG Interpretation: Negative: Method: RevoLaze Chemiluminescent Immunoassay  Reference Range: (values expressed as U/mL)             Negative        < 0.60        U/mL             Equivocal      0.60 - 0.69  U/mL             Positive          >= 0.70      U/mL  The presence of Cytomegalovirus IgG antibody or seroconversion may  indicate recent antigenic stimulation but are not per se confirmatory for  either recent primary infection or reactivation of a pre-existing latent  process with active viral replication.  The titer of asingle specimen  should not be used to aid in the diagnosis of recent infection.  The  assay for the presence of anti-CMV IgM antibody should be used to  diagnose recent infection.          Cytomegalovirus IgM Antibody, Serum (01.25.20 @ 12:09)    CMV IgM Antibody: <8.0 AU/mL    CMV IgM Interpretation: Negative: Method: Liaison Chemiluminescent Immunoassay  Reference ranges: (values expressed as AU/mL)              Negative     < 30.0 AU/mL              Equivocal     30.0 - 34.9 AU/mL              Positive      > 35.0 AU/mL                  < from: CT Chest No Cont (01.24.20 @ 20:27) >    EXAM:  CT CHEST                            PROCEDURE DATE:  01/24/2020          INTERPRETATION:  CLINICAL INFORMATION: Patient on ventilator with respiratory symptoms and fever of 103. Assess for infiltrates and pleural effusions.    COMPARISON: None.    PROCEDURE:   CT of the Chest was performed without intravenous contrast. This limits evaluation of vascular structures.  Sagittal and coronal reformats were performed.      FINDINGS:    LUNGS AND AIRWAYS: A tracheostomy is present. Motion artifact slightly limits evaluation although there is suggestion of a focal density in the right lateral wall of the right proximal mainstem bronchus. This may represent secretions although other etiologies cannot be excluded. Again, this is limited in evaluation. There are faint bilateral groundglass opacities and groundglass nodular opacities in the upper lobes. Mild interlobular septal thickening is seen in the upper lobes. Minimal patchy groundglass opacities are present in the lingula. There is atelectasis of the left lower lobe and atelectatic changes at the right lung base.    PLEURA: Small bilateral pleural effusions, left greater than right.    MEDIASTINUM AND ALEXIS: Very limited in evaluation without contrast and due to the pleural fluid. I cannot rule out subcarinal lymphadenopathy.    VESSELS: Within normal limits.    HEART: Heart size is normal. There is a large pericardial effusion.    CHEST WALL AND LOWER NECK: Within normal limits.    VISUALIZED UPPER ABDOMEN: There is a percutaneous tube in the stomach which continues into the jejunum. The distal tip is not included on the examination.    BONES: Scoliosis    IMPRESSION:     Large pericardial effusion. This was discussed with ANNEMARIE Kaur at 8:44 PM on 1/24/2020.    Faint groundglass opacities and nodular groundglass opacities. Mild interlobular septal thickening. This may be due to fluid overload. Underlying infection cannot be excluded. Clinical correlation follow-up is recommended. Nodular opacities in the lingula which may be due to infection. Atelectasis of the left lower lobe.    Small bilateral pleural effusions.    Motion artifact limits evaluation of the airways although there is suspicion for a nodular density in the right mainstem bronchus proximally along theright side. This may be due to secretions although other etiologies cannot be excluded. Clinical correlation is recommended.`    Limited evaluation for lymphadenopathy as above.    < end of copied text >        < from: Transthoracic Echocardiogram (01.24.20 @ 22:41) >     Impression     Summary     Moderate to large pericardial effusion. Effusion is largest in size   posteriorly & adjacent to RV & RA with minimal effusion at the apex. There   is no echocardiographic evidence of tamponade physiology.   The left ventricle is normal in size, wallthickness, wall motion and   contractility.   Estimated left ventricular ejection fraction is 55-60 %.    < end of copied text >                          Radiology: all available radiological tests reviewed    Advanced directives addressed: full resuscitation

## 2020-01-29 NOTE — CONSULT NOTE ADULT - SUBJECTIVE AND OBJECTIVE BOX
Patient is a 24y Female with cerebral palsy admitted with SOB and lethergy.  In hospital, a disposable blood pressure cuff was on the right upper arm for 2 days, with resulting rash.  Blood pressure cuff moved to the left arm, with rash within a day.          PAST MEDICAL & SURGICAL HISTORY:  Hypokalemia  Kyphosis  Scoliosis  Hypotonia  Asthma  Seizures  Cerebral Palsy  Gastroesophageal Reflux Disease  Hypothyroidism: Treated with synthroid in past. Stopped treatment in 2008 due to normal thyroid function.  Developmental Delay  Tracheostomy in place  Peg (Percutaneous Endoscopic Gastrostomy) Adjustment/Replacement/Removal  S/P Tonsillectomy and Adenoidectomy  Strabismus      MEDICATIONS  (STANDING):  ALBUTerol    90 MICROgram(s) HFA Inhaler 2 Puff(s) Inhalation every 6 hours  aspirin 325 milliGRAM(s) Oral two times a day  budesonide  80 MICROgram(s)/formoterol 4.5 MICROgram(s) Inhaler 2 Puff(s) Inhalation two times a day  calcium carbonate Oral Chewable Tablet - Peds 200 milliGRAM(s) Elemental Calcium Chew two times a day  chlorhexidine 0.12% Liquid 15 milliLiter(s) Oral Mucosa every 12 hours  cholecalciferol Oral Liquid - Peds 3000 Unit(s) Enteral Tube daily  colchicine 0.6 milliGRAM(s) Oral two times a day  fluticasone propionate 50 MICROgram(s)/spray Nasal Spray 1 Spray(s) Both Nostrils daily  levETIRAcetam  Oral Liquid - Peds 500 milliGRAM(s) Oral daily  levETIRAcetam  Oral Liquid - Peds 700 milliGRAM(s) Oral at bedtime  magnesium oxide 400 milliGRAM(s) Oral two times a day with meals  meropenem  IVPB 1000 milliGRAM(s) IV Intermittent every 8 hours  pantoprazole   Suspension 40 milliGRAM(s) Oral daily  potassium chloride    Tablet ER 15 milliEquivalent(s) Oral two times a day  sodium chloride 0.65% Nasal 2 Spray(s) Both Nostrils daily  triamcinolone 0.1% Ointment 1 Application(s) Topical two times a day    MEDICATIONS  (PRN):  acetaminophen    Suspension .. 500 milliGRAM(s) Oral every 6 hours PRN Temp greater or equal to 38C (100.4F), Mild Pain (1 - 3)  sucralfate Oral Liquid - Peds 1000 milliGRAM(s) Oral Before meals and at bedtime PRN gastric bleeding      Allergies:  Bactrim (Unknown)  carbamazepine (Unknown)  cephalosporins (Rash)  Corn (Unknown)  Depakote (Unknown)  diphenhydrAMINE (Seizure)  eggs (Unknown)  EGGS,  NUTS (Anaphylaxis)  Erythromycin Base (Unknown)  metoclopramide (Unknown)  Milk (Unknown)  MILK, SHELLFISH, WHEAT (Unknown)  Nuts (Unknown)  oxcarbazepine (Unknown)  peanuts (Unknown)  Potatoes (Unknown)  SEASONAL, POLLEN, GRASS, PET DANDER, FEATHERS (Unknown)  Sesame (Unknown)  shellfish (Unknown)  Trileptal (Unknown)  valproic acid (Unknown)  vancomycin (Unknown)  Wheat (Unknown)        PE:Vital Signs Last 24 Hrs  T(C): 37.2 (29 Jan 2020 06:00), Max: 37.2 (28 Jan 2020 16:18)  T(F): 98.9 (29 Jan 2020 06:00), Max: 98.9 (28 Jan 2020 16:18)  HR: 60 (29 Jan 2020 12:00) (57 - 78)  BP: 88/57 (29 Jan 2020 03:00) (84/56 - 117/64)  BP(mean): 65 (29 Jan 2020 03:00) (62 - 79)  RR: 16 (29 Jan 2020 12:00) (7 - 29)  SpO2: 98% (29 Jan 2020 10:00) (93% - 99%)    Erythema, mild edema, with some scale, few crusts in blood pressure cuff distribution of the right upper arm.  Mild erythema in same distribution of the left upper arm.          Labs:                      12.2   6.50  )-----------( 289      ( 29 Jan 2020 07:14 )             39.9              01-29    138  |  113<H>  |  10  ----------------------------<  105<H>  3.5   |  20<L>  |  0.44<L>    Ca    8.3<L>      29 Jan 2020 07:14  Phos  2.4     01-29  Mg     2.1     01-29

## 2020-01-29 NOTE — PHYSICAL THERAPY INITIAL EVALUATION ADULT - PERTINENT HX OF CURRENT PROBLEM, REHAB EVAL
24F with h/o cerebral palsy s/p trach and PEG, seizures, asthma and scoliosis presents to ED from Dr. Harris's office for further evaluation. Pt reportedly with lethargy for the past few days and went to Dr. Harris's office, where she was found to be short of breath. CXR done in office as per Dr. Harris showed b/l worsening airspace disease and O2 sat was 90% on vent. she is nonverbal at baseline, on vent at home.

## 2020-01-29 NOTE — PHYSICAL THERAPY INITIAL EVALUATION ADULT - CRITERIA FOR SKILLED THERAPEUTIC INTERVENTIONS
pt is dependent with functional mobility at baseline.  pts mom reports she performs PROM on her daughter.  pt has no inpatient PT needs at this time.

## 2020-01-29 NOTE — DISCHARGE NOTE PROVIDER - CARE PROVIDER_API CALL
Parvez Meyer ([facility])  Internal Medicine  [address] [place] [address]  Phone: [phone]  Fax: [phone]  Scheduled Appointment: 02/05/2020

## 2020-01-29 NOTE — PHYSICAL THERAPY INITIAL EVALUATION ADULT - GENERAL OBSERVATIONS, REHAB EVAL
pt received/left supine in bed in CCU. pts parents present.  pt nonverbal, does not actively follow commands. PROM provided to BUE/BLEs. pt appeared comfortable throughout PT session. + vent, + peg.

## 2020-01-30 LAB
ANA TITR SER: NEGATIVE — SIGNIFICANT CHANGE UP
CULTURE RESULTS: SIGNIFICANT CHANGE UP
CULTURE RESULTS: SIGNIFICANT CHANGE UP
ECV11 AB TITR SER CF: SIGNIFICANT CHANGE UP
ECV30 AB TITR SER CF: SIGNIFICANT CHANGE UP
ECV4 AB TITR SER CF: SIGNIFICANT CHANGE UP
ECV7 AB TITR SER CF: SIGNIFICANT CHANGE UP
ECV9 AB TITR SER CF: SIGNIFICANT CHANGE UP
SPECIMEN SOURCE: SIGNIFICANT CHANGE UP
SPECIMEN SOURCE: SIGNIFICANT CHANGE UP

## 2020-02-01 ENCOUNTER — EMERGENCY (EMERGENCY)
Facility: HOSPITAL | Age: 25
LOS: 0 days | Discharge: HOME | End: 2020-02-01
Attending: EMERGENCY MEDICINE
Payer: COMMERCIAL

## 2020-02-01 VITALS
HEART RATE: 72 BPM | SYSTOLIC BLOOD PRESSURE: 95 MMHG | OXYGEN SATURATION: 100 % | RESPIRATION RATE: 16 BRPM | DIASTOLIC BLOOD PRESSURE: 55 MMHG

## 2020-02-01 VITALS — HEART RATE: 106 BPM | OXYGEN SATURATION: 96 %

## 2020-02-01 DIAGNOSIS — R06.02 SHORTNESS OF BREATH: ICD-10-CM

## 2020-02-01 DIAGNOSIS — Z93.0 TRACHEOSTOMY STATUS: Chronic | ICD-10-CM

## 2020-02-01 DIAGNOSIS — R62.50 UNSPECIFIED LACK OF EXPECTED NORMAL PHYSIOLOGICAL DEVELOPMENT IN CHILDHOOD: ICD-10-CM

## 2020-02-01 DIAGNOSIS — Z91.011 ALLERGY TO MILK PRODUCTS: ICD-10-CM

## 2020-02-01 DIAGNOSIS — M40.209 UNSPECIFIED KYPHOSIS, SITE UNSPECIFIED: ICD-10-CM

## 2020-02-01 DIAGNOSIS — Z93.0 TRACHEOSTOMY STATUS: ICD-10-CM

## 2020-02-01 DIAGNOSIS — Z91.012 ALLERGY TO EGGS: ICD-10-CM

## 2020-02-01 DIAGNOSIS — Z88.8 ALLERGY STATUS TO OTHER DRUGS, MEDICAMENTS AND BIOLOGICAL SUBSTANCES STATUS: ICD-10-CM

## 2020-02-01 DIAGNOSIS — K21.9 GASTRO-ESOPHAGEAL REFLUX DISEASE WITHOUT ESOPHAGITIS: ICD-10-CM

## 2020-02-01 DIAGNOSIS — G80.9 CEREBRAL PALSY, UNSPECIFIED: ICD-10-CM

## 2020-02-01 DIAGNOSIS — Z91.010 ALLERGY TO PEANUTS: ICD-10-CM

## 2020-02-01 DIAGNOSIS — Z99.11 DEPENDENCE ON RESPIRATOR [VENTILATOR] STATUS: ICD-10-CM

## 2020-02-01 DIAGNOSIS — Z93.1 GASTROSTOMY STATUS: ICD-10-CM

## 2020-02-01 DIAGNOSIS — H50.9 UNSPECIFIED STRABISMUS: ICD-10-CM

## 2020-02-01 DIAGNOSIS — M62.89 OTHER SPECIFIED DISORDERS OF MUSCLE: ICD-10-CM

## 2020-02-01 DIAGNOSIS — J45.909 UNSPECIFIED ASTHMA, UNCOMPLICATED: ICD-10-CM

## 2020-02-01 DIAGNOSIS — G40.909 EPILEPSY, UNSPECIFIED, NOT INTRACTABLE, WITHOUT STATUS EPILEPTICUS: ICD-10-CM

## 2020-02-01 DIAGNOSIS — E87.6 HYPOKALEMIA: ICD-10-CM

## 2020-02-01 LAB
ALBUMIN SERPL ELPH-MCNC: 2.2 G/DL — LOW (ref 3.3–5)
ALP SERPL-CCNC: 127 U/L — HIGH (ref 40–120)
ALT FLD-CCNC: 57 U/L — SIGNIFICANT CHANGE UP (ref 12–78)
ANION GAP SERPL CALC-SCNC: 5 MMOL/L — SIGNIFICANT CHANGE UP (ref 5–17)
APPEARANCE UR: CLEAR — SIGNIFICANT CHANGE UP
APTT BLD: 34.8 SEC — SIGNIFICANT CHANGE UP (ref 27.5–36.3)
AST SERPL-CCNC: 46 U/L — HIGH (ref 15–37)
BACTERIA # UR AUTO: ABNORMAL
BASOPHILS # BLD AUTO: 0.02 K/UL — SIGNIFICANT CHANGE UP (ref 0–0.2)
BASOPHILS NFR BLD AUTO: 0.1 % — SIGNIFICANT CHANGE UP (ref 0–2)
BILIRUB SERPL-MCNC: 0.2 MG/DL — SIGNIFICANT CHANGE UP (ref 0.2–1.2)
BILIRUB UR-MCNC: NEGATIVE — SIGNIFICANT CHANGE UP
BUN SERPL-MCNC: 13 MG/DL — SIGNIFICANT CHANGE UP (ref 7–23)
CALCIUM SERPL-MCNC: 8.6 MG/DL — SIGNIFICANT CHANGE UP (ref 8.5–10.1)
CHLORIDE SERPL-SCNC: 105 MMOL/L — SIGNIFICANT CHANGE UP (ref 96–108)
CO2 SERPL-SCNC: 26 MMOL/L — SIGNIFICANT CHANGE UP (ref 22–31)
COLOR SPEC: YELLOW — SIGNIFICANT CHANGE UP
CREAT SERPL-MCNC: 0.4 MG/DL — LOW (ref 0.5–1.3)
DIFF PNL FLD: ABNORMAL
EOSINOPHIL # BLD AUTO: 0.03 K/UL — SIGNIFICANT CHANGE UP (ref 0–0.5)
EOSINOPHIL NFR BLD AUTO: 0.2 % — SIGNIFICANT CHANGE UP (ref 0–6)
EPI CELLS # UR: ABNORMAL
FLU A RESULT: SIGNIFICANT CHANGE UP
FLU A RESULT: SIGNIFICANT CHANGE UP
FLUAV AG NPH QL: SIGNIFICANT CHANGE UP
FLUBV AG NPH QL: SIGNIFICANT CHANGE UP
GLUCOSE SERPL-MCNC: 90 MG/DL — SIGNIFICANT CHANGE UP (ref 70–99)
GLUCOSE UR QL: NEGATIVE MG/DL — SIGNIFICANT CHANGE UP
HCT VFR BLD CALC: 39.7 % — SIGNIFICANT CHANGE UP (ref 34.5–45)
HGB BLD-MCNC: 12.4 G/DL — SIGNIFICANT CHANGE UP (ref 11.5–15.5)
IMM GRANULOCYTES NFR BLD AUTO: 0.4 % — SIGNIFICANT CHANGE UP (ref 0–1.5)
INR BLD: 1.21 RATIO — HIGH (ref 0.88–1.16)
KETONES UR-MCNC: NEGATIVE — SIGNIFICANT CHANGE UP
LACTATE SERPL-SCNC: 0.8 MMOL/L — SIGNIFICANT CHANGE UP (ref 0.7–2)
LEUKOCYTE ESTERASE UR-ACNC: NEGATIVE — SIGNIFICANT CHANGE UP
LYMPHOCYTES # BLD AUTO: 1.43 K/UL — SIGNIFICANT CHANGE UP (ref 1–3.3)
LYMPHOCYTES # BLD AUTO: 10.3 % — LOW (ref 13–44)
MCHC RBC-ENTMCNC: 31.2 GM/DL — LOW (ref 32–36)
MCHC RBC-ENTMCNC: 31.2 PG — SIGNIFICANT CHANGE UP (ref 27–34)
MCV RBC AUTO: 99.7 FL — SIGNIFICANT CHANGE UP (ref 80–100)
MONOCYTES # BLD AUTO: 0.95 K/UL — HIGH (ref 0–0.9)
MONOCYTES NFR BLD AUTO: 6.8 % — SIGNIFICANT CHANGE UP (ref 2–14)
NEUTROPHILS # BLD AUTO: 11.44 K/UL — HIGH (ref 1.8–7.4)
NEUTROPHILS NFR BLD AUTO: 82.2 % — HIGH (ref 43–77)
NITRITE UR-MCNC: NEGATIVE — SIGNIFICANT CHANGE UP
PH UR: 7 — SIGNIFICANT CHANGE UP (ref 5–8)
PLATELET # BLD AUTO: 384 K/UL — SIGNIFICANT CHANGE UP (ref 150–400)
POTASSIUM SERPL-MCNC: 4 MMOL/L — SIGNIFICANT CHANGE UP (ref 3.5–5.3)
POTASSIUM SERPL-SCNC: 4 MMOL/L — SIGNIFICANT CHANGE UP (ref 3.5–5.3)
PROT SERPL-MCNC: 6.8 GM/DL — SIGNIFICANT CHANGE UP (ref 6–8.3)
PROT UR-MCNC: NEGATIVE MG/DL — SIGNIFICANT CHANGE UP
PROTHROM AB SERPL-ACNC: 13.5 SEC — HIGH (ref 10–12.9)
RBC # BLD: 3.98 M/UL — SIGNIFICANT CHANGE UP (ref 3.8–5.2)
RBC # FLD: 12 % — SIGNIFICANT CHANGE UP (ref 10.3–14.5)
RBC CASTS # UR COMP ASSIST: ABNORMAL /HPF (ref 0–4)
RSV RESULT: SIGNIFICANT CHANGE UP
RSV RNA RESP QL NAA+PROBE: SIGNIFICANT CHANGE UP
SODIUM SERPL-SCNC: 136 MMOL/L — SIGNIFICANT CHANGE UP (ref 135–145)
SP GR SPEC: 1 — LOW (ref 1.01–1.02)
UROBILINOGEN FLD QL: NEGATIVE MG/DL — SIGNIFICANT CHANGE UP
WBC # BLD: 13.92 K/UL — HIGH (ref 3.8–10.5)
WBC # FLD AUTO: 13.92 K/UL — HIGH (ref 3.8–10.5)
WBC UR QL: SIGNIFICANT CHANGE UP

## 2020-02-01 PROCEDURE — 71045 X-RAY EXAM CHEST 1 VIEW: CPT | Mod: 26

## 2020-02-01 PROCEDURE — 85610 PROTHROMBIN TIME: CPT

## 2020-02-01 PROCEDURE — 87631 RESP VIRUS 3-5 TARGETS: CPT

## 2020-02-01 PROCEDURE — 83605 ASSAY OF LACTIC ACID: CPT

## 2020-02-01 PROCEDURE — 85025 COMPLETE CBC W/AUTO DIFF WBC: CPT

## 2020-02-01 PROCEDURE — 81001 URINALYSIS AUTO W/SCOPE: CPT

## 2020-02-01 PROCEDURE — 87040 BLOOD CULTURE FOR BACTERIA: CPT

## 2020-02-01 PROCEDURE — 71045 X-RAY EXAM CHEST 1 VIEW: CPT

## 2020-02-01 PROCEDURE — 99285 EMERGENCY DEPT VISIT HI MDM: CPT

## 2020-02-01 PROCEDURE — 85730 THROMBOPLASTIN TIME PARTIAL: CPT

## 2020-02-01 PROCEDURE — 36415 COLL VENOUS BLD VENIPUNCTURE: CPT

## 2020-02-01 PROCEDURE — 99284 EMERGENCY DEPT VISIT MOD MDM: CPT

## 2020-02-01 PROCEDURE — 99285 EMERGENCY DEPT VISIT HI MDM: CPT | Mod: 25

## 2020-02-01 PROCEDURE — 87086 URINE CULTURE/COLONY COUNT: CPT

## 2020-02-01 PROCEDURE — 80053 COMPREHEN METABOLIC PANEL: CPT

## 2020-02-01 RX ORDER — SODIUM CHLORIDE 9 MG/ML
1550 INJECTION INTRAMUSCULAR; INTRAVENOUS; SUBCUTANEOUS ONCE
Refills: 0 | Status: COMPLETED | OUTPATIENT
Start: 2020-02-01 | End: 2020-02-01

## 2020-02-01 RX ADMIN — SODIUM CHLORIDE 1550 MILLILITER(S): 9 INJECTION INTRAMUSCULAR; INTRAVENOUS; SUBCUTANEOUS at 11:00

## 2020-02-01 NOTE — ED PROVIDER NOTE - NSFOLLOWUPINSTRUCTIONS_ED_ALL_ED_FT
Pt to be on Trilogy Vent on assist control with FIO2 of 40, a PEEP of 8 and Tidal volume (Vt) of 375 at a respiratory rate of 17.  Please follow up with Dr. Mohr as soon as possible for re-evaluation.      Shortness of Breath    WHAT YOU NEED TO KNOW:    Shortness of breath is a feeling that you cannot get enough air when you breathe in. You may have this feeling only during activity, or all the time. Your symptoms can range from mild to severe. Shortness of breath may be a sign of a serious health condition that needs immediate care.    DISCHARGE INSTRUCTIONS:    Return to the emergency department if:     Your signs and symptoms are the same or worse within 24 hours of treatment.       The skin over your ribs or on your neck sinks in when you breathe.       You feel confused or dizzy.    Contact your healthcare provider if:     You have new or worsening symptoms.      You have questions or concerns about your condition or care.    Medicines:     Medicines may be used to treat the cause of your symptoms. You may need medicine to treat a bacterial infection or reduce anxiety. Other medicines may be used to open your airway, reduce swelling, or remove extra fluid. If you have a heart condition, you may need medicine to help your heart beat more strongly or regularly.      Take your medicine as directed. Contact your healthcare provider if you think your medicine is not helping or if you have side effects. Tell him or her if you are allergic to any medicine. Keep a list of the medicines, vitamins, and herbs you take. Include the amounts, and when and why you take them. Bring the list or the pill bottles to follow-up visits. Carry your medicine list with you in case of an emergency.    Manage shortness of breath:     Create an action plan. You and your healthcare provider can work together to create a plan for how to handle shortness of breath. The plan can include daily activities, treatment changes, and what to do if you have severe breathing problems.      Lean forward on your elbows when you sit. This helps your lungs expand and may make it easier to breathe.      Use pursed-lip breathing any time you feel short of breath. Breathe in through your nose and then slowly breathe out through your mouth with your lips slightly puckered. It should take you twice as long to breathe out as it did to breathe in.Breathe in Breathe out           Do not smoke. Nicotine and other chemicals in cigarettes and cigars can cause lung damage and make shortness of breath worse. Ask your healthcare provider for information if you currently smoke and need help to quit. E-cigarettes or smokeless tobacco still contain nicotine. Talk to your healthcare provider before you use these products.      Reach or maintain a healthy weight. Your healthcare provider can help you create a safe weight loss plan if you are overweight.      Exercise as directed. Exercise can help your lungs work more easily. Exercise can also help you lose weight if needed. Try to get at least 30 minutes of exercise most days of the week.    Follow up with your healthcare provider or specialist as directed: Write down your questions so you remember to ask them during your visits.

## 2020-02-01 NOTE — CONSULT NOTE ADULT - ASSESSMENT
IMP:    23 y/o female with chronic resp failure on vent support at home needs vent setting changed to CMV from IMV  There is no evidence of infection and stable baseline vitals    Suggest:    Change vent to VC setting  Cont with outpt meds  Does not need admission     Above d/w parents at bedside and Beatrice Castro and Jessica

## 2020-02-01 NOTE — ED PROVIDER NOTE - CLINICAL SUMMARY MEDICAL DECISION MAKING FREE TEXT BOX
Pt with prior Hx of multiple PNAs with Abx resistance, is unable to provide story given baseline mental status. Recently admitted to ICU here and pericarditis with + Coxsachie. Low grade temperature and comorbidities, will evaluate for bacterial source of infection. Most likely this will be PNA, disposition pending labs, work-up and will attempt to discuss with Dr. Gaxiola.

## 2020-02-01 NOTE — ED PROVIDER NOTE - CARE PROVIDER_API CALL
Jarrod Bustillo)  Internal Medicine; Pulmonary Disease  [address] [place] [address]  Phone: [phone]  Fax: [phone]  Follow Up Time:

## 2020-02-01 NOTE — CONSULT NOTE ADULT - SUBJECTIVE AND OBJECTIVE BOX
Pt well known to cc service.  Parents at bedside    CC:  Respiratory Failure     HPI:    23 y/o female with CP, mentally challenged and full vent support presents with "resp distress, tachycardia and not herself" Pt was DC from  few days ago after being admitted for PNA and completed course of Abx.  She also has known mod pericardial effusion wo tamponade followed by serial TTE while in CCU.    On questioning vent setting at home, she is on IMV setting, on VC here, she is comfortable with HR 70s-80s with baseline stable BP.  CXR--?? small B/L effusion (better than prior when compared), No fevers, negative UA.      Above d/w family in detail as well as with dr syed.  Family has emergency contact of vendor and just needs orders from pul to make vent changes.  Dr syed will see pt in ED      PMH:  As above.     PSH:  As above.     FH: Non Contributory other than those listed in HPI    Social History:  No smoking    MEDICATIONS  (STANDING):    MEDICATIONS  (PRN):      Allergies: NKDA    ROS:  SEE BELOW      Weight (kg): 50 ( @ 10:42)    ICU Vital Signs Last 24 Hrs  T(C): 37.3 (2020 10:40), Max: 37.3 (2020 10:40)  T(F): 99.2 (2020 10:40), Max: 99.2 (2020 10:40)  HR: 78 (2020 12:15) (78 - 106)  BP: 91/52 (2020 12:15) (91/52 - 100/67)  BP(mean): --  ABP: --  ABP(mean): --  RR: 17 (2020 12:15) (16 - 20)  SpO2: 99% (2020 12:15) (96% - 100%)          I&O's Summary      Physical Exam:  SEE BELOW                          12.4   13.92 )-----------( 384      ( 2020 11:10 )             39.7           136  |  105  |  13  ----------------------------<  90  4.0   |  26  |  0.40<L>    Ca    8.6      2020 11:10    TPro  6.8  /  Alb  2.2<L>  /  TBili  0.2  /  DBili  x   /  AST  46<H>  /  ALT  57  /  AlkPhos  127<H>                  Urinalysis Basic - ( 2020 11:29 )    Color: Yellow / Appearance: Clear / S.005 / pH: x  Gluc: x / Ketone: Negative  / Bili: Negative / Urobili: Negative mg/dL   Blood: x / Protein: Negative mg/dL / Nitrite: Negative   Leuk Esterase: Negative / RBC: 6-10 /HPF / WBC 3-5   Sq Epi: x / Non Sq Epi: Moderate / Bacteria: Occasional        DVT Prophylaxis:                                                            Contraindication:     Advanced Directives:    Discussed with:    Visit Information:  Time spent excluding procedure:      ** Time is exclusive of billed procedures and/or teaching and/or routine family updates.

## 2020-02-01 NOTE — ED ADULT NURSE NOTE - NSIMPLEMENTINTERV_GEN_ALL_ED
Implemented All Fall with Harm Risk Interventions:  [place] to call system. Call bell, personal items and telephone within reach. Instruct patient to call for assistance. Room bathroom lighting operational. Non-slip footwear when patient is off stretcher. Physically safe environment: no spills, clutter or unnecessary equipment. Stretcher in lowest position, wheels locked, appropriate side rails in place. Provide visual cue, wrist band, yellow gown, etc. Monitor gait and stability. Monitor for mental status changes and reorient to person, place, and time. Review medications for side effects contributing to fall risk. Reinforce activity limits and safety measures with patient and family. Provide visual clues: red socks.

## 2020-02-01 NOTE — ED ADULT TRIAGE NOTE - CHIEF COMPLAINT QUOTE
pt presents to ED due to SOB and fever pt was recently dc home on weds from  pt is a chronic vent pt

## 2020-02-01 NOTE — ED PROVIDER NOTE - PROGRESS NOTE DETAILS
Pt well appearing and with unremarkable lab w/u.  Vitals at baseline throughout entire ED stay.  Noted to have minimal leukocytosis, but CXR unremarkable.  Labs otherwise unremarkable.  Discussed with Dr. Ace who evaluated patient and was told by family that patient is on SIMV at home and as per family they were concerned that patient's breathing was irregular and was causing tachycardia at home.  Had been on AC here on inpatient visit recently and on AC in ED with completely stable exam.  Was able to get in touch with [facility] through respiratory and family here and managed to change settings.  Pt doing well on altered settings.  Family comfortable with d/c home at this time.  Well appearing and at baseline.  D/c home with strict return precautions and prompt outpatient f/u.

## 2020-02-01 NOTE — ED PROVIDER NOTE - PATIENT PORTAL LINK FT
You can access the FollowMyHealth Patient Portal offered by [facility] by registering at the following website: [email or web address]. By joining [employer]’s FollowMyHealth portal, you will also be able to view your health information using other applications (apps) compatible with our system.

## 2020-02-01 NOTE — ED PROVIDER NOTE - UNABLE TO OBTAIN
HPI is limited - Pt unable to provide story given baseline mental status / non-verbal. Severe Illness/Injury ROS is limited - Pt unable to provide story given baseline mental status / non-verbal.

## 2020-02-02 LAB
CULTURE RESULTS: NO GROWTH — SIGNIFICANT CHANGE UP
SPECIMEN SOURCE: SIGNIFICANT CHANGE UP

## 2020-02-03 RX ORDER — INHALER,ASSIST DEVICE,LG MASK
SPACER (EA) MISCELLANEOUS
Qty: 1 | Refills: 0 | Status: ACTIVE | COMMUNITY
Start: 2016-10-17

## 2020-02-03 RX ORDER — MAGNESIUM GLYCINATE 100 MG
TABLET ORAL
Refills: 0 | Status: ACTIVE | COMMUNITY

## 2020-02-03 RX ORDER — KETOCONAZOLE 20 MG/G
2 CREAM TOPICAL
Qty: 60 | Refills: 0 | Status: ACTIVE | COMMUNITY
Start: 2016-12-22

## 2020-02-03 RX ORDER — SYRINGE WITH NEEDLE, 1 ML 25GX5/8"
23G X 1-1/2" SYRINGE, EMPTY DISPOSABLE MISCELLANEOUS
Qty: 100 | Refills: 0 | Status: ACTIVE | COMMUNITY
Start: 2017-01-30

## 2020-02-03 RX ORDER — COLISTIMETHATE SODIUM 150 MG/2ML
150 INJECTION INTRAMUSCULAR; INTRAVENOUS
Qty: 0 | Refills: 0 | DISCHARGE
Start: 2020-02-03 | End: 2020-03-01

## 2020-02-03 RX ORDER — SILVER SULFADIAZINE 10 G/1000G
1 CREAM TOPICAL
Qty: 50 | Refills: 0 | Status: ACTIVE | COMMUNITY
Start: 2016-11-29

## 2020-02-03 RX ORDER — EPINEPHRINE 0.3 MG/.3ML
0.3 INJECTION INTRAMUSCULAR
Qty: 2 | Refills: 0 | Status: ACTIVE | COMMUNITY
Start: 2016-12-29

## 2020-02-04 DIAGNOSIS — L23.9 ALLERGIC CONTACT DERMATITIS, UNSPECIFIED CAUSE: ICD-10-CM

## 2020-02-04 DIAGNOSIS — M41.9 SCOLIOSIS, UNSPECIFIED: ICD-10-CM

## 2020-02-04 DIAGNOSIS — E87.5 HYPERKALEMIA: ICD-10-CM

## 2020-02-04 DIAGNOSIS — Z91.018 ALLERGY TO OTHER FOODS: ICD-10-CM

## 2020-02-04 DIAGNOSIS — E88.09 OTHER DISORDERS OF PLASMA-PROTEIN METABOLISM, NOT ELSEWHERE CLASSIFIED: ICD-10-CM

## 2020-02-04 DIAGNOSIS — I31.3 PERICARDIAL EFFUSION (NONINFLAMMATORY): ICD-10-CM

## 2020-02-04 DIAGNOSIS — R53.2 FUNCTIONAL QUADRIPLEGIA: ICD-10-CM

## 2020-02-04 DIAGNOSIS — G80.9 CEREBRAL PALSY, UNSPECIFIED: ICD-10-CM

## 2020-02-04 DIAGNOSIS — G40.909 EPILEPSY, UNSPECIFIED, NOT INTRACTABLE, WITHOUT STATUS EPILEPTICUS: ICD-10-CM

## 2020-02-04 DIAGNOSIS — Z91.011 ALLERGY TO MILK PRODUCTS: ICD-10-CM

## 2020-02-04 DIAGNOSIS — A41.9 SEPSIS, UNSPECIFIED ORGANISM: ICD-10-CM

## 2020-02-04 DIAGNOSIS — J15.6 PNEUMONIA DUE TO OTHER GRAM-NEGATIVE BACTERIA: ICD-10-CM

## 2020-02-04 DIAGNOSIS — Z88.8 ALLERGY STATUS TO OTHER DRUGS, MEDICAMENTS AND BIOLOGICAL SUBSTANCES STATUS: ICD-10-CM

## 2020-02-04 DIAGNOSIS — E03.9 HYPOTHYROIDISM, UNSPECIFIED: ICD-10-CM

## 2020-02-04 DIAGNOSIS — Z88.1 ALLERGY STATUS TO OTHER ANTIBIOTIC AGENTS STATUS: ICD-10-CM

## 2020-02-04 DIAGNOSIS — Z93.1 GASTROSTOMY STATUS: ICD-10-CM

## 2020-02-04 DIAGNOSIS — R62.50 UNSPECIFIED LACK OF EXPECTED NORMAL PHYSIOLOGICAL DEVELOPMENT IN CHILDHOOD: ICD-10-CM

## 2020-02-04 DIAGNOSIS — J98.11 ATELECTASIS: ICD-10-CM

## 2020-02-04 DIAGNOSIS — J90 PLEURAL EFFUSION, NOT ELSEWHERE CLASSIFIED: ICD-10-CM

## 2020-02-04 DIAGNOSIS — Z91.013 ALLERGY TO SEAFOOD: ICD-10-CM

## 2020-02-04 DIAGNOSIS — J96.10 CHRONIC RESPIRATORY FAILURE, UNSPECIFIED WHETHER WITH HYPOXIA OR HYPERCAPNIA: ICD-10-CM

## 2020-02-04 DIAGNOSIS — J45.909 UNSPECIFIED ASTHMA, UNCOMPLICATED: ICD-10-CM

## 2020-02-04 DIAGNOSIS — Z93.0 TRACHEOSTOMY STATUS: ICD-10-CM

## 2020-02-04 DIAGNOSIS — Z91.012 ALLERGY TO EGGS: ICD-10-CM

## 2020-02-04 DIAGNOSIS — Z16.24 RESISTANCE TO MULTIPLE ANTIBIOTICS: ICD-10-CM

## 2020-02-04 DIAGNOSIS — E46 UNSPECIFIED PROTEIN-CALORIE MALNUTRITION: ICD-10-CM

## 2020-02-04 DIAGNOSIS — K21.9 GASTRO-ESOPHAGEAL REFLUX DISEASE WITHOUT ESOPHAGITIS: ICD-10-CM

## 2020-02-06 ENCOUNTER — APPOINTMENT (OUTPATIENT)
Age: 25
End: 2020-02-06

## 2020-02-06 ENCOUNTER — APPOINTMENT (OUTPATIENT)
Dept: CARDIOLOGY | Facility: CLINIC | Age: 25
End: 2020-02-06

## 2020-02-27 ENCOUNTER — APPOINTMENT (OUTPATIENT)
Dept: CARDIOLOGY | Facility: CLINIC | Age: 25
End: 2020-02-27
Payer: COMMERCIAL

## 2020-02-27 ENCOUNTER — NON-APPOINTMENT (OUTPATIENT)
Age: 25
End: 2020-02-27

## 2020-02-27 ENCOUNTER — OUTPATIENT (OUTPATIENT)
Dept: OUTPATIENT SERVICES | Facility: HOSPITAL | Age: 25
LOS: 1 days | End: 2020-02-27
Payer: COMMERCIAL

## 2020-02-27 VITALS
SYSTOLIC BLOOD PRESSURE: 100 MMHG | OXYGEN SATURATION: 96 % | HEART RATE: 72 BPM | WEIGHT: 82 LBS | BODY MASS INDEX: 18.97 KG/M2 | DIASTOLIC BLOOD PRESSURE: 64 MMHG | HEIGHT: 55 IN

## 2020-02-27 DIAGNOSIS — Z93.0 TRACHEOSTOMY STATUS: Chronic | ICD-10-CM

## 2020-02-27 DIAGNOSIS — Z00.00 ENCOUNTER FOR GENERAL ADULT MEDICAL EXAMINATION WITHOUT ABNORMAL FINDINGS: ICD-10-CM

## 2020-02-27 PROCEDURE — 93306 TTE W/DOPPLER COMPLETE: CPT

## 2020-02-27 PROCEDURE — 93306 TTE W/DOPPLER COMPLETE: CPT | Mod: 26

## 2020-02-27 PROCEDURE — 99215 OFFICE O/P EST HI 40 MIN: CPT | Mod: 25

## 2020-02-27 PROCEDURE — 93000 ELECTROCARDIOGRAM COMPLETE: CPT

## 2020-02-27 RX ORDER — NUTRITIONAL SUPPLEMENT/FIBER 0.05 G-1.5
LIQUID (ML) ORAL
Refills: 0 | Status: ACTIVE | COMMUNITY

## 2020-02-27 RX ORDER — NUT.TX.IMPAIRED DIGESTIVE FXN 14 G-480
POWDER (GRAM) ORAL
Qty: 19 | Refills: 5 | Status: DISCONTINUED | OUTPATIENT
Start: 2018-10-19 | End: 2020-02-27

## 2020-02-27 NOTE — HISTORY OF PRESENT ILLNESS
[FreeTextEntry1] : 23 yo F with hx of pericardial effusion due to coxsackie virus, GERD, cerebral palsy s/p trach and PEG, seizures, asthma and scoliosis here for followup.\par \par admitted Jan 24th for pneumonia & pleural effusion.  CT scan \par reported large pericardial effusion. Echo showed moderate to large pericardial effusion w/ no tamponade physiology.  Admitted for 4 days, repeat echo showed slight decrease in pericardial effusion w/o signs of tamponade physiology.  Discharged.  Since then admitted to [facility] for pneumonia.  Pericardial effusion there unchangeed in size.  There for 7 days, has been out of hospital for 7 days.\par \par Pt cannot provide history due to cerebral palsy.  per family no\par worsening dyspnea, chest pain or other symptoms.  BPs at home taken daily & are stable.\par Echo today shows no pericardial effusion.\par \par On cochicine 0.6 mg po BID.\par

## 2020-02-27 NOTE — PHYSICAL EXAM
[Normal Conjunctiva] : the conjunctiva exhibited no abnormalities [Eyelids - No Xanthelasma] : the eyelids demonstrated no xanthelasmas [Respiration, Rhythm And Depth] : normal respiratory rhythm and effort [Exaggerated Use Of Accessory Muscles For Inspiration] : no accessory muscle use [Auscultation Breath Sounds / Voice Sounds] : lungs were clear to auscultation bilaterally [Heart Rate And Rhythm] : heart rate and rhythm were normal [Heart Sounds] : normal S1 and S2 [Murmurs] : no murmurs present [Abnormal Walk] : normal gait [Gait - Sufficient For Exercise Testing] : the gait was sufficient for exercise testing [Nail Clubbing] : no clubbing of the fingernails [Cyanosis, Localized] : no localized cyanosis [Petechial Hemorrhages (___cm)] : no petechial hemorrhages [] : no ischemic changes

## 2020-02-27 NOTE — REASON FOR VISIT
[Follow-Up - From Hospitalization] : follow-up of a recent hospitalization for [FreeTextEntry2] : pericardial effusion

## 2020-02-27 NOTE — PHYSICAL EXAM
[Normal Conjunctiva] : the conjunctiva exhibited no abnormalities [Eyelids - No Xanthelasma] : the eyelids demonstrated no xanthelasmas [Respiration, Rhythm And Depth] : normal respiratory rhythm and effort [Exaggerated Use Of Accessory Muscles For Inspiration] : no accessory muscle use [Auscultation Breath Sounds / Voice Sounds] : lungs were clear to auscultation bilaterally [Heart Sounds] : normal S1 and S2 [Heart Rate And Rhythm] : heart rate and rhythm were normal [Murmurs] : no murmurs present [Abnormal Walk] : normal gait [Gait - Sufficient For Exercise Testing] : the gait was sufficient for exercise testing [Nail Clubbing] : no clubbing of the fingernails [Cyanosis, Localized] : no localized cyanosis [Petechial Hemorrhages (___cm)] : no petechial hemorrhages [] : no ischemic changes

## 2020-02-27 NOTE — ASSESSMENT
[FreeTextEntry1] : Pericaridal effusion due to cocksackie virus Jan 24th.\par \par Today's echo w/ no pericardial effusion.\par Reccomend continuing colchicine 0.6 mg BID for total of 6 months.\par Will return in 2 months (3 months after effusion diagnosed) to reassess effusion w/ echo.

## 2020-02-27 NOTE — HISTORY OF PRESENT ILLNESS
[FreeTextEntry1] : 25 yo F with hx of pericardial effusion due to coxsackie virus, GERD, cerebral palsy s/p trach and PEG, seizures, asthma and scoliosis here for followup.\par \par admitted Jan 24th for pneumonia & pleural effusion.  CT scan \par reported large pericardial effusion. Echo showed moderate to large pericardial effusion w/ no tamponade physiology.  Admitted for 4 days, repeat echo showed slight decrease in pericardial effusion w/o signs of tamponade physiology.  Discharged.  Since then admitted to [facility] for pneumonia.  Pericardial effusion there unchangeed in size.  There for 7 days, has been out of hospital for 7 days.\par \par Pt cannot provide history due to cerebral palsy.  per family no\par worsening dyspnea, chest pain or other symptoms.  BPs at home taken daily & are stable.\par Echo today shows no pericardial effusion.\par \par On cochicine 0.6 mg po BID.\par

## 2020-02-28 DIAGNOSIS — Z00.00 ENCOUNTER FOR GENERAL ADULT MEDICAL EXAMINATION WITHOUT ABNORMAL FINDINGS: ICD-10-CM

## 2020-04-24 ENCOUNTER — APPOINTMENT (OUTPATIENT)
Dept: CARDIOLOGY | Facility: CLINIC | Age: 25
End: 2020-04-24
Payer: COMMERCIAL

## 2020-04-24 DIAGNOSIS — R00.1 BRADYCARDIA, UNSPECIFIED: ICD-10-CM

## 2020-04-24 DIAGNOSIS — I31.3 PERICARDIAL EFFUSION (NONINFLAMMATORY): ICD-10-CM

## 2020-04-24 PROCEDURE — 99214 OFFICE O/P EST MOD 30 MIN: CPT | Mod: 95

## 2020-04-30 ENCOUNTER — APPOINTMENT (OUTPATIENT)
Dept: CARDIOLOGY | Facility: CLINIC | Age: 25
End: 2020-04-30

## 2020-06-04 ENCOUNTER — APPOINTMENT (OUTPATIENT)
Dept: CARDIOLOGY | Facility: CLINIC | Age: 25
End: 2020-06-04

## 2020-06-17 PROBLEM — R00.1 SINUS BRADYCARDIA: Status: ACTIVE | Noted: 2020-06-17

## 2020-06-18 NOTE — PATIENT PROFILE ADULT. - BRADEN SCORE (IF 18 OR LESS ACTIVATE SKIN INJURY RISK INCREASED GUIDELINE), MLM
Patient given printed discharge instructions reviewed by the MD. Patient understands instructions/follow up recommendations. Patient discharged out of ED in wheelchair to brother's car.
10

## 2020-06-20 ENCOUNTER — RESULT REVIEW (OUTPATIENT)
Age: 25
End: 2020-06-20

## 2020-06-20 ENCOUNTER — INPATIENT (INPATIENT)
Facility: HOSPITAL | Age: 25
LOS: 4 days | Discharge: HOME HEALTH | End: 2020-06-25
Attending: INTERNAL MEDICINE | Admitting: INTERNAL MEDICINE
Payer: COMMERCIAL

## 2020-06-20 VITALS
DIASTOLIC BLOOD PRESSURE: 46 MMHG | HEART RATE: 68 BPM | RESPIRATION RATE: 20 BRPM | SYSTOLIC BLOOD PRESSURE: 76 MMHG | OXYGEN SATURATION: 98 %

## 2020-06-20 DIAGNOSIS — Z93.0 TRACHEOSTOMY STATUS: Chronic | ICD-10-CM

## 2020-06-20 RX ORDER — SODIUM CHLORIDE 9 MG/ML
1000 INJECTION INTRAMUSCULAR; INTRAVENOUS; SUBCUTANEOUS ONCE
Refills: 0 | Status: COMPLETED | OUTPATIENT
Start: 2020-06-20 | End: 2020-06-20

## 2020-06-20 NOTE — ED PROVIDER NOTE - CLINICAL SUMMARY MEDICAL DECISION MAKING FREE TEXT BOX
25 F w/ bradycardia - found to be hypothermic, will actively rewarm, workup for infection, hypothyroidism, likely to be admitted

## 2020-06-20 NOTE — ED ADULT NURSE NOTE - OBJECTIVE STATEMENT
awake responsive to all stimuli unable to follow commands makes eye contact no apparent distress  # 4 non disposable trache to vent intact  breath sounds with harjeet rhonchi SaO2 98% SR on scope skin cool pale SR on scope HR 60 rectal temp 91.2 placed on warming blanket  PEG intact site slightly excoriated  skin intact

## 2020-06-20 NOTE — ED PROVIDER NOTE - PROGRESS NOTE DETAILS
Shahzad Aguirre PGY3: rectal temp 91.1 - ordered external warming Shahzad Aguirre PGY3: d/w hospitalist - [facility] to take trach vented patients to floor, paged [facility] pulm fellow Shahzad Aguirre PGY3: d/w hospitalist - [facility] to take trach vented patients to floor, d/w [facility] pulm fellow - no tele availablilty, consulted [facility] Shahzad Aguirre PGY3: admitted to micu

## 2020-06-20 NOTE — ED ADULT TRIAGE NOTE - BP NONINVASIVE DIASTOLIC (MM HG)
Quality 110: Preventive Care And Screening: Influenza Immunization: Influenza Immunization Administered during Influenza season
Quality 131: Pain Assessment And Follow-Up: Pain assessment using a standardized tool is documented as negative, no follow-up plan required
Detail Level: Detailed
46

## 2020-06-20 NOTE — ED PROVIDER NOTE - PHYSICAL EXAMINATION
*GEN:   awake, nonverbal  *EYES:   pupils equally round and reactive to light, extra-occular movements intact  *HEENT:   trach site patent, no surrounding erythema  *CV:   regular rate and rhythm  *RESP:   clear to auscultation bilaterally, non-labored  *ABD:   peg well positioned, c/d/i  *SKIN:   dry, intact  *NEURO:   awake, nonverbal

## 2020-06-20 NOTE — ED PROVIDER NOTE - ATTENDING CONTRIBUTION TO CARE
agree with above    in addition    25 y F pmh CP non verbal, hypthyroid, scoliosis, seizure disorder   presents with bradycardia. was bradycardic at home to the 40's while sleeping  this is not usual for her, normally in the 80's  of note leviteracetam was recently increased for focal seizure activity after failing a trial switched to dilantin. She is also recently treated for pneumonia. The mother at bedside says she gets frequent infections, uti/pneumonias, and in Jan of this year was diagnosed with pericarditis for which she takes colchicine. the patient is vent dependent at night  the patients focal seizures have improved with changing back to keppra     hypothermic, not bradycardic    CONSTITUTIONAL: chronically ill appearing, CP syndromic appearing woman on ventilator  HEAD: Normocephalic; atruamatic  EYES: EOM intact   ENMT: External appears normal; normal oropharynx, moist  NECK: grossly normal active ROM,  CARD: No cyanosis, good peripheral perfusion, RRR, no MRG  RESP: Normal chest excursion with respiration; no increased work of breathing, CTAB, vented to trach with gurgling from upper airway  ABD: SNTND, GJ tube in place  EXT: moving all extremities, no gross disfigurement or asymmetry,  SKIN: Warm, dry, no rash  NEURO:  no rigidity in extremities     Given hypothermia, will rule out infection (UTI/PNA). Anticipate need for antibiosis, however currently does not meet SIRS criteria. Furthermore, given history of bradycardia and pericarditis, patient could likely benefit from tele monitoring with cardiology consult. Currently, physical exam does not reveal focality or diagnosis for presentation. Also rule out hypothyroid, but patient does not have myxedema coma.

## 2020-06-20 NOTE — ED ADULT TRIAGE NOTE - CHIEF COMPLAINT QUOTE
Brought in by mother for low heart rate and fatigue x several weeks unable to obtain temp in triage   PMH: non verbal, vented, CP, seizures, peg tube

## 2020-06-21 DIAGNOSIS — T68.XXXA HYPOTHERMIA, INITIAL ENCOUNTER: ICD-10-CM

## 2020-06-21 LAB
ALBUMIN SERPL ELPH-MCNC: 3.3 G/DL — SIGNIFICANT CHANGE UP (ref 3.3–5)
ALP SERPL-CCNC: 157 U/L — HIGH (ref 40–120)
ALT FLD-CCNC: 55 U/L — HIGH (ref 4–33)
ANION GAP SERPL CALC-SCNC: 9 MMO/L — SIGNIFICANT CHANGE UP (ref 7–14)
AST SERPL-CCNC: 59 U/L — HIGH (ref 4–32)
BASE EXCESS BLDV CALC-SCNC: -0.9 MMOL/L — SIGNIFICANT CHANGE UP
BASOPHILS # BLD AUTO: 0.02 K/UL — SIGNIFICANT CHANGE UP (ref 0–0.2)
BASOPHILS NFR BLD AUTO: 0.5 % — SIGNIFICANT CHANGE UP (ref 0–2)
BILIRUB SERPL-MCNC: < 0.2 MG/DL — LOW (ref 0.2–1.2)
BLOOD GAS VENOUS - CREATININE: < 0.36 MG/DL — LOW (ref 0.5–1.3)
BUN SERPL-MCNC: 11 MG/DL — SIGNIFICANT CHANGE UP (ref 7–23)
CALCIUM SERPL-MCNC: 8.7 MG/DL — SIGNIFICANT CHANGE UP (ref 8.4–10.5)
CHLORIDE BLDV-SCNC: 110 MMOL/L — HIGH (ref 96–108)
CHLORIDE BLDV-SCNC: 112 MMOL/L — HIGH (ref 96–108)
CHLORIDE SERPL-SCNC: 107 MMOL/L — SIGNIFICANT CHANGE UP (ref 98–107)
CO2 SERPL-SCNC: 24 MMOL/L — SIGNIFICANT CHANGE UP (ref 22–31)
CREAT SERPL-MCNC: 0.32 MG/DL — LOW (ref 0.5–1.3)
EOSINOPHIL # BLD AUTO: 0.04 K/UL — SIGNIFICANT CHANGE UP (ref 0–0.5)
EOSINOPHIL NFR BLD AUTO: 0.9 % — SIGNIFICANT CHANGE UP (ref 0–6)
GAS PNL BLDV: 139 MMOL/L — SIGNIFICANT CHANGE UP (ref 136–146)
GLUCOSE BLDV-MCNC: 120 MG/DL — HIGH (ref 70–99)
GLUCOSE SERPL-MCNC: 106 MG/DL — HIGH (ref 70–99)
HCO3 BLDV-SCNC: 23 MMOL/L — SIGNIFICANT CHANGE UP (ref 20–27)
HCT VFR BLD CALC: 38.9 % — SIGNIFICANT CHANGE UP (ref 34.5–45)
HCT VFR BLDV CALC: 35.4 % — SIGNIFICANT CHANGE UP (ref 34.5–45)
HGB BLD-MCNC: 12.7 G/DL — SIGNIFICANT CHANGE UP (ref 11.5–15.5)
HGB BLDV-MCNC: 11.5 G/DL — SIGNIFICANT CHANGE UP (ref 11.5–15.5)
IMM GRANULOCYTES NFR BLD AUTO: 0.2 % — SIGNIFICANT CHANGE UP (ref 0–1.5)
LACTATE BLDV-MCNC: 0.4 MMOL/L — LOW (ref 0.5–2)
LYMPHOCYTES # BLD AUTO: 1.5 K/UL — SIGNIFICANT CHANGE UP (ref 1–3.3)
LYMPHOCYTES # BLD AUTO: 34.6 % — SIGNIFICANT CHANGE UP (ref 13–44)
MCHC RBC-ENTMCNC: 32.6 % — SIGNIFICANT CHANGE UP (ref 32–36)
MCHC RBC-ENTMCNC: 32.7 PG — SIGNIFICANT CHANGE UP (ref 27–34)
MCV RBC AUTO: 100.3 FL — HIGH (ref 80–100)
MONOCYTES # BLD AUTO: 0.19 K/UL — SIGNIFICANT CHANGE UP (ref 0–0.9)
MONOCYTES NFR BLD AUTO: 4.4 % — SIGNIFICANT CHANGE UP (ref 2–14)
NEUTROPHILS # BLD AUTO: 2.58 K/UL — SIGNIFICANT CHANGE UP (ref 1.8–7.4)
NEUTROPHILS NFR BLD AUTO: 59.4 % — SIGNIFICANT CHANGE UP (ref 43–77)
NRBC # FLD: 0 K/UL — SIGNIFICANT CHANGE UP (ref 0–0)
PCO2 BLDV: 56 MMHG — HIGH (ref 41–51)
PH BLDV: 7.28 PH — LOW (ref 7.32–7.43)
PLATELET # BLD AUTO: 123 K/UL — LOW (ref 150–400)
PMV BLD: 10.9 FL — SIGNIFICANT CHANGE UP (ref 7–13)
PO2 BLDV: 74 MMHG — HIGH (ref 35–40)
POTASSIUM BLDV-SCNC: 3.2 MMOL/L — LOW (ref 3.4–4.5)
POTASSIUM SERPL-MCNC: 5 MMOL/L — SIGNIFICANT CHANGE UP (ref 3.5–5.3)
POTASSIUM SERPL-SCNC: 5 MMOL/L — SIGNIFICANT CHANGE UP (ref 3.5–5.3)
PROT SERPL-MCNC: 6.2 G/DL — SIGNIFICANT CHANGE UP (ref 6–8.3)
RBC # BLD: 3.88 M/UL — SIGNIFICANT CHANGE UP (ref 3.8–5.2)
RBC # FLD: 11.9 % — SIGNIFICANT CHANGE UP (ref 10.3–14.5)
SAO2 % BLDV: 93.6 % — HIGH (ref 60–85)
SARS-COV-2 RNA SPEC QL NAA+PROBE: SIGNIFICANT CHANGE UP
SODIUM SERPL-SCNC: 140 MMOL/L — SIGNIFICANT CHANGE UP (ref 135–145)
TROPONIN T, HIGH SENSITIVITY: < 6 NG/L — SIGNIFICANT CHANGE UP (ref ?–14)
TSH SERPL-MCNC: 3.56 UIU/ML — SIGNIFICANT CHANGE UP (ref 0.27–4.2)
WBC # BLD: 4.34 K/UL — SIGNIFICANT CHANGE UP (ref 3.8–10.5)
WBC # FLD AUTO: 4.34 K/UL — SIGNIFICANT CHANGE UP (ref 3.8–10.5)

## 2020-06-21 PROCEDURE — 99291 CRITICAL CARE FIRST HOUR: CPT

## 2020-06-21 PROCEDURE — 71045 X-RAY EXAM CHEST 1 VIEW: CPT | Mod: 26

## 2020-06-21 RX ORDER — FLUTICASONE PROPIONATE 50 MCG
1 SPRAY, SUSPENSION NASAL DAILY
Refills: 0 | Status: DISCONTINUED | OUTPATIENT
Start: 2020-06-21 | End: 2020-06-25

## 2020-06-21 RX ORDER — SODIUM CHLORIDE 9 MG/ML
0 INJECTION INTRAMUSCULAR; INTRAVENOUS; SUBCUTANEOUS
Qty: 0 | Refills: 0 | DISCHARGE

## 2020-06-21 RX ORDER — COLCHICINE 0.6 MG
0.6 TABLET ORAL
Refills: 0 | Status: DISCONTINUED | OUTPATIENT
Start: 2020-06-21 | End: 2020-06-23

## 2020-06-21 RX ORDER — LEVETIRACETAM 250 MG/1
700 TABLET, FILM COATED ORAL AT BEDTIME
Refills: 0 | Status: DISCONTINUED | OUTPATIENT
Start: 2020-06-21 | End: 2020-06-25

## 2020-06-21 RX ORDER — ACETAMINOPHEN 500 MG
400 TABLET ORAL EVERY 6 HOURS
Refills: 0 | Status: DISCONTINUED | OUTPATIENT
Start: 2020-06-21 | End: 2020-06-25

## 2020-06-21 RX ORDER — SODIUM CHLORIDE 0.65 %
2 AEROSOL, SPRAY (ML) NASAL
Qty: 0 | Refills: 0 | DISCHARGE

## 2020-06-21 RX ORDER — BUDESONIDE, MICRONIZED 100 %
2 POWDER (GRAM) MISCELLANEOUS
Qty: 0 | Refills: 0 | DISCHARGE

## 2020-06-21 RX ORDER — TOBRAMYCIN SULFATE 40 MG/ML
300 VIAL (ML) INJECTION EVERY 12 HOURS
Refills: 0 | Status: DISCONTINUED | OUTPATIENT
Start: 2020-06-21 | End: 2020-06-21

## 2020-06-21 RX ORDER — CHLORHEXIDINE GLUCONATE 213 G/1000ML
15 SOLUTION TOPICAL EVERY 12 HOURS
Refills: 0 | Status: DISCONTINUED | OUTPATIENT
Start: 2020-06-21 | End: 2020-06-25

## 2020-06-21 RX ORDER — CHLORHEXIDINE GLUCONATE 213 G/1000ML
1 SOLUTION TOPICAL
Refills: 0 | Status: DISCONTINUED | OUTPATIENT
Start: 2020-06-21 | End: 2020-06-22

## 2020-06-21 RX ORDER — PANTOPRAZOLE SODIUM 20 MG/1
20 TABLET, DELAYED RELEASE ORAL DAILY
Refills: 0 | Status: DISCONTINUED | OUTPATIENT
Start: 2020-06-21 | End: 2020-06-25

## 2020-06-21 RX ORDER — LEVETIRACETAM 250 MG/1
600 TABLET, FILM COATED ORAL
Refills: 0 | Status: DISCONTINUED | OUTPATIENT
Start: 2020-06-21 | End: 2020-06-25

## 2020-06-21 RX ORDER — ENOXAPARIN SODIUM 100 MG/ML
30 INJECTION SUBCUTANEOUS DAILY
Refills: 0 | Status: DISCONTINUED | OUTPATIENT
Start: 2020-06-21 | End: 2020-06-22

## 2020-06-21 RX ORDER — COLISTIMETHATE SODIUM 150 MG/2ML
150 INJECTION INTRAMUSCULAR; INTRAVENOUS
Refills: 0 | Status: DISCONTINUED | OUTPATIENT
Start: 2020-06-21 | End: 2020-06-25

## 2020-06-21 RX ORDER — PIPERACILLIN AND TAZOBACTAM 4; .5 G/20ML; G/20ML
3.38 INJECTION, POWDER, LYOPHILIZED, FOR SOLUTION INTRAVENOUS EVERY 8 HOURS
Refills: 0 | Status: DISCONTINUED | OUTPATIENT
Start: 2020-06-21 | End: 2020-06-25

## 2020-06-21 RX ORDER — SODIUM CHLORIDE 9 MG/ML
1000 INJECTION INTRAMUSCULAR; INTRAVENOUS; SUBCUTANEOUS
Refills: 0 | Status: DISCONTINUED | OUTPATIENT
Start: 2020-06-21 | End: 2020-06-21

## 2020-06-21 RX ORDER — PIPERACILLIN AND TAZOBACTAM 4; .5 G/20ML; G/20ML
3.38 INJECTION, POWDER, LYOPHILIZED, FOR SOLUTION INTRAVENOUS ONCE
Refills: 0 | Status: COMPLETED | OUTPATIENT
Start: 2020-06-21 | End: 2020-06-21

## 2020-06-21 RX ORDER — LEVALBUTEROL 1.25 MG/.5ML
1.25 SOLUTION, CONCENTRATE RESPIRATORY (INHALATION)
Refills: 0 | Status: DISCONTINUED | OUTPATIENT
Start: 2020-06-21 | End: 2020-06-25

## 2020-06-21 RX ORDER — POTASSIUM CHLORIDE 20 MEQ
1 PACKET (EA) ORAL
Qty: 0 | Refills: 0 | DISCHARGE

## 2020-06-21 RX ORDER — BUDESONIDE, MICRONIZED 100 %
1 POWDER (GRAM) MISCELLANEOUS
Refills: 0 | Status: DISCONTINUED | OUTPATIENT
Start: 2020-06-21 | End: 2020-06-25

## 2020-06-21 RX ORDER — OMEGA-3 ACID ETHYL ESTERS 1 G
1.5 CAPSULE ORAL
Qty: 0 | Refills: 0 | DISCHARGE

## 2020-06-21 RX ADMIN — SODIUM CHLORIDE 50 MILLILITER(S): 9 INJECTION INTRAMUSCULAR; INTRAVENOUS; SUBCUTANEOUS at 06:00

## 2020-06-21 RX ADMIN — SODIUM CHLORIDE 1000 MILLILITER(S): 9 INJECTION INTRAMUSCULAR; INTRAVENOUS; SUBCUTANEOUS at 00:09

## 2020-06-21 RX ADMIN — CHLORHEXIDINE GLUCONATE 1 APPLICATION(S): 213 SOLUTION TOPICAL at 11:45

## 2020-06-21 RX ADMIN — Medication 0.6 MILLIGRAM(S): at 09:50

## 2020-06-21 RX ADMIN — PANTOPRAZOLE SODIUM 20 MILLIGRAM(S): 20 TABLET, DELAYED RELEASE ORAL at 11:44

## 2020-06-21 RX ADMIN — Medication 0.6 MILLIGRAM(S): at 21:14

## 2020-06-21 RX ADMIN — CHLORHEXIDINE GLUCONATE 15 MILLILITER(S): 213 SOLUTION TOPICAL at 19:04

## 2020-06-21 RX ADMIN — ENOXAPARIN SODIUM 30 MILLIGRAM(S): 100 INJECTION SUBCUTANEOUS at 11:44

## 2020-06-21 RX ADMIN — Medication 1 MILLIGRAM(S): at 21:45

## 2020-06-21 RX ADMIN — LEVETIRACETAM 700 MILLIGRAM(S): 250 TABLET, FILM COATED ORAL at 21:14

## 2020-06-21 RX ADMIN — COLISTIMETHATE SODIUM 150 MILLIGRAM(S) CBA: 150 INJECTION INTRAMUSCULAR; INTRAVENOUS at 21:35

## 2020-06-21 RX ADMIN — LEVALBUTEROL 1.25 MILLIGRAM(S): 1.25 SOLUTION, CONCENTRATE RESPIRATORY (INHALATION) at 10:30

## 2020-06-21 RX ADMIN — PIPERACILLIN AND TAZOBACTAM 200 GRAM(S): 4; .5 INJECTION, POWDER, LYOPHILIZED, FOR SOLUTION INTRAVENOUS at 04:25

## 2020-06-21 RX ADMIN — COLISTIMETHATE SODIUM 150 MILLIGRAM(S) CBA: 150 INJECTION INTRAMUSCULAR; INTRAVENOUS at 10:35

## 2020-06-21 RX ADMIN — PIPERACILLIN AND TAZOBACTAM 25 GRAM(S): 4; .5 INJECTION, POWDER, LYOPHILIZED, FOR SOLUTION INTRAVENOUS at 11:44

## 2020-06-21 RX ADMIN — Medication 1 SPRAY(S): at 11:44

## 2020-06-21 RX ADMIN — PIPERACILLIN AND TAZOBACTAM 25 GRAM(S): 4; .5 INJECTION, POWDER, LYOPHILIZED, FOR SOLUTION INTRAVENOUS at 20:43

## 2020-06-21 RX ADMIN — LEVETIRACETAM 600 MILLIGRAM(S): 250 TABLET, FILM COATED ORAL at 09:49

## 2020-06-21 RX ADMIN — Medication 0.5 MILLIGRAM(S): at 10:32

## 2020-06-21 RX ADMIN — CHLORHEXIDINE GLUCONATE 15 MILLILITER(S): 213 SOLUTION TOPICAL at 06:21

## 2020-06-21 RX ADMIN — LEVALBUTEROL 1.25 MILLIGRAM(S): 1.25 SOLUTION, CONCENTRATE RESPIRATORY (INHALATION) at 21:27

## 2020-06-21 NOTE — H&P ADULT - NSHPPHYSICALEXAM_GEN_ALL_CORE
ICU Vital Signs Last 24 Hrs  T(C): 32.9 (21 Jun 2020 02:27), Max: 32.9 (21 Jun 2020 02:27)  T(F): 91.2 (21 Jun 2020 02:27), Max: 91.2 (21 Jun 2020 02:27)  HR: 59 (21 Jun 2020 02:27) (59 - 68)  BP: 90/55 (21 Jun 2020 02:27) (76/46 - 90/55)  BP(mean): --  ABP: --  ABP(mean): --  RR: 14 (21 Jun 2020 02:27) (14 - 20)  SpO2: 95% (21 Jun 2020 02:27) (95% - 98%)    GEN: awake, nonverbal, comfortable, does not track or follow commands  EYES: pupils equally round and reactive to light, extra-occular movements intact  HEENT: trach site patent, no surrounding erythema  CV: regular rate and rhythm, no murmurs   RESP: minimal coarse breath sounds at bases   ABD: peg well positioned, c/d/i, soft, non-tender   SKIN: dry, intact  NEURO: awake, nonverbal  EXTREMITIES: no lower extremity edema

## 2020-06-21 NOTE — PROGRESS NOTE ADULT - SUBJECTIVE AND OBJECTIVE BOX
COVERING RESIDENT: BERNADETTE FERGUSON (PGY-1) | [id number]    INTERVAL HPI/OVERNIGHT EVENTS: No acute events overnight.    SUBJECTIVE: Patient seen and examined at bedside.     Unable to assess ROS as pt is non-verbal.    OBJECTIVE:    VITAL SIGNS:  ICU Vital Signs Last 24 Hrs  T(C): 34.4 (21 Jun 2020 05:13), Max: 34.8 (21 Jun 2020 04:30)  T(F): 94 (21 Jun 2020 05:13), Max: 94.6 (21 Jun 2020 04:30)  HR: 59 (21 Jun 2020 07:00) (59 - 76)  BP: 91/53 (21 Jun 2020 07:00) (70/36 - 97/64)  BP(mean): 61 (21 Jun 2020 07:00) (43 - 72)  ABP: --  ABP(mean): --  RR: 17 (21 Jun 2020 07:00) (10 - 24)  SpO2: 100% (21 Jun 2020 07:00) (93% - 100%)    Mode: AC/ CMV (Assist Control/ Continuous Mandatory Ventilation), RR (machine): 17, FiO2: 50, PEEP: 8, ITime: 0.8, MAP: 10, PC: 7, PIP: 15    06-20 @ 07:01  -  06-21 @ 07:00  --------------------------------------------------------  IN: 100 mL / OUT: 0 mL / NET: 100 mL      CAPILLARY BLOOD GLUCOSE          PHYSICAL EXAM:    GEN: awake, nonverbal, comfortable, does not track or follow commands  EYES: pupils equally round and reactive to light, extra-occular movements intact  HEENT: trach site patent, no surrounding erythema  CV: regular rate and rhythm, no murmurs   RESP: minimal coarse breath sounds at bases   ABD: peg well positioned, c/d/i, soft, non-tender   SKIN: dry, intact  NEURO: awake, nonverbal  EXTREMITIES: no lower extremity edema    MEDICATIONS:  MEDICATIONS  (STANDING):  buDESOnide    Inhalation Suspension 1 milliGRAM(s) Inhalation two times a day  chlorhexidine 0.12% Liquid 15 milliLiter(s) Oral Mucosa every 12 hours  chlorhexidine 2% Cloths 1 Application(s) Topical <User Schedule>  colchicine 0.6 milliGRAM(s) Oral two times a day  colistimethate for Nebulization 150 milliGRAM(s) CBA Nebulizer two times a day  enoxaparin Injectable 30 milliGRAM(s) SubCutaneous daily  fluticasone propionate 50 MICROgram(s)/spray Nasal Spray 1 Spray(s) Both Nostrils daily  levalbuterol Inhalation 1.25 milliGRAM(s) Inhalation two times a day  levETIRAcetam  Solution 600 milliGRAM(s) Oral <User Schedule>  levETIRAcetam  Solution 700 milliGRAM(s) Oral at bedtime  pantoprazole   Suspension 20 milliGRAM(s) Oral daily  piperacillin/tazobactam IVPB.. 3.375 Gram(s) IV Intermittent every 8 hours  sodium chloride 0.9%. 1000 milliLiter(s) (50 mL/Hr) IV Continuous <Continuous>    MEDICATIONS  (PRN):  acetaminophen    Suspension .. 400 milliGRAM(s) Oral every 6 hours PRN Temp greater or equal to 38C (100.4F)      ALLERGIES:  Allergies    Bactrim (Unknown)  carbamazepine (Unknown)  cephalosporins (Rash)  Corn (Unknown)  Depakote (Unknown)  diphenhydrAMINE (Seizure)  DISPOSABLE BLOOD PRESSURE CUFF - Red blistered skin where disposable blood pressure cuff was on right arm. (CONTACT DERMATITIS) (Rash; Blisters; Swelling;)  eggs (Unknown)  EGGS,  NUTS (Anaphylaxis)  Erythromycin Base (Unknown)  metoclopramide (Unknown)  Milk (Unknown)  MILK, SHELLFISH, WHEAT (Unknown)  Nuts (Unknown)  oxcarbazepine (Unknown)  peanuts (Unknown)  Potatoes (Unknown)  SEASONAL, POLLEN, GRASS, PET DANDER, FEATHERS (Unknown)  Sesame (Unknown)  shellfish (Unknown)  Trileptal (Unknown)  valproic acid (Unknown)  vancomycin (Unknown)  Wheat (Unknown)    Intolerances        LABS:                        12.7   4.34  )-----------( 123      ( 20 Jun 2020 23:38 )             38.9     06-20    140  |  107  |  11  ----------------------------<  106<H>  5.0   |  24  |  0.32<L>    Ca    8.7      20 Jun 2020 23:38    TPro  6.2  /  Alb  3.3  /  TBili  < 0.2<L>  /  DBili  x   /  AST  59<H>  /  ALT  55<H>  /  AlkPhos  157<H>  06-20          RADIOLOGY & ADDITIONAL TESTS: Reviewed.

## 2020-06-21 NOTE — H&P ADULT - ATTENDING COMMENTS
26 yo female with hx of hypothyroidism, developmental delay, sz disorder  trach, peg, presents with hypothermia temp 90, increased seizures, focal  with neck retractions, as per mother. bp 100/70 rr 18 heent no jvd,  trach , abdomen peg, labs noted as above. cxr small right effusion,  Pt s/p levaquin for pneumonia x 10 days.    -26 yo female with hypothermia, r/o sepsis,   -panculture, blood, urine, start zosyn, sputum culture  -check cortisol, tsh  -continue vent support  -aspiration precaution  -neurology evaluation for seizure meds  critically ill with seizure disorder and sepsis

## 2020-06-21 NOTE — PROGRESS NOTE ADULT - ASSESSMENT
This is a 25 year-old woman with PMHx hypothyroidism, asthma, GERD, seizures, sleep apnea, cerebral palsy, scoliosis, s/p trach/peg, and developmental delay who presents with episodes of bradycardia, also found to be hypothermic, admitted for evaluation of bradycardia and r/o sepsis.     #Neuro  - Hx seizures, cerebral palsy, baseline nonverbal but follows minimal commands    - C/w keppra 600 mg am and 700 mg qHS (increased keppra dose recently, taken off of dilantin)   - No seizures for 1 week, monitor for seizure activity   - Keppra level    #Respiratory    - Hx asthma, sleep apnea, s/p trach   - Recent PNA treatment outpatient with levaquin (sputum culture grew pseudomonas), will repeat sputum cx   - Trach collar, goes on vent at night, during the day is on trach collar   - CXR showed small R-sided pleural effusion   - C/w vent support AC/CMV PIP 15/RR 17/PEEP 5/FiO2 50, duonebs PRN, Xopenex, ipratropium, budesonide, tobramycin, fluticasone     #CV  Bradycardia   - New onset bradycardia over the last few weeks, followed with Cardiology Dr. Hannon outpatient   - HR 50s - low 60s while here  - Likely in the setting of recent infection vs. dilantin   - EKG SR QTc 412 no block or ST changes noted   - Will order TTE  - TSH WNL, f/u cortisol   - Monitor on telemetry     Hypotension   - BP 93/50 (BP low at baseline)   - Monitor BP     Coxsackie c/b pericarditis in 1/2020   - C/w colchicine   - As per Cardiology plan to d/c colchicine if no reaccumulation of pericardial effusion on TTE     #GI   - Pt with one episode of diarrhea today   - Consider GI PCR if diarrhea persists  - Mild transminitis AST/ALT 59/55, will continue to monitor   - Hold tube feeds (usually gets bolus feeds)      #ID   - Recent outpatient treatment of PNA with levaquin for sputum cx growing Pseudomonas, hx of multiple admissions for PNA and UTI   - Hypothermic to 91.1 2/2 PNA, no leukocytosis   - CXR showed small R-sided pleural effusion   - Will do infectious workup: U/A, urine cx, blood cx, sputum cx, COVID PCR   - Start empiric zosyn     #Heme  - Thrombocytopenia to 123, monitor     #Renal   - Monitor urine output, may need bladder scan     #DVT   - Lovenox This is a 25 year-old woman with PMHx hypothyroidism, asthma, GERD, seizures, sleep apnea, cerebral palsy, scoliosis, s/p trach/peg, and developmental delay who presents with episodes of bradycardia, also found to be hypothermic, admitted for evaluation of bradycardia and r/o sepsis.     #Neuro  - Hx seizures, cerebral palsy, baseline nonverbal but follows minimal commands    - C/w keppra 600 mg am and 700 mg qHS (increased keppra dose recently, taken off of dilantin)   - No seizures for 1 week, monitor for seizure activity   - Keppra level    #Respiratory    - Hx asthma, sleep apnea, s/p trach   - Recent PNA treatment outpatient with levaquin (sputum culture grew pseudomonas), will repeat sputum cx   - Trach collar, goes on vent at night, during the day is on trach collar   - CXR showed small R-sided pleural effusion   - C/w vent support AC/CMV PIP 15/RR 17/PEEP 5/FiO2 50, duonebs PRN, Xopenex, ipratropium, budesonide, tobramycin, fluticasone     #CV  Bradycardia   - New onset bradycardia over the last few weeks, followed with Cardiology Dr. Hannon outpatient   - HR 50s - low 60s while here  - Likely in the setting of recent infection vs. dilantin   - EKG SR QTc 412 no block or ST changes noted   - TSH WNL, f/u cortisol   - f/u TTE 	  - Monitor on telemetry     Hypotension   - BP 93/50 (BP low at baseline)   - Monitor BP     Coxsackie c/b pericarditis in 1/2020   - C/w colchicine   - As per Cardiology plan to d/c colchicine if no reaccumulation of pericardial effusion on TTE     #GI   - Pt with one episode of diarrhea today   - Consider GI PCR if diarrhea persists  - Mild transminitis AST/ALT 59/55, will continue to monitor   - c/w tube feeds     #ID   - Recent outpatient treatment of PNA with levaquin for sputum cx growing Pseudomonas, hx of multiple admissions for PNA and UTI   - Hypothermic to 91.1 2/2 PNA, no leukocytosis   - CXR showed small R-sided pleural effusion   - Will do infectious workup: U/A, urine cx, blood cx, sputum cx, COVID PCR   - c/w empiric zosyn     #Heme  - Thrombocytopenia to 123, monitor     #Renal   - Monitor urine output, may need bladder scan     #DVT   - Lovenox

## 2020-06-21 NOTE — H&P ADULT - NSHPREVIEWOFSYSTEMS_GEN_ALL_CORE
REVIEW OF SYSTEMS: as per mother   CONSTITUTIONAL: No fever   EYES: No discharge   NECK: No stiffness   RESPIRATORY: No cough, wheezing, or hemoptysis   CARDIOVASCULAR: No leg swelling   GASTROINTESTINAL: No vomiting; +diarrhea. No melena or hematochezia.   GENITOURINARY: No hematuria   NEUROLOGICAL: seizures   SKIN: No rashes   LYMPH NODES: No enlarged glands  HEME/LYMPH: No easy bruising, or bleeding gums  ALLERY AND IMMUNOLOGIC: No hives

## 2020-06-21 NOTE — H&P ADULT - ASSESSMENT
This is a 25 year-old woman with PMHx hypothyroidism, asthma, GERD, seizures, sleep apnea, cerebral palsy, scoliosis, s/p trach/peg, and developmental delay who presents with episodes of bradycardia, also found to be hypothermic, admitted for evaluation of bradycardia.     BIB mother for bradycardia. At baseline, patient is nonverbal but follows minimal commands. Mother reports that for the last few weeks patient has been having different types of seizures from her usual focal seizures. She has been having head jerks, often at night but sometimes during the day as well. Saw their neurologist, keppra level was noted to be low so increased keppra dose. Patient was briefly on dilantin but was not responding so was taken off it. Pt's last seizure was one week ago. Additionally, patient started having episodes of bradycardia to the 40s-50s around that time. Patient was supposed to be placed on a holter monitor but due to insurance reasons was not able to yet. Patient had one episode of diarrhea today. No fevers, skin changes, rashes. An infectious workup was done outpatient recently and found to have sputum culture growing pseudomonas; patient treated with a 10 day course of levaquin which she completed a few days ago. Patient has had multiple PICU admissions for infections, often UTI and PNA. Additionally, patient was hospitalized in 1/2020 at [facility] for Coxsackie c/b pericarditis, for which she takes colchicine.     #Neuro  - Hx seizures, cerebral palsy, baseline nonverbal but follows minimal commands    - C/w home meds (increased keppra dose recently, taken off of dilantin)   - No seizures for 1 week, monitor for seizure activity   - Keppra level    #Respiratory    - Hx asthma, sleep apnea, s/p trach   - Recent PNA treatment outpatient with levaquin (sputum culture grew pseudomonas), will repeat sputum cx   - Trach collar, goes on vent at night, during the day is on trach collar   - CXR showed small R-sided pleural effusion   - C/w vent support, duonebs PRN, Xopenex, ipratropium, budesonide    #CV  Bradycardia   - New onset bradycardia over the last few weeks   - HR 50s - low 60s while here  - Likely in the setting of recent infection   - EKG SR QTc 412 no ST changes    - Will order TTE    Hypotension   - BP 93/50 (BP low at baseline)     Coxsackie c/b pericarditis in 1/2020   - C/w colchicine     #GI   - Pt with one episode of diarrhea today   - Consider GI PCR if diarrhea persists  - Mild transminitis AST/ALT 59/55, will continue to monitor   - Hold tube feeds (usually gets bolus feeds)      #ID   - Recent outpatient treatment of PNA with levaquin for sputum cx growing Pseudomonas, hx of multiple admissions for PNA and UTI   - Hypothermic to 91.1 2/2 PNA, no leukocytosis   - CXR showed small R-sided pleural effusion   - Will do infectious workup: U/A, urine cx, blood cx, sputum cx, COVID PCR   - Start empiric zosyn     #Heme  - Thrombocytopenia to 123, monitor     #Renal   - Monitor urine output, may need bladder scan     #DVT   - Lovenox This is a 25 year-old woman with PMHx hypothyroidism, asthma, GERD, seizures, sleep apnea, cerebral palsy, scoliosis, s/p trach/peg, and developmental delay who presents with episodes of bradycardia, also found to be hypothermic, admitted for evaluation of bradycardia.     #Neuro  - Hx seizures, cerebral palsy, baseline nonverbal but follows minimal commands    - C/w home meds (increased keppra dose recently, taken off of dilantin)   - No seizures for 1 week, monitor for seizure activity   - Keppra level    #Respiratory    - Hx asthma, sleep apnea, s/p trach   - Recent PNA treatment outpatient with levaquin (sputum culture grew pseudomonas), will repeat sputum cx   - Trach collar, goes on vent at night, during the day is on trach collar   - CXR showed small R-sided pleural effusion   - C/w vent support, duonebs PRN, Xopenex, ipratropium, budesonide    #CV  Bradycardia   - New onset bradycardia over the last few weeks   - HR 50s - low 60s while here  - Likely in the setting of recent infection   - EKG SR QTc 412 no ST changes    - Will order TTE    Hypotension   - BP 93/50 (BP low at baseline)     Coxsackie c/b pericarditis in 1/2020   - C/w colchicine     #GI   - Pt with one episode of diarrhea today   - Consider GI PCR if diarrhea persists  - Mild transminitis AST/ALT 59/55, will continue to monitor   - Hold tube feeds (usually gets bolus feeds)      #ID   - Recent outpatient treatment of PNA with levaquin for sputum cx growing Pseudomonas, hx of multiple admissions for PNA and UTI   - Hypothermic to 91.1 2/2 PNA, no leukocytosis   - CXR showed small R-sided pleural effusion   - Will do infectious workup: U/A, urine cx, blood cx, sputum cx, COVID PCR   - Start empiric zosyn     #Heme  - Thrombocytopenia to 123, monitor     #Renal   - Monitor urine output, may need bladder scan     #DVT   - Lovenox This is a 25 year-old woman with PMHx hypothyroidism, asthma, GERD, seizures, sleep apnea, cerebral palsy, scoliosis, s/p trach/peg, and developmental delay who presents with episodes of bradycardia, also found to be hypothermic, admitted for evaluation of bradycardia.     #Neuro  - Hx seizures, cerebral palsy, baseline nonverbal but follows minimal commands    - C/w home meds (increased keppra dose recently, taken off of dilantin)   - No seizures for 1 week, monitor for seizure activity   - Keppra level    #Respiratory    - Hx asthma, sleep apnea, s/p trach   - Recent PNA treatment outpatient with levaquin (sputum culture grew pseudomonas), will repeat sputum cx   - Trach collar, goes on vent at night, during the day is on trach collar   - CXR showed small R-sided pleural effusion   - C/w vent support, duonebs PRN, Xopenex, ipratropium, budesonide    #CV  Bradycardia   - New onset bradycardia over the last few weeks, followed with Cardiology Dr. Hannon outpatient   - HR 50s - low 60s while here  - Likely in the setting of recent infection vs. dilantin   - EKG SR QTc 412 no ST changes    - Will order TTE    Hypotension   - BP 93/50 (BP low at baseline)     Coxsackie c/b pericarditis in 1/2020   - C/w colchicine   - As per Cardiology plan to d/c colchicine if no reaccumulation of pericardial effusion on TTE     #GI   - Pt with one episode of diarrhea today   - Consider GI PCR if diarrhea persists  - Mild transminitis AST/ALT 59/55, will continue to monitor   - Hold tube feeds (usually gets bolus feeds)      #ID   - Recent outpatient treatment of PNA with levaquin for sputum cx growing Pseudomonas, hx of multiple admissions for PNA and UTI   - Hypothermic to 91.1 2/2 PNA, no leukocytosis   - CXR showed small R-sided pleural effusion   - Will do infectious workup: U/A, urine cx, blood cx, sputum cx, COVID PCR   - Start empiric zosyn     #Heme  - Thrombocytopenia to 123, monitor     #Renal   - Monitor urine output, may need bladder scan     #DVT   - Lovenox This is a 25 year-old woman with PMHx hypothyroidism, asthma, GERD, seizures, sleep apnea, cerebral palsy, scoliosis, s/p trach/peg, and developmental delay who presents with episodes of bradycardia, also found to be hypothermic, admitted for evaluation of bradycardia.     #Neuro  - Hx seizures, cerebral palsy, baseline nonverbal but follows minimal commands    - C/w home meds (increased keppra dose recently, taken off of dilantin)   - No seizures for 1 week, monitor for seizure activity   - Keppra level    #Respiratory    - Hx asthma, sleep apnea, s/p trach   - Recent PNA treatment outpatient with levaquin (sputum culture grew pseudomonas), will repeat sputum cx   - Trach collar, goes on vent at night, during the day is on trach collar   - CXR showed small R-sided pleural effusion   - C/w vent support, duonebs PRN, Xopenex, ipratropium, budesonide    #CV  Bradycardia   - New onset bradycardia over the last few weeks, followed with Cardiology Dr. Hannon outpatient   - HR 50s - low 60s while here  - Likely in the setting of recent infection vs. dilantin   - EKG SR QTc 412 no block or ST changes noted   - Will order TTE  - Monitor on telemetry     Hypotension   - BP 93/50 (BP low at baseline)     Coxsackie c/b pericarditis in 1/2020   - C/w colchicine   - As per Cardiology plan to d/c colchicine if no reaccumulation of pericardial effusion on TTE     #GI   - Pt with one episode of diarrhea today   - Consider GI PCR if diarrhea persists  - Mild transminitis AST/ALT 59/55, will continue to monitor   - Hold tube feeds (usually gets bolus feeds)      #ID   - Recent outpatient treatment of PNA with levaquin for sputum cx growing Pseudomonas, hx of multiple admissions for PNA and UTI   - Hypothermic to 91.1 2/2 PNA, no leukocytosis   - CXR showed small R-sided pleural effusion   - Will do infectious workup: U/A, urine cx, blood cx, sputum cx, COVID PCR   - Start empiric zosyn     #Heme  - Thrombocytopenia to 123, monitor     #Renal   - Monitor urine output, may need bladder scan     #DVT   - Lovenox This is a 25 year-old woman with PMHx hypothyroidism, asthma, GERD, seizures, sleep apnea, cerebral palsy, scoliosis, s/p trach/peg, and developmental delay who presents with episodes of bradycardia, also found to be hypothermic, admitted for evaluation of bradycardia.     #Neuro  - Hx seizures, cerebral palsy, baseline nonverbal but follows minimal commands    - C/w keppra 600 mg am and 700 mg qHS (increased keppra dose recently, taken off of dilantin)   - No seizures for 1 week, monitor for seizure activity   - Keppra level    #Respiratory    - Hx asthma, sleep apnea, s/p trach   - Recent PNA treatment outpatient with levaquin (sputum culture grew pseudomonas), will repeat sputum cx   - Trach collar, goes on vent at night, during the day is on trach collar   - CXR showed small R-sided pleural effusion   - C/w vent support, duonebs PRN, Xopenex, ipratropium, budesonide, tobramycin, fluticasone     #CV  Bradycardia   - New onset bradycardia over the last few weeks, followed with Cardiology Dr. Hannon outpatient   - HR 50s - low 60s while here  - Likely in the setting of recent infection vs. dilantin   - EKG SR QTc 412 no block or ST changes noted   - Will order TTE  - Monitor on telemetry     Hypotension   - BP 93/50 (BP low at baseline)   - Monitor BP     Coxsackie c/b pericarditis in 1/2020   - C/w colchicine   - As per Cardiology plan to d/c colchicine if no reaccumulation of pericardial effusion on TTE     #GI   - Pt with one episode of diarrhea today   - Consider GI PCR if diarrhea persists  - Mild transminitis AST/ALT 59/55, will continue to monitor   - Hold tube feeds (usually gets bolus feeds)      #ID   - Recent outpatient treatment of PNA with levaquin for sputum cx growing Pseudomonas, hx of multiple admissions for PNA and UTI   - Hypothermic to 91.1 2/2 PNA, no leukocytosis   - CXR showed small R-sided pleural effusion   - Will do infectious workup: U/A, urine cx, blood cx, sputum cx, COVID PCR   - Start empiric zosyn     #Heme  - Thrombocytopenia to 123, monitor     #Renal   - Monitor urine output, may need bladder scan     #DVT   - Lovenox This is a 25 year-old woman with PMHx hypothyroidism, asthma, GERD, seizures, sleep apnea, cerebral palsy, scoliosis, s/p trach/peg, and developmental delay who presents with episodes of bradycardia, also found to be hypothermic, admitted for evaluation of bradycardia and r/o sepsis.     #Neuro  - Hx seizures, cerebral palsy, baseline nonverbal but follows minimal commands    - C/w keppra 600 mg am and 700 mg qHS (increased keppra dose recently, taken off of dilantin)   - No seizures for 1 week, monitor for seizure activity   - Keppra level    #Respiratory    - Hx asthma, sleep apnea, s/p trach   - Recent PNA treatment outpatient with levaquin (sputum culture grew pseudomonas), will repeat sputum cx   - Trach collar, goes on vent at night, during the day is on trach collar   - CXR showed small R-sided pleural effusion   - C/w vent support, duonebs PRN, Xopenex, ipratropium, budesonide, tobramycin, fluticasone     #CV  Bradycardia   - New onset bradycardia over the last few weeks, followed with Cardiology Dr. Hannon outpatient   - HR 50s - low 60s while here  - Likely in the setting of recent infection vs. dilantin   - EKG SR QTc 412 no block or ST changes noted   - Will order TTE  - Monitor on telemetry     Hypotension   - BP 93/50 (BP low at baseline)   - Monitor BP     Coxsackie c/b pericarditis in 1/2020   - C/w colchicine   - As per Cardiology plan to d/c colchicine if no reaccumulation of pericardial effusion on TTE     #GI   - Pt with one episode of diarrhea today   - Consider GI PCR if diarrhea persists  - Mild transminitis AST/ALT 59/55, will continue to monitor   - Hold tube feeds (usually gets bolus feeds)      #ID   - Recent outpatient treatment of PNA with levaquin for sputum cx growing Pseudomonas, hx of multiple admissions for PNA and UTI   - Hypothermic to 91.1 2/2 PNA, no leukocytosis   - CXR showed small R-sided pleural effusion   - Will do infectious workup: U/A, urine cx, blood cx, sputum cx, COVID PCR   - Start empiric zosyn     #Heme  - Thrombocytopenia to 123, monitor     #Renal   - Monitor urine output, may need bladder scan     #DVT   - Lovenox This is a 25 year-old woman with PMHx hypothyroidism, asthma, GERD, seizures, sleep apnea, cerebral palsy, scoliosis, s/p trach/peg, and developmental delay who presents with episodes of bradycardia, also found to be hypothermic, admitted for evaluation of bradycardia and r/o sepsis.     #Neuro  - Hx seizures, cerebral palsy, baseline nonverbal but follows minimal commands    - C/w keppra 600 mg am and 700 mg qHS (increased keppra dose recently, taken off of dilantin)   - No seizures for 1 week, monitor for seizure activity   - Keppra level    #Respiratory    - Hx asthma, sleep apnea, s/p trach   - Recent PNA treatment outpatient with levaquin (sputum culture grew pseudomonas), will repeat sputum cx   - Trach collar, goes on vent at night, during the day is on trach collar   - CXR showed small R-sided pleural effusion   - C/w vent support AC/CMV PIP 15/RR 17/PEEP 5/FiO2 50, duonebs PRN, Xopenex, ipratropium, budesonide, tobramycin, fluticasone     #CV  Bradycardia   - New onset bradycardia over the last few weeks, followed with Cardiology Dr. Hannon outpatient   - HR 50s - low 60s while here  - Likely in the setting of recent infection vs. dilantin   - EKG SR QTc 412 no block or ST changes noted   - Will order TTE  - TSH WNL, f/u cortisol   - Monitor on telemetry     Hypotension   - BP 93/50 (BP low at baseline)   - Monitor BP     Coxsackie c/b pericarditis in 1/2020   - C/w colchicine   - As per Cardiology plan to d/c colchicine if no reaccumulation of pericardial effusion on TTE     #GI   - Pt with one episode of diarrhea today   - Consider GI PCR if diarrhea persists  - Mild transminitis AST/ALT 59/55, will continue to monitor   - Hold tube feeds (usually gets bolus feeds)      #ID   - Recent outpatient treatment of PNA with levaquin for sputum cx growing Pseudomonas, hx of multiple admissions for PNA and UTI   - Hypothermic to 91.1 2/2 PNA, no leukocytosis   - CXR showed small R-sided pleural effusion   - Will do infectious workup: U/A, urine cx, blood cx, sputum cx, COVID PCR   - Start empiric zosyn     #Heme  - Thrombocytopenia to 123, monitor     #Renal   - Monitor urine output, may need bladder scan     #DVT   - Lovenox

## 2020-06-22 LAB
ALBUMIN SERPL ELPH-MCNC: 3 G/DL — LOW (ref 3.3–5)
ALP SERPL-CCNC: 145 U/L — HIGH (ref 40–120)
ALT FLD-CCNC: 41 U/L — HIGH (ref 4–33)
ANION GAP SERPL CALC-SCNC: 10 MMO/L — SIGNIFICANT CHANGE UP (ref 7–14)
AST SERPL-CCNC: 28 U/L — SIGNIFICANT CHANGE UP (ref 4–32)
BILIRUB SERPL-MCNC: < 0.2 MG/DL — LOW (ref 0.2–1.2)
BUN SERPL-MCNC: 13 MG/DL — SIGNIFICANT CHANGE UP (ref 7–23)
CALCIUM SERPL-MCNC: 8.6 MG/DL — SIGNIFICANT CHANGE UP (ref 8.4–10.5)
CHLORIDE SERPL-SCNC: 109 MMOL/L — HIGH (ref 98–107)
CO2 SERPL-SCNC: 23 MMOL/L — SIGNIFICANT CHANGE UP (ref 22–31)
CREAT SERPL-MCNC: 0.49 MG/DL — LOW (ref 0.5–1.3)
GLUCOSE SERPL-MCNC: 94 MG/DL — SIGNIFICANT CHANGE UP (ref 70–99)
GRAM STN FLD: SIGNIFICANT CHANGE UP
HCT VFR BLD CALC: 34.3 % — LOW (ref 34.5–45)
HGB BLD-MCNC: 11 G/DL — LOW (ref 11.5–15.5)
MAGNESIUM SERPL-MCNC: 1.8 MG/DL — SIGNIFICANT CHANGE UP (ref 1.6–2.6)
MCHC RBC-ENTMCNC: 32.1 % — SIGNIFICANT CHANGE UP (ref 32–36)
MCHC RBC-ENTMCNC: 32.4 PG — SIGNIFICANT CHANGE UP (ref 27–34)
MCV RBC AUTO: 100.9 FL — HIGH (ref 80–100)
NRBC # FLD: 0 K/UL — SIGNIFICANT CHANGE UP (ref 0–0)
PHOSPHATE SERPL-MCNC: 3 MG/DL — SIGNIFICANT CHANGE UP (ref 2.5–4.5)
PLATELET # BLD AUTO: 128 K/UL — LOW (ref 150–400)
PMV BLD: 10.7 FL — SIGNIFICANT CHANGE UP (ref 7–13)
POTASSIUM SERPL-MCNC: 3.8 MMOL/L — SIGNIFICANT CHANGE UP (ref 3.5–5.3)
POTASSIUM SERPL-SCNC: 3.8 MMOL/L — SIGNIFICANT CHANGE UP (ref 3.5–5.3)
PROT SERPL-MCNC: 5.6 G/DL — LOW (ref 6–8.3)
RBC # BLD: 3.4 M/UL — LOW (ref 3.8–5.2)
RBC # FLD: 11.8 % — SIGNIFICANT CHANGE UP (ref 10.3–14.5)
SODIUM SERPL-SCNC: 142 MMOL/L — SIGNIFICANT CHANGE UP (ref 135–145)
SPECIMEN SOURCE: SIGNIFICANT CHANGE UP
WBC # BLD: 4.55 K/UL — SIGNIFICANT CHANGE UP (ref 3.8–10.5)
WBC # FLD AUTO: 4.55 K/UL — SIGNIFICANT CHANGE UP (ref 3.8–10.5)

## 2020-06-22 PROCEDURE — 99233 SBSQ HOSP IP/OBS HIGH 50: CPT | Mod: GC

## 2020-06-22 PROCEDURE — 95819 EEG AWAKE AND ASLEEP: CPT | Mod: 26

## 2020-06-22 PROCEDURE — 93010 ELECTROCARDIOGRAM REPORT: CPT

## 2020-06-22 RX ORDER — SODIUM CHLORIDE 9 MG/ML
3 INJECTION INTRAMUSCULAR; INTRAVENOUS; SUBCUTANEOUS EVERY 6 HOURS
Refills: 0 | Status: DISCONTINUED | OUTPATIENT
Start: 2020-06-22 | End: 2020-06-25

## 2020-06-22 RX ADMIN — LEVETIRACETAM 700 MILLIGRAM(S): 250 TABLET, FILM COATED ORAL at 21:10

## 2020-06-22 RX ADMIN — PIPERACILLIN AND TAZOBACTAM 25 GRAM(S): 4; .5 INJECTION, POWDER, LYOPHILIZED, FOR SOLUTION INTRAVENOUS at 11:28

## 2020-06-22 RX ADMIN — Medication 0.5 MILLIGRAM(S): at 22:05

## 2020-06-22 RX ADMIN — CHLORHEXIDINE GLUCONATE 1 APPLICATION(S): 213 SOLUTION TOPICAL at 09:50

## 2020-06-22 RX ADMIN — CHLORHEXIDINE GLUCONATE 15 MILLILITER(S): 213 SOLUTION TOPICAL at 10:52

## 2020-06-22 RX ADMIN — PIPERACILLIN AND TAZOBACTAM 25 GRAM(S): 4; .5 INJECTION, POWDER, LYOPHILIZED, FOR SOLUTION INTRAVENOUS at 19:42

## 2020-06-22 RX ADMIN — COLISTIMETHATE SODIUM 150 MILLIGRAM(S) CBA: 150 INJECTION INTRAMUSCULAR; INTRAVENOUS at 14:05

## 2020-06-22 RX ADMIN — PIPERACILLIN AND TAZOBACTAM 25 GRAM(S): 4; .5 INJECTION, POWDER, LYOPHILIZED, FOR SOLUTION INTRAVENOUS at 05:05

## 2020-06-22 RX ADMIN — SODIUM CHLORIDE 3 MILLILITER(S): 9 INJECTION INTRAMUSCULAR; INTRAVENOUS; SUBCUTANEOUS at 22:06

## 2020-06-22 RX ADMIN — Medication 0.6 MILLIGRAM(S): at 09:36

## 2020-06-22 RX ADMIN — LEVALBUTEROL 1.25 MILLIGRAM(S): 1.25 SOLUTION, CONCENTRATE RESPIRATORY (INHALATION) at 09:16

## 2020-06-22 RX ADMIN — LEVALBUTEROL 1.25 MILLIGRAM(S): 1.25 SOLUTION, CONCENTRATE RESPIRATORY (INHALATION) at 22:08

## 2020-06-22 RX ADMIN — LEVETIRACETAM 600 MILLIGRAM(S): 250 TABLET, FILM COATED ORAL at 09:36

## 2020-06-22 RX ADMIN — PANTOPRAZOLE SODIUM 20 MILLIGRAM(S): 20 TABLET, DELAYED RELEASE ORAL at 11:28

## 2020-06-22 RX ADMIN — COLISTIMETHATE SODIUM 150 MILLIGRAM(S) CBA: 150 INJECTION INTRAMUSCULAR; INTRAVENOUS at 22:08

## 2020-06-22 RX ADMIN — Medication 0.6 MILLIGRAM(S): at 21:10

## 2020-06-22 RX ADMIN — Medication 1 SPRAY(S): at 11:27

## 2020-06-22 RX ADMIN — CHLORHEXIDINE GLUCONATE 15 MILLILITER(S): 213 SOLUTION TOPICAL at 18:53

## 2020-06-22 RX ADMIN — Medication 1 MILLIGRAM(S): at 09:16

## 2020-06-22 NOTE — CHART NOTE - NSCHARTNOTEFT_GEN_A_CORE
RCU PA NOTE     Informed by RN of patient with desaturation and hypothermia to 94. Patient seen immediately by bedside and noted with desaturation to 85 with bradycardia to 50. RT and Fellow by bedside. Vent checked. Patient suctioned with little to no output. Vent changed from SIMV to AC with improvement in saturation. POCUS with normal heart function and no RV strain, BL lung sliding and no signs of DVT in BL LE. Currently tolerating SIMV 17/15/8/40 and pulling volumes of 250-300. Eula almanza continued. Will continue to monitor patient.     KAILASH WebberC  Department of Medicine/ RCU   In House Pager #[phone]

## 2020-06-22 NOTE — PROGRESS NOTE ADULT - SUBJECTIVE AND OBJECTIVE BOX
CHIEF COMPLAINT: Patient is a 25y old  Female who presents with a chief complaint of hypothermia (22 Jun 2020 10:18)    SUBJECTIVE:   No interval events overnight. Accepted and transferred to RCU today.     [x ] Unable to assess ROS because as nonverbal at baseline     OBJECTIVE:  ICU Vital Signs Last 24 Hrs  T(C): 35.2 (22 Jun 2020 10:00), Max: 36.4 (21 Jun 2020 16:00)  T(F): 95.4 (22 Jun 2020 10:00), Max: 97.6 (21 Jun 2020 16:00)  HR: 81 (22 Jun 2020 11:00) (58 - 120)  BP: 96/46 (22 Jun 2020 11:00) (72/32 - 114/69)  BP(mean): 57 (22 Jun 2020 11:00) (41 - 79)  ABP: --  ABP(mean): --  RR: 26 (22 Jun 2020 11:00) (8 - 38)  SpO2: 99% (22 Jun 2020 11:00) (92% - 100%)    Mode: standby    06-21 @ 07:01 - 06-22 @ 07:00  --------------------------------------------------------  IN: 2475 mL / OUT: 0 mL / NET: 2475 mL    06-22 @ 07:01  - 06-22 @ 12:42  --------------------------------------------------------  IN: 435 mL / OUT: 0 mL / NET: 435 mL    CAPILLARY BLOOD GLUCOSE    PHYSICAL EXAM:  General:   HEENT:   Lymph Nodes:  Neck:   Respiratory:   Cardiovascular:   Abdomen:   Extremities:   Skin:   Neurological:  Psychiatry:    HOSPITAL MEDICATIONS:  MEDICATIONS  (STANDING):  buDESOnide    Inhalation Suspension 1 milliGRAM(s) Inhalation two times a day  chlorhexidine 0.12% Liquid 15 milliLiter(s) Oral Mucosa every 12 hours  colchicine 0.6 milliGRAM(s) Oral two times a day  colistimethate for Nebulization 150 milliGRAM(s) CBA Nebulizer two times a day  fluticasone propionate 50 MICROgram(s)/spray Nasal Spray 1 Spray(s) Both Nostrils daily  levalbuterol Inhalation 1.25 milliGRAM(s) Inhalation two times a day  levETIRAcetam  Solution 600 milliGRAM(s) Oral <User Schedule>  levETIRAcetam  Solution 700 milliGRAM(s) Oral at bedtime  pantoprazole   Suspension 20 milliGRAM(s) Oral daily  piperacillin/tazobactam IVPB.. 3.375 Gram(s) IV Intermittent every 8 hours    MEDICATIONS  (PRN):  acetaminophen    Suspension .. 400 milliGRAM(s) Oral every 6 hours PRN Temp greater or equal to 38C (100.4F)    LABS:                        11.0   4.55  )-----------( 128      ( 22 Jun 2020 04:30 )             34.3     06-22    142  |  109<H>  |  13  ----------------------------<  94  3.8   |  23  |  0.49<L>    Ca    8.6      22 Jun 2020 04:30  Phos  3.0     06-22  Mg     1.8     06-22    TPro  5.6<L>  /  Alb  3.0<L>  /  TBili  < 0.2<L>  /  DBili  x   /  AST  28  /  ALT  41<H>  /  AlkPhos  145<H>  06-22    Venous Blood Gas:  06-21 @ 02:20  7.28/56/74/23/93.6  VBG Lactate: 0.4    MICROBIOLOGY:     RADIOLOGY:  [ ] Reviewed and interpreted by me    PULMONARY FUNCTION TESTS:    EKG: CHIEF COMPLAINT: Patient is a 25y old  Female who presents with a chief complaint of hypothermia (22 Jun 2020 10:18)    SUBJECTIVE:   No interval events overnight. Accepted and transferred to RCU today.     [x ] Unable to assess ROS because as nonverbal at baseline     OBJECTIVE:  ICU Vital Signs Last 24 Hrs  T(C): 35.2 (22 Jun 2020 10:00), Max: 36.4 (21 Jun 2020 16:00)  T(F): 95.4 (22 Jun 2020 10:00), Max: 97.6 (21 Jun 2020 16:00)  HR: 81 (22 Jun 2020 11:00) (58 - 120)  BP: 96/46 (22 Jun 2020 11:00) (72/32 - 114/69)  BP(mean): 57 (22 Jun 2020 11:00) (41 - 79)  ABP: --  ABP(mean): --  RR: 26 (22 Jun 2020 11:00) (8 - 38)  SpO2: 99% (22 Jun 2020 11:00) (92% - 100%)    Mode: standby    06-21 @ 07:01  -  06-22 @ 07:00  --------------------------------------------------------  IN: 2475 mL / OUT: 0 mL / NET: 2475 mL    06-22 @ 07:01  -  06-22 @ 12:42  --------------------------------------------------------  IN: 435 mL / OUT: 0 mL / NET: 435 mL    CAPILLARY BLOOD GLUCOSE    PHYSICAL EXAM:  General: NAD  Neck: Supple, no LAD. Trach clean dry and intact.   Cardio: RRR, S1/S2, no murmurs or rubs.   Pulm: Coarse vent sounds noted throughout, equal bilaterally.   GI: Soft, NDNT, BS (+). PEG (+)    MS: No pedal edema    Neuro: Awake. No focal neurological deficits noted.   Skin: Warm and dry. No jaundice or cyanosis     HOSPITAL MEDICATIONS:  MEDICATIONS  (STANDING):  buDESOnide    Inhalation Suspension 1 milliGRAM(s) Inhalation two times a day  chlorhexidine 0.12% Liquid 15 milliLiter(s) Oral Mucosa every 12 hours  colchicine 0.6 milliGRAM(s) Oral two times a day  colistimethate for Nebulization 150 milliGRAM(s) CBA Nebulizer two times a day  fluticasone propionate 50 MICROgram(s)/spray Nasal Spray 1 Spray(s) Both Nostrils daily  levalbuterol Inhalation 1.25 milliGRAM(s) Inhalation two times a day  levETIRAcetam  Solution 600 milliGRAM(s) Oral <User Schedule>  levETIRAcetam  Solution 700 milliGRAM(s) Oral at bedtime  pantoprazole   Suspension 20 milliGRAM(s) Oral daily  piperacillin/tazobactam IVPB.. 3.375 Gram(s) IV Intermittent every 8 hours    MEDICATIONS  (PRN):  acetaminophen    Suspension .. 400 milliGRAM(s) Oral every 6 hours PRN Temp greater or equal to 38C (100.4F)    LABS:                        11.0   4.55  )-----------( 128      ( 22 Jun 2020 04:30 )             34.3     06-22    142  |  109<H>  |  13  ----------------------------<  94  3.8   |  23  |  0.49<L>    Ca    8.6      22 Jun 2020 04:30  Phos  3.0     06-22  Mg     1.8     06-22    TPro  5.6<L>  /  Alb  3.0<L>  /  TBili  < 0.2<L>  /  DBili  x   /  AST  28  /  ALT  41<H>  /  AlkPhos  145<H>  06-22    Venous Blood Gas:  06-21 @ 02:20  7.28/56/74/23/93.6  VBG Lactate: 0.4    MICROBIOLOGY:     RADIOLOGY:  [ ] Reviewed and interpreted by me    PULMONARY FUNCTION TESTS:    EKG:

## 2020-06-22 NOTE — CHART NOTE - NSCHARTNOTEFT_GEN_A_CORE
MICU Transfer Note  ---------------------------    Transfer from: MICU  Transfer to:  (  ) Medicine    (  ) Telemetry    ( x ) RCU- [id number]   (  ) Palliative    (  ) Stroke Unit    (  ) _______________  Accepting Physician:    This is a 25 year-old woman with PMHx hypothyroidism, asthma, GERD, seizures, cerebral palsy, scoliosis, s/p trach/peg, and developmental delay BIB mother for bradycardia. At baseline, patient is nonverbal but follows minimal commands. Mother reports that for the last few weeks patient has been having different types of seizures from her usual focal seizures. She has been having head jerks, often at night but sometimes during the day as well. Saw their neurologist, keppra level was noted to be low so increased keppra dose. Patient was briefly on dilantin but was not responding so was taken off it. Pt's last seizure was one week ago. Additionally, patient started having episodes of bradycardia to the 40s-50s around that time. Patient was supposed to be placed on a holter monitor but due to insurance reasons was not able to yet. Patient had one episode of diarrhea today. No fevers, skin changes, rashes. An infectious workup was done outpatient recently and found to have sputum culture growing pseudomonas; patient treated with a 10 day course of levaquin which she completed a few days ago. Patient has had multiple PICU admissions for infections, often UTI and PNA. Additionally, patient was hospitalized in 1/2020 at [place] for Coxsackie c/b pericarditis, for which she takes colchicine.     In the ED, pt's vitals were T 91.1 F HR 50s-60s BP 85/44 (pt's baseline) RR 15 POX 98% on vent. Pt treated with NS 1L IVB and levaquin 750 mg IV.     MICU COURSE    Pt admitted for sepsis rule out in setting of recurrence of seizures. Pt continued on keppra 600 mg am and 700 mg qH and on home vent support settings AC/CMV PIP 15/RR 17/PEEP 5/FiO2 50. Pt had recent outpatient treatment of PNA with levaquin for sputum cx growing Pseudomonas, now on zosyn.         To-Do:    [ ] F/u neuro consult re: keppra dosage and need for increase  [ ] F/u TTE read regarding coxsackie myocarditis and need for continued colchicine use   [ ] F/u infectious workup U/A, urine cx, blood cx, sputum cx,

## 2020-06-22 NOTE — PROGRESS NOTE ADULT - ASSESSMENT
26 YO F with PMHx of Hypothyroidism, Asthma, GERD, Seizures, Cerebral Palsy, Scoliosis, Developmental Delay, Respiratory Failure s/p Tracheostomy, Dysphagia s/p PEG, multiple past PICU admissions for PNA and UTIs and recent hospitalization in 1/2020 at [place] for Coxsackie c/b Pericarditis, now on Colchicine. Patient BIB Mother for Bradycardia and Seizures. Saw by Neurologist outpatient, Keppra levels low, dose increased and no seizures x 1 week. Around the same time, patient was noted with Bradycardia and was suppose to get a Holter monitor, however unable to second to insurance issues. Additional infectious work up found with Pseudomonas and now s/p LVQ x 10 days. In ER, admitted for r/o Sepsis to MICU. In MICU, Keppra (600AM/700HS), Zosyn and home vent continued. Remained stable. Transferred to RCU 6/22    # Sepsis and Breakthrough Seizures likely second to R Pseudomonas PNA   - CXR on admission with right sided mild pleural effusion  - Hypothermic in ICU to 91.2   - Meets Sepsis Criteria given temperature, seizures and source of infection.   - No leukocytosis.   - Blood culture negative.   - COVID 19 PCR negative.   - Sputum culture currently pending.   - Continue on Zosyn and Colistin (6/21 - ) for now.   - Continue on Keppra 600mg PEG AM and 700mg PEG HS. Keppra levels pending.   - Continue on Pulmicort, Levalbuterol and Chest PT   - Continue on home vent AC/ CMV RR 17 PIP 15 PEEP 5 FiO2 50 QHS/ PRN and TC QD as tolerated.   - Trach care QD   - Suction PRN   - Neurology following     # Bradycardiac   - ECG on admission with NSR,  and no signs of block or ST changes.   - Bradycardia noted at home. ICU HR 50-60s.   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   - Followed by Cardiology, Dr. Hannon outpatient.   - ECHO 2/2020 with EF 60-65 and normal LVSF.   - TSH WNL   - Cortisol pending.   - RPT ECHO pending. IF no signs of pericardial effusion, can likely stop Colchicine.   - Cardiology following      # Diarrhea and Transaminitis   - Patient with one episode of diarrhea.   - Consider GI PCR if diarrhea persists  - Mild Transminitis noted likely second to Sepsis. Monitor LFTs and BMs.   - Continue on Tube Feeds.       # Thrombocytopenia likely second to Sepsis.   - Thrombocytopenia to 123 likely reactive given Sepsis. Monitor CBC.     # DVT - Lovenox PPX dose.   # DISPO - Accepted to [facility]     Cherrie Herrera PA-C  Department of Medicine/ [facility]   In House Pager #[phone]

## 2020-06-22 NOTE — PROGRESS NOTE ADULT - ASSESSMENT
This is a 25 year-old woman with PMHx hypothyroidism, asthma, GERD, seizures, sleep apnea, cerebral palsy, scoliosis, s/p trach/peg, and developmental delay who presents with episodes of bradycardia, also found to be hypothermic, admitted for evaluation of bradycardia and r/o sepsis.     #Neuro  - Hx seizures, cerebral palsy, baseline nonverbal but follows minimal commands    - C/w keppra 600 mg am and 700 mg qHS (increased keppra dose recently, taken off of dilantin)   - No seizures for 1 week, monitor for seizure activity   - Keppra level    #Respiratory    - Hx asthma, sleep apnea, s/p trach   - Recent PNA treatment outpatient with levaquin (sputum culture grew pseudomonas), will repeat sputum cx   - Trach collar, goes on vent at night, during the day is on trach collar   - CXR showed small R-sided pleural effusion   - C/w vent support AC/CMV PIP 15/RR 17/PEEP 5/FiO2 50, duonebs PRN, Xopenex, ipratropium, budesonide, tobramycin, fluticasone     #CV  Bradycardia   - New onset bradycardia over the last few weeks, followed with Cardiology Dr. Hannon outpatient   - HR 50s - low 60s while here  - Likely in the setting of recent infection vs. dilantin   - EKG SR QTc 412 no block or ST changes noted   - TSH WNL, f/u cortisol   - f/u TTE 	  - Monitor on telemetry     Hypotension   - BP 93/50 (BP low at baseline)   - Monitor BP     Coxsackie c/b pericarditis in 1/2020   - C/w colchicine   - As per Cardiology plan to d/c colchicine if no reaccumulation of pericardial effusion on TTE   - TTE pending    #GI   - Pt with one episode of diarrhea today   - Consider GI PCR if diarrhea persists  - Mild transminitis AST/ALT 59/55, will continue to monitor   - c/w tube feeds     #ID   - Recent outpatient treatment of PNA with levaquin for sputum cx growing Pseudomonas, hx of multiple admissions for PNA and UTI   - Hypothermic to 91.1 2/2 PNA, no leukocytosis   - CXR showed small R-sided pleural effusion   - Will do infectious workup: U/A, urine cx, blood cx, sputum cx, COVID PCR   - c/w empiric zosyn     #Heme  - Thrombocytopenia to 123, monitor     #Renal   - Monitor urine output, may need bladder scan     #DVT   - Lovenox

## 2020-06-22 NOTE — PHYSICAL THERAPY INITIAL EVALUATION ADULT - ADDITIONAL COMMENTS
patient is nonverbal, nonambulatory, dependent for all ADLs. patient receives PT/ OT/ speech 5x/wk at home.    patient dependently supported in dangle , tolerated airway clearance techniques well, SPO2 increased from 90% O2 to 97% O2

## 2020-06-22 NOTE — PHYSICAL THERAPY INITIAL EVALUATION ADULT - PERTINENT HX OF CURRENT PROBLEM, REHAB EVAL
patient presents with bradycardia/ hypothermia. PMH includes hypothyroidism, asthma, GERD, seizures, cerebral palsy, scoliosis, s/p trach/peg, and developmental delay

## 2020-06-22 NOTE — PHYSICAL THERAPY INITIAL EVALUATION ADULT - IMPAIRMENTS FOUND, PT EVAL
joint integrity and mobility/ROM/cognitive impairment/muscle strength/tone/gross motor/aerobic capacity/endurance/fine motor

## 2020-06-22 NOTE — PROVIDER CONTACT NOTE (OTHER) - BACKGROUND
Pt already on elmer hugger; pt hypothermic at baseline; pt admitted for bradycardia and hypothermia. Pt on 50% vent AC; EKG done.

## 2020-06-22 NOTE — PHYSICAL THERAPY INITIAL EVALUATION ADULT - DISCHARGE DISPOSITION, PT EVAL
home/home with no skilled PT needs. patient receives PT/ OT / speech 5x/wk at home prior to admission

## 2020-06-22 NOTE — PROGRESS NOTE ADULT - SUBJECTIVE AND OBJECTIVE BOX
Patient is a 25y old  Female who presents with a chief complaint of hypothermia (21 Jun 2020 08:01)      INTERVAL HPI/OVERNIGHT EVENTS: madelaine      REVIEW OF SYSTEMS: unobt    FAMILY HISTORY:  No pertinent family history in first degree relatives    T(C): 35.2 (06-22-20 @ 10:00), Max: 36.6 (06-21-20 @ 12:00)  HR: 89 (06-22-20 @ 10:07) (58 - 120)  BP: 87/30 (06-22-20 @ 10:00) (72/32 - 114/69)  RR: 15 (06-22-20 @ 10:00) (8 - 38)  SpO2: 97% (06-22-20 @ 10:07) (92% - 100%)  Wt(kg): --Vital Signs Last 24 Hrs  T(C): 35.2 (22 Jun 2020 10:00), Max: 36.6 (21 Jun 2020 12:00)  T(F): 95.4 (22 Jun 2020 10:00), Max: 97.8 (21 Jun 2020 12:00)  HR: 89 (22 Jun 2020 10:07) (58 - 120)  BP: 87/30 (22 Jun 2020 10:00) (72/32 - 114/69)  BP(mean): 44 (22 Jun 2020 10:00) (41 - 97)  RR: 15 (22 Jun 2020 10:00) (8 - 38)  SpO2: 97% (22 Jun 2020 10:07) (92% - 100%)    PHYSICAL EXAM:  GEN: awake, nonverbal, comfortable, does not track or follow commands  EYES: pupils equally round and reactive to light, extra-occular movements intact  HEENT: trach site patent, no surrounding erythema  CV: regular rate and rhythm, no murmurs   RESP: minimal coarse breath sounds at bases   ABD: peg well positioned, c/d/i, soft, non-tender   SKIN: dry, intact  NEURO: awake, nonverbal  EXTREMITIES: no lower extremity edema    Consultant(s) Notes Reviewed:  [x ] YES  [ ] NO  Care Discussed with Consultants/Other Providers [ x] YES  [ ] NO    LABS:                        11.0   4.55  )-----------( 128      ( 22 Jun 2020 04:30 )             34.3     22 Jun 2020 04:30    142    |  109    |  13     ----------------------------<  94     3.8     |  23     |  0.49     Ca    8.6        22 Jun 2020 04:30  Phos  3.0       22 Jun 2020 04:30  Mg     1.8       22 Jun 2020 04:30    TPro  5.6    /  Alb  3.0    /  TBili  < 0.2  /  DBili  x      /  AST  28     /  ALT  41     /  AlkPhos  145    22 Jun 2020 04:30      CAPILLARY BLOOD GLUCOSE        BLOOD CULTURE  06-21 @ 06:52   No growth to date.  --  --  06-21 @ 06:51   No growth to date.  --  --      RADIOLOGY & ADDITIONAL TESTS:    Imaging Personally Reviewed:  [ ] YES  [ ] NO  acetaminophen    Suspension .. 400 milliGRAM(s) Oral every 6 hours PRN  buDESOnide    Inhalation Suspension 1 milliGRAM(s) Inhalation two times a day  chlorhexidine 0.12% Liquid 15 milliLiter(s) Oral Mucosa every 12 hours  chlorhexidine 2% Cloths 1 Application(s) Topical <User Schedule>  colchicine 0.6 milliGRAM(s) Oral two times a day  colistimethate for Nebulization 150 milliGRAM(s) CBA Nebulizer two times a day  enoxaparin Injectable 30 milliGRAM(s) SubCutaneous daily  fluticasone propionate 50 MICROgram(s)/spray Nasal Spray 1 Spray(s) Both Nostrils daily  levalbuterol Inhalation 1.25 milliGRAM(s) Inhalation two times a day  levETIRAcetam  Solution 600 milliGRAM(s) Oral <User Schedule>  levETIRAcetam  Solution 700 milliGRAM(s) Oral at bedtime  pantoprazole   Suspension 20 milliGRAM(s) Oral daily  piperacillin/tazobactam IVPB.. 3.375 Gram(s) IV Intermittent every 8 hours    Mode: standby  HEALTH ISSUES - PROBLEM Dx:

## 2020-06-23 ENCOUNTER — TRANSCRIPTION ENCOUNTER (OUTPATIENT)
Age: 25
End: 2020-06-23

## 2020-06-23 LAB
ANION GAP SERPL CALC-SCNC: 12 MMO/L — SIGNIFICANT CHANGE UP (ref 7–14)
APPEARANCE UR: SIGNIFICANT CHANGE UP
BACTERIA # UR AUTO: HIGH
BILIRUB UR-MCNC: NEGATIVE — SIGNIFICANT CHANGE UP
BLOOD UR QL VISUAL: SIGNIFICANT CHANGE UP
BUN SERPL-MCNC: 9 MG/DL — SIGNIFICANT CHANGE UP (ref 7–23)
CALCIUM SERPL-MCNC: 9.2 MG/DL — SIGNIFICANT CHANGE UP (ref 8.4–10.5)
CHLORIDE SERPL-SCNC: 101 MMOL/L — SIGNIFICANT CHANGE UP (ref 98–107)
CO2 SERPL-SCNC: 22 MMOL/L — SIGNIFICANT CHANGE UP (ref 22–31)
COLOR SPEC: SIGNIFICANT CHANGE UP
CORTIS SERPL-MCNC: 11 UG/DL — SIGNIFICANT CHANGE UP (ref 2.7–18.4)
CREAT SERPL-MCNC: 0.53 MG/DL — SIGNIFICANT CHANGE UP (ref 0.5–1.3)
EPI CELLS # UR: SIGNIFICANT CHANGE UP
GLUCOSE SERPL-MCNC: 110 MG/DL — HIGH (ref 70–99)
GLUCOSE UR-MCNC: NEGATIVE — SIGNIFICANT CHANGE UP
HCT VFR BLD CALC: 35.3 % — SIGNIFICANT CHANGE UP (ref 34.5–45)
HGB BLD-MCNC: 12.1 G/DL — SIGNIFICANT CHANGE UP (ref 11.5–15.5)
INR BLD: 1.09 — SIGNIFICANT CHANGE UP (ref 0.88–1.17)
KETONES UR-MCNC: NEGATIVE — SIGNIFICANT CHANGE UP
LEUKOCYTE ESTERASE UR-ACNC: SIGNIFICANT CHANGE UP
LEVETIRACETAM SERPL-MCNC: 39.6 MCG/ML — SIGNIFICANT CHANGE UP (ref 12–46)
MAGNESIUM SERPL-MCNC: 1.7 MG/DL — SIGNIFICANT CHANGE UP (ref 1.6–2.6)
MCHC RBC-ENTMCNC: 33.4 PG — SIGNIFICANT CHANGE UP (ref 27–34)
MCHC RBC-ENTMCNC: 34.3 % — SIGNIFICANT CHANGE UP (ref 32–36)
MCV RBC AUTO: 97.5 FL — SIGNIFICANT CHANGE UP (ref 80–100)
MUCOUS THREADS # UR AUTO: SIGNIFICANT CHANGE UP
NITRITE UR-MCNC: NEGATIVE — SIGNIFICANT CHANGE UP
NRBC # FLD: 0 K/UL — SIGNIFICANT CHANGE UP (ref 0–0)
PH UR: 8.5 — HIGH (ref 5–8)
PHOSPHATE SERPL-MCNC: 2.7 MG/DL — SIGNIFICANT CHANGE UP (ref 2.5–4.5)
PLATELET # BLD AUTO: 133 K/UL — LOW (ref 150–400)
PMV BLD: 11 FL — SIGNIFICANT CHANGE UP (ref 7–13)
POTASSIUM SERPL-MCNC: 3.3 MMOL/L — LOW (ref 3.5–5.3)
POTASSIUM SERPL-SCNC: 3.3 MMOL/L — LOW (ref 3.5–5.3)
PROT UR-MCNC: NEGATIVE — SIGNIFICANT CHANGE UP
PROTHROM AB SERPL-ACNC: 12.6 SEC — SIGNIFICANT CHANGE UP (ref 9.8–13.1)
RBC # BLD: 3.62 M/UL — LOW (ref 3.8–5.2)
RBC # FLD: 11.9 % — SIGNIFICANT CHANGE UP (ref 10.3–14.5)
RBC CASTS # UR COMP ASSIST: HIGH (ref 0–?)
SODIUM SERPL-SCNC: 135 MMOL/L — SIGNIFICANT CHANGE UP (ref 135–145)
SP GR SPEC: 1.01 — SIGNIFICANT CHANGE UP (ref 1–1.04)
T4 FREE SERPL-MCNC: 1.36 NG/DL — SIGNIFICANT CHANGE UP (ref 0.9–1.8)
UROBILINOGEN FLD QL: NORMAL — SIGNIFICANT CHANGE UP
WBC # BLD: 3.66 K/UL — LOW (ref 3.8–10.5)
WBC # FLD AUTO: 3.66 K/UL — LOW (ref 3.8–10.5)
WBC UR QL: HIGH (ref 0–?)

## 2020-06-23 PROCEDURE — 99233 SBSQ HOSP IP/OBS HIGH 50: CPT

## 2020-06-23 PROCEDURE — 93306 TTE W/DOPPLER COMPLETE: CPT | Mod: 26

## 2020-06-23 PROCEDURE — 93010 ELECTROCARDIOGRAM REPORT: CPT

## 2020-06-23 RX ORDER — POTASSIUM CHLORIDE 20 MEQ
40 PACKET (EA) ORAL EVERY 4 HOURS
Refills: 0 | Status: DISCONTINUED | OUTPATIENT
Start: 2020-06-23 | End: 2020-06-23

## 2020-06-23 RX ORDER — ENOXAPARIN SODIUM 100 MG/ML
30 INJECTION SUBCUTANEOUS ONCE
Refills: 0 | Status: COMPLETED | OUTPATIENT
Start: 2020-06-23 | End: 2020-06-23

## 2020-06-23 RX ORDER — POTASSIUM CHLORIDE 20 MEQ
20 PACKET (EA) ORAL ONCE
Refills: 0 | Status: COMPLETED | OUTPATIENT
Start: 2020-06-23 | End: 2020-06-23

## 2020-06-23 RX ADMIN — LEVETIRACETAM 700 MILLIGRAM(S): 250 TABLET, FILM COATED ORAL at 21:30

## 2020-06-23 RX ADMIN — Medication 0.5 MILLIGRAM(S): at 07:33

## 2020-06-23 RX ADMIN — LEVETIRACETAM 600 MILLIGRAM(S): 250 TABLET, FILM COATED ORAL at 08:25

## 2020-06-23 RX ADMIN — Medication 20 MILLIEQUIVALENT(S): at 18:49

## 2020-06-23 RX ADMIN — Medication 1 SPRAY(S): at 11:45

## 2020-06-23 RX ADMIN — ENOXAPARIN SODIUM 30 MILLIGRAM(S): 100 INJECTION SUBCUTANEOUS at 17:55

## 2020-06-23 RX ADMIN — Medication 0.6 MILLIGRAM(S): at 08:25

## 2020-06-23 RX ADMIN — SODIUM CHLORIDE 3 MILLILITER(S): 9 INJECTION INTRAMUSCULAR; INTRAVENOUS; SUBCUTANEOUS at 22:26

## 2020-06-23 RX ADMIN — COLISTIMETHATE SODIUM 150 MILLIGRAM(S) CBA: 150 INJECTION INTRAMUSCULAR; INTRAVENOUS at 07:37

## 2020-06-23 RX ADMIN — PIPERACILLIN AND TAZOBACTAM 25 GRAM(S): 4; .5 INJECTION, POWDER, LYOPHILIZED, FOR SOLUTION INTRAVENOUS at 04:36

## 2020-06-23 RX ADMIN — COLISTIMETHATE SODIUM 150 MILLIGRAM(S) CBA: 150 INJECTION INTRAMUSCULAR; INTRAVENOUS at 22:32

## 2020-06-23 RX ADMIN — PIPERACILLIN AND TAZOBACTAM 25 GRAM(S): 4; .5 INJECTION, POWDER, LYOPHILIZED, FOR SOLUTION INTRAVENOUS at 11:45

## 2020-06-23 RX ADMIN — Medication 0.5 MILLIGRAM(S): at 22:25

## 2020-06-23 RX ADMIN — SODIUM CHLORIDE 3 MILLILITER(S): 9 INJECTION INTRAMUSCULAR; INTRAVENOUS; SUBCUTANEOUS at 03:19

## 2020-06-23 RX ADMIN — CHLORHEXIDINE GLUCONATE 15 MILLILITER(S): 213 SOLUTION TOPICAL at 17:55

## 2020-06-23 RX ADMIN — PANTOPRAZOLE SODIUM 20 MILLIGRAM(S): 20 TABLET, DELAYED RELEASE ORAL at 11:45

## 2020-06-23 RX ADMIN — PIPERACILLIN AND TAZOBACTAM 25 GRAM(S): 4; .5 INJECTION, POWDER, LYOPHILIZED, FOR SOLUTION INTRAVENOUS at 21:29

## 2020-06-23 RX ADMIN — SODIUM CHLORIDE 3 MILLILITER(S): 9 INJECTION INTRAMUSCULAR; INTRAVENOUS; SUBCUTANEOUS at 07:34

## 2020-06-23 RX ADMIN — LEVALBUTEROL 1.25 MILLIGRAM(S): 1.25 SOLUTION, CONCENTRATE RESPIRATORY (INHALATION) at 22:24

## 2020-06-23 RX ADMIN — Medication 40 MILLIEQUIVALENT(S): at 10:11

## 2020-06-23 RX ADMIN — CHLORHEXIDINE GLUCONATE 15 MILLILITER(S): 213 SOLUTION TOPICAL at 06:11

## 2020-06-23 NOTE — DISCHARGE NOTE PROVIDER - NSDCCPCAREPLAN_GEN_ALL_CORE_FT
PRINCIPAL DISCHARGE DIAGNOSIS  Diagnosis: Hypothermia  Assessment and Plan of Treatment: Patient noted to be hypothermic and presented with breakthrough seizures and bradycardia. She found with multibacterial (klebsiella, stenotrophomonas and pseudomonas) in sputum culture. Antibiotics given empirically as patient completed levaquin x 10 day course outpatient. COVID negative. Sputum culture in house noted with normal john and empiric antibiotics completed for 5 day course. Keppra levels 39.6 and noted to be stable. EEG with no seizures. Case discussed with Neurology and Keppra 600mg in AM and 700mg in PM appropriate and continued. Bradycardia resolved spontaneously. Transthoracic echocardiogram within normal limits and colchicine discontinued.   Please follow up with your primary care provider and your outpatient Neurologist for further management.      SECONDARY DISCHARGE DIAGNOSES  Diagnosis: History of tracheostomy  Assessment and Plan of Treatment: Continue on home vent with new settings (RR 17, PS 15, PEEP 8, Total PIP 23, FiO2 30).   Patient able to tolerate some TC during the day and uses Ventilatory predominantly at night. As of such, she requires a percussion vest to assist with mobilization of secretions to keep airways cleas as she has poor respiratory efforts secondary to muscle weakness. Patient is bed and wheelchair bound. She may resume home care services without restrictions on discharge to home.

## 2020-06-23 NOTE — PROGRESS NOTE ADULT - SUBJECTIVE AND OBJECTIVE BOX
CHIEF COMPLAINT: Patient is a 25y old  Female who presents with a chief complaint of hypothermia (22 Jun 2020 12:42)      Interval Events:     REVIEW OF SYSTEMS:  Constitutional:   Eyes:  ENT:  CV:  Resp:  GI:  :  MSK:  Integumentary:  Neurological:  Psychiatric:  Endocrine:  Hematologic/Lymphatic:  Allergic/Immunologic:  [ ] All other systems negative  [ ] Unable to assess ROS because ________    OBJECTIVE:  ICU Vital Signs Last 24 Hrs  T(C): 35.9 (23 Jun 2020 05:59), Max: 37.4 (23 Jun 2020 01:58)  T(F): 96.7 (23 Jun 2020 05:59), Max: 99.3 (23 Jun 2020 01:58)  HR: 57 (23 Jun 2020 07:29) (50 - 96)  BP: 136/78 (23 Jun 2020 05:59) (87/30 - 136/78)  BP(mean): 63 (23 Jun 2020 01:58) (44 - 85)  ABP: --  ABP(mean): --  RR: 17 (23 Jun 2020 05:59) (13 - 26)  SpO2: 96% (23 Jun 2020 07:29) (92% - 100%)    Mode: SIMV with PS, RR (machine): 17, FiO2: 40, PEEP: 8, PS: 10, ITime: 1, MAP: 12.8, PC: 15, PIP: 25    06-22 @ 07:01  -  06-23 @ 07:00  --------------------------------------------------------  IN: 1250 mL / OUT: 400 mL / NET: 850 mL      CAPILLARY BLOOD GLUCOSE          PHYSICAL EXAM:  General:   HEENT:   Lymph Nodes:  Neck:   Respiratory:   Cardiovascular:   Abdomen:   Extremities:   Skin:   Neurological:  Psychiatry:    HOSPITAL MEDICATIONS:  MEDICATIONS  (STANDING):  buDESOnide    Inhalation Suspension 1 milliGRAM(s) Inhalation two times a day  chlorhexidine 0.12% Liquid 15 milliLiter(s) Oral Mucosa every 12 hours  colchicine 0.6 milliGRAM(s) Oral two times a day  colistimethate for Nebulization 150 milliGRAM(s) CBA Nebulizer two times a day  fluticasone propionate 50 MICROgram(s)/spray Nasal Spray 1 Spray(s) Both Nostrils daily  levalbuterol Inhalation 1.25 milliGRAM(s) Inhalation two times a day  levETIRAcetam  Solution 600 milliGRAM(s) Oral <User Schedule>  levETIRAcetam  Solution 700 milliGRAM(s) Oral at bedtime  pantoprazole   Suspension 20 milliGRAM(s) Oral daily  piperacillin/tazobactam IVPB.. 3.375 Gram(s) IV Intermittent every 8 hours  sodium chloride 3%  Inhalation 3 milliLiter(s) Inhalation every 6 hours    MEDICATIONS  (PRN):  acetaminophen    Suspension .. 400 milliGRAM(s) Oral every 6 hours PRN Temp greater or equal to 38C (100.4F)      LABS:                        12.1   3.66  )-----------( 133      ( 23 Jun 2020 06:45 )             35.3     06-23    135  |  101  |  9   ----------------------------<  110<H>  3.3<L>   |  22  |  0.53    Ca    9.2      23 Jun 2020 06:45  Phos  2.7     06-23  Mg     1.7     06-23    TPro  5.6<L>  /  Alb  3.0<L>  /  TBili  < 0.2<L>  /  DBili  x   /  AST  28  /  ALT  41<H>  /  AlkPhos  145<H>  06-22    PT/INR - ( 23 Jun 2020 06:45 )   PT: 12.6 SEC;   INR: 1.09                    MICROBIOLOGY:     RADIOLOGY:  [ ] Reviewed and interpreted by me    PULMONARY FUNCTION TESTS:    EKG: CHIEF COMPLAINT: Patient is a 25y old  Female who presents with a chief complaint of hypothermia (22 Jun 2020 12:42)      Interval Events: transferred from MICU    REVIEW OF SYSTEMS:  [x] Unable to assess ROS because AMS    OBJECTIVE:  ICU Vital Signs Last 24 Hrs  T(C): 35.9 (23 Jun 2020 05:59), Max: 37.4 (23 Jun 2020 01:58)  T(F): 96.7 (23 Jun 2020 05:59), Max: 99.3 (23 Jun 2020 01:58)  HR: 57 (23 Jun 2020 07:29) (50 - 96)  BP: 136/78 (23 Jun 2020 05:59) (87/30 - 136/78)  BP(mean): 63 (23 Jun 2020 01:58) (44 - 85)  ABP: --  ABP(mean): --  RR: 17 (23 Jun 2020 05:59) (13 - 26)  SpO2: 96% (23 Jun 2020 07:29) (92% - 100%)    Mode: SIMV with PS, RR (machine): 17, FiO2: 40, PEEP: 8, PS: 10, ITime: 1, MAP: 12.8, PC: 15, PIP: 25    06-22 @ 07:01  -  06-23 @ 07:00  --------------------------------------------------------  IN: 1250 mL / OUT: 400 mL / NET: 850 mL      CAPILLARY BLOOD GLUCOSE          HOSPITAL MEDICATIONS:  MEDICATIONS  (STANDING):  buDESOnide    Inhalation Suspension 1 milliGRAM(s) Inhalation two times a day  chlorhexidine 0.12% Liquid 15 milliLiter(s) Oral Mucosa every 12 hours  colchicine 0.6 milliGRAM(s) Oral two times a day  colistimethate for Nebulization 150 milliGRAM(s) CBA Nebulizer two times a day  fluticasone propionate 50 MICROgram(s)/spray Nasal Spray 1 Spray(s) Both Nostrils daily  levalbuterol Inhalation 1.25 milliGRAM(s) Inhalation two times a day  levETIRAcetam  Solution 600 milliGRAM(s) Oral <User Schedule>  levETIRAcetam  Solution 700 milliGRAM(s) Oral at bedtime  pantoprazole   Suspension 20 milliGRAM(s) Oral daily  piperacillin/tazobactam IVPB.. 3.375 Gram(s) IV Intermittent every 8 hours  sodium chloride 3%  Inhalation 3 milliLiter(s) Inhalation every 6 hours    MEDICATIONS  (PRN):  acetaminophen    Suspension .. 400 milliGRAM(s) Oral every 6 hours PRN Temp greater or equal to 38C (100.4F)      LABS:                        12.1   3.66  )-----------( 133      ( 23 Jun 2020 06:45 )             35.3     06-23    135  |  101  |  9   ----------------------------<  110<H>  3.3<L>   |  22  |  0.53    Ca    9.2      23 Jun 2020 06:45  Phos  2.7     06-23  Mg     1.7     06-23    TPro  5.6<L>  /  Alb  3.0<L>  /  TBili  < 0.2<L>  /  DBili  x   /  AST  28  /  ALT  41<H>  /  AlkPhos  145<H>  06-22    PT/INR - ( 23 Jun 2020 06:45 )   PT: 12.6 SEC;   INR: 1.09                    MICROBIOLOGY:     RADIOLOGY:  [ ] Reviewed and interpreted by me    PULMONARY FUNCTION TESTS:    EKG:

## 2020-06-23 NOTE — DISCHARGE NOTE PROVIDER - NSDCFUSCHEDAPPT_GEN_ALL_CORE_FT
CHAMP CALDWELL ; 06/24/2020 ; [facility] [address] [facility]  CHAMP CALDWELL ; 07/01/2020 ; [facility] CHAMP CALDWELL ; 07/01/2020 ; [facility] CHAMP CALDWELL ; 07/01/2020 ; [facility] [address] [facility]

## 2020-06-23 NOTE — DISCHARGE NOTE PROVIDER - HOSPITAL COURSE
This is a 25 year-old woman with PMHx hypothyroidism, asthma, GERD, seizures, cerebral palsy, scoliosis, s/p trach/peg, and developmental delay BIB mother for bradycardia. At baseline, patient is nonverbal but follows minimal commands. Mother reports that for the last few weeks patient has been having different types of seizures from her usual focal seizures. She has been having head jerks, often at night but sometimes during the day as well. Saw their neurologist, keppra level was noted to be low so increased keppra dose. Patient was briefly on dilantin but was not responding so was taken off it. Pt's last seizure was one week ago. Additionally, patient started having episodes of bradycardia to the 40s-50s around that time. Patient was supposed to be placed on a holter monitor but due to insurance reasons was not able to yet. Patient had one episode of diarrhea today. No fevers, skin changes, rashes. An infectious workup was done outpatient recently and found to have sputum culture growing pseudomonas; patient treated with a 10 day course of levaquin which she completed a few days ago. Patient has had multiple PICU admissions for infections, often UTI and PNA. Additionally, patient was hospitalized in 1/2020 at [place] for Coxsackie c/b pericarditis, for which she takes colchicine.         In the ED, pt's vitals were T 91.1 F HR 50s-60s BP 85/44 (pt's baseline) RR 15 POX 98% on vent. Pt treated with NS 1L IVB and levaquin 750 mg IV.         MICU COURSE        Pt admitted for sepsis rule out in setting of recurrence of seizures. Pt continued on keppra 600 mg am and 700 mg qH and on home vent support settings AC/CMV PIP 15/RR 17/PEEP 5/FiO2 50. Pt had recent outpatient treatment of PNA with levaquin for sputum cx growing Pseudomonas, now on zosyn.          RCU COURSE    Pt transferred to RCU on 6/22/20     FU ECHO given hx of coxsackie if no pericardial fluid can dc colchicine------------------- 26 YO F with PMHx of Hypothyroidism, Asthma, GERD, Seizures, Cerebral Palsy, Scoliosis, Developmental Delay, Respiratory Failure s/p Tracheostomy, Dysphagia s/p PEG, multiple past PICU admissions for PNA and UTIs and recent hospitalization in 1/2020 at [place] for Coxsackie c/b Pericarditis, now on Colchicine. Patient BIB Mother for Bradycardia and Seizures. Saw by Neurologist outpatient, Keppra levels low and dose increased. Around the same time, patient was noted with Bradycardic to HR 40-50s and was suppose to get a Holter monitor, however unable to second to insurance issues. Additional infectious work up found with Pseudomonas, Klebsiella and Stenotrophomonas and now s/p LVQ x 10 days. Despite completion of ABX, patient noted with continued episodes of Bradycardia of which prompted her ER visit.         In ER, patient noted with temperature 91.1F, HR 50-60s, BP 85/44 (baseline), RR 15 and SpO2 98 on Vent. Patient admitted for r/o Sepsis to MICU.         In MICU, Keppra (600AM/700HS), Zosyn and home vent PC/ SIMV RR 17, PS 15, Total PIP 20, PEEP 5, FiO2 50 continued. Remained stable. Cultures sent out and pending. Transferred to RCU 6/22        In RCU, patient noted with GNR/ normal jhon growing out of sputum culture and Zosyn continued x 5 days and completed in house. Colistin added and completed x 5 days. On 6/22, patient noted with desaturation, Vent settings changed to PEEP 8 and FiO2 30 and patient transitioned to home vent. Keppra levels checked and noted to be stable on current dose as above (keppra level 39.6). Case discussed with Neurology and EEG performed. EEG noted with potential epileptogenic focus in the left frontocentral region, moderate nonspecific diffuse or multifocal cerebral dysfunction, no seizures seen, diffuse excess beta activities may be seen with medication use such as benzodiazepines or barbiturates. Discussed with Neurology and no acute intervention necessary. ECHO performed and noted with normal LVSF and colchicine dc'ed.         On 6/25, case discussed with Dr Gonzalez and patient is medically stable and cleared for discharge home. Patient able to tolerate some TC during the day and uses Ventilatory predominantly at night. As of such, she requires a percussion vest to assist with mobilization of secretions to keep airways cleas as she has poor respiratory efforts secondary to muscle weakness. Patient is bed and wheelchair bound. She may resume home care services without restrictions on discharge to home.

## 2020-06-23 NOTE — DISCHARGE NOTE PROVIDER - INSTRUCTIONS
Peptide 135cc every 6 hours for total 540cc volume.   Pedialyte 300cc at rate of 150cc/hr every 6 hours after feeds.

## 2020-06-23 NOTE — DISCHARGE NOTE PROVIDER - CARE PROVIDER_API CALL
YOUR PRIMARY CARE PROVIDER AND NEUROLOGIST,   Phone: (   )    -  Fax: (   )    -  Follow Up Time: Haseeb Teran)  Family Medicine  [address] [place] [address]  Phone: [phone]  Fax: [phone]  Follow Up Time:     YOUR NEUROLOGIST,   Phone: (   )    -  Fax: (   )    -  Follow Up Time:     YOUR CARDIOLOGIST,   Phone: (   )    -  Fax: (   )    -  Follow Up Time:

## 2020-06-23 NOTE — DISCHARGE NOTE PROVIDER - PROVIDER TOKENS
FREE:[LAST:[YOUR PRIMARY CARE PROVIDER AND NEUROLOGIST],PHONE:[(   )    -],FAX:[(   )    -]] PROVIDER:[TOKEN:[[id number]]],FREE:[LAST:[YOUR NEUROLOGIST],PHONE:[(   )    -],FAX:[(   )    -]],FREE:[LAST:[YOUR CARDIOLOGIST],PHONE:[(   )    -],FAX:[(   )    -]]

## 2020-06-23 NOTE — PROGRESS NOTE ADULT - ASSESSMENT
26 YO F with PMHx of Hypothyroidism, Asthma, GERD, Seizures, Cerebral Palsy, Scoliosis, Developmental Delay, Respiratory Failure s/p Tracheostomy, Dysphagia s/p PEG, multiple past PICU admissions for PNA and UTIs and recent hospitalization in 1/2020 at [place] for Coxsackie c/b Pericarditis, now on Colchicine. Patient BIB Mother for Bradycardia and Seizures. Saw by Neurologist outpatient, Keppra levels low, dose increased and no seizures x 1 week. Around the same time, patient was noted with Bradycardia and was suppose to get a Holter monitor, however unable to second to insurance issues. Additional infectious work up found with Pseudomonas and now s/p LVQ x 10 days. In ER, admitted for r/o Sepsis to MICU. In MICU, Keppra (600AM/700HS), Zosyn and home vent continued. Remained stable. Transferred to [facility] 6/22    # Sepsis and Breakthrough Seizures likely second to R Pseudomonas PNA   - CXR on admission with right sided mild pleural effusion  - Hypothermic in ICU to 91.2   - Meets Sepsis Criteria given temperature, seizures and source of infection.   - No leukocytosis.   - Blood culture negative.   - COVID 19 PCR negative.   - Sputum culture currently pending.   - Continue on Zosyn and Colistin (6/21 - ) for now.   - Continue on Keppra 600mg PEG AM and 700mg PEG HS. Keppra levels pending.   - Continue on Pulmicort, Levalbuterol and Chest PT   - Continue on home vent AC/ CMV RR 17 PIP 15 PEEP 5 FiO2 50 QHS/ PRN and TC QD as tolerated.   - Trach care QD   - Suction PRN   - Neurology following     # Bradycardiac   - ECG on admission with NSR,  and no signs of block or ST changes.   - Bradycardia noted at home. ICU HR 50-60s.   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   - Followed by Cardiology, Dr. Hannon outpatient.   - ECHO 2/2020 with EF 60-65 and normal LVSF.   - TSH WNL   - Cortisol pending.   - RPT ECHO pending. IF no signs of pericardial effusion, can likely stop Colchicine.   - Cardiology following      # Diarrhea and Transaminitis   - Patient with one episode of diarrhea.   - Consider GI PCR if diarrhea persists  - Mild Transminitis noted likely second to Sepsis. Monitor LFTs and BMs.   - Continue on Tube Feeds.       # Thrombocytopenia likely second to Sepsis.   - Thrombocytopenia to 123 likely reactive given Sepsis. Monitor CBC.     # DVT - Lovenox PPX dose.   # DISPO - Accepted to [facility] 24 YO F with PMHx of Hypothyroidism, Asthma, GERD, Seizures, Cerebral Palsy, Scoliosis, Developmental Delay, Respiratory Failure s/p Tracheostomy, Dysphagia s/p PEG, multiple past PICU admissions for PNA and UTIs and recent hospitalization in 1/2020 at [place] for Coxsackie c/b Pericarditis, now on Colchicine. Patient BIB Mother for Bradycardia and Seizures. Saw by Neurologist outpatient, Keppra levels low, dose increased and no seizures x 1 week. Around the same time, patient was noted with Bradycardia and was suppose to get a Holter monitor, however unable to second to insurance issues. Additional infectious work up found with Pseudomonas and now s/p LVQ x 10 days. In ER, admitted for r/o Sepsis to MICU. In MICU, Keppra (600AM/700HS), Zosyn and home vent continued. Remained stable. Transferred to [facility]CU 6/22    # Sepsis and Breakthrough Seizures likely second to R Pseudomonas PNA   - CXR on admission with right sided mild pleural effusion  - Hypothermic in ICU to 91.2   - Meets Sepsis Criteria given temperature, seizures and source of infection.   - No leukocytosis.   - Blood culture negative.   - COVID 19 PCR negative.   - Sputum culture currently pending.   - Continue on Zosyn and Colistin (6/21 - ) for now.   - Continue on Keppra 600mg PEG AM and 700mg PEG HS. Keppra levels pending.   - Continue on Pulmicort, Levalbuterol and Chest PT   - Continue on home vent AC/ CMV RR 17 PIP 15 PEEP 5 FiO2 50 QHS/ PRN and TC QD as tolerated.   - Trach care QD   - Pt with dx of cerebral palsy, scoliosis, kyphosis, chronic respiratory failure, using trach collar during the day and ventilatior at night -pt requires percussion vest to assist with mobiliztion of secretions to keep the air way clear as she has poor respiratory effort secondary to muscle weakness.  Pt is bed and wheelchair bound.   - Suction PRN   - Neurology following     # Bradycardiac   - ECG on admission with NSR,  and no signs of block or ST changes.   - Bradycardia noted at home. ICU HR 50-60s.   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   - Followed by Cardiology, Dr. Hannon outpatient.   - ECHO 2/2020 with EF 60-65 and normal LVSF.   - TSH WNL   - Cortisol pending.   - RPT ECHO pending. IF no signs of pericardial effusion, can likely stop Colchicine.   - Cardiology following      # Diarrhea and Transaminitis   - Patient with one episode of diarrhea.   - Consider GI PCR if diarrhea persists  - Mild Transminitis noted likely second to Sepsis. Monitor LFTs and BMs.   - Continue on Tube Feeds.       # Thrombocytopenia likely second to Sepsis.   - Thrombocytopenia to 123 likely reactive given Sepsis. Monitor CBC.     # DVT - Lovenox PPX dose.   # DISPO - Accepted to [facility] 24 YO F with PMHx of Hypothyroidism, Asthma, GERD, Seizures, Cerebral Palsy, Scoliosis, Developmental Delay, Respiratory Failure s/p Tracheostomy, Dysphagia s/p PEG, multiple past PICU admissions for PNA and UTIs and recent hospitalization in 1/2020 at [place] for Coxsackie c/b Pericarditis, now on Colchicine. Patient BIB Mother for Bradycardia and Seizures. Saw by Neurologist outpatient, Keppra levels low, dose increased and no seizures x 1 week. Around the same time, patient was noted with Bradycardia and was suppose to get a Holter monitor, however unable to second to insurance issues. Additional infectious work up found with Pseudomonas and now s/p LVQ x 10 days. In ER, admitted for r/o Sepsis to MICU. In MICU, Keppra (600AM/700HS), Zosyn and home vent continued. Remained stable. Transferred to RCU 6/22    # Sepsis and Breakthrough Seizures likely second to R Pseudomonas PNA   - CXR on admission with right sided mild pleural effusion  - Hypothermic in ICU to 91.2   - Meets Sepsis Criteria given temperature, seizures and source of infection.   - No leukocytosis.   - Blood culture negative.   - COVID 19 PCR negative.   - Sputum culture currently pending.   - Continue on Zosyn and Colistin (6/21 - ) for now.   - Continue on Keppra 600mg PEG AM and 700mg PEG HS. Keppra levels pending.   - Continue on Pulmicort, Levalbuterol and Chest PT   - Continue on home vent AC/ CMV RR 17 PIP 15 PEEP 5 FiO2 50 QHS/ PRN and TC QD as tolerated. Did not use TC today   - Trach care QD   - Pt with dx of cerebral palsy, scoliosis, kyphosis, chronic respiratory failure, using trach collar during the day and ventilatior at night -pt requires percussion vest to assist with mobiliztion of secretions to keep the air way clear as she has poor respiratory effort secondary to muscle weakness.  Pt is bed and wheelchair bound.   - Suction PRN   - Neurology following     # Bradycardiac   - ECG on admission with NSR,  and no signs of block or ST changes.   - Bradycardia noted at home. ICU HR 50-60s.   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   - Followed by Cardiology, Dr. Hannon outpatient.   - ECHO 2/2020 with EF 60-65 and normal LVSF.   - TSH WNL   - Cortisol WNL   -  ECHO done with no signs of pericardial effusion, stopped Colchicine.   - Cardiology following      # Diarrhea and Transaminitis   - Patient with one episode of diarrhea. ? colchcine   - Described as watery foul - C-diff sent   - Consider GI PCR if diarrhea persists  - Mild Transminitis noted likely second to Sepsis. Monitor LFTs and BMs.   - Continue on Tube Feeds. Pedialyte given       # Thrombocytopenia likely second to Sepsis.   - Thrombocytopenia to 123 likely reactive given Sepsis. Monitor CBC.     # DVT - Lovenox PPX dose.   # DISPO - Accepted to RCU

## 2020-06-24 ENCOUNTER — APPOINTMENT (OUTPATIENT)
Dept: CARDIOLOGY | Facility: CLINIC | Age: 25
End: 2020-06-24

## 2020-06-24 LAB
ANION GAP SERPL CALC-SCNC: 9 MMO/L — SIGNIFICANT CHANGE UP (ref 7–14)
BASE EXCESS BLDA CALC-SCNC: -1.1 MMOL/L — SIGNIFICANT CHANGE UP
BLOOD GAS ARTERIAL - FIO2: 40 — SIGNIFICANT CHANGE UP
BUN SERPL-MCNC: 9 MG/DL — SIGNIFICANT CHANGE UP (ref 7–23)
CALCIUM SERPL-MCNC: 9.1 MG/DL — SIGNIFICANT CHANGE UP (ref 8.4–10.5)
CHLORIDE BLDA-SCNC: 112 MMOL/L — HIGH (ref 96–108)
CHLORIDE SERPL-SCNC: 106 MMOL/L — SIGNIFICANT CHANGE UP (ref 98–107)
CO2 SERPL-SCNC: 24 MMOL/L — SIGNIFICANT CHANGE UP (ref 22–31)
CORTIS SERPL-MCNC: 11.6 UG/DL — SIGNIFICANT CHANGE UP (ref 2.7–18.4)
CREAT BLDA-MCNC: 0.56 MG/DL — SIGNIFICANT CHANGE UP (ref 0.5–1.3)
CREAT SERPL-MCNC: 0.6 MG/DL — SIGNIFICANT CHANGE UP (ref 0.5–1.3)
CULTURE RESULTS: SIGNIFICANT CHANGE UP
GLUCOSE BLDA-MCNC: 93 MG/DL — SIGNIFICANT CHANGE UP (ref 70–99)
GLUCOSE SERPL-MCNC: 130 MG/DL — HIGH (ref 70–99)
HCO3 BLDA-SCNC: 24 MMOL/L — SIGNIFICANT CHANGE UP (ref 22–26)
HCT VFR BLD CALC: 38.6 % — SIGNIFICANT CHANGE UP (ref 34.5–45)
HCT VFR BLDA CALC: 39 % — SIGNIFICANT CHANGE UP (ref 34.5–46.5)
HGB BLD-MCNC: 12.4 G/DL — SIGNIFICANT CHANGE UP (ref 11.5–15.5)
HGB BLDA-MCNC: 12.7 G/DL — SIGNIFICANT CHANGE UP (ref 11.5–15.5)
LACTATE BLDA-SCNC: 0.8 MMOL/L — SIGNIFICANT CHANGE UP (ref 0.5–2)
MAGNESIUM SERPL-MCNC: 1.9 MG/DL — SIGNIFICANT CHANGE UP (ref 1.6–2.6)
MCHC RBC-ENTMCNC: 31.4 PG — SIGNIFICANT CHANGE UP (ref 27–34)
MCHC RBC-ENTMCNC: 32.1 % — SIGNIFICANT CHANGE UP (ref 32–36)
MCV RBC AUTO: 97.7 FL — SIGNIFICANT CHANGE UP (ref 80–100)
NRBC # FLD: 0 K/UL — SIGNIFICANT CHANGE UP (ref 0–0)
PCO2 BLDA: 40 MMHG — SIGNIFICANT CHANGE UP (ref 32–48)
PH BLDA: 7.38 PH — SIGNIFICANT CHANGE UP (ref 7.35–7.45)
PHOSPHATE SERPL-MCNC: 3.4 MG/DL — SIGNIFICANT CHANGE UP (ref 2.5–4.5)
PLATELET # BLD AUTO: 141 K/UL — LOW (ref 150–400)
PMV BLD: 10.6 FL — SIGNIFICANT CHANGE UP (ref 7–13)
PO2 BLDA: 164 MMHG — HIGH (ref 83–108)
POTASSIUM BLDA-SCNC: 3.7 MMOL/L — SIGNIFICANT CHANGE UP (ref 3.4–4.5)
POTASSIUM SERPL-MCNC: 4.4 MMOL/L — SIGNIFICANT CHANGE UP (ref 3.5–5.3)
POTASSIUM SERPL-SCNC: 4.4 MMOL/L — SIGNIFICANT CHANGE UP (ref 3.5–5.3)
RBC # BLD: 3.95 M/UL — SIGNIFICANT CHANGE UP (ref 3.8–5.2)
RBC # FLD: 11.9 % — SIGNIFICANT CHANGE UP (ref 10.3–14.5)
SAO2 % BLDA: 99.4 % — HIGH (ref 95–99)
SODIUM BLDA-SCNC: 138 MMOL/L — SIGNIFICANT CHANGE UP (ref 136–146)
SODIUM SERPL-SCNC: 139 MMOL/L — SIGNIFICANT CHANGE UP (ref 135–145)
SPECIMEN SOURCE: SIGNIFICANT CHANGE UP
WBC # BLD: 6.6 K/UL — SIGNIFICANT CHANGE UP (ref 3.8–10.5)
WBC # FLD AUTO: 6.6 K/UL — SIGNIFICANT CHANGE UP (ref 3.8–10.5)

## 2020-06-24 PROCEDURE — 99233 SBSQ HOSP IP/OBS HIGH 50: CPT

## 2020-06-24 RX ORDER — ENOXAPARIN SODIUM 100 MG/ML
30 INJECTION SUBCUTANEOUS DAILY
Refills: 0 | Status: DISCONTINUED | OUTPATIENT
Start: 2020-06-24 | End: 2020-06-25

## 2020-06-24 RX ADMIN — COLISTIMETHATE SODIUM 150 MILLIGRAM(S) CBA: 150 INJECTION INTRAMUSCULAR; INTRAVENOUS at 07:58

## 2020-06-24 RX ADMIN — CHLORHEXIDINE GLUCONATE 15 MILLILITER(S): 213 SOLUTION TOPICAL at 06:34

## 2020-06-24 RX ADMIN — LEVETIRACETAM 700 MILLIGRAM(S): 250 TABLET, FILM COATED ORAL at 23:15

## 2020-06-24 RX ADMIN — SODIUM CHLORIDE 3 MILLILITER(S): 9 INJECTION INTRAMUSCULAR; INTRAVENOUS; SUBCUTANEOUS at 15:25

## 2020-06-24 RX ADMIN — SODIUM CHLORIDE 3 MILLILITER(S): 9 INJECTION INTRAMUSCULAR; INTRAVENOUS; SUBCUTANEOUS at 07:57

## 2020-06-24 RX ADMIN — PANTOPRAZOLE SODIUM 20 MILLIGRAM(S): 20 TABLET, DELAYED RELEASE ORAL at 12:25

## 2020-06-24 RX ADMIN — COLISTIMETHATE SODIUM 150 MILLIGRAM(S) CBA: 150 INJECTION INTRAMUSCULAR; INTRAVENOUS at 22:37

## 2020-06-24 RX ADMIN — SODIUM CHLORIDE 3 MILLILITER(S): 9 INJECTION INTRAMUSCULAR; INTRAVENOUS; SUBCUTANEOUS at 22:25

## 2020-06-24 RX ADMIN — LEVALBUTEROL 1.25 MILLIGRAM(S): 1.25 SOLUTION, CONCENTRATE RESPIRATORY (INHALATION) at 07:57

## 2020-06-24 RX ADMIN — PIPERACILLIN AND TAZOBACTAM 25 GRAM(S): 4; .5 INJECTION, POWDER, LYOPHILIZED, FOR SOLUTION INTRAVENOUS at 04:30

## 2020-06-24 RX ADMIN — Medication 1 SPRAY(S): at 12:24

## 2020-06-24 RX ADMIN — PIPERACILLIN AND TAZOBACTAM 25 GRAM(S): 4; .5 INJECTION, POWDER, LYOPHILIZED, FOR SOLUTION INTRAVENOUS at 17:15

## 2020-06-24 RX ADMIN — Medication 0.5 MILLIGRAM(S): at 22:40

## 2020-06-24 RX ADMIN — LEVETIRACETAM 600 MILLIGRAM(S): 250 TABLET, FILM COATED ORAL at 09:51

## 2020-06-24 RX ADMIN — LEVALBUTEROL 1.25 MILLIGRAM(S): 1.25 SOLUTION, CONCENTRATE RESPIRATORY (INHALATION) at 22:18

## 2020-06-24 RX ADMIN — SODIUM CHLORIDE 3 MILLILITER(S): 9 INJECTION INTRAMUSCULAR; INTRAVENOUS; SUBCUTANEOUS at 03:51

## 2020-06-24 RX ADMIN — Medication 0.5 MILLIGRAM(S): at 07:56

## 2020-06-24 RX ADMIN — CHLORHEXIDINE GLUCONATE 15 MILLILITER(S): 213 SOLUTION TOPICAL at 17:15

## 2020-06-24 NOTE — DIETITIAN INITIAL EVALUATION ADULT. - PERTINENT MEDS FT
acetaminophen    Suspension .. PRN  buDESOnide    Inhalation Suspension  chlorhexidine 0.12% Liquid  colistimethate for Nebulization  fluticasone propionate 50 MICROgram(s)/spray Nasal Spray  levalbuterol Inhalation  levETIRAcetam  Solution  levETIRAcetam  Solution  pantoprazole   Suspension  piperacillin/tazobactam IVPB..  sodium chloride 3%  Inhalation

## 2020-06-24 NOTE — EEG REPORT - NS EEG TEXT BOX
[facility]   COMPREHENSIVE EPILEPSY CENTER   REPORT OF ROUTINE EEG W/ Video     [facility]: [address], [place] [address], Ph#: [phone]  [place]:  [address] [address], Ph#: [phone]  [facility]: [address] [place] [address], Ph#: [phone]    Patient Name: CHAMP CALDWELL  Age and : 25y ([birth year])  MRN #: [id number]  Location: [place] [address] A  Referring Physician: Andrew Jorge    Study Date: 20    _____________________________________________________________  TECHNICAL INFORMATION    Placement and Labeling of Electrodes:  The EEG was performed utilizing 20 channels referential EEG connections (coronal over temporal over parasagittal montage) using all standard 10-20 electrode placements with EKG.  Recording was at a sampling rate of 256 samples per second per channel.  Time synchronized digital video recording was done simultaneously with EEG recording.  A low light infrared camera was used for low light recording.  Cristofer and seizure detection algorithms were utilized.    _____________________________________________________________  HISTORY    Patient is a 25y old  Female who presents with a chief complaint of hypothermia (2020 07:14)      PERTINENT MEDICATION:  levETIRAcetam  Solution 600 milliGRAM(s) Oral <User Schedule>  levETIRAcetam  Solution 700 milliGRAM(s) Oral at bedtime  _____________________________________________________________  STUDY INTERPRETATION    Findings: The background was continuous, spontaneously variable and reactive. During wakefulness, the posterior dominant rhythm consisted of symmetric, poorly-modulated 5-6 Hz activity, with amplitude to 30 uV, that attenuated to eye opening.      Background Slowing:  Diffuse theta and polymorphic delta slowing.    Focal Slowing:   None were present.    Sleep Background:  Drowsiness and stage II sleep transients were not recorded.    Other Non-Epileptiform Findings:  Diffuse excess beta activity.    Interictal Epileptiform Activity:   None were present.    Events:  Clinical events: Jerking of both arms were seen on video which did not correlate to an abnormal ictal EEG finding.  Seizures: None recorded.    Activation Procedures:   Hyperventilation was not performed.    Photic stimulation was not performed.     Artifacts:  Intermittent myogenic and movement artifacts were noted.    ECG:  The heart rate on single channel ECG was predominantly between 50-60 BPM.    _____________________________________________________________  EEG SUMMARY/CLASSIFICATION    Abnormal EEG in the awake states.  - Jerking of both arms were seen on video which did not correlate to an abnormal ictal EEG finding.  - Mild to moderate generalized slowing.  - Diffuse excess beta activity.  _____________________________________________________________  EEG IMPRESSION/CLINICAL CORRELATE        Abnormal EEG study.  1. Mild to moderate nonspecific diffuse or multifocal cerebral dysfunction.   2. No epileptiform pattern or seizure seen.  3. Diffuse excess beta activity may be seen with medication use such as benzodiazepines or barbiturates.    Preliminary Fellow Report  _____________________________________________________________    Bibiana Garcia MD  Epilepsy Fellow    Raza Valencia MD  Attending Physician, [facility] Epilepsy [facility]   COMPREHENSIVE EPILEPSY CENTER   REPORT OF ROUTINE EEG W/ Video     [facility]: [address], [place] [address], Ph#: [phone]  [place]  [address] [address], Ph#: [phone]  [facility]: [address] [place] [address], Ph#: [phone]    Patient Name: CHAMP CALDWELL  Age and : 25y ([birth year])  MRN #: [id number]  Location: [place] [address] A  Referring Physician: Andrew Jorge    Study Date: 20    _____________________________________________________________  TECHNICAL INFORMATION    Placement and Labeling of Electrodes:  The EEG was performed utilizing 20 channels referential EEG connections (coronal over temporal over parasagittal montage) using all standard 10-20 electrode placements with EKG.  Recording was at a sampling rate of 256 samples per second per channel.  Time synchronized digital video recording was done simultaneously with EEG recording.  A low light infrared camera was used for low light recording.  Cristofer and seizure detection algorithms were utilized.    _____________________________________________________________  HISTORY    Patient is a 25y old  Female who presents with a chief complaint of hypothermia (2020 07:14)      PERTINENT MEDICATION:  levETIRAcetam  Solution 600 milliGRAM(s) Oral <User Schedule>  levETIRAcetam  Solution 700 milliGRAM(s) Oral at bedtime    _____________________________________________________________  STUDY INTERPRETATION    Findings: The background was continuous, spontaneously variable and reactive. No posterior dominant rhythm seen.     Background Slowing:  Diffuse theta and polymorphic delta slowing.    Focal Slowing:   None were present.    Sleep Background:  Drowsiness and stage II sleep transients were not recorded.    Other Non-Epileptiform Findings:  Diffuse excess beta activity.    Interictal Epileptiform Activity:   Occasional sharp wave discharges distributed in the left frontocentral (max F3/C3), at times with field extending to the left parietal and temporal regions.     Events:  Clinical events: Jerking of both arms were seen on video which did not correlate to ictal pattern on EEG.  Seizures: None recorded.    Activation Procedures:   Hyperventilation was not performed.    Photic stimulation was not performed.     Artifacts:  Intermittent myogenic and movement artifacts were noted.    ECG:  The heart rate on single channel ECG was predominantly between 50-60 BPM.    _____________________________________________________________  EEG SUMMARY/CLASSIFICATION    Abnormal EEG in the awake states.  - Jerking of both arms were seen on video which did not correlate to ictal pattern on EEG.  - Occasional sharp wave discharges distributed in the left frontocentral (max F3/C3), at times with field extending to the left parietal and temporal regions.   - Moderate generalized slowing.  - Diffuse excess beta activity.  _____________________________________________________________  EEG IMPRESSION/CLINICAL CORRELATE    Abnormal EEG study.  1. Potential epileptogenic focus in the left frontocentral region.   2. Moderate nonspecific diffuse or multifocal cerebral dysfunction.   3. No seizure seen.  4. Diffuse excess beta activity may be seen with medication use such as benzodiazepines or barbiturates.    _____________________________________________________________    Bibiana Garcia MD  Epilepsy Fellow    Raza Valencia MD  Attending Physician, [facility] Epilepsy [place]

## 2020-06-24 NOTE — DIETITIAN INITIAL EVALUATION ADULT. - ENERGY INTAKE
How Severe Is Your Skin Lesion?: mild Has Your Skin Lesion Been Treated?: not been treated Is This A New Presentation, Or A Follow-Up?: Skin Lesion Receiving tube feeding and tolerating @ goal rate

## 2020-06-24 NOTE — DISCUSSION/SUMMARY
[FreeTextEntry1] : Pericaridal effusion due to cocksackie virus Jan 24th.\par \par Cont. colchicine for 3 months total.\par Will check echo in the next 2-3 weeks to confirm no reaccumulation of pericardial effusion.\par Will call pt w/ results.\par If no effusion will d/c colchicine & schedule next followup visit & echo in 3 months.\par ==========================\par Addendum Jun 17th:\par \par Pt's mother called - concerned re: low heart rates.\par \par Recently has been having frequent seizures.\par Seen by neuro, weaned off of keppra, started on dilantin.\par \par Since then parents have noted pt's HRs are lower.\par During day HRs 60-70s (baseline 80-90s).\par At night HRs decrease to 48 bpm.\par Previously rarely had nighttime HRs below 50 - \par has always had continuous HR pulse oximetry at night.\par \par Reassurred mother that HRs are in normal range though lower than previous measurements.\par Event monitor for 2 weeks will be sent to home.\par Telehealth visit in 3 weeks scheduled to review results of monitor.\par Pt's mother will schedule echo in the next 1-2 weeks - surveillance after pericardial effusion Jan '20.\par \par Dilantin has been reported to slow HR, may be the cause of decreases in HR.\par \par  Dr. Letha Matos (neurology, [facility])  [phone].\par ==========================\par Echo to reassess pericardial effusion reviewed:\par \par *Echo Jun '20: no pericardial effusion; grossly normal LV systolic function.\par \par Left message w/ mother hoang to discuss results.\par Pt admited to hospital for unclear reasons to to [facility].\par Will also ask about monitor described above.\par

## 2020-06-24 NOTE — DIETITIAN INITIAL EVALUATION ADULT. - ENERGY NEEDS
Height (cm): 129.5 (21 Jun 2020 07:00)  Weight (kg): 35.8 (21 Jun 2020 05:30)  BMI (kg/m2): 21.3 (21 Jun 2020 07:00)

## 2020-06-24 NOTE — PROGRESS NOTE ADULT - SUBJECTIVE AND OBJECTIVE BOX
CHIEF COMPLAINT: Patient is a 25y old  Female who presents with a chief complaint of hypothermia (2020 08:52)      Interval Events: none overnight     REVIEW OF SYSTEMS:  [x ] Unable to assess ROS because Trach/vent    OBJECTIVE:  ICU Vital Signs Last 24 Hrs  T(C): 36 (2020 06:29), Max: 36.3 (2020 12:45)  T(F): 96.8 (2020 06:29), Max: 97.4 (2020 12:45)  HR: 52 (2020 06:29) (46 - 73)  BP: 96/62 (2020 06:29) (95/58 - 123/68)  BP(mean): --  ABP: --  ABP(mean): --  RR: 17 (2020 06:29) (15 - 17)  SpO2: 99% (2020 06:29) (96% - 100%)    Mode: SIMV with PS, RR (machine): 17, FiO2: 40, PEEP: 8, PS: 10, ITime: 1, MAP: 13, PC: 15, PIP: 26    06 @ 07:01  -  24 @ 07:00  --------------------------------------------------------  IN: 600 mL / OUT: 600 mL / NET: 0 mL      CAPILLARY BLOOD GLUCOSE          HOSPITAL MEDICATIONS:  MEDICATIONS  (STANDING):  buDESOnide    Inhalation Suspension 1 milliGRAM(s) Inhalation two times a day  chlorhexidine 0.12% Liquid 15 milliLiter(s) Oral Mucosa every 12 hours  colistimethate for Nebulization 150 milliGRAM(s) CBA Nebulizer two times a day  fluticasone propionate 50 MICROgram(s)/spray Nasal Spray 1 Spray(s) Both Nostrils daily  levalbuterol Inhalation 1.25 milliGRAM(s) Inhalation two times a day  levETIRAcetam  Solution 600 milliGRAM(s) Oral <User Schedule>  levETIRAcetam  Solution 700 milliGRAM(s) Oral at bedtime  pantoprazole   Suspension 20 milliGRAM(s) Oral daily  piperacillin/tazobactam IVPB.. 3.375 Gram(s) IV Intermittent every 8 hours  sodium chloride 3%  Inhalation 3 milliLiter(s) Inhalation every 6 hours    MEDICATIONS  (PRN):  acetaminophen    Suspension .. 400 milliGRAM(s) Oral every 6 hours PRN Temp greater or equal to 38C (100.4F)      LABS:                        12.1   3.66  )-----------( 133      ( 2020 06:45 )             35.3         135  |  101  |  9   ----------------------------<  110<H>  3.3<L>   |  22  |  0.53    Ca    9.2      2020 06:45  Phos  2.7       Mg     1.7     23      PT/INR - ( 2020 06:45 )   PT: 12.6 SEC;   INR: 1.09            Urinalysis Basic - ( 2020 18:30 )    Color: LIGHT YELLOW / Appearance: Lt TURBID / S.015 / pH: 8.5  Gluc: NEGATIVE / Ketone: NEGATIVE  / Bili: NEGATIVE / Urobili: NORMAL   Blood: TRACE / Protein: NEGATIVE / Nitrite: NEGATIVE   Leuk Esterase: SMALL / RBC: 6-10 / WBC 6-10   Sq Epi: x / Non Sq Epi: FEW / Bacteria: MODERATE      Arterial Blood Gas:   @ 05:39  7.38/40/164/24/99.4/-1.1  ABG lactate: 0.8        MICROBIOLOGY:     RADIOLOGY:  [ ] Reviewed and interpreted by me    PULMONARY FUNCTION TESTS:    EKG:

## 2020-06-24 NOTE — HISTORY OF PRESENT ILLNESS
[Home] : at home, [unfilled] , at the time of the visit. [Medical Office: ([facility])___] : at the medical office located in  [Parents] : parents [Patient] : the patient [Self] : self [FreeTextEntry2] : blanca howard [FreeTextEntry4] : rios alex ma [FreeTextEntry1] : initiated at 10:07 am\par \par 23 yo F with hx of pericardial effusion due to coxsackie virus, GERD, cerebral palsy s/p trach and PEG, seizures, asthma and scoliosis here for followup.\par \par admitted Jan 24th for pneumonia & pleural effusion. CT scan \par reported large pericardial effusion. Echo showed moderate to large pericardial effusion w/ no tamponade physiology. Admitted for 4 days, repeat echo showed slight decrease in pericardial effusion w/o signs of tamponade physiology. Discharged. Since then admitted to [facility] for pneumonia. Pericardial effusion there unchangeed in size. There for 7 days, has been out of hospital for 7 days.\par Pt cannot provide history due to cerebral palsy.\par \par Seen Feb 27th, no changes in symptoms.  Echo same day showed no pericardial effusion.\par During today's telehealth visit during COVID pandemic, family denies any change in symptoms.\par at home  SBPs -90-100s (baseline) HRs 60-80s.  Still taking colchicine.\par Discussed getting followup echo - will schedule this at [address] in 2-3 weeks.

## 2020-06-24 NOTE — DIETITIAN INITIAL EVALUATION ADULT. - RD TO REMAIN AVAILABLE
2. If C. Diff negative - can consider antidiarrheal as medically appropriate if diarrhea persists. 3. Suggest outpatient follow up with appropriate RD for purposes of long-term nutrition evaluation.

## 2020-06-24 NOTE — DIETITIAN INITIAL EVALUATION ADULT. - OTHER INFO
Per chart review patient with medical history of hypothyroidism, asthma, GERD, seizures, cerebral palsy, scoliosis, s/p trach/peg, and developmental delay BIB mother for bradycardia. Spoke to Mom at bedside. Mom reports patient has been following tube feeding regimen provided by outpatient dietitian since December 2019. Regimen as follows: [employer] Peptide 1.5, 135mL bolus 4x daily (runs at 90mL/hr), followed by 200mL free water (runs at 100mL/hr), 2 boluses of Pedialyte daily (150mL in the morning and 150mL midday), and 3 Tbsp of Nutricia Amino acid supplement daily.    Mom reports patient with overall stable weight on regimen. States patient weighed 37kg in December at a PCP visit. Current admit weight 35.8kg; noted ~2kg weight change. Mom reports patient does not appear thinner so weight differences possibly scale discrepancies. Patient tolerating enteral nutrition regimen. Family bringing in tube feeding supplies from home. Mom states patient with 4x loose stools yesterday. Today reports 1 loose stool. C. Diff testing pending.

## 2020-06-24 NOTE — DIETITIAN INITIAL EVALUATION ADULT. - PERTINENT LABORATORY DATA
06-24 Na 139 mmol/L Glu 130 mg/dL<H> K+ 4.4 mmol/L Cr 0.60 mg/dL BUN 9 mg/dL Phos 3.4 mg/dL Alb n/a   PAB n/a   Hgb 12.4 g/dL Hct 38.6 % HgA1C n/a    Glucose, Serum: 130 mg/dL <H>

## 2020-06-25 ENCOUNTER — TRANSCRIPTION ENCOUNTER (OUTPATIENT)
Age: 25
End: 2020-06-25

## 2020-06-25 VITALS — OXYGEN SATURATION: 100 % | HEART RATE: 65 BPM

## 2020-06-25 LAB — C DIFF TOX GENS STL QL NAA+PROBE: SIGNIFICANT CHANGE UP

## 2020-06-25 PROCEDURE — 99233 SBSQ HOSP IP/OBS HIGH 50: CPT | Mod: GC

## 2020-06-25 RX ORDER — COLISTIMETHATE SODIUM 150 MG/2ML
0 INJECTION INTRAMUSCULAR; INTRAVENOUS
Qty: 0 | Refills: 0 | DISCHARGE

## 2020-06-25 RX ADMIN — LEVALBUTEROL 1.25 MILLIGRAM(S): 1.25 SOLUTION, CONCENTRATE RESPIRATORY (INHALATION) at 08:05

## 2020-06-25 RX ADMIN — LEVETIRACETAM 600 MILLIGRAM(S): 250 TABLET, FILM COATED ORAL at 11:48

## 2020-06-25 RX ADMIN — PIPERACILLIN AND TAZOBACTAM 25 GRAM(S): 4; .5 INJECTION, POWDER, LYOPHILIZED, FOR SOLUTION INTRAVENOUS at 00:23

## 2020-06-25 RX ADMIN — CHLORHEXIDINE GLUCONATE 15 MILLILITER(S): 213 SOLUTION TOPICAL at 06:29

## 2020-06-25 RX ADMIN — Medication 0.5 MILLIGRAM(S): at 08:04

## 2020-06-25 RX ADMIN — PANTOPRAZOLE SODIUM 20 MILLIGRAM(S): 20 TABLET, DELAYED RELEASE ORAL at 11:49

## 2020-06-25 RX ADMIN — SODIUM CHLORIDE 3 MILLILITER(S): 9 INJECTION INTRAMUSCULAR; INTRAVENOUS; SUBCUTANEOUS at 03:35

## 2020-06-25 RX ADMIN — PIPERACILLIN AND TAZOBACTAM 25 GRAM(S): 4; .5 INJECTION, POWDER, LYOPHILIZED, FOR SOLUTION INTRAVENOUS at 06:29

## 2020-06-25 RX ADMIN — COLISTIMETHATE SODIUM 150 MILLIGRAM(S) CBA: 150 INJECTION INTRAMUSCULAR; INTRAVENOUS at 08:07

## 2020-06-25 RX ADMIN — ENOXAPARIN SODIUM 30 MILLIGRAM(S): 100 INJECTION SUBCUTANEOUS at 11:48

## 2020-06-25 RX ADMIN — Medication 1 SPRAY(S): at 11:48

## 2020-06-25 NOTE — DISCHARGE NOTE NURSING/CASE MANAGEMENT/SOCIAL WORK - NSDCCRNAME_GEN_ALL_CORE_FT
[facility]:[phone]: Your CDPAP Services 28hr's/wk will be reinstated for Thursday 6/25/20.

## 2020-06-25 NOTE — DISCHARGE NOTE NURSING/CASE MANAGEMENT/SOCIAL WORK - PATIENT PORTAL LINK FT
You can access the FollowMyHealth Patient Portal offered by [facility] by registering at the following website: [email or web address]. By joining [facility]’s FollowMyHealth portal, you will also be able to view your health information using other applications (apps) compatible with our system.

## 2020-06-25 NOTE — PROGRESS NOTE ADULT - ASSESSMENT
26 YO F with PMHx of Hypothyroidism, Asthma, GERD, Seizures, Cerebral Palsy, Scoliosis, Developmental Delay, Respiratory Failure s/p Tracheostomy, Dysphagia s/p PEG, multiple past PICU admissions for PNA and UTIs and recent hospitalization in 1/2020 at [place] for Coxsackie c/b Pericarditis, now on Colchicine. Patient BIB Mother for Bradycardia and Seizures. Saw by Neurologist outpatient, Keppra levels low, dose increased and no seizures x 1 week. Around the same time, patient was noted with Bradycardia and was suppose to get a Holter monitor, however unable to second to insurance issues. Additional infectious work up found with Pseudomonas and now s/p LVQ x 10 days. In ER, admitted for r/o Sepsis to MICU. In MICU, Keppra (600AM/700HS), Zosyn and home vent continued. Remained stable. Transferred to [facility] 6/22    # Sepsis and Breakthrough Seizures likely second to R Pseudomonas PNA   - CXR on admission with right sided mild pleural effusion and Hypothermic in ICU to 91.2   - Meets Sepsis Criteria given temperature, seizures and source of infection.   - No leukocytosis. Blood culture negative. COVID 19 PCR negative.   - Sputum culture with GNR and Zosyn and Colistin to be completed in house (6/21 -6/27).   - Continue on Keppra 600mg PEG AM and 700mg PEG HS. Keppra levels wnl  - Continue on Pulmicort, Levalbuterol and Chest PT   - Continue on home vent AC/ CMV RR 17 PIP 15 PEEP 5 FiO2 50 QHS/ PRN and TC QD as tolerated.   - Suction PRN   - Neurology following, EEG WNL.     # Bradycardiac   - ECG on admission with NSR,  and no signs of block or ST changes.   - Bradycardia noted at home. ICU HR 50-60s.   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   - Followed by Cardiology, Dr. Hannon outpatient.   - ECHO 2/2020 with EF 60-65 and normal LVSF.   - TSH WNL and Cortisol WNL x 2   - ECHO done with no signs of pericardial effusion, stopped Colchicine.   - Cardiology following      # Diarrhea and Transaminitis   - Patient with one episode of diarrhea second to Colchicine. CDIFF pending.   - Mild Transminitis noted likely second to Sepsis. Monitor LFTs and BMs.   - Continue on Tube Feeds with Pedialyte.     # Thrombocytopenia likely second to Sepsis.   - Thrombocytopenia to 123 likely reactive given Sepsis. Monitor CBC.     # DVT - Lovenox PPX dose.   # DISPO - Pt may resume home care services without restrictions on discharge to home 26 YO F with PMHx of Hypothyroidism, Asthma, GERD, Seizures, Cerebral Palsy, Scoliosis, Developmental Delay, Respiratory Failure s/p Tracheostomy, Dysphagia s/p PEG, multiple past PICU admissions for PNA and UTIs and recent hospitalization in 1/2020 at [place] for Coxsackie c/b Pericarditis, now on Colchicine. Patient BIB Mother for Bradycardia and Seizures. Saw by Neurologist outpatient, Keppra levels low, dose increased and no seizures x 1 week. Around the same time, patient was noted with Bradycardia and was suppose to get a Holter monitor, however unable to second to insurance issues. Additional infectious work up found with Pseudomonas and now s/p LVQ x 10 days. In ER, admitted for r/o Sepsis to MICU. In MICU, Keppra (600AM/700HS), Zosyn and home vent continued. Remained stable. Transferred to [facility] 6/22    # Sepsis and Breakthrough Seizures likely second to R Pseudomonas PNA   - CXR on admission with right sided mild pleural effusion and Hypothermic in ICU to 91.2   - Meets Sepsis Criteria given temperature, seizures and source of infection.   - No leukocytosis. Blood culture negative. COVID 19 PCR negative.   - Sputum culture with GNR and Zosyn and Colistin to be completed in house (6/21 -6/27).   - Continue on Keppra 600mg PEG AM and 700mg PEG HS. Keppra levels wnl  - Continue on Pulmicort, Levalbuterol and Chest PT   - Continue on home vent AC/ CMV RR 17 PIP 15 PEEP 5 FiO2 50 QHS/ PRN and TC QD as tolerated.   - Suction PRN   - Neurology following, EEG WNL.     # Bradycardiac   - ECG on admission with NSR,  and no signs of block or ST changes.   - Bradycardia noted at home. ICU HR 50-60s.   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   - Followed by Cardiology, Dr. Hannon outpatient.   - ECHO 2/2020 with EF 60-65 and normal LVSF.   - TSH WNL and Cortisol WNL x 2   - ECHO done with no signs of pericardial effusion, stopped Colchicine.   - Cardiology following      # Diarrhea and Transaminitis   - Patient with one episode of diarrhea second to Colchicine. CDIFF pending.   - Mild Transminitis noted likely second to Sepsis. Monitor LFTs and BMs.   - Continue on Tube Feeds with Pedialyte.     # Thrombocytopenia likely second to Sepsis.   - Thrombocytopenia to 123 likely reactive given Sepsis. Monitor CBC.     # DVT - Lovenox PPX dose.   # DISPO - Patient may resume home care services without restrictions on discharge to home. DC home today.

## 2020-06-25 NOTE — DISCHARGE NOTE NURSING/CASE MANAGEMENT/SOCIAL WORK - NSSCNAMETXT_GEN_ALL_CORE
[facility]: [phone]: The Visiting Nurse will call you to arrange the home visit to re-open your case for RN, PT, OT, & virtual ST.

## 2020-06-25 NOTE — PROGRESS NOTE ADULT - SUBJECTIVE AND OBJECTIVE BOX
CHIEF COMPLAINT: Patient is a 25y old  Female who presents with a chief complaint of hypothermia (2020 07:14)    SUBJECTIVE:     [ ] All other systems negative  [ ] Unable to assess ROS because ________    OBJECTIVE:  ICU Vital Signs Last 24 Hrs  T(C): 37.3 (2020 06:30), Max: 37.7 (2020 21:35)  T(F): 99.2 (2020 06:30), Max: 99.8 (2020 21:35)  HR: 87 (2020 08:06) (75 - 98)  BP: 82/59 (2020 06:30) (80/42 - 108/77)  BP(mean): --  ABP: --  ABP(mean): --  RR: 17 (2020 06:30) (17 - 17)  SpO2: 100% (2020 08:06) (97% - 100%)    Mode: SIMV (Synchronized Intermittent Mandatory Ventilation), RR (machine): 17, PEEP: 8, PS: 15, ITime: 1, MAP: 10, PC: 15, PIP: 15     @ 07:01  -  -25 @ 07:00  --------------------------------------------------------  IN: 860 mL / OUT: 300 mL / NET: 560 mL    CAPILLARY BLOOD GLUCOSE    PHYSICAL EXAM:  General:   HEENT:   Lymph Nodes:  Neck:   Respiratory:   Cardiovascular:   Abdomen:   Extremities:   Skin:   Neurological:  Psychiatry:    HOSPITAL MEDICATIONS:  MEDICATIONS  (STANDING):  buDESOnide    Inhalation Suspension 1 milliGRAM(s) Inhalation two times a day  chlorhexidine 0.12% Liquid 15 milliLiter(s) Oral Mucosa every 12 hours  colistimethate for Nebulization 150 milliGRAM(s) CBA Nebulizer two times a day  enoxaparin Injectable 30 milliGRAM(s) SubCutaneous daily  fluticasone propionate 50 MICROgram(s)/spray Nasal Spray 1 Spray(s) Both Nostrils daily  levalbuterol Inhalation 1.25 milliGRAM(s) Inhalation two times a day  levETIRAcetam  Solution 600 milliGRAM(s) Oral <User Schedule>  levETIRAcetam  Solution 700 milliGRAM(s) Oral at bedtime  pantoprazole   Suspension 20 milliGRAM(s) Oral daily  piperacillin/tazobactam IVPB.. 3.375 Gram(s) IV Intermittent every 8 hours  sodium chloride 3%  Inhalation 3 milliLiter(s) Inhalation every 6 hours    MEDICATIONS  (PRN):  acetaminophen    Suspension .. 400 milliGRAM(s) Oral every 6 hours PRN Temp greater or equal to 38C (100.4F)    LABS:                        12.4   6.60  )-----------( 141      ( 2020 11:15 )             38.6     06-24    139  |  106  |  9   ----------------------------<  130<H>  4.4   |  24  |  0.60    Ca    9.1      2020 11:15  Phos  3.4     06-24  Mg     1.9     -24    Urinalysis Basic - ( 2020 18:30 )  Color: LIGHT YELLOW / Appearance: Lt TURBID / S.015 / pH: 8.5  Gluc: NEGATIVE / Ketone: NEGATIVE  / Bili: NEGATIVE / Urobili: NORMAL   Blood: TRACE / Protein: NEGATIVE / Nitrite: NEGATIVE   Leuk Esterase: SMALL / RBC: 6-10 / WBC 6-10   Sq Epi: x / Non Sq Epi: FEW / Bacteria: MODERATE    Arterial Blood Gas:   @ 05:39  7.38/40/164/24/99.4/-1.1  ABG lactate: 0.8    MICROBIOLOGY:     RADIOLOGY:  [ ] Reviewed and interpreted by me    PULMONARY FUNCTION TESTS:    EKG: CHIEF COMPLAINT: Patient is a 25y old  Female who presents with a chief complaint of hypothermia (2020 07:14)    SUBJECTIVE: No interval events overnight. Seen by bedside during AM rounds and case discussed with parents by bedside.     [ ] All other systems negative  [ x] Unable to assess ROS because patient nonverbal and bed bound.     OBJECTIVE:  ICU Vital Signs Last 24 Hrs  T(C): 37.3 (2020 06:30), Max: 37.7 (2020 21:35)  T(F): 99.2 (2020 06:30), Max: 99.8 (2020 21:35)  HR: 87 (2020 08:06) (75 - 98)  BP: 82/59 (2020 06:30) (80/42 - 108/77)  BP(mean): --  ABP: --  ABP(mean): --  RR: 17 (2020 06:30) (17 - 17)  SpO2: 100% (2020 08:06) (97% - 100%)    Mode: SIMV (Synchronized Intermittent Mandatory Ventilation), RR (machine): 17, PEEP: 8, PS: 15, ITime: 1, MAP: 10, PC: 15, PIP: 15    06-24 @ 07:01  -  06-25 @ 07:00  --------------------------------------------------------  IN: 860 mL / OUT: 300 mL / NET: 560 mL    CAPILLARY BLOOD GLUCOSE    PHYSICAL EXAM:  General: NAD   Neck: Trach clean dry and intact.   Cards: S1/S2, no murmurs   Pulm: Coarse vent breath sounds equal and bilateral. Tolerating home vent.   Abdomen: Soft, NTND. BS (+) PEG (+)   Extremities: No pedal edema.   Neurology: Awake. No focal neurological deficits.     HOSPITAL MEDICATIONS:  MEDICATIONS  (STANDING):  buDESOnide    Inhalation Suspension 1 milliGRAM(s) Inhalation two times a day  chlorhexidine 0.12% Liquid 15 milliLiter(s) Oral Mucosa every 12 hours  colistimethate for Nebulization 150 milliGRAM(s) CBA Nebulizer two times a day  enoxaparin Injectable 30 milliGRAM(s) SubCutaneous daily  fluticasone propionate 50 MICROgram(s)/spray Nasal Spray 1 Spray(s) Both Nostrils daily  levalbuterol Inhalation 1.25 milliGRAM(s) Inhalation two times a day  levETIRAcetam  Solution 600 milliGRAM(s) Oral <User Schedule>  levETIRAcetam  Solution 700 milliGRAM(s) Oral at bedtime  pantoprazole   Suspension 20 milliGRAM(s) Oral daily  piperacillin/tazobactam IVPB.. 3.375 Gram(s) IV Intermittent every 8 hours  sodium chloride 3%  Inhalation 3 milliLiter(s) Inhalation every 6 hours    MEDICATIONS  (PRN):  acetaminophen    Suspension .. 400 milliGRAM(s) Oral every 6 hours PRN Temp greater or equal to 38C (100.4F)    LABS:                        12.4   6.60  )-----------( 141      ( 2020 11:15 )             38.6     06-24    139  |  106  |  9   ----------------------------<  130<H>  4.4   |  24  |  0.60    Ca    9.1      2020 11:15  Phos  3.4     06-24  Mg     1.9     -24    Urinalysis Basic - ( 2020 18:30 )  Color: LIGHT YELLOW / Appearance: Lt TURBID / S.015 / pH: 8.5  Gluc: NEGATIVE / Ketone: NEGATIVE  / Bili: NEGATIVE / Urobili: NORMAL   Blood: TRACE / Protein: NEGATIVE / Nitrite: NEGATIVE   Leuk Esterase: SMALL / RBC: 6-10 / WBC 6-10   Sq Epi: x / Non Sq Epi: FEW / Bacteria: MODERATE    Arterial Blood Gas:   @ 05:39  7.38/40/164/24/99.4/-1.1  ABG lactate: 0.8    MICROBIOLOGY:     RADIOLOGY:  [ ] Reviewed and interpreted by me    PULMONARY FUNCTION TESTS:    EKG:

## 2020-06-25 NOTE — PROGRESS NOTE ADULT - ATTENDING COMMENTS
CP, chronically vented, recent coxsackie infection with pericarditis  Bradycardia, possible sepsis on admission  5 day empiric treatment for possible PNA  Will get ECHO
CP, chronically vented, recent coxsackie infection with pericarditis  Bradycardia, possible sepsis on admission  ECHO normal  EEG pending per neurology
CP, chronically vented, recent coxsackie infection with pericarditis  Bradycardia, possible sepsis on admission. No episodes currently  ECHO normal  D/c home today
Patient with seizures, bradycardia, acute on chronic hypoxic respiratory failure likely due to aspiration pna.  Chronic trach and PEG, but on baseline ventilator settings.  Keppra level was low, will increase keppra, f/u neuro reccs.  Bradycardia resolved, patient at baseline BP.  Had history of pericarditis and pericardial effusion for which she is on colchicine.  POCUS appears normal, will get formal TTE.  On empiric abx for aspiration pna.
I personally interviewed and reviewed the plan of therapy with the resident at bedside.  Shira does not provide me with information this morning, all information from her mother (this is normal for Shira). Most of her history, medical plans, etc have been covered above and in detail in the H&P. Shira's mother has some concerns as Shira has developed a "new" eye/head twtich which could be some seizure like activity (not uncommon, though the motion(s) would be new). Shira's baseline SBP ~ 85, and is currently "doing better, I think". It would appear Shira has suffered a slight exacerbation of what I'll presume is some colonization from her chronic trach requirements. she's responded well to fluids and antibiotics without much increase in FiO2 requirements. Her sputum is yellow/green, which is slightly increased. Overall, her clinical state is improved, if she remains in this state she could be transitioned to the R[facility].

## 2020-06-25 NOTE — DISCHARGE NOTE NURSING/CASE MANAGEMENT/SOCIAL WORK - NSDCCRTYPESERV_GEN_ALL_CORE_FT
You will contact your Private Duty Nurse for reinstatement of your Home Private Duty Nurses Services.

## 2020-07-05 NOTE — PROGRESS NOTE ADULT - ASSESSMENT
26 YO F with PMHx of Hypothyroidism, Asthma, GERD, Seizures, Cerebral Palsy, Scoliosis, Developmental Delay, Respiratory Failure s/p Tracheostomy, Dysphagia s/p PEG, multiple past PICU admissions for PNA and UTIs and recent hospitalization in 1/2020 at [facility] for Coxsackie c/b Pericarditis, now on Colchicine. Patient BIB Mother for Bradycardia and Seizures. Saw by Neurologist outpatient, Keppra levels low, dose increased and no seizures x 1 week. Around the same time, patient was noted with Bradycardia and was suppose to get a Holter monitor, however unable to second to insurance issues. Additional infectious work up found with Pseudomonas and now s/p LVQ x 10 days. In ER, admitted for r/o Sepsis to MICU. In MICU, Keppra (600AM/700HS), Zosyn and home vent continued. Remained stable. Transferred to [facility] 6/22    # Sepsis and Breakthrough Seizures likely second to R Pseudomonas PNA   - CXR on admission with right sided mild pleural effusion  - Hypothermic in ICU to 91.2   - Meets Sepsis Criteria given temperature, seizures and source of infection.   - No leukocytosis.   - Blood culture negative.   - COVID 19 PCR negative.   - Sputum culture currently pending.   - Continue on Zosyn and Colistin (6/21 - ) for now.   - Continue on Keppra 600mg PEG AM and 700mg PEG HS. Keppra levels pending.   - Continue on Pulmicort, Levalbuterol and Chest PT   - Continue on home vent AC/ CMV RR 17 PIP 15 PEEP 5 FiO2 50 QHS/ PRN and TC QD as tolerated. Did not use TC today   - Trach care QD   - Pt with dx of cerebral palsy, scoliosis, kyphosis, chronic respiratory failure, using trach collar during the day and ventilatior at night -pt requires percussion vest to assist with mobiliztion of secretions to keep the air way clear as she has poor respiratory effort secondary to muscle weakness.  Pt is bed and wheelchair bound.   - Suction PRN   - Neurology following     # Bradycardiac   - ECG on admission with NSR,  and no signs of block or ST changes.   - Bradycardia noted at home. ICU HR 50-60s.   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   - Followed by Cardiology, Dr. Hannon outpatient.   - ECHO 2/2020 with EF 60-65 and normal LVSF.   - TSH WNL   - Cortisol WNL   -  ECHO done with no signs of pericardial effusion, stopped Colchicine.   - Cardiology following      # Diarrhea and Transaminitis   - Patient with one episode of diarrhea. ? colchcine   - Described as watery foul - C-diff sent   - Consider GI PCR if diarrhea persists  - Mild Transminitis noted likely second to Sepsis. Monitor LFTs and BMs.   - Continue on Tube Feeds. Pedialyte given       # Thrombocytopenia likely second to Sepsis.   - Thrombocytopenia to 123 likely reactive given Sepsis. Monitor CBC.     # DVT - Lovenox PPX dose.   # DISPO - Accepted to RCU normal 24 YO F with PMHx of Hypothyroidism, Asthma, GERD, Seizures, Cerebral Palsy, Scoliosis, Developmental Delay, Respiratory Failure s/p Tracheostomy, Dysphagia s/p PEG, multiple past PICU admissions for PNA and UTIs and recent hospitalization in 1/2020 at [place] for Coxsackie c/b Pericarditis, now on Colchicine. Patient BIB Mother for Bradycardia and Seizures. Saw by Neurologist outpatient, Keppra levels low, dose increased and no seizures x 1 week. Around the same time, patient was noted with Bradycardia and was suppose to get a Holter monitor, however unable to second to insurance issues. Additional infectious work up found with Pseudomonas and now s/p LVQ x 10 days. In ER, admitted for r/o Sepsis to MICU. In MICU, Keppra (600AM/700HS), Zosyn and home vent continued. Remained stable. Transferred to [facility]CU 6/22    # Sepsis and Breakthrough Seizures likely second to R Pseudomonas PNA   - CXR on admission with right sided mild pleural effusion  - Hypothermic in ICU to 91.2   - Meets Sepsis Criteria given temperature, seizures and source of infection.   - No leukocytosis.   - Blood culture negative.   - COVID 19 PCR negative.   - Sputum culture currently pending.   - Continue on Zosyn and Colistin (6/21 - ) for now.   - Continue on Keppra 600mg PEG AM and 700mg PEG HS. Keppra levels pending.   - Continue on Pulmicort, Levalbuterol and Chest PT   - Continue on home vent AC/ CMV RR 17 PIP 15 PEEP 5 FiO2 50 QHS/ PRN and TC QD as tolerated. Did not use TC today   - Trach care QD   - Pt with dx of cerebral palsy, scoliosis, kyphosis, chronic respiratory failure, using trach collar during the day and ventilatior at night -pt requires percussion vest to assist with mobiliztion of secretions to keep the air way clear as she has poor respiratory effort secondary to muscle weakness.  Pt is bed and wheelchair bound.   - Suction PRN   - Neurology following     # Bradycardiac   - ECG on admission with NSR,  and no signs of block or ST changes.   - Bradycardia noted at home. ICU HR 50-60s.   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   - Followed by Cardiology, Dr. Hannon outpatient.   - ECHO 2/2020 with EF 60-65 and normal LVSF.   - TSH WNL   - Cortisol WNL   -  ECHO done with no signs of pericardial effusion, stopped Colchicine.   - Cardiology following      # Diarrhea and Transaminitis   - Patient with one episode of diarrhea. ? colchcine   - Described as watery foul - C-diff sent   - Consider GI PCR if diarrhea persists  - Mild Transminitis noted likely second to Sepsis. Monitor LFTs and BMs.   - Continue on Tube Feeds. Pedialyte given       # Thrombocytopenia likely second to Sepsis.   - Thrombocytopenia to 123 likely reactive given Sepsis. Monitor CBC.     # DVT - Lovenox PPX dose.   # DISPO - Pt may resume home care services without restrictions on discharge to home 24 YO F with PMHx of Hypothyroidism, Asthma, GERD, Seizures, Cerebral Palsy, Scoliosis, Developmental Delay, Respiratory Failure s/p Tracheostomy, Dysphagia s/p PEG, multiple past PICU admissions for PNA and UTIs and recent hospitalization in 1/2020 at [facility] for Coxsackie c/b Pericarditis, now on Colchicine. Patient BIB Mother for Bradycardia and Seizures. Saw by Neurologist outpatient, Keppra levels low, dose increased and no seizures x 1 week. Around the same time, patient was noted with Bradycardia and was suppose to get a Holter monitor, however unable to second to insurance issues. Additional infectious work up found with Pseudomonas and now s/p LVQ x 10 days. In ER, admitted for r/o Sepsis to MICU. In MICU, Keppra (600AM/700HS), Zosyn and home vent continued. Remained stable. Transferred to [facility] 6/22    # Sepsis and Breakthrough Seizures likely second to R Pseudomonas PNA   - CXR on admission with right sided mild pleural effusion  - Hypothermic in ICU to 91.2   - Meets Sepsis Criteria given temperature, seizures and source of infection.   - No leukocytosis.   - Blood culture negative.   - COVID 19 PCR negative.   - Sputum culture currently pending.   - Continue on Zosyn and Colistin (6/21 - ) for now.   - Continue on Keppra 600mg PEG AM and 700mg PEG HS. Keppra levels pending.   - Continue on Pulmicort, Levalbuterol and Chest PT   - Continue on home vent AC/ CMV RR 17 PIP 15 PEEP 5 FiO2 50 QHS/ PRN and TC QD as tolerated. Did not use TC today   - will do trial of hospital vent settings on home vent today after cleared by biomed   - Trach care QD   - Pt with dx of cerebral palsy, scoliosis, kyphosis, chronic respiratory failure, using trach collar during the day and ventilatior at night -pt requires percussion vest to assist with mobilization of secretions to keep the air way clear as she has poor respiratory effort secondary to muscle weakness.  Pt is bed and wheelchair bound.   - Suction PRN   - Neurology following EEG done     # Bradycardiac   - ECG on admission with NSR,  and no signs of block or ST changes.   - Bradycardia noted at home. ICU HR 50-60s.   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   - Followed by Cardiology, Dr. Hannon outpatient.   - ECHO 2/2020 with EF 60-65 and normal LVSF.   - TSH WNL   - Cortisol WNL x 2   -  ECHO done with no signs of pericardial effusion, stopped Colchicine.   - Cardiology following      # Diarrhea and Transaminitis   - Patient with one episode of diarrhea. ? colchcine   - Described as watery foul - C-diff sent and pending   - Consider GI PCR if diarrhea persists  - Mild Transminitis noted likely second to Sepsis. Monitor LFTs and BMs.   - Continue on Tube Feeds. Pedialyte given       # Thrombocytopenia likely second to Sepsis.   - Thrombocytopenia to 123 likely reactive given Sepsis. Monitor CBC.     # DVT - Lovenox PPX dose.   # DISPO - Pt may resume home care services without restrictions on discharge to home 26 YO F with PMHx of Hypothyroidism, Asthma, GERD, Seizures, Cerebral Palsy, Scoliosis, Developmental Delay, Respiratory Failure s/p Tracheostomy, Dysphagia s/p PEG, multiple past PICU admissions for PNA and UTIs and recent hospitalization in 1/2020 at [place] for Coxsackie c/b Pericarditis, now on Colchicine. Patient BIB Mother for Bradycardia and Seizures. Saw by Neurologist outpatient, Keppra levels low, dose increased and no seizures x 1 week. Around the same time, patient was noted with Bradycardia and was suppose to get a Holter monitor, however unable to second to insurance issues. Additional infectious work up found with Pseudomonas and now s/p LVQ x 10 days. In ER, admitted for r/o Sepsis to MICU. In MICU, Keppra (600AM/700HS), Zosyn and home vent continued. Remained stable. Transferred to [facility]CU 6/22    # Sepsis and Breakthrough Seizures likely second to R Pseudomonas PNA   - CXR on admission with right sided mild pleural effusion  - Hypothermic in ICU to 91.2   - Meets Sepsis Criteria given temperature, seizures and source of infection.   - No leukocytosis.   - Blood culture negative.   - COVID 19 PCR negative.   - Sputum culture normal john    - Continue on Zosyn and Colistin (6/21 -6/24).   - Continue on Keppra 600mg PEG AM and 700mg PEG HS. Keppra levels wnl  - Continue on Pulmicort, Levalbuterol and Chest PT   - Continue on home vent AC/ CMV RR 17 PIP 15 PEEP 5 FiO2 50 QHS/ PRN and TC QD as tolerated. Did not use TC today   - will do trial of hospital vent settings on home vent today after cleared by biomed   - Trach care QD   - Pt with dx of cerebral palsy, scoliosis, kyphosis, chronic respiratory failure, using trach collar during the day and ventilatior at night -pt requires percussion vest to assist with mobilization of secretions to keep the air way clear as she has poor respiratory effort secondary to muscle weakness.  Pt is bed and wheelchair bound.   - Suction PRN   - Neurology following EEG done     # Bradycardiac   - ECG on admission with NSR,  and no signs of block or ST changes.   - Bradycardia noted at home. ICU HR 50-60s.   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   - Followed by Cardiology, Dr. Hannon outpatient.   - ECHO 2/2020 with EF 60-65 and normal LVSF.   - TSH WNL   - Cortisol WNL x 2   -  ECHO done with no signs of pericardial effusion, stopped Colchicine.   - Cardiology following      # Diarrhea and Transaminitis   - Patient with one episode of diarrhea. ? colchcine   - Described as watery foul - C-diff sent and pending   - Consider GI PCR if diarrhea persists  - Mild Transminitis noted likely second to Sepsis. Monitor LFTs and BMs.   - Continue on Tube Feeds. Pedialyte given       # Thrombocytopenia likely second to Sepsis.   - Thrombocytopenia to 123 likely reactive given Sepsis. Monitor CBC.     # DVT - Lovenox PPX dose.   # DISPO - Pt may resume home care services without restrictions on discharge to home

## 2020-07-10 ENCOUNTER — APPOINTMENT (OUTPATIENT)
Dept: CARDIOLOGY | Facility: CLINIC | Age: 25
End: 2020-07-10
Payer: COMMERCIAL

## 2020-07-10 PROCEDURE — 99214 OFFICE O/P EST MOD 30 MIN: CPT | Mod: 95

## 2020-07-10 NOTE — HISTORY OF PRESENT ILLNESS
[Home] : at home, [unfilled] , at the time of the visit. [Verbal consent obtained from patient] : the patient, [unfilled] [Medical Office: ([facility])___] : at the medical office located in  [FreeTextEntry1] : Initiated at 2:20 PM.\par \par \par 25 yo F with hx of pericardial effusion due to coxsackie virus Jan '20, \par GERD, cerebral palsy s/p trach and PEG, seizures, \par asthma and scoliosis here for followup.\par \par Since last visit, admitted to [facility].\par Diagnosed w/ sepsis, sinus bradycardia in 40-50s.\par Discharged after 1 week stay.\par Since discharge, HRs have increasd to the 60-90s resting.\par \par Pt cannot provide history.  Mother states pt is weak after\par prolonged hospital course & is working w/ physical therapy.\par \par \par H/o pericardial effusion:\par admitted Jan 24th for pneumonia & pleural effusion. CT scan \par reported large pericardial effusion. \par Echo showed moderate to large pericardial effusion w/ no tamponade physiology. \par Admitted for 4 days, repeat echo showed slight decrease in pericardial effusion \par w/o signs of tamponade physiology. Discharged. \par Pt cannot provide history due to cerebral palsy.\par Two echos afer discharge show no evidence of pericardial effusion.\par \par \par \par \par  [FreeTextEntry4] : Hanny Haney MA

## 2020-07-10 NOTE — ASSESSMENT
[FreeTextEntry1] : A/P:\par \par Pericardial effusion due to coxsackie virus Jan '20.\par No evidence of pericardial effusion on multiple serial echos since then.\par colchicine now d/alison as >6 months postpericardial effusion.\par \par sinus bradycardia\par may have been related to dilantin v. infection.\par no further workup indicated.\par \par Pt may followup w/ primary care provider, \par no further followup w/ cardiology needed.\par Pt's family should call us for any worsening bradycardia, low BP, dyspnea or chest pain.

## 2020-08-03 ENCOUNTER — OUTPATIENT (OUTPATIENT)
Dept: OUTPATIENT SERVICES | Age: 25
LOS: 1 days | End: 2020-08-03
Payer: COMMERCIAL

## 2020-08-03 ENCOUNTER — RESULT REVIEW (OUTPATIENT)
Age: 25
End: 2020-08-03

## 2020-08-03 DIAGNOSIS — Z93.0 TRACHEOSTOMY STATUS: Chronic | ICD-10-CM

## 2020-08-03 DIAGNOSIS — K21.9 GASTRO-ESOPHAGEAL REFLUX DISEASE WITHOUT ESOPHAGITIS: ICD-10-CM

## 2020-08-03 PROCEDURE — 49452 REPLACE G-J TUBE PERC: CPT

## 2020-08-14 DIAGNOSIS — Z46.59 ENCOUNTER FOR FITTING AND ADJUSTMENT OF OTHER GASTROINTESTINAL APPLIANCE AND DEVICE: ICD-10-CM

## 2020-08-21 ENCOUNTER — APPOINTMENT (OUTPATIENT)
Dept: NEUROLOGY | Facility: CLINIC | Age: 25
End: 2020-08-21
Payer: COMMERCIAL

## 2020-08-21 VITALS
HEIGHT: 55 IN | DIASTOLIC BLOOD PRESSURE: 50 MMHG | WEIGHT: 80 LBS | TEMPERATURE: 98 F | BODY MASS INDEX: 18.52 KG/M2 | SYSTOLIC BLOOD PRESSURE: 81 MMHG | HEART RATE: 71 BPM

## 2020-08-21 PROCEDURE — 99214 OFFICE O/P EST MOD 30 MIN: CPT

## 2020-08-21 PROCEDURE — 99204 OFFICE O/P NEW MOD 45 MIN: CPT

## 2020-08-21 NOTE — CONSULT LETTER
[Dear  ___] : Dear  [unfilled], [Consult Letter:] : I had the pleasure of evaluating your patient, [unfilled]. [Please see my note below.] : Please see my note below. [Consult Closing:] : Thank you very much for allowing me to participate in the care of this patient.  If you have any questions, please do not hesitate to contact me. [FreeTextEntry2] : Letha Matos [FreeTextEntry3] : Sincerely,\par \par \par Stefany Villareal MD\par Diplomate, American Academy of Psychiatry and Neurology\par Board Certified in the Subspecialty of Clinical Neurophysiology\par Board Certified in the Subspecialty of Sleep Medicine\par Board Certified in the Subspecialty of Epilepsy\par  [DrSabi  ___] : Dr. MORGAN normal...

## 2020-08-21 NOTE — HISTORY OF PRESENT ILLNESS
[FreeTextEntry1] : Shira Clark is accompanied today by her mother for neurological evaluation.\par She has been followed by Dr. Matos.\par She was previously followed by pediatric neurologist, Dr. Faustin.\par \par Her mother reports that she has had increasing frequency of seizures.\par \par Her mother describes her seizures as a scream followed by turning to the side (either side) with stiffening of her body lasting for about 15-30 seconds.\par These tend to occur at night between 1 AM - 7 AM.\par Over the weekend she had a cluster of three events as described above.\par These events were not captured on her most recent routine EEG at [facility].\par \par She also has episodes of stiffening at night.\par Her mother suspects additional seizures because she has episodes of being extremely sleepy in the morning.\par \par \par She is on a ventilator at night.\par Her oxygen levels have been normal.\par They do notice some tachycardia.\par \par Her Keppra level used to run \par \par In May 2019 her diet was changed to a different formula with protein supplements.\par Starting in September 2019 her Keppra levels started to drop to about 15 when they had previously been around 25.\par \par Her current dose of Keppra is 600 mg in the AM and 700 mg at night.\par Prior to this she was on Keppra 500 mg BID for several years with fairly good control of her seizures.\par \par Earlier in 2020 she was hospitalized with cocksackie virus and a pericardial effusion.\par After discharge from [facility] she was admitted at [facility] for pneumonia.\par Since this time she has been "off". Her mother and caregiver have noticed new "neurological activity". she is more jittery, anxious and seems to startle. She has also noticed increased staring spells.\par \par Since this time Keppra has been increased without any effect.\par Dilantin was added and it was increased she continued to get worse and was eventually admitted to [facility] in late June.\par She was born full term by vaginal delivery without any complications.\par \par Seizure history:\par First seizure occurred at the age of 18 months with status epilepticus\par She has had two convulsions in her lifetime, both which occurred in the setting of fever. The last one occurred at about age 7.\par \par Previous medication trials:\par Trileptal -> rash\par Depakote -> hypersomnolence\par \par She has had a tracheostomy since June 2012 when she had sepsis and respiratory failure. \par She has a history of cardiac arrest in March of 2013. she was found by parents with a pulse ox of 30 and bradycardia. \par \par She has a history of asthma, Cerebral palsy, developmental delay, esophageal reflux, Gastrostomy tube, hydronephrosis, hypotonia, kyphosis, scoliosis and seizure disorder. She also has a tracheostomy.

## 2020-08-21 NOTE — DISCUSSION/SUMMARY
[FreeTextEntry1] : Ms. Clark is a 25 year old woman with cerebral palsy, intellectual disability and epilepsy.\par She had a long period of relative seizure freedom with a dose of Keppra 500 mg BID.\par However, over the last year she has had increased episodes suspicious for seizures in the setting of decreased Keppra levels.\par Her levels remain in the therapeutic range at ~15 but are lower than they previously were at ~ 25. The lower levels have persisted despite increase in dosage other than one level of 39 during hospitalization in June.\par \par -Follow-up repeat Keppra level performed earlier this week\par -She is now on brand Keppra. If recent level remains low, will have repeat level done in 2-3 weeks on brand Keppra\par -Will get 24 hour ambulatory EEG to capture nocturnal events\par -If Keppra level is back to ~25 and seizures are persisting, will add clobazam 5 mg at bedtime which can be increased up to 10 mg as needed. Since she is on the ventilator at night, apnea should not be a problem.\par -We also discussed addition of Lamotrigine but I am concerned that she may have an allergy given the history of rash with Trileptal.\par -Other options can include Topamax or Zonisamide if necessary.\par \par I will follow up with Shira and her mother after the 24 hour EEG, sooner if needed.\par \par

## 2020-08-21 NOTE — DATA REVIEWED
[de-identified] : Routine EEG 6/24/20:\par -Jerking of both arms did not correlate with ictal pattern on EEG\par -Occasional sharp wave discharges distributed in the left frontocentral (max F3/C3), at times with field extending to the left parietal and temporal regions.\par -Moderate generalized slowing\par -Diffuse excess beta activity\par \par Impression/Clinical correlate\par 1. POtential epileptogenic focus int he left frontocentral region\par 2. moderate nonspecific diffuse or multifocal cerebral dysfunction\par 3. No seizure seen\par 4. diffuse excess beta activity may be seen with medication use such as benzodiazepines or barbiturates\par \par \par \par \par routine EEG 10/16/17: Moderate generalized background slowing and disorganization.\par Video EEG 8/6/2016: \par 1. Rare sharps, right and left fronto-temporal\par 2. Intermittent polymorphic delta, bilateral fronto-temporal\par 3. Generalized background slowing\par \par \par  [de-identified] : Keppra levels\par 7/25/20: 15.6\par 6/20/20: 39.6 (while in hospital)\par 6/11/20: 15.5\par 9/26/19: 15.4\par 5/6/19: 24\par 1/18/19: 25.6\par \par \par CT brain 10/13/17:\par No acute intracranial findings.  The ventricles, sulci and basal cisterns \par are dilated, compatible generalized cerebral volume loss, as seen on \par prior exams.

## 2020-08-21 NOTE — PHYSICAL EXAM
[FreeTextEntry1] : Examination:\par Constitutional: normal, no apparent distress\par Eyes: normal conjunctiva b/l, no ptosis\par Respiratory: no respiratory distress, normal effort, normal auscultation\par Cardiovascular: normal rate, rhythm, no murmurs\par Neck: tracheostomy in place\par Skin: normal color, no rashes\par Psych: unable to assess\par \par Neurological:\par Memory: unable to assess\par Language: nonverbal\par Cranial Nerves:  Pupils equally round and reactive to light, ocular muscles/movements intact, no ptosis, symmetric nasolabial fold\par Motor: increased tone in all four extremities\par Coordination: impaired\par Sensory: unable to assess\par DTRs: symmetric, 1 in b/l triceps, 1 in b/l biceps, 1 in b/l brachioradialis, 1 in bilateral patellars\par Gait: non-ambulatory, in wheelchair\par \par

## 2020-08-21 NOTE — REVIEW OF SYSTEMS
[Feeling Tired] : feeling tired [As Noted in HPI] : as noted in HPI [Sleep Disturbances] : sleep disturbances [Negative] : Musculoskeletal

## 2020-08-27 ENCOUNTER — APPOINTMENT (OUTPATIENT)
Dept: NEUROLOGY | Facility: CLINIC | Age: 25
End: 2020-08-27
Payer: COMMERCIAL

## 2020-08-27 PROCEDURE — 95816 EEG AWAKE AND DROWSY: CPT

## 2020-08-31 ENCOUNTER — APPOINTMENT (OUTPATIENT)
Dept: NEUROLOGY | Facility: CLINIC | Age: 25
End: 2020-08-31
Payer: COMMERCIAL

## 2020-08-31 PROCEDURE — 95721 EEG PHY/QHP>36<60 HR W/O VID: CPT

## 2020-08-31 PROCEDURE — 95708 EEG WO VID EA 12-26HR UNMNTR: CPT

## 2020-09-09 RX ORDER — TOBRAMYCIN SULFATE 40 MG/ML
5 VIAL (ML) INJECTION
Qty: 0 | Refills: 0 | DISCHARGE
Start: 2020-09-09 | End: 2020-10-06

## 2020-09-18 RX ORDER — AZTREONAM 2 G
1 VIAL (EA) INJECTION
Qty: 0 | Refills: 0 | DISCHARGE
Start: 2020-09-18 | End: 2020-09-24

## 2020-09-24 ENCOUNTER — APPOINTMENT (OUTPATIENT)
Dept: NEUROLOGY | Facility: CLINIC | Age: 25
End: 2020-09-24
Payer: COMMERCIAL

## 2020-09-24 DIAGNOSIS — F79 UNSPECIFIED INTELLECTUAL DISABILITIES: ICD-10-CM

## 2020-09-24 PROCEDURE — 99213 OFFICE O/P EST LOW 20 MIN: CPT | Mod: 95

## 2020-09-24 NOTE — DATA REVIEWED
[de-identified] : Routine EEG 6/24/20:\par -Jerking of both arms did not correlate with ictal pattern on EEG\par -Occasional sharp wave discharges distributed in the left frontocentral (max F3/C3), at times with field extending to the left parietal and temporal regions.\par -Moderate generalized slowing\par -Diffuse excess beta activity\par \par Impression/Clinical correlate\par 1. POtential epileptogenic focus int he left frontocentral region\par 2. moderate nonspecific diffuse or multifocal cerebral dysfunction\par 3. No seizure seen\par 4. diffuse excess beta activity may be seen with medication use such as benzodiazepines or barbiturates\par \par \par \par \par routine EEG 10/16/17: Moderate generalized background slowing and disorganization.\par Video EEG 8/6/2016: \par 1. Rare sharps, right and left fronto-temporal\par 2. Intermittent polymorphic delta, bilateral fronto-temporal\par 3. Generalized background slowing\par \par EEG 8/27/20: This was an abnormal EEG study in the awake and drowsy states due to the presence of:\par -Mild to moderate generalized slowing\par -Multifocal epileptiform discharges\par -Less frequent diffuse/generalized epileptiform discharges.\par \par EEG Impression/Clinical Correlate:\par The findings are consistent with diffuse cerebral dysfunction and a risk for seizures. The EEG findings along with the clinical picture is suggestive of symptomatic generalized epilepsy.\par \par 48 hour ambulatory EEG 8/27/20-8/29/20:\par EEG Summary/Classification:\par This was a markedly abnormal EEG study in the awake, drowsy, and asleep states due to the presence of:\par Mild to moderate generalized slowing\par Disorganized background\par  Multifocal epileptiform discharges are seen including:\par -Left fronto-central sharp waves\par -Left central-parietal\par -Sharp waves seen over the right posterior quadrant, at times with a posterior-temporal predominance\par -Sharp waves that are seen broadly and independently over the left or right hemisphere\par -Diffuse spikes and wave discharges, at time with shifting predominance\par \par Multiple clinical events are reported, some with possible electrographic correlate of rhythmic or fast activity over the central region.\par \par \par EEG Impression/Clinical Correlate:\par The EEG is most suggestive of symptomatic generalized epilepsy. \par Multiple events are captured with subtle background changes but electrographic seizures are not clearly identified.\par \par  [de-identified] : Keppra levels\par 9/10/20: 38.5\par 8/21/20: 21\par 7/25/20: 15.6\par 6/20/20: 39.6 (while in hospital)\par 6/11/20: 15.5\par 9/26/19: 15.4\par 5/6/19: 24\par 1/18/19: 25.6\par \par \par CT brain 10/13/17:\par No acute intracranial findings.  The ventricles, sulci and basal cisterns \par are dilated, compatible generalized cerebral volume loss, as seen on \par prior exams.

## 2020-09-24 NOTE — HISTORY OF PRESENT ILLNESS
[Home] : at home, [unfilled] , at the time of the visit. [Medical Office: ([facility])___] : at the medical office located in  [FreeTextEntry3] : Tania Paino [FreeTextEntry4] : Mother [FreeTextEntry1] : \par This is a telehealth visit that was performed with the originating site at the patient’s home and the distant site of my office at [address] [place] [address].\par Two way audio and visual technology was used. \par Verbal consent to participate in the telephone/video visit was obtained in lieu of in-person signature due to the coronavirus emergency.\par This particular visit occurred during the 2020 COVID-19 emergency. I discussed with the patient the nature of our telehealth visits, that:\par -I would evaluate the patient and recommend diagnostics and treatments based on my assessment.\par -Our sessions are not being recorded.\par -The patient acknowledged the risk of unsecure transmission of his/her information.\par -Our team would provide follow up care in person if/when the patient needs it.\par \par I last saw Ms. Clark on 8/21/20.\par \par Her mother reports that things are more stable with the brand name of Keppra.\par She did not try the melatonin because she is sleeping better overall.\par Her mother and caregiver are not noticing as much activity at night.\par She occasionally has a back spasm.\par She is much more alert during the day.\par \par

## 2020-09-24 NOTE — PHYSICAL EXAM
[FreeTextEntry1] : Exam:\par Limited exam due to video visit and patient's condition.\par Awake and alert.\par Tracheostomy in place\par Language: non-verbal\par Makes eye contact over computer. Smiles slightly\par no obvious facial weakness\par Decreased movement in extremities\par Gait: in wheelchair, non-ambulatory

## 2020-09-24 NOTE — PROCEDURE
[FreeTextEntry1] : Ms. Clark is a 25 year old woman with cerebral palsy, intellectual disability and epilepsy.\par She had a long period of relative seizure freedom with a dose of Keppra 500 mg BID.\par However, over the last year she has had increased episodes suspicious for seizures in the setting of decreased Keppra levels.\par Her levels remain in the therapeutic range at ~15 but are lower than they previously were at ~ 25. The lower levels have persisted despite increase in dosage other than one level of 39 during hospitalization in June.\par \par Since changing to brand name Keppra her level has increased to

## 2020-09-24 NOTE — CONSULT LETTER
[Dear  ___] : Dear  [unfilled], [Consult Letter:] : I had the pleasure of evaluating your patient, [unfilled]. [Please see my note below.] : Please see my note below. [Consult Closing:] : Thank you very much for allowing me to participate in the care of this patient.  If you have any questions, please do not hesitate to contact me. [FreeTextEntry2] : Letha Matos [FreeTextEntry3] : Sincerely,\par \par \par Stefany Villareal MD\par Diplomate, American Academy of Psychiatry and Neurology\par Board Certified in the Subspecialty of Clinical Neurophysiology\par Board Certified in the Subspecialty of Sleep Medicine\par Board Certified in the Subspecialty of Epilepsy\par  [DrSabi  ___] : Dr. MORGAN

## 2020-09-24 NOTE — DISCUSSION/SUMMARY
[FreeTextEntry1] : Ms. Clark is a 25 year old woman with cerebral palsy, intellectual disability and epilepsy.\par She had a long period of relative seizure freedom with a dose of Keppra 500 mg BID.\par However, over the last year she has had increased episodes suspicious for seizures in the setting of decreased Keppra levels.\par Her levels remain in the therapeutic range at ~15 but are lower than they previously were at ~ 25. The lower levels have persisted despite increase in dosage other than one level of 39 during hospitalization in June.\par \par -She is now doing much better with a higher level of Keppra\par -Level has increased to 38.5\par -Sleep and daytime alertness is improved.\par -Continue Keppra (brand named) at dose of 6 ml in the AM (600 mg) and 7 ml at night (700 mg)\par -Prior auth has been obtained through Dr. Matos's office.\par -Will not add additional medications at this time.\par -If family notices worsening of seizures/sleep, would check Keppra level. Will send referral so that they have it if needed.\par \par Her mother will call with any questions or concerns and she will follow up as needed.

## 2020-09-29 ENCOUNTER — RESULT REVIEW (OUTPATIENT)
Age: 25
End: 2020-09-29

## 2020-09-29 ENCOUNTER — OUTPATIENT (OUTPATIENT)
Dept: OUTPATIENT SERVICES | Age: 25
LOS: 1 days | End: 2020-09-29
Payer: COMMERCIAL

## 2020-09-29 DIAGNOSIS — Z93.0 TRACHEOSTOMY STATUS: Chronic | ICD-10-CM

## 2020-09-29 DIAGNOSIS — K21.9 GASTRO-ESOPHAGEAL REFLUX DISEASE WITHOUT ESOPHAGITIS: ICD-10-CM

## 2020-09-29 PROCEDURE — 49452 REPLACE G-J TUBE PERC: CPT

## 2020-10-01 ENCOUNTER — INPATIENT (INPATIENT)
Facility: HOSPITAL | Age: 25
LOS: 6 days | Discharge: HOME HEALTH | DRG: 870 | End: 2020-10-08
Attending: INTERNAL MEDICINE | Admitting: INTERNAL MEDICINE
Payer: COMMERCIAL

## 2020-10-01 VITALS — HEIGHT: 55 IN | WEIGHT: 110.01 LBS

## 2020-10-01 DIAGNOSIS — J18.9 PNEUMONIA, UNSPECIFIED ORGANISM: ICD-10-CM

## 2020-10-01 DIAGNOSIS — Z93.0 TRACHEOSTOMY STATUS: Chronic | ICD-10-CM

## 2020-10-01 LAB
ALBUMIN SERPL ELPH-MCNC: 3 G/DL — LOW (ref 3.3–5)
ALP SERPL-CCNC: 149 U/L — HIGH (ref 40–120)
ALT FLD-CCNC: 27 U/L — SIGNIFICANT CHANGE UP (ref 12–78)
ANION GAP SERPL CALC-SCNC: 5 MMOL/L — SIGNIFICANT CHANGE UP (ref 5–17)
APPEARANCE UR: CLEAR — SIGNIFICANT CHANGE UP
APTT BLD: 36.3 SEC — HIGH (ref 27.5–35.5)
AST SERPL-CCNC: 17 U/L — SIGNIFICANT CHANGE UP (ref 15–37)
BASOPHILS # BLD AUTO: 0.01 K/UL — SIGNIFICANT CHANGE UP (ref 0–0.2)
BASOPHILS NFR BLD AUTO: 0.1 % — SIGNIFICANT CHANGE UP (ref 0–2)
BILIRUB SERPL-MCNC: 0.2 MG/DL — SIGNIFICANT CHANGE UP (ref 0.2–1.2)
BILIRUB UR-MCNC: NEGATIVE — SIGNIFICANT CHANGE UP
BUN SERPL-MCNC: 11 MG/DL — SIGNIFICANT CHANGE UP (ref 7–23)
CALCIUM SERPL-MCNC: 8.9 MG/DL — SIGNIFICANT CHANGE UP (ref 8.5–10.1)
CHLORIDE SERPL-SCNC: 104 MMOL/L — SIGNIFICANT CHANGE UP (ref 96–108)
CO2 SERPL-SCNC: 29 MMOL/L — SIGNIFICANT CHANGE UP (ref 22–31)
COLOR SPEC: YELLOW — SIGNIFICANT CHANGE UP
CREAT SERPL-MCNC: 0.29 MG/DL — LOW (ref 0.5–1.3)
DIFF PNL FLD: NEGATIVE — SIGNIFICANT CHANGE UP
EOSINOPHIL # BLD AUTO: 0.02 K/UL — SIGNIFICANT CHANGE UP (ref 0–0.5)
EOSINOPHIL NFR BLD AUTO: 0.2 % — SIGNIFICANT CHANGE UP (ref 0–6)
GLUCOSE SERPL-MCNC: 71 MG/DL — SIGNIFICANT CHANGE UP (ref 70–99)
GLUCOSE UR QL: NEGATIVE MG/DL — SIGNIFICANT CHANGE UP
HCT VFR BLD CALC: 40 % — SIGNIFICANT CHANGE UP (ref 34.5–45)
HGB BLD-MCNC: 12.8 G/DL — SIGNIFICANT CHANGE UP (ref 11.5–15.5)
IMM GRANULOCYTES NFR BLD AUTO: 0.1 % — SIGNIFICANT CHANGE UP (ref 0–1.5)
INR BLD: 1.05 RATIO — SIGNIFICANT CHANGE UP (ref 0.88–1.16)
KETONES UR-MCNC: NEGATIVE — SIGNIFICANT CHANGE UP
LACTATE SERPL-SCNC: 0.5 MMOL/L — LOW (ref 0.7–2)
LEUKOCYTE ESTERASE UR-ACNC: NEGATIVE — SIGNIFICANT CHANGE UP
LYMPHOCYTES # BLD AUTO: 1.81 K/UL — SIGNIFICANT CHANGE UP (ref 1–3.3)
LYMPHOCYTES # BLD AUTO: 18.5 % — SIGNIFICANT CHANGE UP (ref 13–44)
MCHC RBC-ENTMCNC: 32 GM/DL — SIGNIFICANT CHANGE UP (ref 32–36)
MCHC RBC-ENTMCNC: 32.7 PG — SIGNIFICANT CHANGE UP (ref 27–34)
MCV RBC AUTO: 102.3 FL — HIGH (ref 80–100)
MONOCYTES # BLD AUTO: 0.34 K/UL — SIGNIFICANT CHANGE UP (ref 0–0.9)
MONOCYTES NFR BLD AUTO: 3.5 % — SIGNIFICANT CHANGE UP (ref 2–14)
NEUTROPHILS # BLD AUTO: 7.62 K/UL — HIGH (ref 1.8–7.4)
NEUTROPHILS NFR BLD AUTO: 77.6 % — HIGH (ref 43–77)
NITRITE UR-MCNC: NEGATIVE — SIGNIFICANT CHANGE UP
PH UR: 8 — SIGNIFICANT CHANGE UP (ref 5–8)
PLATELET # BLD AUTO: 166 K/UL — SIGNIFICANT CHANGE UP (ref 150–400)
POTASSIUM SERPL-MCNC: 3.8 MMOL/L — SIGNIFICANT CHANGE UP (ref 3.5–5.3)
POTASSIUM SERPL-SCNC: 3.8 MMOL/L — SIGNIFICANT CHANGE UP (ref 3.5–5.3)
PROT SERPL-MCNC: 6.9 GM/DL — SIGNIFICANT CHANGE UP (ref 6–8.3)
PROT UR-MCNC: NEGATIVE MG/DL — SIGNIFICANT CHANGE UP
PROTHROM AB SERPL-ACNC: 12.2 SEC — SIGNIFICANT CHANGE UP (ref 10.6–13.6)
RBC # BLD: 3.91 M/UL — SIGNIFICANT CHANGE UP (ref 3.8–5.2)
RBC # FLD: 11.8 % — SIGNIFICANT CHANGE UP (ref 10.3–14.5)
SARS-COV-2 RNA SPEC QL NAA+PROBE: SIGNIFICANT CHANGE UP
SODIUM SERPL-SCNC: 138 MMOL/L — SIGNIFICANT CHANGE UP (ref 135–145)
SP GR SPEC: 1.01 — SIGNIFICANT CHANGE UP (ref 1.01–1.02)
UROBILINOGEN FLD QL: NEGATIVE MG/DL — SIGNIFICANT CHANGE UP
WBC # BLD: 9.81 K/UL — SIGNIFICANT CHANGE UP (ref 3.8–10.5)
WBC # FLD AUTO: 9.81 K/UL — SIGNIFICANT CHANGE UP (ref 3.8–10.5)

## 2020-10-01 PROCEDURE — 82728 ASSAY OF FERRITIN: CPT

## 2020-10-01 PROCEDURE — 97162 PT EVAL MOD COMPLEX 30 MIN: CPT | Mod: GP

## 2020-10-01 PROCEDURE — 94003 VENT MGMT INPAT SUBQ DAY: CPT

## 2020-10-01 PROCEDURE — 84145 PROCALCITONIN (PCT): CPT

## 2020-10-01 PROCEDURE — 85027 COMPLETE CBC AUTOMATED: CPT

## 2020-10-01 PROCEDURE — 86769 SARS-COV-2 COVID-19 ANTIBODY: CPT

## 2020-10-01 PROCEDURE — 93308 TTE F-UP OR LMTD: CPT

## 2020-10-01 PROCEDURE — 87186 SC STD MICRODIL/AGAR DIL: CPT

## 2020-10-01 PROCEDURE — 94760 N-INVAS EAR/PLS OXIMETRY 1: CPT

## 2020-10-01 PROCEDURE — 87070 CULTURE OTHR SPECIMN AEROBIC: CPT

## 2020-10-01 PROCEDURE — 82533 TOTAL CORTISOL: CPT

## 2020-10-01 PROCEDURE — 80202 ASSAY OF VANCOMYCIN: CPT

## 2020-10-01 PROCEDURE — 84439 ASSAY OF FREE THYROXINE: CPT

## 2020-10-01 PROCEDURE — 71045 X-RAY EXAM CHEST 1 VIEW: CPT | Mod: 26

## 2020-10-01 PROCEDURE — 99223 1ST HOSP IP/OBS HIGH 75: CPT | Mod: GC

## 2020-10-01 PROCEDURE — 94640 AIRWAY INHALATION TREATMENT: CPT

## 2020-10-01 PROCEDURE — 94002 VENT MGMT INPAT INIT DAY: CPT

## 2020-10-01 PROCEDURE — 84481 FREE ASSAY (FT-3): CPT

## 2020-10-01 PROCEDURE — 93010 ELECTROCARDIOGRAM REPORT: CPT

## 2020-10-01 PROCEDURE — 36415 COLL VENOUS BLD VENIPUNCTURE: CPT

## 2020-10-01 PROCEDURE — 83735 ASSAY OF MAGNESIUM: CPT

## 2020-10-01 PROCEDURE — 80048 BASIC METABOLIC PNL TOTAL CA: CPT

## 2020-10-01 PROCEDURE — 83605 ASSAY OF LACTIC ACID: CPT

## 2020-10-01 PROCEDURE — 97530 THERAPEUTIC ACTIVITIES: CPT | Mod: GP

## 2020-10-01 PROCEDURE — 82607 VITAMIN B-12: CPT

## 2020-10-01 PROCEDURE — 84443 ASSAY THYROID STIM HORMONE: CPT

## 2020-10-01 PROCEDURE — 84100 ASSAY OF PHOSPHORUS: CPT

## 2020-10-01 PROCEDURE — 71045 X-RAY EXAM CHEST 1 VIEW: CPT

## 2020-10-01 RX ORDER — LEVALBUTEROL 1.25 MG/.5ML
1.25 SOLUTION, CONCENTRATE RESPIRATORY (INHALATION)
Refills: 0 | Status: DISCONTINUED | OUTPATIENT
Start: 2020-10-01 | End: 2020-10-01

## 2020-10-01 RX ORDER — SODIUM CHLORIDE 0.65 %
1 AEROSOL, SPRAY (ML) NASAL
Refills: 0 | Status: DISCONTINUED | OUTPATIENT
Start: 2020-10-01 | End: 2020-10-08

## 2020-10-01 RX ORDER — BUDESONIDE, MICRONIZED 100 %
2 POWDER (GRAM) MISCELLANEOUS
Qty: 0 | Refills: 0 | DISCHARGE

## 2020-10-01 RX ORDER — LACTULOSE 10 G/15ML
15 SOLUTION ORAL
Qty: 0 | Refills: 0 | DISCHARGE

## 2020-10-01 RX ORDER — ALBUTEROL 90 UG/1
2 AEROSOL, METERED ORAL EVERY 4 HOURS
Refills: 0 | Status: DISCONTINUED | OUTPATIENT
Start: 2020-10-01 | End: 2020-10-08

## 2020-10-01 RX ORDER — ALBUTEROL 90 UG/1
2.5 AEROSOL, METERED ORAL ONCE
Refills: 0 | Status: DISCONTINUED | OUTPATIENT
Start: 2020-10-01 | End: 2020-10-01

## 2020-10-01 RX ORDER — SODIUM CHLORIDE 9 MG/ML
1500 INJECTION, SOLUTION INTRAVENOUS ONCE
Refills: 0 | Status: COMPLETED | OUTPATIENT
Start: 2020-10-01 | End: 2020-10-01

## 2020-10-01 RX ORDER — MEROPENEM 1 G/30ML
1000 INJECTION INTRAVENOUS ONCE
Refills: 0 | Status: COMPLETED | OUTPATIENT
Start: 2020-10-01 | End: 2020-10-01

## 2020-10-01 RX ORDER — FLUTICASONE PROPIONATE 50 MCG
1 SPRAY, SUSPENSION NASAL
Refills: 0 | Status: DISCONTINUED | OUTPATIENT
Start: 2020-10-01 | End: 2020-10-08

## 2020-10-01 RX ORDER — CALCIUM CARBONATE 500(1250)
2.5 TABLET ORAL
Qty: 0 | Refills: 0 | DISCHARGE

## 2020-10-01 RX ORDER — SODIUM CHLORIDE 9 MG/ML
2 INJECTION INTRAMUSCULAR; INTRAVENOUS; SUBCUTANEOUS
Refills: 0 | Status: DISCONTINUED | OUTPATIENT
Start: 2020-10-01 | End: 2020-10-01

## 2020-10-01 RX ORDER — LEVETIRACETAM 250 MG/1
700 TABLET, FILM COATED ORAL AT BEDTIME
Refills: 0 | Status: DISCONTINUED | OUTPATIENT
Start: 2020-10-01 | End: 2020-10-08

## 2020-10-01 RX ORDER — LEVETIRACETAM 250 MG/1
7 TABLET, FILM COATED ORAL
Qty: 0 | Refills: 0 | DISCHARGE

## 2020-10-01 RX ORDER — IBUPROFEN 200 MG
10 TABLET ORAL
Qty: 0 | Refills: 0 | DISCHARGE

## 2020-10-01 RX ORDER — LEVETIRACETAM 250 MG/1
600 TABLET, FILM COATED ORAL DAILY
Refills: 0 | Status: DISCONTINUED | OUTPATIENT
Start: 2020-10-01 | End: 2020-10-08

## 2020-10-01 RX ORDER — POTASSIUM CHLORIDE 20 MEQ
10 PACKET (EA) ORAL
Refills: 0 | Status: DISCONTINUED | OUTPATIENT
Start: 2020-10-01 | End: 2020-10-08

## 2020-10-01 RX ORDER — POTASSIUM CHLORIDE 20 MEQ
10 PACKET (EA) ORAL
Refills: 0 | Status: DISCONTINUED | OUTPATIENT
Start: 2020-10-01 | End: 2020-10-01

## 2020-10-01 RX ORDER — FLUTICASONE PROPIONATE 50 MCG
1 SPRAY, SUSPENSION NASAL
Qty: 0 | Refills: 0 | DISCHARGE

## 2020-10-01 RX ORDER — L.ACIDOPH/B.ANIMALIS/B.LONGUM 15B CELL
2.5 CAPSULE ORAL
Qty: 0 | Refills: 0 | DISCHARGE

## 2020-10-01 RX ORDER — VANCOMYCIN HCL 1 G
750 VIAL (EA) INTRAVENOUS ONCE
Refills: 0 | Status: COMPLETED | OUTPATIENT
Start: 2020-10-01 | End: 2020-10-01

## 2020-10-01 RX ORDER — VANCOMYCIN HCL 1 G
1000 VIAL (EA) INTRAVENOUS ONCE
Refills: 0 | Status: DISCONTINUED | OUTPATIENT
Start: 2020-10-01 | End: 2020-10-01

## 2020-10-01 RX ORDER — EPINEPHRINE 0.3 MG/.3ML
1 INJECTION INTRAMUSCULAR; SUBCUTANEOUS
Qty: 0 | Refills: 0 | DISCHARGE

## 2020-10-01 RX ORDER — ACETAMINOPHEN 500 MG
650 TABLET ORAL EVERY 6 HOURS
Refills: 0 | Status: DISCONTINUED | OUTPATIENT
Start: 2020-10-01 | End: 2020-10-08

## 2020-10-01 RX ORDER — ALBUTEROL 90 UG/1
2 AEROSOL, METERED ORAL EVERY 4 HOURS
Refills: 0 | Status: COMPLETED | OUTPATIENT
Start: 2020-10-01 | End: 2021-08-30

## 2020-10-01 RX ORDER — TOBRAMYCIN SULFATE 40 MG/ML
300 VIAL (ML) INJECTION EVERY 12 HOURS
Refills: 0 | Status: DISCONTINUED | OUTPATIENT
Start: 2020-10-01 | End: 2020-10-06

## 2020-10-01 RX ORDER — PANTOPRAZOLE SODIUM 20 MG/1
20 TABLET, DELAYED RELEASE ORAL DAILY
Refills: 0 | Status: DISCONTINUED | OUTPATIENT
Start: 2020-10-01 | End: 2020-10-08

## 2020-10-01 RX ORDER — DIAZEPAM 5 MG
10 TABLET ORAL DAILY
Refills: 0 | Status: DISCONTINUED | OUTPATIENT
Start: 2020-10-01 | End: 2020-10-01

## 2020-10-01 RX ORDER — SUCRALFATE 1 G
10 TABLET ORAL
Qty: 0 | Refills: 0 | DISCHARGE

## 2020-10-01 RX ORDER — COLISTIMETHATE SODIUM 150 MG/2ML
150 INJECTION INTRAMUSCULAR; INTRAVENOUS
Refills: 0 | Status: DISCONTINUED | OUTPATIENT
Start: 2020-10-01 | End: 2020-10-01

## 2020-10-01 RX ORDER — BUDESONIDE, MICRONIZED 100 %
0.5 POWDER (GRAM) MISCELLANEOUS EVERY 12 HOURS
Refills: 0 | Status: DISCONTINUED | OUTPATIENT
Start: 2020-10-01 | End: 2020-10-01

## 2020-10-01 RX ORDER — CIPROFLOXACIN AND DEXAMETHASONE 3; 1 MG/ML; MG/ML
2 SUSPENSION/ DROPS AURICULAR (OTIC)
Qty: 0 | Refills: 0 | DISCHARGE

## 2020-10-01 RX ORDER — FLUTICASONE PROPIONATE 220 MCG
1 AEROSOL WITH ADAPTER (GRAM) INHALATION
Refills: 0 | Status: DISCONTINUED | OUTPATIENT
Start: 2020-10-01 | End: 2020-10-08

## 2020-10-01 RX ORDER — FEXOFENADINE HCL 30 MG
5 TABLET ORAL
Qty: 0 | Refills: 0 | DISCHARGE

## 2020-10-01 RX ORDER — MEROPENEM 1 G/30ML
500 INJECTION INTRAVENOUS ONCE
Refills: 0 | Status: DISCONTINUED | OUTPATIENT
Start: 2020-10-01 | End: 2020-10-01

## 2020-10-01 RX ORDER — CHOLECALCIFEROL (VITAMIN D3) 125 MCG
400 CAPSULE ORAL DAILY
Refills: 0 | Status: DISCONTINUED | OUTPATIENT
Start: 2020-10-01 | End: 2020-10-08

## 2020-10-01 RX ORDER — SODIUM CHLORIDE 9 MG/ML
2 INJECTION INTRAMUSCULAR; INTRAVENOUS; SUBCUTANEOUS
Refills: 0 | Status: DISCONTINUED | OUTPATIENT
Start: 2020-10-01 | End: 2020-10-07

## 2020-10-01 RX ADMIN — Medication 250 MILLIGRAM(S): at 19:31

## 2020-10-01 RX ADMIN — LEVETIRACETAM 700 MILLIGRAM(S): 250 TABLET, FILM COATED ORAL at 23:13

## 2020-10-01 RX ADMIN — SODIUM CHLORIDE 1500 MILLILITER(S): 9 INJECTION, SOLUTION INTRAVENOUS at 18:26

## 2020-10-01 RX ADMIN — MEROPENEM 100 MILLIGRAM(S): 1 INJECTION INTRAVENOUS at 17:44

## 2020-10-01 RX ADMIN — ALBUTEROL 2 PUFF(S): 90 AEROSOL, METERED ORAL at 17:58

## 2020-10-01 RX ADMIN — MEROPENEM 1000 MILLIGRAM(S): 1 INJECTION INTRAVENOUS at 18:15

## 2020-10-01 RX ADMIN — SODIUM CHLORIDE 3000 MILLILITER(S): 9 INJECTION, SOLUTION INTRAVENOUS at 17:25

## 2020-10-01 NOTE — H&P ADULT - NSHPPHYSICALEXAM_GEN_ALL_CORE
PHYSICAL EXAM:  Constitutional: NAD, frail appearing  HEENT: PERR, EOMI, trach collar present no noted secretions   Neck: Soft and supple, No LAD, No JVD  Respiratory: Breath sounds are clear bilaterally, No wheezing, rales or rhonchi  Cardiovascular: S1 and S2, regular rate and rhythm, no Murmurs, gallops or rubs  Gastrointestinal: Bowel Sounds present, soft, nontender, nondistended, no guarding, no rebound, PEG in place   Extremities: No peripheral edema  Vascular: 2+ peripheral pulses  Neurological: Unable to assess 2/2 baseline mentation   Musculoskeletal:  strength b/l upper and lower extremities  Skin: No rashes or ulcers PHYSICAL EXAM:  Constitutional: NAD, frail appearing, pale and lethargic   HEENT: PERR, EOMI, trach collar present no noted secretions   Neck: Soft and supple, No LAD, No JVD  Respiratory: RLL ronchi noted with mild crackles on the Left no wheezing  Cardiovascular: S1 and S2, regular rate and rhythm, no Murmurs, gallops or rubs  Gastrointestinal: Bowel Sounds present, soft, nontender, nondistended, no guarding, no rebound, PEG in place   Extremities: No peripheral edema  Vascular: 1+ peripheral pulses  Neurological: Unable to assess 2/2 baseline mentation   Musculoskeletal:  strength b/l upper and lower extremities  Skin: Left middle finger cellulitic lesion at the nail bed cuticle

## 2020-10-01 NOTE — ED PROVIDER NOTE - PROGRESS NOTE DETAILS
Oumar [place] for ED attending, Dr. Shannon: Spoke with mother, states that vancomycin "may have thrown off her Potassium". Feel conformable that she is not allergic to vancomycin, will give a dose.

## 2020-10-01 NOTE — ED PROVIDER NOTE - ENMT NEGATIVE STATEMENT, MLM
Ears: no ear pain and no hearing problems. Nose: no nasal congestion and no nasal drainage. Mouth/Throat: no dysphagia, no hoarseness and no throat pain. Neck: no lumps, no pain, no stiffness and no swollen glands. +bloody mucosa Ears: no ear pain and no hearing problems. Nose: no nasal congestion and no nasal drainage. Mouth/Throat: no dysphagia, no hoarseness and no throat pain. Neck: no lumps, no pain, no stiffness and no swollen glands.

## 2020-10-01 NOTE — H&P ADULT - ATTENDING COMMENTS
24 yo Female with a PMH significant for Cerebral Palsy and DD, GERD, Asthma, Seizure disorders, Scoliosis/Kyphosis, h/o Coxsackie Myocarditis, c/b pericarditis, Chronic Respiratory Failure s/p Trach and Peg placement. She was BIB her mother with complaints of worsening Dyspnea. Mother reports the onset as the day prior, where she began experiencing "wet coarse breath sounds" with thick yellow mucosal secretions tinged with blood, upon suctioning with multiple episodes of mucosal plugging. During that time she experienced a few episodes of  transient desaturations on RA to the 70's. Her sxs progressed the next day where she experienced more frequent and longer hypoxic episodes to the 60's, that were refractory to Chest Physiotherapy Vest and nebulizer treatments. Per her mother she became more lethargic then usual with temperatures as low as 96.5. After consulting the pt's pulmonologist a Chest XR performed revealed findings suggestive of an acute consolidation and she was sent to the ED for further management.    ROS: as above.  On exam: As above.    #Septic Shock with Acute on Chronic Hypoxic Respiratory Failure likely 2/2 Ventilator Associated PNA  -Admit to SICU  -Lactate: 0.5   -BP: 91/49mmHg; MAP 58mmHg  -CXR: RLL Consolidation on prelim reading  -Continue Trach to Vent with home vent settings qHs  -Vent Settings: AC/ CMV RR 17 PIP 15 PEEP 5 FiO2 50 QHS/ PRN and TC QD as tolerated.   -ID Consulted, f/u Recc   -Pulm consulted, F/u recc   -S/p IV Compa + Vanco in ED   -Continue IV Compa 1g q8h  -Continue IV Vanco  750 q12  -S/p 2500cc IVF Bolus in ED with refractory HypoTN  -will consult ICU    #Chronic Respiratory Failure s/p Trach + PEG  -Trach Collar in place currently vented   -Vent Settings: AC/ CMV RR 17 PIP 15 PEEP 5 FiO2 50 QHS/ PRN and TC QD as tolerated.   -Continue alternating doses of Colistimethate qD x 28d, to end on 10/6/2020  -Suction PRN   -Chest PT encouraged for desaturations   -Continue Tobramycin qD x 28d will be finished on 10/07/2020, will start Coly-Mycin on 10/8/20 for 28 days, then alternate to continue  -Pulm Consulted, F/u Recc       #Asthma  -initiate Albuterol Inhaler     #GERD  -Continue home dose of PPI   -Omeprazole 20mg qD       #Hypothermia   -Likely in the setting of ongoing Sepsis  -Temp: 95.6  -Continue with Warming Blankets  -Consider Warm IVFs with refractory cases     #Hypothyroidism   -Presented with Hypothermia    -Discontinued on Synthroid when Out pt PCP detected normalized Thyroid function  -F/u am TSH with reflex T4       #Seizure D/o   -Continue Keppra Solution 600mg qAC, 700mg qHs  -Seizure Precautions in place   -Hold Home dose of Diastat 10mg Rectal kit for active seizures  -PRN Lorazepam 1mg for active seizures q2hrs         #Advanced Directives   -Full Code per HCP  -Pt without Capacity   -HCP: Mother Tania Clark [phone]    #DVT PPX  -SCDs     Patient is seen and examined with the Resident. patient presented with sob, hypoxia and lethargy. Found to be in septic shock. Got IVF and IV antibiotics. Will consult ICU for management of Hypotension/ for septic shock.     A/P:

## 2020-10-01 NOTE — H&P ADULT - HISTORY OF PRESENT ILLNESS
25F with a PMH significant for Cerebral Palsy and DD, GERD, Asthma, Seizure disorders, Scoliosis/Kyphosis, h/o Coxsackie Myocarditis c/b pericarditis Chronic Respiratory Failure s/p Trach and Peg placement. She was BIB her mother with complaints of worsening Dyspnea. Mother reports the onset as %^$^%$ where she began experiencing      25F with a PMH significant for Cerebral Palsy and DD, GERD, Asthma, Seizure disorders, Scoliosis/Kyphosis, h/o Coxsackie Myocarditis c/b pericarditis Chronic Respiratory Failure s/p Trach and Peg placement. She was BIB her mother with complaints of worsening Dyspnea. Mother reports the onset as the day prior, where she began experiencing "wet course breath sounds" with thick mucosal secretions tinged with blood, upon suctioning. During that time she experienced a few episodes of  transient desaturations on RA to the 70's. Her sxs progressed the next day where she experienced more frequent and longer hypoxic episodes to the 60's, that were refractory to Chest Physiotherapy Vest and nebulizer treatments. Per her mother she became more lethargic then usual with temperatures as low as 96.5. After consulting the pt's pulmonologist a Chest XR performed revealed findings suggestive of an acute consolidation and she was sent to the ED for further management.       25F with a PMH significant for Cerebral Palsy and DD, GERD, Asthma, Seizure disorders, Scoliosis/Kyphosis, h/o Coxsackie Myocarditis c/b pericarditis Chronic Respiratory Failure s/p Trach and Peg placement. She was BIB her mother with complaints of worsening Dyspnea. Mother reports the onset as the day prior, where she began experiencing "wet course breath sounds" with thick yellow mucosal secretions tinged with blood, upon suctioning wih multiple episodes of mucosal plugging. During that time she experienced a few episodes of  transient desaturations on RA to the 70's. Her sxs progressed the next day where she experienced more frequent and longer hypoxic episodes to the 60's, that were refractory to Chest Physiotherapy Vest and nebulizer treatments. Per her mother she became more lethargic then usual with temperatures as low as 96.5. After consulting the pt's pulmonologist a Chest XR performed revealed findings suggestive of an acute consolidation and she was sent to the ED for further management.       24 yo Female with a PMH significant for Cerebral Palsy and DD, GERD, Asthma, Seizure disorders, Scoliosis/Kyphosis, h/o Coxsackie Myocarditis, c/b pericarditis, Chronic Respiratory Failure s/p Trach and Peg placement. She was BIB her mother with complaints of worsening Dyspnea. Mother reports the onset as the day prior, where she began experiencing "wet coarse breath sounds" with thick yellow mucosal secretions tinged with blood, upon suctioning with multiple episodes of mucosal plugging. During that time she experienced a few episodes of  transient desaturations on RA to the 70's. Her sxs progressed the next day where she experienced more frequent and longer hypoxic episodes to the 60's, that were refractory to Chest Physiotherapy Vest and nebulizer treatments. Per her mother she became more lethargic then usual with temperatures as low as 96.5. After consulting the pt's pulmonologist a Chest XR performed revealed findings suggestive of an acute consolidation and she was sent to the ED for further management.      Family Hx:  unable to obtain detail family history due to her cerebral palsy.

## 2020-10-01 NOTE — H&P ADULT - NSHPLABSRESULTS_GEN_ALL_CORE
Labs:      12.8   9.81  )-----------( 166      ( 01 Oct 2020 17:20 )             40.0     01 Oct 2020 17:20    138    |  104    |  11     ----------------------------<  71     3.8     |  29     |  0.29     Ca    8.9        01 Oct 2020 17:20    TPro  6.9    /  Alb  3.0    /  TBili  0.2    /  DBili  x      /  AST  17     /  ALT  27     /  AlkPhos  149    01 Oct 2020 17:20    LIVER FUNCTIONS - ( 01 Oct 2020 17:20 )  Alb: 3.0 g/dL / Pro: 6.9 gm/dL / ALK PHOS: 149 U/L / ALT: 27 U/L / AST: 17 U/L / GGT: x           PT/INR - ( 01 Oct 2020 17:20 )   PT: 12.2 sec;   INR: 1.05 ratio         PTT - ( 01 Oct 2020 17:20 )  PTT:36.3 sec  CAPILLARY BLOOD GLUCOSE            Urinalysis Basic - ( 01 Oct 2020 18:35 )    Color: Yellow / Appearance: Clear / S.010 / pH: x  Gluc: x / Ketone: Negative  / Bili: Negative / Urobili: Negative mg/dL   Blood: x / Protein: Negative mg/dL / Nitrite: Negative   Leuk Esterase: Negative / RBC: x / WBC x   Sq Epi: x / Non Sq Epi: x / Bacteria: x

## 2020-10-01 NOTE — ED ADULT NURSE REASSESSMENT NOTE - NS ED NURSE REASSESS COMMENT FT1
pt's mom is caretaker, requests to accompany pt to floor. as per Bernardino, nursing administration okay for pt to be accompanied by mom to floor upon admission.

## 2020-10-01 NOTE — ED ADULT TRIAGE NOTE - CHIEF COMPLAINT QUOTE
Patient presents in ED from MD office with respiratory distress as per Mom. Patient has a trach. Patient presents in ED from MD office with respiratory distress as per Mom. Patient has a trach. Patient was suctioned in the office with improvement. 02 saturation in triage was 97%

## 2020-10-01 NOTE — ED ADULT TRIAGE NOTE - INTERNATIONAL TRAVEL
Pt states no BM since Friday. Took stool softeners and dulcolax, no results. Used Ada lax in the past, but doesn't have any.   No

## 2020-10-01 NOTE — ED ADULT NURSE REASSESSMENT NOTE - NS ED NURSE REASSESS COMMENT FT1
Report received from Rebeka RN. pt resting in bed at baseline mental status, comfortable appearance. mom at bedside. VSS. safety maintained, call bell within reach.

## 2020-10-01 NOTE — ED PROVIDER NOTE - CLINICAL SUMMARY MEDICAL DECISION MAKING FREE TEXT BOX
destas to upp er 80s new LL infiltrate abx cover MDR organisms based off prior cx requires hospital admission  mother agrees to plan of care

## 2020-10-01 NOTE — ED PROVIDER NOTE - OBJECTIVE STATEMENT
Pt presents to the ED with mother c/o SOB over the past few days . Pt non verbal, trach in place. Per mother she has been having increased bloody mucosa after suctioning. Also noted that she has been having low O2 sat, 79% at home. Pt seen at Pulmonologist today with CXR showing possible fluid on lungs, sent patient to the ED for further evaluation. denies fever. denies HA or neck pain. no chest pain. no abd pain. no n/v/d. no urinary f/u/d. no back pain. denies illicit drug use. no recent travel. no rash. no other acute issues symptoms or concerns Pt presents to the ED with mother c/o SOB with associated increase blood secretions after suctioning for the past few days. Pt non verbal, trach in place. Also noted that she has been having low O2 sat, 79% at home. Pt seen at Pulmonologist today with CXR showing possible fluid on lungs, sent patient to the ED for further evaluation. denies fever. denies HA or neck pain. no chest pain. no abd pain. no n/v/d. no urinary f/u/d. no back pain. denies illicit drug use. no recent travel. no rash. no other acute issues symptoms or concerns Pt presents to the ED with mother c/o SOB with associated increase blood secretions from trach after suctioning for the past few days. Pt non verbal, trach in place. Also noted that she has been having low O2 sat, 79% at home. Pt seen at Pulmonologist today with CXR showing possible fluid on lungs, sent patient to the ED for further evaluation. denies fever. denies HA or neck pain. no chest pain. no abd pain. no n/v/d. no urinary f/u/d. no back pain. denies illicit drug use. no recent travel. no rash. no other acute issues symptoms or concerns Pt presents to the ED with mother c/o SOB with associated increase blood secretions from trach after suctioning for the past few days. Pt non verbal, trach in place. Also noted that she has been having low O2 sat, 79% at home. Pt seen at Pulmonologist today with CXR showing possible fluid on lungs, sent patient to the ED for further evaluation. denies fever. denies HA or neck pain. no chest pain. no abd pain. no n/v/d. no urinary f/u/d. no back pain. denies illicit drug use. no recent travel. no rash. no other acute issues symptoms or concerns  PCD: Dr. Paredes  Pulmonolgist: Dr. Cameron

## 2020-10-01 NOTE — H&P ADULT - ASSESSMENT
25F with a PMH significant for Cerebral Palsy and DD, GERD, Asthma, Seizure disorders, Scoliosis/Kyphosis, h/o Coxsackie Myocarditis c/b pericarditis Chronic Respiratory Failure s/p Trach and Peg placement who presented to the [facility] with worsening dyspnea, Hypothermia and increase mucosal secretions. In the ED she was found to be hypothermic and hypotensive with a MAP of 58mmHg and Hypoxic with Sat down to 86% and No remarkable findings on PE. CXR revealed LLL infiltrates suggestive of consolidations Pt was given a 1 time dose of IV Compa with Vanco and bolused 1500cc NS IVF with Trach collar connected to vent, then she was admitted to Surgical Step Down for Sepsis with Acute Respiratory Failure 2/2 PNA with Pulm consulted for ventilation management.     25F with a PMH significant for Cerebral Palsy and DD, GERD, Asthma, Seizure disorders, Scoliosis/Kyphosis, h/o Coxsackie Myocarditis c/b pericarditis Chronic Respiratory Failure s/p Trach and Peg placement who presented to the [facility] with worsening dyspnea, Hypothermia and increase mucosal secretions. In the ED she was found to be hypothermic and hypotensive with a MAP of 58mmHg and Hypoxic with Sat down to 86% and No remarkable findings on PE. CXR revealed LLL infiltrates suggestive of consolidations Pt was given a 1 time dose of IV Compa with Vanco and bolused 1500cc NS IVF with Trach collar connected to vent, then she was admitted to Surgical Step Down for Sepsis with Acute Respiratory Failure 2/2 PNA with Pulm consulted for ventilation management.        #Severe Sepsis with Acute on Chronic Hypoxic Respiratory Failure likely 2/2 Ventilator Associated PNA  -Admit to SICU under [facility] management   -Lactate: 0.5   -BP: 91/49mmHg; MAP 58mmHg  -CXR: RLL Consolidation on prelim reading  -Continue Trach to Vent with home vent settings   -Vent Settings: AC/ CMV RR 17 PIP 15 PEEP 5 FiO2 50 QHS/ PRN and TC QD as tolerated.   -ID Consulted, f/u Recc   -Pulm consulted, F/u recc   -S/p IV Compa + Vanco in ED   -Continue IV Compa 1g q8h  -Continue IV Vanco    -S/p 1500cc IVF Bolus in ED         #Chronic Respiratory Failure s/p Trach + PEG  -Trach Collar in place currently vented   -Vent Settings: AC/ CMV RR 17 PIP 15 PEEP 5 FiO2 50 QHS/ PRN and TC QD as tolerated.   -Continue alternating doses of Colistimethate qD x 28d  -Sunction PRN   -Chest PT encouraged for desaturations   -Continue Tobramycin qD x 28d after Colistimethate course ends   -Pulm Consulted, F/u Recc       #Asthma  -Hold Levalbuterol nebulizer and initiate Albuterol Inhaler   -Hold Budesonide + Xopenex for now   -Initiate Flovent-Fluticazone Steroid Inhaler,       #GERD  -Continue home dose of PPI   -Omeprazole 20mg qD       #Hypothermia   -Likely in the setting of ongoing Sepsis  -Temp: 95.6  -Continue with Cooling Blankets  -Consider Warm IVFs with refractory cases     #Hypothyroidism   -Presented with Hypothermia    -Discontinued on Synthroid when Out pt PCP detected normalized Thyroid function  -F/u am TSH with reflex T4       #Seizure D/o   -Continue Keppra Solution 600mg qAC, 700mg qHs  -Seizure Precautions in place   -Hold Home dose of Diastat 10mg Rectal kit for active seizures  -PRN Lorazepam 1mg for active seizures qD         #Advanced Directives   -Full Code per HCP  -Pt without Capacity   -HCP: Mother Tania Clark [phone]    #DVT PPX  -SubQ Heparin    25F with a PMH significant for Cerebral Palsy and DD, GERD, Asthma, Seizure disorders, Scoliosis/Kyphosis, h/o Coxsackie Myocarditis c/b pericarditis Chronic Respiratory Failure s/p Trach and Peg placement who presented to the [facility] with worsening dyspnea, Hypothermia and increase mucosal secretions. In the ED she was found to be hypothermic and hypotensive with a MAP of 58mmHg decreased to 40's and Hypoxic with Sat down to 86% on RA with ronchi appreciated on her RLL. CXR revealed RLL and retrocardiac infiltrates suggestive of consolidations Pt was given a 1 time dose of IV Compa with Vanco and bolused 2500cc NS IVF with Trach collar connected to vent, then she was admitted to ICU for Septic Shock with Acute Respiratory Failure 2/2 Ventilator Associated PNA.       #Septic Shock with Acute on Chronic Hypoxic Respiratory Failure likely 2/2 Ventilator Associated PNA  -Admit to ICU    -Lactate: 0.5   -BP: 91/49mmHg; MAP 58mmHg  -CXR: RLL Consolidation on prelim reading  -Continue Trach to Vent with home vent settings qHs  -Vent Settings: AC/ CMV RR 17 PIP 15 PEEP 5 FiO2 50 QHS/ PRN and TC QD as tolerated.   -ID Consulted, f/u Recc   -Pulm consulted, F/u recc   -S/p IV Compa + Vanco in ED   -Continue IV Compa 1g q8h  -Continue IV Vanco  750 q12  -S/p 2500cc IVF Bolus in ED with refractory HypoTN  -Initiated on Levophed   -Consider initiated high dose Hydrocortisone 100mg IV          #Chronic Respiratory Failure s/p Trach + PEG  -Trach Collar in place currently vented   -Vent Settings: AC/ CMV RR 17 PIP 15 PEEP 5 FiO2 50 QHS/ PRN and TC QD as tolerated.   -Continue alternating doses of Colistimethate qD x 28d, to end on 10/6/2020  -Suction PRN   -Chest PT encouraged for desaturations   -Continue Tobramycin qD x 28d to be initiated on 10/08/2020  -Pulm Consulted, F/u Recc       #Asthma  -Hold Levalbuterol nebulizer and initiate Albuterol Inhaler   -Hold Budesonide + Xopenex for now   -Initiate Flovent-Fluticazone Steroid Inhaler,       #GERD  -Continue home dose of PPI   -Omeprazole 20mg qD       #Hypothermia   -Likely in the setting of ongoing Sepsis  -Temp: 95.6  -Continue with Cooling Blankets  -Consider Warm IVFs with refractory cases     #Hypothyroidism   -Presented with Hypothermia    -Discontinued on Synthroid when Out pt PCP detected normalized Thyroid function  -F/u am TSH with reflex T4       #Seizure D/o   -Continue Keppra Solution 600mg qAC, 700mg qHs  -Seizure Precautions in place   -Hold Home dose of Diastat 10mg Rectal kit for active seizures  -PRN Lorazepam 1mg for active seizures qD         #Advanced Directives   -Full Code per HCP  -Pt without Capacity   -HCP: Mother Tania Clark [phone]    #DVT PPX  -SCDs    25F with a PMH significant for Cerebral Palsy and DD, GERD, Asthma, Seizure disorders, Scoliosis/Kyphosis, h/o Coxsackie Myocarditis c/b pericarditis Chronic Respiratory Failure s/p Trach and Peg placement who presented to the [facility] with worsening dyspnea, Hypothermia and increase mucosal secretions. In the ED she was found to be hypothermic and hypotensive with a MAP of 58mmHg decreased to 40's and Hypoxic with Sat down to 86% on RA with ronchi appreciated on her RLL. CXR revealed RLL and retrocardiac infiltrates suggestive of consolidations Pt was given a 1 time dose of IV Compa with Vanco and bolused 2500cc NS IVF with Trach collar connected to vent, then she was admitted to ICU for Septic Shock with Acute Respiratory Failure 2/2 Ventilator Associated PNA.       #Septic Shock with Acute on Chronic Hypoxic Respiratory Failure likely 2/2 Ventilator Associated PNA  -Admit to ICU    -Lactate: 0.5   -BP: 91/49mmHg; MAP 58mmHg  -CXR: RLL Consolidation on prelim reading  -Continue Trach to Vent with home vent settings qHs  -Vent Settings: AC/ CMV RR 17 PIP 15 PEEP 5 FiO2 50 QHS/ PRN and TC QD as tolerated.   -ID Consulted, f/u Recc   -Pulm consulted, F/u recc   -S/p IV Compa + Vanco in ED   -Continue IV Compa 1g q8h  -Continue IV Vanco  750 q12  -S/p 2500cc IVF Bolus in ED with refractory HypoTN  -Initiated on Levophed 0.5   -Consider initiated high dose Hydrocortisone 100mg IV          #Chronic Respiratory Failure s/p Trach + PEG  -Trach Collar in place currently vented   -Vent Settings: AC/ CMV RR 17 PIP 15 PEEP 5 FiO2 50 QHS/ PRN and TC QD as tolerated.   -Continue alternating doses of Colistimethate qD x 28d, to end on 10/6/2020  -Suction PRN   -Chest PT encouraged for desaturations   -Continue Tobramycin qD x 28d to be initiated on 10/08/2020  -Pulm Consulted, F/u Recc       #Asthma  -Hold Levalbuterol nebulizer and initiate Albuterol Inhaler   -Hold Budesonide + Xopenex for now   -Initiate Flovent-Fluticazone Steroid Inhaler,       #GERD  -Continue home dose of PPI   -Omeprazole 20mg qD       #Hypothermia   -Likely in the setting of ongoing Sepsis  -Temp: 95.6  -Continue with Cooling Blankets  -Consider Warm IVFs with refractory cases     #Hypothyroidism   -Presented with Hypothermia    -Discontinued on Synthroid when Out pt PCP detected normalized Thyroid function  -F/u am TSH with reflex T4       #Seizure D/o   -Continue Keppra Solution 600mg qAC, 700mg qHs  -Seizure Precautions in place   -Hold Home dose of Diastat 10mg Rectal kit for active seizures  -PRN Lorazepam 1mg for active seizures qD         #Advanced Directives   -Full Code per HCP  -Pt without Capacity   -HCP: Mother Tania Clark [phone]    #DVT PPX  -SCDs    25F with a PMH significant for Cerebral Palsy and DD, GERD, Asthma, Seizure disorders, Scoliosis/Kyphosis, h/o Coxsackie Myocarditis c/b pericarditis Chronic Respiratory Failure s/p Trach and Peg placement who presented to the [facility] with worsening dyspnea, Hypothermia and increase mucosal secretions. In the ED she was found to be hypothermic and hypotensive with a MAP of 58mmHg decreased to 40's and Hypoxic with Sat down to 86% on RA with ronchi appreciated on her RLL. CXR revealed RLL and retrocardiac infiltrates suggestive of consolidations Pt was given a 1 time dose of IV Compa with Vanco and bolused 2500cc NS IVF with Trach collar connected to vent, then she was admitted to ICU for Septic Shock with Acute Respiratory Failure 2/2 Ventilator Associated PNA.       #Septic Shock with Acute on Chronic Hypoxic Respiratory Failure likely 2/2 Ventilator Associated PNA  -Admit to ICU    -Lactate: 0.5   -BP: 91/49mmHg; MAP 58mmHg  -CXR: RLL Consolidation on prelim reading  -Continue Trach to Vent with home vent settings qHs  -Vent Settings: AC/ CMV RR 17 PIP 15 PEEP 5 FiO2 50 QHS/ PRN and TC QD as tolerated.   -ID Consulted, f/u Recc   -Pulm consulted, F/u recc   -S/p IV Compa + Vanco in ED   -Continue IV Compa 1g q8h  -Continue IV Vanco  750 q12  -S/p 2500cc IVF Bolus in ED with refractory HypoTN  -Initiated on Levophed 0.5   -Consider initiated high dose Hydrocortisone 100mg IV          #Chronic Respiratory Failure s/p Trach + PEG  -Trach Collar in place currently vented   -Vent Settings: AC/ CMV RR 17 PIP 15 PEEP 5 FiO2 50 QHS/ PRN and TC QD as tolerated.   -Continue alternating doses of Colistimethate qD x 28d, to end on 10/6/2020  -Suction PRN   -Chest PT encouraged for desaturations   -Continue Tobramycin qD x 28d to be initiated on 10/08/2020  -Pulm Consulted, F/u Recc       #Asthma  -Hold Levalbuterol nebulizer and initiate Albuterol Inhaler   -Hold Budesonide + Xopenex for now   -Initiate Flovent-Fluticazone Steroid Inhaler,       #GERD  -Continue home dose of PPI   -Omeprazole 20mg qD       #Hypothermia   -Likely in the setting of ongoing Sepsis  -Temp: 95.6  -Continue with Cooling Blankets  -Consider Warm IVFs with refractory cases     #Hypothyroidism   -Presented with Hypothermia    -Discontinued on Synthroid when Out pt PCP detected normalized Thyroid function  -F/u am TSH with reflex T4       #Seizure D/o   -Continue Keppra Solution 600mg qAC, 700mg qHs  -Seizure Precautions in place   -Hold Home dose of Diastat 10mg Rectal kit for active seizures  -PRN Lorazepam 1mg for active seizures q2hrs         #Advanced Directives   -Full Code per HCP  -Pt without Capacity   -HCP: Mother Tania Clark [phone]    #DVT PPX  -SCDs    25F with a PMH significant for Cerebral Palsy and DD, GERD, Asthma, Seizure disorders, Scoliosis/Kyphosis, h/o Coxsackie Myocarditis c/b pericarditis Chronic Respiratory Failure s/p Trach and Peg placement who presented to the [facility] with worsening dyspnea, Hypothermia and increase mucosal secretions. In the ED she was found to be hypothermic and hypotensive with a MAP of 58mmHg decreased to 40's and Hypoxic with Sat down to 86% on RA with ronchi appreciated on her RLL. CXR revealed RLL and retrocardiac infiltrates suggestive of consolidations Pt was given a 1 time dose of IV Compa with Vanco and bolused 2500cc NS IVF with Trach collar connected to vent, then she was admitted to ICU for Septic Shock with Acute Respiratory Failure 2/2 Ventilator Associated PNA.     #Septic Shock with Acute on Chronic Hypoxic Respiratory Failure likely 2/2 Ventilator Associated PNA  -Admit to SICU  -Lactate: 0.5   -BP: 91/49mmHg; MAP 58mmHg  -CXR: RLL Consolidation on prelim reading  -Continue Trach to Vent with home vent settings qHs  -Vent Settings: AC/ CMV RR 17 PIP 15 PEEP 5 FiO2 50 QHS/ PRN and TC QD as tolerated.   -ID Consulted, f/u Recc   -Pulm consulted, F/u recc   -S/p IV Compa + Vanco in ED   -Continue IV Compa 1g q8h  -Continue IV Vanco  750 q12  -S/p 2500cc IVF Bolus in ED with refractory HypoTN  -will consult ICU    #Chronic Respiratory Failure s/p Trach + PEG  -Trach Collar in place currently vented   -Vent Settings: AC/ CMV RR 17 PIP 15 PEEP 5 FiO2 50 QHS/ PRN and TC QD as tolerated.   -Continue alternating doses of Colistimethate qD x 28d, to end on 10/6/2020  -Suction PRN   -Chest PT encouraged for desaturations   -Continue Tobramycin qD x 28d will be finished on 10/07/2020, will start Coly-Mycin on 10/8/20 for 28 days, then alternate to continue  -Pulm Consulted, F/u Recc       #Asthma  -initiate Albuterol Inhaler     #GERD  -Continue home dose of PPI   -Omeprazole 20mg qD       #Hypothermia   -Likely in the setting of ongoing Sepsis  -Temp: 95.6  -Continue with Warming Blankets  -Consider Warm IVFs with refractory cases     #Hypothyroidism   -Presented with Hypothermia    -Discontinued on Synthroid when Out pt PCP detected normalized Thyroid function  -F/u am TSH with reflex T4       #Seizure D/o   -Continue Keppra Solution 600mg qAC, 700mg qHs  -Seizure Precautions in place   -Hold Home dose of Diastat 10mg Rectal kit for active seizures  -PRN Lorazepam 1mg for active seizures q2hrs         #Advanced Directives   -Full Code per HCP  -Pt without Capacity   -HCP: Mother Tania Clark [phone]    #DVT PPX  -SCDs

## 2020-10-02 DIAGNOSIS — J45.909 UNSPECIFIED ASTHMA, UNCOMPLICATED: ICD-10-CM

## 2020-10-02 DIAGNOSIS — G93.41 METABOLIC ENCEPHALOPATHY: ICD-10-CM

## 2020-10-02 DIAGNOSIS — J96.01 ACUTE RESPIRATORY FAILURE WITH HYPOXIA: ICD-10-CM

## 2020-10-02 DIAGNOSIS — J18.1 LOBAR PNEUMONIA, UNSPECIFIED ORGANISM: ICD-10-CM

## 2020-10-02 DIAGNOSIS — A41.9 SEPSIS, UNSPECIFIED ORGANISM: ICD-10-CM

## 2020-10-02 DIAGNOSIS — G40.909 EPILEPSY, UNSPECIFIED, NOT INTRACTABLE, WITHOUT STATUS EPILEPTICUS: ICD-10-CM

## 2020-10-02 DIAGNOSIS — E03.9 HYPOTHYROIDISM, UNSPECIFIED: ICD-10-CM

## 2020-10-02 LAB
ANION GAP SERPL CALC-SCNC: 6 MMOL/L — SIGNIFICANT CHANGE UP (ref 5–17)
BUN SERPL-MCNC: 6 MG/DL — LOW (ref 7–23)
CALCIUM SERPL-MCNC: 8.6 MG/DL — SIGNIFICANT CHANGE UP (ref 8.5–10.1)
CHLORIDE SERPL-SCNC: 112 MMOL/L — HIGH (ref 96–108)
CO2 SERPL-SCNC: 25 MMOL/L — SIGNIFICANT CHANGE UP (ref 22–31)
CREAT SERPL-MCNC: 0.22 MG/DL — LOW (ref 0.5–1.3)
CULTURE RESULTS: NO GROWTH — SIGNIFICANT CHANGE UP
GLUCOSE SERPL-MCNC: 105 MG/DL — HIGH (ref 70–99)
GRAM STN FLD: SIGNIFICANT CHANGE UP
HCT VFR BLD CALC: 35.1 % — SIGNIFICANT CHANGE UP (ref 34.5–45)
HGB BLD-MCNC: 11.6 G/DL — SIGNIFICANT CHANGE UP (ref 11.5–15.5)
LACTATE SERPL-SCNC: 0.7 MMOL/L — SIGNIFICANT CHANGE UP (ref 0.7–2)
MAGNESIUM SERPL-MCNC: 1.8 MG/DL — SIGNIFICANT CHANGE UP (ref 1.6–2.6)
MCHC RBC-ENTMCNC: 33 GM/DL — SIGNIFICANT CHANGE UP (ref 32–36)
MCHC RBC-ENTMCNC: 33.3 PG — SIGNIFICANT CHANGE UP (ref 27–34)
MCV RBC AUTO: 100.9 FL — HIGH (ref 80–100)
PHOSPHATE SERPL-MCNC: 2.3 MG/DL — LOW (ref 2.5–4.5)
PLATELET # BLD AUTO: 152 K/UL — SIGNIFICANT CHANGE UP (ref 150–400)
POTASSIUM SERPL-MCNC: 3.5 MMOL/L — SIGNIFICANT CHANGE UP (ref 3.5–5.3)
POTASSIUM SERPL-SCNC: 3.5 MMOL/L — SIGNIFICANT CHANGE UP (ref 3.5–5.3)
PROCALCITONIN SERPL-MCNC: 0.07 NG/ML — SIGNIFICANT CHANGE UP (ref 0.02–0.1)
RBC # BLD: 3.48 M/UL — LOW (ref 3.8–5.2)
RBC # FLD: 11.9 % — SIGNIFICANT CHANGE UP (ref 10.3–14.5)
SODIUM SERPL-SCNC: 143 MMOL/L — SIGNIFICANT CHANGE UP (ref 135–145)
SPECIMEN SOURCE: SIGNIFICANT CHANGE UP
SPECIMEN SOURCE: SIGNIFICANT CHANGE UP
TSH SERPL-MCNC: 2.69 UU/ML — SIGNIFICANT CHANGE UP (ref 0.34–4.82)
VANCOMYCIN TROUGH SERPL-MCNC: 7.6 UG/ML — LOW (ref 10–20)
WBC # BLD: 5.34 K/UL — SIGNIFICANT CHANGE UP (ref 3.8–10.5)
WBC # FLD AUTO: 5.34 K/UL — SIGNIFICANT CHANGE UP (ref 3.8–10.5)

## 2020-10-02 PROCEDURE — 93308 TTE F-UP OR LMTD: CPT | Mod: 26

## 2020-10-02 PROCEDURE — 99291 CRITICAL CARE FIRST HOUR: CPT

## 2020-10-02 RX ORDER — MIDODRINE HYDROCHLORIDE 2.5 MG/1
10 TABLET ORAL EVERY 8 HOURS
Refills: 0 | Status: DISCONTINUED | OUTPATIENT
Start: 2020-10-02 | End: 2020-10-03

## 2020-10-02 RX ORDER — MEROPENEM 1 G/30ML
1000 INJECTION INTRAVENOUS EVERY 8 HOURS
Refills: 0 | Status: COMPLETED | OUTPATIENT
Start: 2020-10-02 | End: 2020-10-07

## 2020-10-02 RX ORDER — CHLORHEXIDINE GLUCONATE 213 G/1000ML
1 SOLUTION TOPICAL
Refills: 0 | Status: DISCONTINUED | OUTPATIENT
Start: 2020-10-02 | End: 2020-10-02

## 2020-10-02 RX ORDER — ENOXAPARIN SODIUM 100 MG/ML
30 INJECTION SUBCUTANEOUS DAILY
Refills: 0 | Status: DISCONTINUED | OUTPATIENT
Start: 2020-10-02 | End: 2020-10-08

## 2020-10-02 RX ORDER — CHLORHEXIDINE GLUCONATE 213 G/1000ML
15 SOLUTION TOPICAL EVERY 12 HOURS
Refills: 0 | Status: DISCONTINUED | OUTPATIENT
Start: 2020-10-02 | End: 2020-10-08

## 2020-10-02 RX ORDER — VANCOMYCIN HCL 1 G
750 VIAL (EA) INTRAVENOUS EVERY 12 HOURS
Refills: 0 | Status: DISCONTINUED | OUTPATIENT
Start: 2020-10-02 | End: 2020-10-02

## 2020-10-02 RX ORDER — NOREPINEPHRINE BITARTRATE/D5W 8 MG/250ML
0.05 PLASTIC BAG, INJECTION (ML) INTRAVENOUS
Qty: 8 | Refills: 0 | Status: DISCONTINUED | OUTPATIENT
Start: 2020-10-02 | End: 2020-10-02

## 2020-10-02 RX ORDER — SODIUM CHLORIDE 9 MG/ML
500 INJECTION INTRAMUSCULAR; INTRAVENOUS; SUBCUTANEOUS ONCE
Refills: 0 | Status: COMPLETED | OUTPATIENT
Start: 2020-10-02 | End: 2020-10-02

## 2020-10-02 RX ADMIN — Medication 10 MILLIEQUIVALENT(S): at 11:01

## 2020-10-02 RX ADMIN — LEVETIRACETAM 600 MILLIGRAM(S): 250 TABLET, FILM COATED ORAL at 11:01

## 2020-10-02 RX ADMIN — ALBUTEROL 2 PUFF(S): 90 AEROSOL, METERED ORAL at 16:03

## 2020-10-02 RX ADMIN — MEROPENEM 100 MILLIGRAM(S): 1 INJECTION INTRAVENOUS at 05:42

## 2020-10-02 RX ADMIN — ALBUTEROL 2 PUFF(S): 90 AEROSOL, METERED ORAL at 21:01

## 2020-10-02 RX ADMIN — CHLORHEXIDINE GLUCONATE 15 MILLILITER(S): 213 SOLUTION TOPICAL at 05:43

## 2020-10-02 RX ADMIN — MIDODRINE HYDROCHLORIDE 10 MILLIGRAM(S): 2.5 TABLET ORAL at 05:42

## 2020-10-02 RX ADMIN — PANTOPRAZOLE SODIUM 20 MILLIGRAM(S): 20 TABLET, DELAYED RELEASE ORAL at 11:01

## 2020-10-02 RX ADMIN — Medication 1 SPRAY(S): at 22:11

## 2020-10-02 RX ADMIN — Medication 1 TABLET(S): at 11:01

## 2020-10-02 RX ADMIN — ALBUTEROL 2 PUFF(S): 90 AEROSOL, METERED ORAL at 23:51

## 2020-10-02 RX ADMIN — MEROPENEM 100 MILLIGRAM(S): 1 INJECTION INTRAVENOUS at 13:12

## 2020-10-02 RX ADMIN — MIDODRINE HYDROCHLORIDE 10 MILLIGRAM(S): 2.5 TABLET ORAL at 22:10

## 2020-10-02 RX ADMIN — SODIUM CHLORIDE 500 MILLILITER(S): 9 INJECTION INTRAMUSCULAR; INTRAVENOUS; SUBCUTANEOUS at 01:41

## 2020-10-02 RX ADMIN — Medication 400 UNIT(S): at 11:01

## 2020-10-02 RX ADMIN — ALBUTEROL 2 PUFF(S): 90 AEROSOL, METERED ORAL at 08:06

## 2020-10-02 RX ADMIN — MEROPENEM 100 MILLIGRAM(S): 1 INJECTION INTRAVENOUS at 22:11

## 2020-10-02 RX ADMIN — ENOXAPARIN SODIUM 30 MILLIGRAM(S): 100 INJECTION SUBCUTANEOUS at 12:20

## 2020-10-02 RX ADMIN — CHLORHEXIDINE GLUCONATE 1 APPLICATION(S): 213 SOLUTION TOPICAL at 05:43

## 2020-10-02 RX ADMIN — CHLORHEXIDINE GLUCONATE 15 MILLILITER(S): 213 SOLUTION TOPICAL at 17:18

## 2020-10-02 RX ADMIN — Medication 1 APPLICATION(S): at 11:01

## 2020-10-02 RX ADMIN — Medication 1 SPRAY(S): at 12:20

## 2020-10-02 RX ADMIN — ALBUTEROL 2 PUFF(S): 90 AEROSOL, METERED ORAL at 12:28

## 2020-10-02 RX ADMIN — Medication 10 MILLIEQUIVALENT(S): at 22:10

## 2020-10-02 RX ADMIN — LEVETIRACETAM 700 MILLIGRAM(S): 250 TABLET, FILM COATED ORAL at 20:02

## 2020-10-02 RX ADMIN — ALBUTEROL 2 PUFF(S): 90 AEROSOL, METERED ORAL at 04:23

## 2020-10-02 RX ADMIN — MIDODRINE HYDROCHLORIDE 10 MILLIGRAM(S): 2.5 TABLET ORAL at 13:12

## 2020-10-02 NOTE — PROGRESS NOTE ADULT - ASSESSMENT
Patient with cerebral palsy on home vent at night with inc secretions, dec bp  new right lower lobe infiltrate.  septic shock with hypotension, [facility]  [facility] with trach , and j tube    Plan     1. abx. meropenem and vanco, check sputum culture, chest pt        focused sono no large effusion    2. hydration for sepsis, on levophed, will titrate off note patients normal bp is 85-90    3. respiratory failure will leave on vent    4. will continue patient home tube feed regiment

## 2020-10-02 NOTE — CONSULT NOTE ADULT - PROBLEM SELECTOR RECOMMENDATION 3
RLL consolidation on CXR. Send sputum culture and legionella study. Respiratory support and abx as above.

## 2020-10-02 NOTE — ED ADULT NURSE NOTE - CHIEF COMPLAINT QUOTE
Patient presents in ED from MD office with respiratory distress as per Mom. Patient has a trach. Patient was suctioned in the office with improvement. 02 saturation in triage was 97%

## 2020-10-02 NOTE — PROVIDER CONTACT NOTE (EICU) - BACKGROUND
25F with a history of cerebral Palsy, GERD, Asthma, Seizure disorders, Scoliosis/Kyphosis, h/o Coxsackie Myocarditis c/b pericarditis Chronic Respiratory Failure s/p Trach and Peg placement admitted for increased FiO2 requirement and lethargy.  SBP was in 60s.  As per ICU PA, no abd pain, no tenderness on exam.  No history of diarrhea  WBC 9.8, Plt 166, lactate 0.5  Cr 0.3  CXR showed trach in place, RLL consolidation and right sided infiltrates

## 2020-10-02 NOTE — DIETITIAN INITIAL EVALUATION ADULT. - ADD RECOMMEND
1) resume home TF regimen: SummitIG Peptide 1.5 @ 135mL x 4 times per day @ 90mL/hr (4am, 10am, 4pm, and 10pm) with 30mL free water flush after, Nutrica Complete Amino Acid mix 1Tbsp combined with 1 spoon of apple sauce and water, via tube TID.  Followed by 200mL free water given at 150mL/hr.  2) monitor TF tolerance; keep back of bed > 35 degrees 3) monitor hydration status; adjust free water flushes prn 4) weekly wt checks to track/trend changes 5) monitor lytes/mins; replete/correct prn

## 2020-10-02 NOTE — CONSULT NOTE ADULT - PROBLEM SELECTOR RECOMMENDATION 6
Empiric inhaled bronchodilators for now given acute on chronic respiratory failure. No indication for IV steroids at this time.

## 2020-10-02 NOTE — PATIENT PROFILE ADULT - ABILITY TO HEAR (WITH HEARING AID OR HEARING APPLIANCE IF NORMALLY USED):
Unable to assess hearing/non verbal,has cerebral palsy, non verbal,has cerebral palsy,/Adequate: hears normal conversation without difficulty

## 2020-10-02 NOTE — PROGRESS NOTE ADULT - SUBJECTIVE AND OBJECTIVE BOX
Assuming care of Patient this am    HPI:     24 yo Female with a PMH significant for Cerebral Palsy and DD, GERD, Asthma, Seizure disorders, Scoliosis/Kyphosis, h/o Coxsackie Myocarditis, c/b pericarditis, Chronic Respiratory Failure s/p Trach and Peg placement. She was BIB her mother with complaints of worsening Dyspnea and increased sputum production with plugging  CXR showed new right sided infiltrate, Patient became hypostensioe in ED and required levophed overnight.  BP now better. on meropenem. Patient is normally only on vent at night at home. currently on vent    ROS cant obtain secondary to cerebral palsy       MEDICATIONS  (STANDING):  ALBUTerol    90 MICROgram(s) HFA Inhaler 2 Puff(s) Inhalation every 4 hours  chlorhexidine 0.12% Liquid 15 milliLiter(s) Oral Mucosa every 12 hours  cholecalciferol 400 Unit(s) Oral daily  fluticasone propionate   110 MICROgram(s) HFA Inhaler 1 Puff(s) Inhalation two times a day  fluticasone propionate 50 MICROgram(s)/spray Nasal Spray 1 Spray(s) Both Nostrils two times a day  levETIRAcetam  Solution 700 milliGRAM(s) Oral at bedtime  levETIRAcetam  Solution 600 milliGRAM(s) Oral daily  meropenem  IVPB 1000 milliGRAM(s) IV Intermittent every 8 hours  midodrine 10 milliGRAM(s) Oral every 8 hours  multivitamin 1 Tablet(s) Oral daily  norepinephrine Infusion 0.05 MICROgram(s)/kG/Min (4.68 mL/Hr) IV Continuous <Continuous>  pantoprazole   Suspension 20 milliGRAM(s) Oral daily  potassium chloride   Powder 10 milliEquivalent(s) Oral two times a day  silver sulfADIAZINE 1% Cream 1 Application(s) Topical daily  sodium chloride 3%  Inhalation 2 milliLiter(s) Inhalation two times a day  tobramycin for Nebulization - Peds 300 milliGRAM(s) Nebulizer every 12 hours    MEDICATIONS  (PRN):  acetaminophen  Rectal Suppository - Peds. 650 milliGRAM(s) Rectal every 6 hours PRN Temp greater or equal to 38 C (100.4 F), Moderate Pain (4 - 6)  LORazepam   Injectable 1 milliGRAM(s) IV Push every 2 hours PRN Seizure  sodium chloride 0.65% Nasal 1 Spray(s) Both Nostrils two times a day PRN Nasal Congestion      Height (cm): 129.5 (10-01 @ 16:16)  Weight (kg): 49.9 (10-01 @ 16:16)  BMI (kg/m2): 29.8 (10-01 @ 16:16)    ICU Vital Signs Last 24 Hrs  T(C): 35.9 (02 Oct 2020 03:00), Max: 35.9 (01 Oct 2020 16:22)  T(F): 96.6 (02 Oct 2020 03:00), Max: 96.7 (01 Oct 2020 16:22)  HR: 53 (02 Oct 2020 10:56) (48 - 92)  BP: 116/70 (02 Oct 2020 10:00) (69/34 - 140/82)  BP(mean): 81 (02 Oct 2020 10:00) (42 - 95)  ABP: --  ABP(mean): --  RR: 12 (02 Oct 2020 10:00) (12 - 21)  SpO2: 91% (02 Oct 2020 10:56) (83% - 100%)      Mode: SIMV with PS  RR (machine): 15  FiO2: 40  PEEP: 8  PS: 10  ITime: 1  MAP: 12  PC: 15  PIP: 23      I&O's Summary    01 Oct 2020 07:01  -  02 Oct 2020 07:00  --------------------------------------------------------  IN: 64 mL / OUT: 0 mL / NET: 64 mL                          11.6   5.34  )-----------( 152      ( 02 Oct 2020 06:17 )             35.1     10-02    143  |  112<H>  |  6<L>  ----------------------------<  105<H>  3.5   |  25  |  0.22<L>    Ca    8.6      02 Oct 2020 06:17  Phos  2.3     10-02  Mg     1.8     10-02    TPro  6.9  /  Alb  3.0<L>  /  TBili  0.2  /  DBili  x   /  AST  17  /  ALT  27  /  AlkPhos  149<H>  10-    Urinalysis Basic - ( 01 Oct 2020 18:35 )    Color: Yellow / Appearance: Clear / S.010 / pH: x  Gluc: x / Ketone: Negative  / Bili: Negative / Urobili: Negative mg/dL   Blood: x / Protein: Negative mg/dL / Nitrite: Negative   Leuk Esterase: Negative / RBC: x / WBC x   Sq Epi: x / Non Sq Epi: x / Bacteria: x    DVT Prophylaxis:  Lovenox                                                                Advanced Directives: Full Code

## 2020-10-02 NOTE — DIETITIAN INITIAL EVALUATION ADULT. - ENERGY NEEDS
Ht. 51 "     Wt. 38.9 Kg (used bedscale, likely slightly off)       23 BMI       IBW  34.5 Kg      Pt is at   111 %  IBW

## 2020-10-02 NOTE — CONSULT NOTE ADULT - SUBJECTIVE AND OBJECTIVE BOX
Patient is a 25y old  Female who presents with a chief complaint of Sepsis with Acute Hypoxic Respiratory Failure 2/ PNA (01 Oct 2020 20:59)      BRIEF HOSPITAL COURSE: 24 y/o F with a h/o     Events last 24 hours: ***      PAST MEDICAL & SURGICAL HISTORY:  Hypokalemia    Kyphosis    Scoliosis    Hypotonia    Asthma    Seizures    Cerebral Palsy    Gastroesophageal Reflux Disease    Hypothyroidism  Treated with synthroid in past. Stopped treatment in  due to normal thyroid function.    Developmental Delay    Tracheostomy in place    Peg (Percutaneous Endoscopic Gastrostomy) Adjustment/Replacement/Removal    S/P Tonsillectomy and Adenoidectomy    Strabismus      Allergies    Bactrim (Unknown)  carbamazepine (Unknown)  cephalosporins (Rash)  Corn (Unknown)  Depakote (Unknown)  diphenhydrAMINE (Seizure)  DISPOSABLE BLOOD PRESSURE CUFF - Red blistered skin where disposable blood pressure cuff was on right arm. (CONTACT DERMATITIS) (Rash; Blisters; Swelling;)  eggs (Unknown)  EGGS,  NUTS (Anaphylaxis)  Erythromycin Base (Unknown)  metoclopramide (Unknown)  Milk (Unknown)  MILK, SHELLFISH, WHEAT (Unknown)  Nuts (Unknown)  oxcarbazepine (Unknown)  peanuts (Unknown)  Potatoes (Unknown)  SEASONAL, POLLEN, GRASS, PET DANDER, FEATHERS (Unknown)  Sesame (Unknown)  shellfish (Unknown)  Trileptal (Unknown)  valproic acid (Unknown)  Wheat (Unknown)    Intolerances      FAMILY HISTORY:  No pertinent family history in first degree relatives        Review of Systems:  CONSTITUTIONAL: No fever, chills, or fatigue  EYES: No eye pain, visual disturbances, or discharge  ENMT:  No difficulty hearing, tinnitus, vertigo; No sinus or throat pain  NECK: No pain or stiffness  RESPIRATORY: No cough, wheezing, chills or hemoptysis; No shortness of breath  CARDIOVASCULAR: No chest pain, palpitations, dizziness, or leg swelling  GASTROINTESTINAL: No abdominal or epigastric pain. No nausea, vomiting, or hematemesis; No diarrhea or constipation. No melena or hematochezia.  GENITOURINARY: No dysuria, frequency, hematuria, or incontinence  NEUROLOGICAL: No headaches, memory loss, loss of strength, numbness, or tremors  SKIN: No itching, burning, rashes, or lesions   MUSCULOSKELETAL: No joint pain or swelling; No muscle, back, or extremity pain  PSYCHIATRIC: No depression, anxiety, mood swings, or difficulty sleeping      Social History: ***      Medications:  meropenem  IVPB 1000 milliGRAM(s) IV Intermittent every 8 hours  tobramycin for Nebulization - Peds 300 milliGRAM(s) Nebulizer every 12 hours  vancomycin  IVPB 750 milliGRAM(s) IV Intermittent every 12 hours    norepinephrine Infusion 0.05 MICROgram(s)/kG/Min IV Continuous <Continuous>    ALBUTerol    90 MICROgram(s) HFA Inhaler 2 Puff(s) Inhalation every 4 hours  fluticasone propionate   110 MICROgram(s) HFA Inhaler 1 Puff(s) Inhalation two times a day  sodium chloride 3%  Inhalation 2 milliLiter(s) Inhalation two times a day    acetaminophen  Rectal Suppository - Peds. 650 milliGRAM(s) Rectal every 6 hours PRN  levETIRAcetam  Solution 700 milliGRAM(s) Oral at bedtime  levETIRAcetam  Solution 600 milliGRAM(s) Oral daily  LORazepam   Injectable 1 milliGRAM(s) IV Push every 2 hours PRN        pantoprazole   Suspension 20 milliGRAM(s) Oral daily        cholecalciferol 400 Unit(s) Oral daily  multivitamin 1 Tablet(s) Oral daily  potassium chloride   Powder 10 milliEquivalent(s) Oral two times a day      chlorhexidine 0.12% Liquid 15 milliLiter(s) Oral Mucosa every 12 hours  chlorhexidine 2% Cloths 1 Application(s) Topical <User Schedule>  fluticasone propionate 50 MICROgram(s)/spray Nasal Spray 1 Spray(s) Both Nostrils two times a day  silver sulfADIAZINE 1% Cream 1 Application(s) Topical daily  sodium chloride 0.65% Nasal 1 Spray(s) Both Nostrils two times a day PRN            ICU Vital Signs Last 24 Hrs  T(C): 35.8 (02 Oct 2020 00:45), Max: 35.9 (01 Oct 2020 16:22)  T(F): 96.5 (02 Oct 2020 00:45), Max: 96.7 (01 Oct 2020 16:22)  HR: 69 (02 Oct 2020 00:15) (60 - 92)  BP: 80/48 (02 Oct 2020 00:15) (69/34 - 98/64)  BP(mean): 55 (02 Oct 2020 00:15) (42 - 74)  ABP: --  ABP(mean): --  RR: 13 (02 Oct 2020 00:15) (13 - 21)  SpO2: 99% (02 Oct 2020 00:15) (86% - 100%)    Vital Signs Last 24 Hrs  T(C): 35.8 (02 Oct 2020 00:45), Max: 35.9 (01 Oct 2020 16:22)  T(F): 96.5 (02 Oct 2020 00:45), Max: 96.7 (01 Oct 2020 16:22)  HR: 69 (02 Oct 2020 00:15) (60 - 92)  BP: 80/48 (02 Oct 2020 00:15) (69/34 - 98/64)  BP(mean): 55 (02 Oct 2020 00:15) (42 - 74)  RR: 13 (02 Oct 2020 00:15) (13 - 21)  SpO2: 99% (02 Oct 2020 00:15) (86% - 100%)        I&O's Detail        LABS:                        12.8   9.81  )-----------( 166      ( 01 Oct 2020 17:20 )             40.0     10-    138  |  104  |  11  ----------------------------<  71  3.8   |  29  |  0.29<L>    Ca    8.9      01 Oct 2020 17:20    TPro  6.9  /  Alb  3.0<L>  /  TBili  0.2  /  DBili  x   /  AST  17  /  ALT  27  /  AlkPhos  149<H>  10-01          CAPILLARY BLOOD GLUCOSE        PT/INR - ( 01 Oct 2020 17:20 )   PT: 12.2 sec;   INR: 1.05 ratio         PTT - ( 01 Oct 2020 17:20 )  PTT:36.3 sec  Urinalysis Basic - ( 01 Oct 2020 18:35 )    Color: Yellow / Appearance: Clear / S.010 / pH: x  Gluc: x / Ketone: Negative  / Bili: Negative / Urobili: Negative mg/dL   Blood: x / Protein: Negative mg/dL / Nitrite: Negative   Leuk Esterase: Negative / RBC: x / WBC x   Sq Epi: x / Non Sq Epi: x / Bacteria: x      CULTURES:        Physical Examination:    General: No acute distress.  Alert, oriented, interactive, nonfocal    HEENT: Pupils equal, reactive to light.  Symmetric.    PULM: Clear to auscultation bilaterally, no significant sputum production    CVS: Regular rate and rhythm, no murmurs, rubs, or gallops    ABD: Soft, nondistended, nontender, normoactive bowel sounds, no masses    EXT: No edema, nontender    SKIN: Warm and well perfused, no rashes noted.    NEURO: A&Ox3, strength 5/5 all extremities, cranial nerves grossly intact, no focal deficits      RADIOLOGY: ***        CRITICAL CARE TIME SPENT: ***  Time spent evaluating/treating patient with medical issues that pose a high risk for life threatening deterioration and/or end-organ damage, reviewing data/labs/imaging, discussing case with multidisciplinary team, discussing plan/goals of care with patient/family. Non-inclusive of procedure time.   Patient is a 25y old  Female who presents with a chief complaint of Sepsis with Acute Hypoxic Respiratory Failure 2/2 PNA (01 Oct 2020 20:59)      BRIEF HOSPITAL COURSE: 24 y/o F with a h/o cerebral palsy, global developmental delay, chronic respiratory failure (s/p trach/PEG, SIMV qHS), recurrent pneumonia, GERD, asthma, seizure disorder, scoliosis/kyphosis, Coxsackie myocarditis with large pericardial effusion (2020), admitted overnight with progressive dyspnea and hypoxemia over the past few days as per her mother. She had been requiring frequent suctioning for copious blood tinged secretions. After consulting the patient's pulmonologist they were advised to come to the ED for evaluation. Noted to have a RLL lung consolidation on CXR.    In the ED she was found to be hypothermic and hypotensive (SBP 60s) despite aggressive volume resuscitation. Started on IV vasopressor therapy. Connected to ventilator on home settings and is tolerating this well.      PAST MEDICAL & SURGICAL HISTORY:  Hypokalemia    Kyphosis    Scoliosis    Hypotonia    Asthma    Seizures    Cerebral Palsy    Gastroesophageal Reflux Disease    Hypothyroidism  Treated with synthroid in past. Stopped treatment in  due to normal thyroid function.    Developmental Delay    Tracheostomy in place    Peg (Percutaneous Endoscopic Gastrostomy) Adjustment/Replacement/Removal    S/P Tonsillectomy and Adenoidectomy    Strabismus      Allergies    Bactrim (Unknown)  carbamazepine (Unknown)  cephalosporins (Rash)  Corn (Unknown)  Depakote (Unknown)  diphenhydrAMINE (Seizure)  DISPOSABLE BLOOD PRESSURE CUFF - Red blistered skin where disposable blood pressure cuff was on right arm. (CONTACT DERMATITIS) (Rash; Blisters; Swelling;)  eggs (Unknown)  EGGS,  NUTS (Anaphylaxis)  Erythromycin Base (Unknown)  metoclopramide (Unknown)  Milk (Unknown)  MILK, SHELLFISH, WHEAT (Unknown)  Nuts (Unknown)  oxcarbazepine (Unknown)  peanuts (Unknown)  Potatoes (Unknown)  SEASONAL, POLLEN, GRASS, PET DANDER, FEATHERS (Unknown)  Sesame (Unknown)  shellfish (Unknown)  Trileptal (Unknown)  valproic acid (Unknown)  Wheat (Unknown)    Intolerances      FAMILY HISTORY:  No pertinent family history in first degree relatives        Review of Systems:  Unable to obtain as she is nonverbal at baseline.    Social History: lives at home      Medications:  meropenem  IVPB 1000 milliGRAM(s) IV Intermittent every 8 hours  tobramycin for Nebulization - Peds 300 milliGRAM(s) Nebulizer every 12 hours  vancomycin  IVPB 750 milliGRAM(s) IV Intermittent every 12 hours  norepinephrine Infusion 0.05 MICROgram(s)/kG/Min IV Continuous <Continuous>  ALBUTerol    90 MICROgram(s) HFA Inhaler 2 Puff(s) Inhalation every 4 hours  fluticasone propionate   110 MICROgram(s) HFA Inhaler 1 Puff(s) Inhalation two times a day  sodium chloride 3%  Inhalation 2 milliLiter(s) Inhalation two times a day  acetaminophen  Rectal Suppository - Peds. 650 milliGRAM(s) Rectal every 6 hours PRN  levETIRAcetam  Solution 700 milliGRAM(s) Oral at bedtime  levETIRAcetam  Solution 600 milliGRAM(s) Oral daily  LORazepam   Injectable 1 milliGRAM(s) IV Push every 2 hours PRN  pantoprazole   Suspension 20 milliGRAM(s) Oral daily  cholecalciferol 400 Unit(s) Oral daily  multivitamin 1 Tablet(s) Oral daily  potassium chloride   Powder 10 milliEquivalent(s) Oral two times a day  chlorhexidine 0.12% Liquid 15 milliLiter(s) Oral Mucosa every 12 hours  chlorhexidine 2% Cloths 1 Application(s) Topical <User Schedule>  fluticasone propionate 50 MICROgram(s)/spray Nasal Spray 1 Spray(s) Both Nostrils two times a day  silver sulfADIAZINE 1% Cream 1 Application(s) Topical daily  sodium chloride 0.65% Nasal 1 Spray(s) Both Nostrils two times a day PRN        ICU Vital Signs Last 24 Hrs  T(C): 35.8 (02 Oct 2020 00:45), Max: 35.9 (01 Oct 2020 16:22)  T(F): 96.5 (02 Oct 2020 00:45), Max: 96.7 (01 Oct 2020 16:22)  HR: 69 (02 Oct 2020 00:15) (60 - 92)  BP: 80/48 (02 Oct 2020 00:15) (69/34 - 98/64)  BP(mean): 55 (02 Oct 2020 00:15) (42 - 74)  ABP: --  ABP(mean): --  RR: 13 (02 Oct 2020 00:15) (13 - 21)  SpO2: 99% (02 Oct 2020 00:15) (86% - 100%)    Vital Signs Last 24 Hrs  T(C): 35.8 (02 Oct 2020 00:45), Max: 35.9 (01 Oct 2020 16:22)  T(F): 96.5 (02 Oct 2020 00:45), Max: 96.7 (01 Oct 2020 16:22)  HR: 69 (02 Oct 2020 00:15) (60 - 92)  BP: 80/48 (02 Oct 2020 00:15) (69/34 - 98/64)  BP(mean): 55 (02 Oct 2020 00:15) (42 - 74)  RR: 13 (02 Oct 2020 00:15) (13 - 21)  SpO2: 99% (02 Oct 2020 00:15) (86% - 100%)        I&O's Detail        LABS:                        12.8   9.81  )-----------( 166      ( 01 Oct 2020 17:20 )             40.0     10-    138  |  104  |  11  ----------------------------<  71  3.8   |  29  |  0.29<L>    Ca    8.9      01 Oct 2020 17:20    TPro  6.9  /  Alb  3.0<L>  /  TBili  0.2  /  DBili  x   /  AST  17  /  ALT  27  /  AlkPhos  149<H>  10-01          CAPILLARY BLOOD GLUCOSE        PT/INR - ( 01 Oct 2020 17:20 )   PT: 12.2 sec;   INR: 1.05 ratio         PTT - ( 01 Oct 2020 17:20 )  PTT:36.3 sec  Urinalysis Basic - ( 01 Oct 2020 18:35 )    Color: Yellow / Appearance: Clear / S.010 / pH: x  Gluc: x / Ketone: Negative  / Bili: Negative / Urobili: Negative mg/dL   Blood: x / Protein: Negative mg/dL / Nitrite: Negative   Leuk Esterase: Negative / RBC: x / WBC x   Sq Epi: x / Non Sq Epi: x / Bacteria: x      CULTURES:        Physical Examination:    General: No acute distress.  lethargic, trach to vent, toxic appearing    HEENT: Pupils equal, reactive to light.  Symmetric.    PULM: Coarse breath sounds bilaterally, diminished at right base, no significant sputum production    CVS: bradycardic, reg rhythm, no murmurs, rubs, or gallops    ABD: Soft, nondistended, nontender, normoactive bowel sounds, no masses, (+)PEG    EXT: No edema, nontender    SKIN: Warm and well perfused, no rashes noted.    NEURO: lethargic, will arouse to noxious stimuli, nonverbal, does not follow commands      RADIOLOGY:  n/a        CRITICAL CARE TIME SPENT: 57 mins  Time spent evaluating/treating patient with medical issues that pose a high risk for life threatening deterioration and/or end-organ damage, reviewing data/labs/imaging, discussing case with multidisciplinary team, discussing plan/goals of care with patient/family. Non-inclusive of procedure time.

## 2020-10-02 NOTE — CONSULT NOTE ADULT - PROBLEM SELECTOR RECOMMENDATION 5
Start outpatient Keppra regimen. No evidence of seizure activity at this time. Monitor closely given lowered threshold. Start outpatient Keppra regimen. No evidence of seizure activity at this time. Monitor closely given decreased threshold.

## 2020-10-02 NOTE — CONSULT NOTE ADULT - PROBLEM SELECTOR RECOMMENDATION 9
Secondary to RLL pneumonia. SBP in the 60s despite 2.5L IVF bolus. Start IV vasopressor therapy with norepinephrine. Titrate to maintain a SBP > 80 as this is her reported baseline. Will add midodrine as well. Monitor clinical end-points of perfusion closely. Recent TTE from this past June revealed normal biventricular function with resolved pericardial effusion. Will order repeat study. Empiric meropenem and vancomycin for now given h/o recurrent pneumonia/sepsis. Pancultures pending.

## 2020-10-02 NOTE — ED ADULT NURSE REASSESSMENT NOTE - NS ED NURSE REASSESS COMMENT FT1
pt BP 72/33, MAP 43. MD Thomas aware and at bedside. 500cc bolus hung with pressure bag. ICU consulted and coming to see pt. additional IV access established. As per mom pt uses ventilator at night, respiratory at bedside to switch pt to ventilator. will continue to monitor BP.

## 2020-10-02 NOTE — DIETITIAN INITIAL EVALUATION ADULT. - OTHER INFO
26 yo Female with a PMH significant for Cerebral Palsy and DD, GERD, Asthma, Seizure disorders, Scoliosis/Kyphosis, h/o Coxsackie Myocarditis, c/b pericarditis, Chronic Respiratory Failure s/p Trach and Peg placement. She was BIB her mother with complaints of worsening Dyspnea. Mother reports the onset as the day prior, where she began experiencing "wet coarse breath sounds" with thick yellow mucosal secretions tinged with blood, upon suctioning with multiple episodes of mucosal plugging.  Pt admitted with septic shock with acute on chronic hypoxic resp failure 2/2 ventilator associated PNA, hypothermia.    *pt has been seen 2 times in past by dietitian while on this exact TF regimen; pt with wt gain over time: pt was 78.9# on 6/21/20, pt now measuring at 85.5# (RD used bedscale); pt with ~6.6# wt gain in past 3.5 months (7.7%).  Current TF regimen appears adequate to maintain wt and skin integrity. It meets ~98% of estimated calorie needs and 100% of estimated protein needs. rec'd c/w home TF regimen at this time.

## 2020-10-02 NOTE — DIETITIAN INITIAL EVALUATION ADULT. - NAME AND PHONE
Yareli Guadalupe MA, RDN, CDN, [facility]  [phone] (office number)  [phone] (pager number)

## 2020-10-02 NOTE — DIETITIAN INITIAL EVALUATION ADULT. - ENTERAL
resume home TF regimen: efw-suhl Peptide 1.5 @ 135mL x 4 times per day @ 90mL/hr (4am, 10am, 4pm, and 10pm) with 30mL free water flush after, Nutrica Complete Amino Acid mix 1Tbsp combined with 1 spoon of apple sauce and water, via tube TID.  Followed by 200mL free water given at 150mL/hr.  Will provide ~829Kcal, 52g protein, and 1298mL free water, total TF volume of 540mL.

## 2020-10-02 NOTE — CONSULT NOTE ADULT - SUBJECTIVE AND OBJECTIVE BOX
Patient is a 25y old  Female who presents with a chief complaint of Sepsis with Acute Hypoxic Respiratory Failure 2/2 PNA (02 Oct 2020 02:07)    HPI:  26 yo Female with a PMH significant for Cerebral Palsy and DD, GERD, Asthma, Seizure disorders, Scoliosis/Kyphosis, h/o Coxsackie Myocarditis, c/b pericarditis, Chronic Respiratory Failure s/p Trach and Peg placement, admission in  to [facility] for respiratory distress, admitted on 10/1 for evaluation of shortness of breath over day prior  to admission with wet coarse breath sounds and increased secretions and mucous plugging; did have transient desaturations that were refractory to nebulizer treatments; the patient was hypothermic upon admission. History per medical record and patient mother at bedside.  Patient placed on pressors for blood pressure support.        PMH: as above  PSH: as above  Meds: per reconciliation sheet, noted below  MEDICATIONS  (STANDING):  ALBUTerol    90 MICROgram(s) HFA Inhaler 2 Puff(s) Inhalation every 4 hours  chlorhexidine 0.12% Liquid 15 milliLiter(s) Oral Mucosa every 12 hours  cholecalciferol 400 Unit(s) Oral daily  fluticasone propionate   110 MICROgram(s) HFA Inhaler 1 Puff(s) Inhalation two times a day  fluticasone propionate 50 MICROgram(s)/spray Nasal Spray 1 Spray(s) Both Nostrils two times a day  levETIRAcetam  Solution 700 milliGRAM(s) Oral at bedtime  levETIRAcetam  Solution 600 milliGRAM(s) Oral daily  meropenem  IVPB 1000 milliGRAM(s) IV Intermittent every 8 hours  midodrine 10 milliGRAM(s) Oral every 8 hours  multivitamin 1 Tablet(s) Oral daily  norepinephrine Infusion 0.05 MICROgram(s)/kG/Min (4.68 mL/Hr) IV Continuous <Continuous>  pantoprazole   Suspension 20 milliGRAM(s) Oral daily  potassium chloride   Powder 10 milliEquivalent(s) Oral two times a day  silver sulfADIAZINE 1% Cream 1 Application(s) Topical daily  sodium chloride 3%  Inhalation 2 milliLiter(s) Inhalation two times a day  tobramycin for Nebulization - Peds 300 milliGRAM(s) Nebulizer every 12 hours    MEDICATIONS  (PRN):  acetaminophen  Rectal Suppository - Peds. 650 milliGRAM(s) Rectal every 6 hours PRN Temp greater or equal to 38 C (100.4 F), Moderate Pain (4 - 6)  LORazepam   Injectable 1 milliGRAM(s) IV Push every 2 hours PRN Seizure  sodium chloride 0.65% Nasal 1 Spray(s) Both Nostrils two times a day PRN Nasal Congestion    Allergies    Bactrim (Unknown)  carbamazepine (Unknown)  cephalosporins (Rash)  Corn (Unknown)  Depakote (Unknown)  diphenhydrAMINE (Seizure)  DISPOSABLE BLOOD PRESSURE CUFF - Red blistered skin where disposable blood pressure cuff was on right arm. (CONTACT DERMATITIS) (Rash; Blisters; Swelling;)  eggs (Unknown)  EGGS,  NUTS (Anaphylaxis)  Erythromycin Base (Unknown)  metoclopramide (Unknown)  Milk (Unknown)  MILK, SHELLFISH, WHEAT (Unknown)  Nuts (Unknown)  oxcarbazepine (Unknown)  peanuts (Unknown)  Potatoes (Unknown)  SEASONAL, POLLEN, GRASS, PET DANDER, FEATHERS (Unknown)  Sesame (Unknown)  shellfish (Unknown)  Trileptal (Unknown)  valproic acid (Unknown)  Wheat (Unknown)    Intolerances      Social: no smoking, no alcohol, no illegal drugs; no recent travel, no exposure to TB  FAMILY HISTORY: unable to obtain secondary to patient medical condition      ROS unable to obtain secondary to patient medical condition     Vital Signs Last 24 Hrs  T(C): 35.9 (02 Oct 2020 03:00), Max: 35.9 (01 Oct 2020 16:22)  T(F): 96.6 (02 Oct 2020 03:00), Max: 96.7 (01 Oct 2020 16:22)  HR: 56 (02 Oct 2020 08:09) (48 - 92)  BP: 99/77 (02 Oct 2020 08:00) (69/34 - 140/82)  BP(mean): 82 (02 Oct 2020 08:00) (42 - 95)  RR: 14 (02 Oct 2020 08:00) (13 - 21)  SpO2: 89% (02 Oct 2020 08:09) (86% - 100%)  Daily Height in cm: 129.54 (01 Oct 2020 16:16)    Daily     PE:    Constitutional: frail looking  HEENT: NC/AT, EOMI, PERRLA, conjunctivae clear; ears and nose atraumatic; pharynx clear  Neck: trach in place  Back: no tenderness  Respiratory: respiratory effort normal; scattered coarse breath sounds  Cardiovascular: S1S2 regular, no murmurs  Abdomen: soft, not tender, not distended, positive BS; no liver or spleen organomegaly; peg in place  Genitourinary: no suprapubic tenderness  Musculoskeletal: no muscle tenderness, no joint swelling or tenderness  Neurological/ Psychiatric:  moving all extremities  Skin: no rashes; no palpable lesions    Labs: all available labs reviewed                        11.6   5.34  )-----------( 152      ( 02 Oct 2020 06:17 )             35.1     10-02    143  |  112<H>  |  6<L>  ----------------------------<  105<H>  3.5   |  25  |  0.22<L>    Ca    8.6      02 Oct 2020 06:17  Phos  2.3     10-02  Mg     1.8     10-    TPro  6.9  /  Alb  3.0<L>  /  TBili  0.2  /  DBili  x   /  AST  17  /  ALT  27  /  AlkPhos  149<H>  10-01     LIVER FUNCTIONS - ( 01 Oct 2020 17:20 )  Alb: 3.0 g/dL / Pro: 6.9 gm/dL / ALK PHOS: 149 U/L / ALT: 27 U/L / AST: 17 U/L / GGT: x           Urinalysis Basic - ( 01 Oct 2020 18:35 )    Color: Yellow / Appearance: Clear / S.010 / pH: x  Gluc: x / Ketone: Negative  / Bili: Negative / Urobili: Negative mg/dL   Blood: x / Protein: Negative mg/dL / Nitrite: Negative   Leuk Esterase: Negative / RBC: x / WBC x   Sq Epi: x / Non Sq Epi: x / Bacteria: x          Radiology: all available radiological tests reviewed    Advanced directives addressed: full resuscitation

## 2020-10-02 NOTE — CONSULT NOTE ADULT - SUBJECTIVE AND OBJECTIVE BOX
HPI: CHAMP CALDWELL is a 26 yo Female with a PMH significant for Cerebral Palsy and DD, GERD, Asthma, Seizure disorders, Scoliosis/Kyphosis, h/o Coxsackie Myocarditis, c/b pericarditis, Chronic Respiratory Failure s/p Trach and Peg placement who presented on 10/1/2020 with worsening Dyspnea. Mother reports the onset as the day prior, where she began experiencing "wet coarse breath sounds" with thick yellow mucosal secretions tinged with blood, upon suctioning with multiple episodes of mucosal plugging. During that time she experienced a few episodes of  transient desaturations on RA to the 70's. Her sxs progressed the next day where she experienced more frequent and longer hypoxic episodes to the 60's, that were refractory to Chest Physiotherapy Vest and nebulizer treatments. Per her mother she became more lethargic then usual with temperatures as low as 96.5. She saw me in the office, and xray of the spine revealed new consolidation, so she was sent to the ED for further management.    In the ed she was given fluids and started on pressors for hypotension. she is no longer on levophed. per mother, she looks better today    Past Medical History:   Hypokalemia    Kyphosis    Scoliosis    Hypotonia    Asthma    Seizures    Cerebral Palsy    Gastroesophageal Reflux Disease    Hypothyroidism    Developmental Delay      Past Surgical History:   Tracheostomy in place    Peg (Percutaneous Endoscopic Gastrostomy) Adjustment/Replacement/Removal    S/P Tonsillectomy and Adenoidectomy    Strabismus      Family History:    No pertinent family history in first degree relatives       Social History: nonsmoker, lives with family    Review of Systems:  All 10 systems reviewed in detailed and found to be negative with the exception of what has already been described above    Allergies:  Bactrim (Unknown)  carbamazepine (Unknown)  cephalosporins (Rash)  Corn (Unknown)  Depakote (Unknown)  diphenhydrAMINE (Seizure)  DISPOSABLE BLOOD PRESSURE CUFF - Red blistered skin where disposable blood pressure cuff was on right arm. (CONTACT DERMATITIS) (Rash; Blisters; Swelling;)  eggs (Unknown)  EGGS,  NUTS (Anaphylaxis)  Erythromycin Base (Unknown)  metoclopramide (Unknown)  Milk (Unknown)  MILK, SHELLFISH, WHEAT (Unknown)  Nuts (Unknown)  oxcarbazepine (Unknown)  peanuts (Unknown)  Potatoes (Unknown)  SEASONAL, POLLEN, GRASS, PET DANDER, FEATHERS (Unknown)  Sesame (Unknown)  shellfish (Unknown)  Trileptal (Unknown)  valproic acid (Unknown)  Wheat (Unknown)    Meds  MEDICATIONS  (STANDING):  ALBUTerol    90 MICROgram(s) HFA Inhaler 2 Puff(s) Inhalation every 4 hours  chlorhexidine 0.12% Liquid 15 milliLiter(s) Oral Mucosa every 12 hours  cholecalciferol 400 Unit(s) Oral daily  fluticasone propionate   110 MICROgram(s) HFA Inhaler 1 Puff(s) Inhalation two times a day  fluticasone propionate 50 MICROgram(s)/spray Nasal Spray 1 Spray(s) Both Nostrils two times a day  levETIRAcetam  Solution 700 milliGRAM(s) Oral at bedtime  levETIRAcetam  Solution 600 milliGRAM(s) Oral daily  meropenem  IVPB 1000 milliGRAM(s) IV Intermittent every 8 hours  midodrine 10 milliGRAM(s) Oral every 8 hours  multivitamin 1 Tablet(s) Oral daily  norepinephrine Infusion 0.05 MICROgram(s)/kG/Min (4.68 mL/Hr) IV Continuous <Continuous>  pantoprazole   Suspension 20 milliGRAM(s) Oral daily  potassium chloride   Powder 10 milliEquivalent(s) Oral two times a day  silver sulfADIAZINE 1% Cream 1 Application(s) Topical daily  sodium chloride 3%  Inhalation 2 milliLiter(s) Inhalation two times a day  tobramycin for Nebulization - Peds 300 milliGRAM(s) Nebulizer every 12 hours    MEDICATIONS  (PRN):  acetaminophen  Rectal Suppository - Peds. 650 milliGRAM(s) Rectal every 6 hours PRN Temp greater or equal to 38 C (100.4 F), Moderate Pain (4 - 6)  LORazepam   Injectable 1 milliGRAM(s) IV Push every 2 hours PRN Seizure  sodium chloride 0.65% Nasal 1 Spray(s) Both Nostrils two times a day PRN Nasal Congestion    Physical Exam  T(C): 35.9 (10-02-20 @ 03:00), Max: 35.9 (10-01-20 @ 16:22)  HR: 53 (10-02-20 @ 10:56) (48 - 92)  BP: 116/70 (10-02-20 @ 10:00) (69/34 - 140/82)  RR: 12 (10-02-20 @ 10:00) (12 - 21)  SpO2: 91% (10-02-20 @ 10:56) (83% - 100%)  Gen: Trach, no distress, on vent  HEENT: Microcephalic  Cardio:  regular rate  Resp: Coarse breath sounds  GI:  GJ tube  Ext: No cyanosis, clubbing or edema  Neuro: Nonverbal  beside us 10/2 revealed small pleural effusion on right    Labs:                        11.6   5.34  )-----------( 152      ( 02 Oct 2020 06:17 )             35.1     10    143  |  112<H>  |  6<L>  ----------------------------<  105<H>  3.5   |  25  |  0.22<L>    Ca    8.6      02 Oct 2020 06:17  Phos  2.3     10  Mg     1.8     10-02    TPro  6.9  /  Alb  3.0<L>  /  TBili  0.2  /  DBili  x   /  AST  17  /  ALT  27  /  AlkPhos  149<H>  10    PT/INR - ( 01 Oct 2020 17:20 )   PT: 12.2 sec;   INR: 1.05 ratio         PTT - ( 01 Oct 2020 17:20 )  PTT:36.3 sec  Urinalysis Basic - ( 01 Oct 2020 18:35 )    Color: Yellow / Appearance: Clear / S.010 / pH: x  Gluc: x / Ketone: Negative  / Bili: Negative / Urobili: Negative mg/dL   Blood: x / Protein: Negative mg/dL / Nitrite: Negative   Leuk Esterase: Negative / RBC: x / WBC x   Sq Epi: x / Non Sq Epi: x / Bacteria: x    < from: Xray Chest 1 View AP/PA. (10.01.20 @ 17:13) >  PROCEDURE DATE:  10/01/2020          INTERPRETATION:  XR CHEST    History: cough    Technique: Single AP view of the chest.    Comparison: Chest x-ray 2020    Findings:    Tracheostomy. Moderate right pleural effusion with patchy opacities in the right lung. Left lung is clear.    There appears to be a partially imaged gastrostomy tube.    Heart normal in size. Levoconvex thoracolumbar scoliosis.    Impression:    Moderate size right pleural effusion with patchy opacities in the right lung; differential includes pneumonia, atelectasis or pulmonary edema.    < end of copied text >

## 2020-10-02 NOTE — PATIENT PROFILE ADULT - NSPROMEDSADMININFO_GEN_A_NUR
crushed and administered via peg tube/difficulty swallowing pills/requires elixir form/administer in food crushed and administered via peg tube/difficulty swallowing pills

## 2020-10-02 NOTE — CONSULT NOTE ADULT - REASON FOR ADMISSION
Sepsis with Acute Hypoxic Respiratory Failure 2/2 PNA

## 2020-10-02 NOTE — DIETITIAN INITIAL EVALUATION ADULT. - PERTINENT LABORATORY DATA
10-02 Na143 mmol/L Glu 105 mg/dL<H> K+ 3.5 mmol/L Cr  0.22 mg/dL<L> BUN 6 mg/dL<L> Phos 2.3 mg/dL<L> Alb n/a   PAB n/a

## 2020-10-02 NOTE — PROVIDER CONTACT NOTE (EICU) - RECOMMENDATIONS
Plan  1. Agree with Vanco and Meropenem for now  2. Pan culture  3. Mech vent support   4. PEG feed, no need to place NGT  5. GI/DVT prophylasix

## 2020-10-02 NOTE — CONSULT NOTE ADULT - ASSESSMENT
25y old Female with PMH asthma, cerebral palsy, developmental delay, seizures, scoliosis, hypothyroidism, GERD, s/p trach, PEG, on trilogy at home, with multiple hospitalizations in the past at [facility] PICU pneumonia, colonized with serratia marcescens, but sputum cultures also grew out achromobacter xylosoxidans and burkolderia cepacia in the past, with pneumonia  1. Pneumonia: ID following  -continue meropenem  -continue nebulized teofilo  -pulmonary toilet  2. Asthma: continue xopenex, budesonide once off vent  3. Seizures: continue levetiracetam.  4.  GJ tube/malnutrition: Continue tube feeds  5. Shock/hypotension: off norepinephrine  
26 yo Female with a PMH significant for Cerebral Palsy and DD, GERD, Asthma, Seizure disorders, Scoliosis/Kyphosis, h/o Coxsackie Myocarditis, c/b pericarditis, Chronic Respiratory Failure s/p Trach and Peg placement, admission in June to [facility] for respiratory distress, admitted on 10/1 for evaluation of shortness of breath over day prior  to admission with wet coarse breath sounds and increased secretions and mucous plugging; did have transient desaturations that were refractory to nebulizer treatments; the patient was hypothermic upon admission. History per medical record and patient mother at bedside.  Patient placed on pressors for blood pressure support.  1. Patient admitted with septic shock secondary to pneumonia in this young patient with cerebral palsy s/p trach; may have component of aspiration pneumonia as well  - follow up cultures   - pressors and vent per icu  - - iv hydration and supportive care   - serial cbc and monitor temperature   - reviewed prior medical records to evaluate for resistant or atypical pathogens   - can continue antibiotic nebs as ordered, though benefit is unclear  - will continue meropenem as ordered  - send sputum culture  - hold on further vancomycin for now  2. other issues; per medicine  
26 y/o F with a h/o cerebral palsy, global developmental delay, chronic respiratory failure (s/p trach/PEG, SIMV qHS), recurrent pneumonia, GERD, asthma, seizure disorder, scoliosis/kyphosis, Coxsackie myocarditis with large pericardial effusion (January 2020), with septic shock, lobar pneumonia, acute hypoxemic respiratory failure, metabolic encephalopathy.

## 2020-10-02 NOTE — PATIENT PROFILE ADULT - VISION (WITH CORRECTIVE LENSES IF THE PATIENT USUALLY WEARS THEM):
unable to assess.patient has cerebral palsy. tracks with eyes Normal vision: sees adequately in most situations; can see medication labels, newsprint/unable to assess.patient has cerebral palsy. tracks with eyes

## 2020-10-02 NOTE — CONSULT NOTE ADULT - PROBLEM SELECTOR RECOMMENDATION 2
Secondary to pneumonia and mucus plugging. Placed on mechanical ventilation at outpatient settings. Actively titrating vent settings to maintain SpO2 > 92%. Keep HOB elevated > 30 degrees. Aggressive suctioning and chest PT.

## 2020-10-02 NOTE — DIETITIAN INITIAL EVALUATION ADULT. - FACTORS AFF FOOD INTAKE
Aline Kota Peptide 1.5 @ 135mL four times per day intermittant @ 90mL/hr via Jtube with 30mL free water flush after followed by 125mL pedialyte, then 30mL free water flush, then 200mL free water @ 150mL/hr.  This is done at 4am, 10am, 4pm, and 10pm.  Pt is also given Nutrica Complete Amino Acid Mix @ 1tbsp TID mixed with 1spoon of apple sauce and a little water and pushed through Jtube.  This provides pt with ~885Kcal, 52g protein, 1040mL free water daily, 500mL pedialyte daily./other (specify)

## 2020-10-02 NOTE — DIETITIAN INITIAL EVALUATION ADULT. - PERTINENT MEDS FT
MEDICATIONS  (STANDING):  ALBUTerol    90 MICROgram(s) HFA Inhaler 2 Puff(s) Inhalation every 4 hours  chlorhexidine 0.12% Liquid 15 milliLiter(s) Oral Mucosa every 12 hours  cholecalciferol 400 Unit(s) Oral daily  fluticasone propionate   110 MICROgram(s) HFA Inhaler 1 Puff(s) Inhalation two times a day  fluticasone propionate 50 MICROgram(s)/spray Nasal Spray 1 Spray(s) Both Nostrils two times a day  levETIRAcetam  Solution 700 milliGRAM(s) Oral at bedtime  levETIRAcetam  Solution 600 milliGRAM(s) Oral daily  meropenem  IVPB 1000 milliGRAM(s) IV Intermittent every 8 hours  midodrine 10 milliGRAM(s) Oral every 8 hours  multivitamin 1 Tablet(s) Oral daily  norepinephrine Infusion 0.05 MICROgram(s)/kG/Min (4.68 mL/Hr) IV Continuous <Continuous>  pantoprazole   Suspension 20 milliGRAM(s) Oral daily  potassium chloride   Powder 10 milliEquivalent(s) Oral two times a day  silver sulfADIAZINE 1% Cream 1 Application(s) Topical daily  sodium chloride 3%  Inhalation 2 milliLiter(s) Inhalation two times a day  tobramycin for Nebulization - Peds 300 milliGRAM(s) Nebulizer every 12 hours    MEDICATIONS  (PRN):  acetaminophen  Rectal Suppository - Peds. 650 milliGRAM(s) Rectal every 6 hours PRN Temp greater or equal to 38 C (100.4 F), Moderate Pain (4 - 6)  LORazepam   Injectable 1 milliGRAM(s) IV Push every 2 hours PRN Seizure  sodium chloride 0.65% Nasal 1 Spray(s) Both Nostrils two times a day PRN Nasal Congestion

## 2020-10-02 NOTE — PATIENT PROFILE ADULT - NSPROEDALEARNPREF_GEN_A_NUR
patient non verbal ,cerebral palsy verbal instruction/written material/patient non verbal ,cerebral palsy

## 2020-10-03 LAB
ANION GAP SERPL CALC-SCNC: 5 MMOL/L — SIGNIFICANT CHANGE UP (ref 5–17)
BUN SERPL-MCNC: 11 MG/DL — SIGNIFICANT CHANGE UP (ref 7–23)
CALCIUM SERPL-MCNC: 8.9 MG/DL — SIGNIFICANT CHANGE UP (ref 8.5–10.1)
CHLORIDE SERPL-SCNC: 111 MMOL/L — HIGH (ref 96–108)
CO2 SERPL-SCNC: 26 MMOL/L — SIGNIFICANT CHANGE UP (ref 22–31)
CREAT SERPL-MCNC: 0.37 MG/DL — LOW (ref 0.5–1.3)
GLUCOSE SERPL-MCNC: 67 MG/DL — LOW (ref 70–99)
GRAM STN FLD: SIGNIFICANT CHANGE UP
HCT VFR BLD CALC: 39.4 % — SIGNIFICANT CHANGE UP (ref 34.5–45)
HGB BLD-MCNC: 12.5 G/DL — SIGNIFICANT CHANGE UP (ref 11.5–15.5)
MCHC RBC-ENTMCNC: 31.7 GM/DL — LOW (ref 32–36)
MCHC RBC-ENTMCNC: 32.6 PG — SIGNIFICANT CHANGE UP (ref 27–34)
MCV RBC AUTO: 102.9 FL — HIGH (ref 80–100)
PLATELET # BLD AUTO: 165 K/UL — SIGNIFICANT CHANGE UP (ref 150–400)
POTASSIUM SERPL-MCNC: 3.9 MMOL/L — SIGNIFICANT CHANGE UP (ref 3.5–5.3)
POTASSIUM SERPL-SCNC: 3.9 MMOL/L — SIGNIFICANT CHANGE UP (ref 3.5–5.3)
RBC # BLD: 3.83 M/UL — SIGNIFICANT CHANGE UP (ref 3.8–5.2)
RBC # FLD: 12.1 % — SIGNIFICANT CHANGE UP (ref 10.3–14.5)
SARS-COV-2 IGG SERPL QL IA: NEGATIVE — SIGNIFICANT CHANGE UP
SARS-COV-2 IGM SERPL IA-ACNC: <0.1 INDEX — SIGNIFICANT CHANGE UP
SODIUM SERPL-SCNC: 142 MMOL/L — SIGNIFICANT CHANGE UP (ref 135–145)
SPECIMEN SOURCE: SIGNIFICANT CHANGE UP
WBC # BLD: 4.7 K/UL — SIGNIFICANT CHANGE UP (ref 3.8–10.5)
WBC # FLD AUTO: 4.7 K/UL — SIGNIFICANT CHANGE UP (ref 3.8–10.5)

## 2020-10-03 PROCEDURE — 99291 CRITICAL CARE FIRST HOUR: CPT

## 2020-10-03 PROCEDURE — 71045 X-RAY EXAM CHEST 1 VIEW: CPT | Mod: 26

## 2020-10-03 RX ORDER — SODIUM CHLORIDE 9 MG/ML
250 INJECTION INTRAMUSCULAR; INTRAVENOUS; SUBCUTANEOUS ONCE
Refills: 0 | Status: COMPLETED | OUTPATIENT
Start: 2020-10-03 | End: 2020-10-03

## 2020-10-03 RX ORDER — SODIUM CHLORIDE 9 MG/ML
500 INJECTION INTRAMUSCULAR; INTRAVENOUS; SUBCUTANEOUS ONCE
Refills: 0 | Status: COMPLETED | OUTPATIENT
Start: 2020-10-03 | End: 2020-10-03

## 2020-10-03 RX ADMIN — CHLORHEXIDINE GLUCONATE 15 MILLILITER(S): 213 SOLUTION TOPICAL at 19:00

## 2020-10-03 RX ADMIN — Medication 1 SPRAY(S): at 10:04

## 2020-10-03 RX ADMIN — ALBUTEROL 2 PUFF(S): 90 AEROSOL, METERED ORAL at 23:25

## 2020-10-03 RX ADMIN — Medication 1 SPRAY(S): at 22:12

## 2020-10-03 RX ADMIN — Medication 1 APPLICATION(S): at 10:03

## 2020-10-03 RX ADMIN — ENOXAPARIN SODIUM 30 MILLIGRAM(S): 100 INJECTION SUBCUTANEOUS at 12:21

## 2020-10-03 RX ADMIN — ALBUTEROL 2 PUFF(S): 90 AEROSOL, METERED ORAL at 05:29

## 2020-10-03 RX ADMIN — CHLORHEXIDINE GLUCONATE 15 MILLILITER(S): 213 SOLUTION TOPICAL at 05:33

## 2020-10-03 RX ADMIN — ALBUTEROL 2 PUFF(S): 90 AEROSOL, METERED ORAL at 13:10

## 2020-10-03 RX ADMIN — MEROPENEM 100 MILLIGRAM(S): 1 INJECTION INTRAVENOUS at 22:11

## 2020-10-03 RX ADMIN — Medication 10 MILLIEQUIVALENT(S): at 10:02

## 2020-10-03 RX ADMIN — Medication 10 MILLIEQUIVALENT(S): at 22:11

## 2020-10-03 RX ADMIN — ALBUTEROL 2 PUFF(S): 90 AEROSOL, METERED ORAL at 20:53

## 2020-10-03 RX ADMIN — Medication 400 UNIT(S): at 10:07

## 2020-10-03 RX ADMIN — LEVETIRACETAM 700 MILLIGRAM(S): 250 TABLET, FILM COATED ORAL at 20:46

## 2020-10-03 RX ADMIN — SODIUM CHLORIDE 500 MILLILITER(S): 9 INJECTION INTRAMUSCULAR; INTRAVENOUS; SUBCUTANEOUS at 17:48

## 2020-10-03 RX ADMIN — MIDODRINE HYDROCHLORIDE 10 MILLIGRAM(S): 2.5 TABLET ORAL at 05:33

## 2020-10-03 RX ADMIN — ALBUTEROL 2 PUFF(S): 90 AEROSOL, METERED ORAL at 08:10

## 2020-10-03 RX ADMIN — MEROPENEM 100 MILLIGRAM(S): 1 INJECTION INTRAVENOUS at 05:33

## 2020-10-03 RX ADMIN — Medication 1 TABLET(S): at 10:03

## 2020-10-03 RX ADMIN — ALBUTEROL 2 PUFF(S): 90 AEROSOL, METERED ORAL at 16:07

## 2020-10-03 RX ADMIN — LEVETIRACETAM 600 MILLIGRAM(S): 250 TABLET, FILM COATED ORAL at 08:00

## 2020-10-03 RX ADMIN — PANTOPRAZOLE SODIUM 20 MILLIGRAM(S): 20 TABLET, DELAYED RELEASE ORAL at 10:03

## 2020-10-03 RX ADMIN — MEROPENEM 100 MILLIGRAM(S): 1 INJECTION INTRAVENOUS at 13:31

## 2020-10-03 NOTE — PROGRESS NOTE ADULT - SUBJECTIVE AND OBJECTIVE BOX
CHAMP CALDWELL  MRN: [id number]    S: 10/3/2020: Comfortable on vent. Improved. Less secretions. Off pressors.     PAST MEDICAL & SURGICAL HISTORY:  Hypokalemia    Kyphosis    Scoliosis    Hypotonia    Asthma    Seizures    Cerebral Palsy    Gastroesophageal Reflux Disease    Hypothyroidism  Treated with synthroid in past. Stopped treatment in 2008 due to normal thyroid function.    Developmental Delay    Tracheostomy in place    Peg (Percutaneous Endoscopic Gastrostomy) Adjustment/Replacement/Removal    S/P Tonsillectomy and Adenoidectomy    Strabismus        O: T(C): 36.4 (10-03-20 @ 06:00), Max: 37.3 (10-02-20 @ 18:00)  HR: 71 (10-03-20 @ 09:00) (53 - 101)  BP: 109/57 (10-03-20 @ 09:00) (79/34 - 121/83)  RR: 13 (10-03-20 @ 09:00) (12 - 26)  SpO2: 91% (10-03-20 @ 09:00) (91% - 100%)  Wt(kg): --    PHYSICAL EXAM:      GENERAL: comfortable on vent    NEURO: awake    NECK: no JVD    CHEST: equal bilat    CARDIAC: RR    EXT: no edema      LABS:                        12.5   4.70  )-----------( 165      ( 03 Oct 2020 06:42 )             39.4       10-03    142  |  111<H>  |  11  ----------------------------<  67<L>  3.9   |  26  |  0.37<L>    Ca    8.9      03 Oct 2020 06:42  Phos  2.3     10-02  Mg     1.8     10-02    TPro  6.9  /  Alb  3.0<L>  /  TBili  0.2  /  DBili  x   /  AST  17  /  ALT  27  /  AlkPhos  149<H>  10-01      MEDICATIONS  (STANDING):  ALBUTerol    90 MICROgram(s) HFA Inhaler 2 Puff(s) Inhalation every 4 hours  chlorhexidine 0.12% Liquid 15 milliLiter(s) Oral Mucosa every 12 hours  cholecalciferol 400 Unit(s) Oral daily  enoxaparin Injectable 30 milliGRAM(s) SubCutaneous daily  fluticasone propionate   110 MICROgram(s) HFA Inhaler 1 Puff(s) Inhalation two times a day  fluticasone propionate 50 MICROgram(s)/spray Nasal Spray 1 Spray(s) Both Nostrils two times a day  levETIRAcetam  Solution 700 milliGRAM(s) Oral at bedtime  levETIRAcetam  Solution 600 milliGRAM(s) Oral daily  meropenem  IVPB 1000 milliGRAM(s) IV Intermittent every 8 hours  multivitamin 1 Tablet(s) Oral daily  pantoprazole   Suspension 20 milliGRAM(s) Oral daily  potassium chloride   Powder 10 milliEquivalent(s) Oral two times a day  silver sulfADIAZINE 1% Cream 1 Application(s) Topical daily  sodium chloride 3%  Inhalation 2 milliLiter(s) Inhalation two times a day  tobramycin for Nebulization - Peds 300 milliGRAM(s) Nebulizer every 12 hours    MEDICATIONS  (PRN):  acetaminophen  Rectal Suppository - Peds. 650 milliGRAM(s) Rectal every 6 hours PRN Temp greater or equal to 38 C (100.4 F), Moderate Pain (4 - 6)  LORazepam   Injectable 1 milliGRAM(s) IV Push every 2 hours PRN Seizure  sodium chloride 0.65% Nasal 1 Spray(s) Both Nostrils two times a day PRN Nasal Congestion      A/P: 1. RLL pneumonia. This AM CXR improved. Less secretions. Continue meropenem per ID.     2. Respiratory failure. Continue vent support. Patient is usually off vent during the day at home. Did not tolerate spontaneous breathing this am.     3. S/P hypotension. Now stable off pressors.     4. Hx of seizures. Continue Keppra    5. Lovenox for DVT prophylaxis.     D/W patient's mother at bedside.     D/W Dr. Burgess.

## 2020-10-03 NOTE — PROGRESS NOTE ADULT - SUBJECTIVE AND OBJECTIVE BOX
Events Overnight:  Patient on vent, desaturated with attempted weaning this am                             sputum wbc, no organisms, levophed is off  HPI:      26 yo Female with a PMH significant for Cerebral Palsy and DD, GERD, Asthma, Seizure disorders, Scoliosis/Kyphosis, h/o Coxsackie Myocarditis, c/b pericarditis, Chronic Respiratory Failure s/p Trach and Peg placement. She was BIB her mother with complaints of worsening Dyspnea and increased sputum production with plugging  CXR showed new right sided infiltrate, Patient became hypostensioe in ED and required levophed overnight.  BP now better. on meropenem. Patient is normally only on vent at night at home. currently on vent    ROS cant obtain secondary to cerebral palsy      MEDICATIONS  (STANDING):  ALBUTerol    90 MICROgram(s) HFA Inhaler 2 Puff(s) Inhalation every 4 hours  chlorhexidine 0.12% Liquid 15 milliLiter(s) Oral Mucosa every 12 hours  cholecalciferol 400 Unit(s) Oral daily  enoxaparin Injectable 30 milliGRAM(s) SubCutaneous daily  fluticasone propionate   110 MICROgram(s) HFA Inhaler 1 Puff(s) Inhalation two times a day  fluticasone propionate 50 MICROgram(s)/spray Nasal Spray 1 Spray(s) Both Nostrils two times a day  levETIRAcetam  Solution 700 milliGRAM(s) Oral at bedtime  levETIRAcetam  Solution 600 milliGRAM(s) Oral daily  meropenem  IVPB 1000 milliGRAM(s) IV Intermittent every 8 hours  multivitamin 1 Tablet(s) Oral daily  pantoprazole   Suspension 20 milliGRAM(s) Oral daily  potassium chloride   Powder 10 milliEquivalent(s) Oral two times a day  silver sulfADIAZINE 1% Cream 1 Application(s) Topical daily  sodium chloride 3%  Inhalation 2 milliLiter(s) Inhalation two times a day  tobramycin for Nebulization - Peds 300 milliGRAM(s) Nebulizer every 12 hours    MEDICATIONS  (PRN):  acetaminophen  Rectal Suppository - Peds. 650 milliGRAM(s) Rectal every 6 hours PRN Temp greater or equal to 38 C (100.4 F), Moderate Pain (4 - 6)  LORazepam   Injectable 1 milliGRAM(s) IV Push every 2 hours PRN Seizure  sodium chloride 0.65% Nasal 1 Spray(s) Both Nostrils two times a day PRN Nasal Congestion    ICU Vital Signs Last 24 Hrs  T(C): 36.4 (03 Oct 2020 06:00), Max: 37.3 (02 Oct 2020 18:00)  T(F): 97.6 (03 Oct 2020 06:00), Max: 99.2 (02 Oct 2020 18:00)  HR: 71 (03 Oct 2020 09:00) (53 - 101)  BP: 109/57 (03 Oct 2020 09:00) (79/34 - 121/83)  BP(mean): 66 (03 Oct 2020 09:00) (42 - 92)  ABP: --  ABP(mean): --  RR: 13 (03 Oct 2020 09:00) (12 - 26)  SpO2: 91% (03 Oct 2020 09:00) (91% - 100%)    Mode: SIMV (Synchronized Intermittent Mandatory Ventilation)  RR (machine): 15  FiO2: 40  PEEP: 8  PS: 10  ITime: 1  MAP: 10  PC: 15  PIP: 23    I&O's Summary    02 Oct 2020 07:01  -  03 Oct 2020 07:00  --------------------------------------------------------  IN: 1680 mL / OUT: 900 mL / NET: 780 mL                        12.5   4.70  )-----------( 165      ( 03 Oct 2020 06:42 )             39.4     10-03    142  |  111<H>  |  11  ----------------------------<  67<L>  3.9   |  26  |  0.37<L>    Ca    8.9      03 Oct 2020 06:42  Phos  2.3     10-02  Mg     1.8     10-02    TPro  6.9  /  Alb  3.0<L>  /  TBili  0.2  /  DBili  x   /  AST  17  /  ALT  27  /  AlkPhos  149<H>  10-01    Urinalysis Basic - ( 01 Oct 2020 18:35 )    Color: Yellow / Appearance: Clear / S.010 / pH: x  Gluc: x / Ketone: Negative  / Bili: Negative / Urobili: Negative mg/dL   Blood: x / Protein: Negative mg/dL / Nitrite: Negative   Leuk Esterase: Negative / RBC: x / WBC x   Sq Epi: x / Non Sq Epi: x / Bacteria: x    DVT Prophylaxis: Lovenox                                                                Advanced Directives: Full Code

## 2020-10-03 NOTE — PROGRESS NOTE ADULT - ASSESSMENT
Patient with cerebral palsy on home vent at night with inc secretions, dec bp  new right lower lobe infiltrate.  cxr today better, secretions better  septic shock with hypotension, [facility]  patient with trach , and j tube    Plan     1. on abx. meropenem and vanco, check final sputum culturesputum culture, chest pt        focused sono no large effusion    2. hydration for sepsis, off  levophed, d/c midodrine    3.  attempted weaning did not tolerate    4. will continue patient home tube feed regiment    5. Lovenox dvt prophylaxis

## 2020-10-04 LAB
ANION GAP SERPL CALC-SCNC: 6 MMOL/L — SIGNIFICANT CHANGE UP (ref 5–17)
BUN SERPL-MCNC: 13 MG/DL — SIGNIFICANT CHANGE UP (ref 7–23)
CALCIUM SERPL-MCNC: 8.8 MG/DL — SIGNIFICANT CHANGE UP (ref 8.5–10.1)
CHLORIDE SERPL-SCNC: 112 MMOL/L — HIGH (ref 96–108)
CO2 SERPL-SCNC: 25 MMOL/L — SIGNIFICANT CHANGE UP (ref 22–31)
CREAT SERPL-MCNC: 0.25 MG/DL — LOW (ref 0.5–1.3)
GLUCOSE SERPL-MCNC: 115 MG/DL — HIGH (ref 70–99)
HCT VFR BLD CALC: 35.8 % — SIGNIFICANT CHANGE UP (ref 34.5–45)
HGB BLD-MCNC: 11.1 G/DL — LOW (ref 11.5–15.5)
MCHC RBC-ENTMCNC: 31 GM/DL — LOW (ref 32–36)
MCHC RBC-ENTMCNC: 31.9 PG — SIGNIFICANT CHANGE UP (ref 27–34)
MCV RBC AUTO: 102.9 FL — HIGH (ref 80–100)
PLATELET # BLD AUTO: 154 K/UL — SIGNIFICANT CHANGE UP (ref 150–400)
POTASSIUM SERPL-MCNC: 3.7 MMOL/L — SIGNIFICANT CHANGE UP (ref 3.5–5.3)
POTASSIUM SERPL-SCNC: 3.7 MMOL/L — SIGNIFICANT CHANGE UP (ref 3.5–5.3)
RBC # BLD: 3.48 M/UL — LOW (ref 3.8–5.2)
RBC # FLD: 12 % — SIGNIFICANT CHANGE UP (ref 10.3–14.5)
SODIUM SERPL-SCNC: 143 MMOL/L — SIGNIFICANT CHANGE UP (ref 135–145)
WBC # BLD: 3.39 K/UL — LOW (ref 3.8–10.5)
WBC # FLD AUTO: 3.39 K/UL — LOW (ref 3.8–10.5)

## 2020-10-04 PROCEDURE — 99233 SBSQ HOSP IP/OBS HIGH 50: CPT

## 2020-10-04 RX ORDER — SODIUM CHLORIDE 9 MG/ML
500 INJECTION INTRAMUSCULAR; INTRAVENOUS; SUBCUTANEOUS ONCE
Refills: 0 | Status: COMPLETED | OUTPATIENT
Start: 2020-10-04 | End: 2020-10-04

## 2020-10-04 RX ADMIN — ALBUTEROL 2 PUFF(S): 90 AEROSOL, METERED ORAL at 20:36

## 2020-10-04 RX ADMIN — MEROPENEM 100 MILLIGRAM(S): 1 INJECTION INTRAVENOUS at 13:05

## 2020-10-04 RX ADMIN — SODIUM CHLORIDE 250 MILLILITER(S): 9 INJECTION INTRAMUSCULAR; INTRAVENOUS; SUBCUTANEOUS at 02:15

## 2020-10-04 RX ADMIN — ENOXAPARIN SODIUM 30 MILLIGRAM(S): 100 INJECTION SUBCUTANEOUS at 13:04

## 2020-10-04 RX ADMIN — ALBUTEROL 2 PUFF(S): 90 AEROSOL, METERED ORAL at 12:16

## 2020-10-04 RX ADMIN — ALBUTEROL 2 PUFF(S): 90 AEROSOL, METERED ORAL at 16:07

## 2020-10-04 RX ADMIN — Medication 1 SPRAY(S): at 10:46

## 2020-10-04 RX ADMIN — Medication 1 APPLICATION(S): at 10:47

## 2020-10-04 RX ADMIN — Medication 1 TABLET(S): at 10:45

## 2020-10-04 RX ADMIN — Medication 1 SPRAY(S): at 21:21

## 2020-10-04 RX ADMIN — CHLORHEXIDINE GLUCONATE 15 MILLILITER(S): 213 SOLUTION TOPICAL at 05:52

## 2020-10-04 RX ADMIN — LEVETIRACETAM 700 MILLIGRAM(S): 250 TABLET, FILM COATED ORAL at 19:53

## 2020-10-04 RX ADMIN — Medication 10 MILLIEQUIVALENT(S): at 21:21

## 2020-10-04 RX ADMIN — ALBUTEROL 2 PUFF(S): 90 AEROSOL, METERED ORAL at 09:00

## 2020-10-04 RX ADMIN — MEROPENEM 100 MILLIGRAM(S): 1 INJECTION INTRAVENOUS at 05:52

## 2020-10-04 RX ADMIN — PANTOPRAZOLE SODIUM 20 MILLIGRAM(S): 20 TABLET, DELAYED RELEASE ORAL at 10:46

## 2020-10-04 RX ADMIN — Medication 10 MILLIEQUIVALENT(S): at 10:45

## 2020-10-04 RX ADMIN — MEROPENEM 100 MILLIGRAM(S): 1 INJECTION INTRAVENOUS at 21:21

## 2020-10-04 RX ADMIN — CHLORHEXIDINE GLUCONATE 15 MILLILITER(S): 213 SOLUTION TOPICAL at 17:51

## 2020-10-04 RX ADMIN — LEVETIRACETAM 600 MILLIGRAM(S): 250 TABLET, FILM COATED ORAL at 09:08

## 2020-10-04 RX ADMIN — SODIUM CHLORIDE 1000 MILLILITER(S): 9 INJECTION INTRAMUSCULAR; INTRAVENOUS; SUBCUTANEOUS at 23:13

## 2020-10-04 RX ADMIN — Medication 400 UNIT(S): at 10:45

## 2020-10-04 RX ADMIN — Medication 1 PUFF(S): at 09:00

## 2020-10-04 RX ADMIN — Medication 1 PUFF(S): at 20:36

## 2020-10-04 NOTE — PROGRESS NOTE ADULT - ASSESSMENT
Patient with cerebral palsy on home vent at night with inc secretions, dec bp, likely septic shock on admission  new right lower lobe infiltrate.  cxr today better, secretions better  did not tolerated trach collar during today yesterday  patient with trach , and j tube    Plan     1. on abx. meropenem and vanco, sputum culture negative to day        cxr improved    2. hydration for sepsis, off  levophed, normal bp 80s per family    3.  attempted weaning did not tolerate    4. will continue patient home tube feed regiment    5. Lovenox dvt prophylaxis    6. given desaturation yesterday will hold weaning for today and try again for tomorrow

## 2020-10-04 NOTE — PROGRESS NOTE ADULT - SUBJECTIVE AND OBJECTIVE BOX
Events Overnight: borderline bp although patient normal bp is in upper 80s - 90, cxr yesterday improved, desaturated when on trach collar    HPI:     5 yo Female with a PMH significant for Cerebral Palsy and DD, GERD, Asthma, Seizure disorders, Scoliosis/Kyphosis, h/o Coxsackie Myocarditis, c/b pericarditis, Chronic Respiratory Failure s/p Trach and Peg placement. She was BIB her mother with complaints of worsening Dyspnea and increased sputum production with plugging  CXR showed new right sided infiltrate, Patient became hypostensioe in ED and required levophed now off,  BP now better. on meropenem. Patient is normally only on vent at night at home. currently on vent desaturated with attempt at trach collar yesterday  but cxr improved yesterday from admission    ROS cant obtain secondary to cerebral palsy      MEDICATIONS  (STANDING):  ALBUTerol    90 MICROgram(s) HFA Inhaler 2 Puff(s) Inhalation every 4 hours  chlorhexidine 0.12% Liquid 15 milliLiter(s) Oral Mucosa every 12 hours  cholecalciferol 400 Unit(s) Oral daily  enoxaparin Injectable 30 milliGRAM(s) SubCutaneous daily  fluticasone propionate   110 MICROgram(s) HFA Inhaler 1 Puff(s) Inhalation two times a day  fluticasone propionate 50 MICROgram(s)/spray Nasal Spray 1 Spray(s) Both Nostrils two times a day  levETIRAcetam  Solution 700 milliGRAM(s) Oral at bedtime  levETIRAcetam  Solution 600 milliGRAM(s) Oral daily  meropenem  IVPB 1000 milliGRAM(s) IV Intermittent every 8 hours  multivitamin 1 Tablet(s) Oral daily  pantoprazole   Suspension 20 milliGRAM(s) Oral daily  potassium chloride   Powder 10 milliEquivalent(s) Oral two times a day  silver sulfADIAZINE 1% Cream 1 Application(s) Topical daily  sodium chloride 3%  Inhalation 2 milliLiter(s) Inhalation two times a day  tobramycin for Nebulization - Peds 300 milliGRAM(s) Nebulizer every 12 hours    MEDICATIONS  (PRN):  acetaminophen  Rectal Suppository - Peds. 650 milliGRAM(s) Rectal every 6 hours PRN Temp greater or equal to 38 C (100.4 F), Moderate Pain (4 - 6)  LORazepam   Injectable 1 milliGRAM(s) IV Push every 2 hours PRN Seizure  sodium chloride 0.65% Nasal 1 Spray(s) Both Nostrils two times a day PRN Nasal Congestion    ICU Vital Signs Last 24 Hrs  T(C): 36.9 (04 Oct 2020 09:00), Max: 36.9 (04 Oct 2020 09:00)  T(F): 98.4 (04 Oct 2020 09:00), Max: 98.4 (04 Oct 2020 09:00)  HR: 75 (04 Oct 2020 09:10) (54 - 105)  BP: 102/64 (04 Oct 2020 09:00) (71/39 - 119/73)  BP(mean): 72 (04 Oct 2020 09:00) (44 - 84)  ABP: --  ABP(mean): --  RR: 19 (04 Oct 2020 09:00) (10 - 23)  SpO2: 100% (04 Oct 2020 09:10) (92% - 100%)      Mode: SIMV (Synchronized Intermittent Mandatory Ventilation)  RR (machine): 15  FiO2: 40  PEEP: 8  PS: 10  ITime: 1  PC: 15  PIP: 23    I&O's Summary    03 Oct 2020 07:01  -  04 Oct 2020 07:00  --------------------------------------------------------  IN: 2180 mL / OUT: 1200 mL / NET: 980 mL                         11.1   3.39  )-----------( 154      ( 04 Oct 2020 06:29 )             35.8       10-04    143  |  112<H>  |  13  ----------------------------<  115<H>  3.7   |  25  |  0.25<L>    Ca    8.8      04 Oct 2020 06:29    DVT Prophylaxis: Lovenox                                                                Advanced Directives: Full Code

## 2020-10-04 NOTE — PROGRESS NOTE ADULT - SUBJECTIVE AND OBJECTIVE BOX
CHAMP CALDWELL  MRN: [id number]    S: 10/04/2020: Awake. Remains on vent support. Remains off pressors. Comfortable on vent. O2 sat 100%. Secretions remain light in color. Mother sleeping in room.     PAST MEDICAL & SURGICAL HISTORY:  Hypokalemia    Kyphosis    Scoliosis    Hypotonia    Asthma    Seizures    Cerebral Palsy    Gastroesophageal Reflux Disease    Hypothyroidism  Treated with synthroid in past. Stopped treatment in 2008 due to normal thyroid function.    Developmental Delay    Tracheostomy in place    Peg (Percutaneous Endoscopic Gastrostomy) Adjustment/Replacement/Removal    S/P Tonsillectomy and Adenoidectomy    Strabismus        O: T(C): 36.9 (10-04-20 @ 09:00), Max: 36.9 (10-04-20 @ 09:00)  HR: 75 (10-04-20 @ 09:10) (54 - 105)  BP: 102/64 (10-04-20 @ 09:00) (71/39 - 111/67)  RR: 19 (10-04-20 @ 09:00) (10 - 23)  SpO2: 100% (10-04-20 @ 09:10) (92% - 100%)  Wt(kg): --    PHYSICAL EXAM:      GENERAL: comfortable on vent    NEURO: awake / alert    NECK: no JVD    CHEST: equal bilaterally. NO wheezing     CARDIAC: RR    EXT: no edema      LABS:                        11.1   3.39  )-----------( 154      ( 04 Oct 2020 06:29 )             35.8       10-04    143  |  112<H>  |  13  ----------------------------<  115<H>  3.7   |  25  |  0.25<L>    Ca    8.8      04 Oct 2020 06:29    Sputum culture: Normal respiratory john    RADIOLOGY:  EXAM:  XR CHEST PORTABLE ROUTINE 1V                            PROCEDURE DATE:  10/03/2020      INTERPRETATION:  AP chest on October 3, 2020 at 6:39 AM. Patient requires tracheostomy.    Heart is magnified by technique. Tracheostomy remains.    On October 1 was a significant right base pleural pulmonary process. Present film shows significant improvement.    IMPRESSION: Improved right base process.      CIARA GOOD M.D., ATTENDING RADIOLOGIST        MEDICATIONS  (STANDING):  ALBUTerol    90 MICROgram(s) HFA Inhaler 2 Puff(s) Inhalation every 4 hours  chlorhexidine 0.12% Liquid 15 milliLiter(s) Oral Mucosa every 12 hours  cholecalciferol 400 Unit(s) Oral daily  enoxaparin Injectable 30 milliGRAM(s) SubCutaneous daily  fluticasone propionate   110 MICROgram(s) HFA Inhaler 1 Puff(s) Inhalation two times a day  fluticasone propionate 50 MICROgram(s)/spray Nasal Spray 1 Spray(s) Both Nostrils two times a day  levETIRAcetam  Solution 700 milliGRAM(s) Oral at bedtime  levETIRAcetam  Solution 600 milliGRAM(s) Oral daily  meropenem  IVPB 1000 milliGRAM(s) IV Intermittent every 8 hours  multivitamin 1 Tablet(s) Oral daily  pantoprazole   Suspension 20 milliGRAM(s) Oral daily  potassium chloride   Powder 10 milliEquivalent(s) Oral two times a day  silver sulfADIAZINE 1% Cream 1 Application(s) Topical daily  sodium chloride 3%  Inhalation 2 milliLiter(s) Inhalation two times a day  tobramycin for Nebulization - Peds 300 milliGRAM(s) Nebulizer every 12 hours    MEDICATIONS  (PRN):  acetaminophen  Rectal Suppository - Peds. 650 milliGRAM(s) Rectal every 6 hours PRN Temp greater or equal to 38 C (100.4 F), Moderate Pain (4 - 6)  LORazepam   Injectable 1 milliGRAM(s) IV Push every 2 hours PRN Seizure  sodium chloride 0.65% Nasal 1 Spray(s) Both Nostrils two times a day PRN Nasal Congestion        A/P:     1. RLL pneumonia / atelectasis. CXR much improved on 10/3 improved as compared to CXR done 10/1. Less secretions. Antibiotics per ID; currently on meropenem. Sputum culture result noted  Pulmonary toilet. On tobramycin via nebulizer.     2. Respiratory failure. Continue vent support. Patient is usually off vent during the day at home. SBT's per CCM.     3. S/P hypotension. Stable off pressors.     4. Hx of seizures. Continue Keppra    5. Lovenox for DVT prophylaxis.    6. Nutrition - tolerating J-tube feedings.      D/W Dr. Burgess.     Dr. Bustillo will return tomorrow to resume pulmonary aspects of patients care.

## 2020-10-05 DIAGNOSIS — Z46.59 ENCOUNTER FOR FITTING AND ADJUSTMENT OF OTHER GASTROINTESTINAL APPLIANCE AND DEVICE: ICD-10-CM

## 2020-10-05 LAB
-  AMIKACIN: SIGNIFICANT CHANGE UP
-  AMIKACIN: SIGNIFICANT CHANGE UP
-  AZTREONAM: SIGNIFICANT CHANGE UP
-  AZTREONAM: SIGNIFICANT CHANGE UP
-  CEFEPIME: SIGNIFICANT CHANGE UP
-  CEFEPIME: SIGNIFICANT CHANGE UP
-  CEFTAZIDIME: SIGNIFICANT CHANGE UP
-  CEFTAZIDIME: SIGNIFICANT CHANGE UP
-  CIPROFLOXACIN: SIGNIFICANT CHANGE UP
-  CIPROFLOXACIN: SIGNIFICANT CHANGE UP
-  GENTAMICIN: SIGNIFICANT CHANGE UP
-  GENTAMICIN: SIGNIFICANT CHANGE UP
-  IMIPENEM: SIGNIFICANT CHANGE UP
-  IMIPENEM: SIGNIFICANT CHANGE UP
-  LEVOFLOXACIN: SIGNIFICANT CHANGE UP
-  LEVOFLOXACIN: SIGNIFICANT CHANGE UP
-  MEROPENEM: SIGNIFICANT CHANGE UP
-  MEROPENEM: SIGNIFICANT CHANGE UP
-  PIPERACILLIN/TAZOBACTAM: SIGNIFICANT CHANGE UP
-  PIPERACILLIN/TAZOBACTAM: SIGNIFICANT CHANGE UP
-  TOBRAMYCIN: SIGNIFICANT CHANGE UP
-  TOBRAMYCIN: SIGNIFICANT CHANGE UP
ANION GAP SERPL CALC-SCNC: 4 MMOL/L — LOW (ref 5–17)
BUN SERPL-MCNC: 12 MG/DL — SIGNIFICANT CHANGE UP (ref 7–23)
CALCIUM SERPL-MCNC: 8.8 MG/DL — SIGNIFICANT CHANGE UP (ref 8.5–10.1)
CHLORIDE SERPL-SCNC: 112 MMOL/L — HIGH (ref 96–108)
CO2 SERPL-SCNC: 28 MMOL/L — SIGNIFICANT CHANGE UP (ref 22–31)
CORTIS AM PEAK SERPL-MCNC: 13 UG/DL — SIGNIFICANT CHANGE UP (ref 6–18.4)
CREAT SERPL-MCNC: 0.32 MG/DL — LOW (ref 0.5–1.3)
CULTURE RESULTS: SIGNIFICANT CHANGE UP
CULTURE RESULTS: SIGNIFICANT CHANGE UP
FERRITIN SERPL-MCNC: 154 NG/ML — HIGH (ref 15–150)
GLUCOSE SERPL-MCNC: 97 MG/DL — SIGNIFICANT CHANGE UP (ref 70–99)
HCT VFR BLD CALC: 37.9 % — SIGNIFICANT CHANGE UP (ref 34.5–45)
HGB BLD-MCNC: 12.1 G/DL — SIGNIFICANT CHANGE UP (ref 11.5–15.5)
MCHC RBC-ENTMCNC: 31.9 GM/DL — LOW (ref 32–36)
MCHC RBC-ENTMCNC: 32.9 PG — SIGNIFICANT CHANGE UP (ref 27–34)
MCV RBC AUTO: 103 FL — HIGH (ref 80–100)
METHOD TYPE: SIGNIFICANT CHANGE UP
METHOD TYPE: SIGNIFICANT CHANGE UP
ORGANISM # SPEC MICROSCOPIC CNT: SIGNIFICANT CHANGE UP
PLATELET # BLD AUTO: 176 K/UL — SIGNIFICANT CHANGE UP (ref 150–400)
POTASSIUM SERPL-MCNC: 4.1 MMOL/L — SIGNIFICANT CHANGE UP (ref 3.5–5.3)
POTASSIUM SERPL-SCNC: 4.1 MMOL/L — SIGNIFICANT CHANGE UP (ref 3.5–5.3)
RBC # BLD: 3.68 M/UL — LOW (ref 3.8–5.2)
RBC # FLD: 11.9 % — SIGNIFICANT CHANGE UP (ref 10.3–14.5)
SODIUM SERPL-SCNC: 144 MMOL/L — SIGNIFICANT CHANGE UP (ref 135–145)
SPECIMEN SOURCE: SIGNIFICANT CHANGE UP
SPECIMEN SOURCE: SIGNIFICANT CHANGE UP
VIT B12 SERPL-MCNC: >2000 PG/ML — HIGH (ref 232–1245)
WBC # BLD: 3.48 K/UL — LOW (ref 3.8–10.5)
WBC # FLD AUTO: 3.48 K/UL — LOW (ref 3.8–10.5)

## 2020-10-05 PROCEDURE — 99291 CRITICAL CARE FIRST HOUR: CPT

## 2020-10-05 RX ORDER — LANOLIN ALCOHOL/MO/W.PET/CERES
3 CREAM (GRAM) TOPICAL AT BEDTIME
Refills: 0 | Status: DISCONTINUED | OUTPATIENT
Start: 2020-10-05 | End: 2020-10-05

## 2020-10-05 RX ORDER — MIDODRINE HYDROCHLORIDE 2.5 MG/1
5 TABLET ORAL EVERY 8 HOURS
Refills: 0 | Status: DISCONTINUED | OUTPATIENT
Start: 2020-10-05 | End: 2020-10-06

## 2020-10-05 RX ORDER — LANOLIN ALCOHOL/MO/W.PET/CERES
3 CREAM (GRAM) TOPICAL AT BEDTIME
Refills: 0 | Status: DISCONTINUED | OUTPATIENT
Start: 2020-10-05 | End: 2020-10-06

## 2020-10-05 RX ADMIN — ALBUTEROL 2 PUFF(S): 90 AEROSOL, METERED ORAL at 15:59

## 2020-10-05 RX ADMIN — ALBUTEROL 2 PUFF(S): 90 AEROSOL, METERED ORAL at 08:57

## 2020-10-05 RX ADMIN — ALBUTEROL 2 PUFF(S): 90 AEROSOL, METERED ORAL at 20:22

## 2020-10-05 RX ADMIN — MIDODRINE HYDROCHLORIDE 5 MILLIGRAM(S): 2.5 TABLET ORAL at 13:51

## 2020-10-05 RX ADMIN — MEROPENEM 100 MILLIGRAM(S): 1 INJECTION INTRAVENOUS at 22:32

## 2020-10-05 RX ADMIN — MEROPENEM 100 MILLIGRAM(S): 1 INJECTION INTRAVENOUS at 13:51

## 2020-10-05 RX ADMIN — Medication 1 APPLICATION(S): at 09:50

## 2020-10-05 RX ADMIN — Medication 10 MILLIEQUIVALENT(S): at 22:35

## 2020-10-05 RX ADMIN — MIDODRINE HYDROCHLORIDE 5 MILLIGRAM(S): 2.5 TABLET ORAL at 22:32

## 2020-10-05 RX ADMIN — Medication 400 UNIT(S): at 09:40

## 2020-10-05 RX ADMIN — ALBUTEROL 2 PUFF(S): 90 AEROSOL, METERED ORAL at 06:06

## 2020-10-05 RX ADMIN — LEVETIRACETAM 600 MILLIGRAM(S): 250 TABLET, FILM COATED ORAL at 08:35

## 2020-10-05 RX ADMIN — CHLORHEXIDINE GLUCONATE 15 MILLILITER(S): 213 SOLUTION TOPICAL at 17:03

## 2020-10-05 RX ADMIN — CHLORHEXIDINE GLUCONATE 15 MILLILITER(S): 213 SOLUTION TOPICAL at 05:23

## 2020-10-05 RX ADMIN — ALBUTEROL 2 PUFF(S): 90 AEROSOL, METERED ORAL at 20:20

## 2020-10-05 RX ADMIN — Medication 1 SPRAY(S): at 22:32

## 2020-10-05 RX ADMIN — Medication 1 SPRAY(S): at 09:46

## 2020-10-05 RX ADMIN — PANTOPRAZOLE SODIUM 20 MILLIGRAM(S): 20 TABLET, DELAYED RELEASE ORAL at 09:42

## 2020-10-05 RX ADMIN — Medication 1 PUFF(S): at 20:21

## 2020-10-05 RX ADMIN — Medication 10 MILLIEQUIVALENT(S): at 09:45

## 2020-10-05 RX ADMIN — LEVETIRACETAM 700 MILLIGRAM(S): 250 TABLET, FILM COATED ORAL at 21:13

## 2020-10-05 RX ADMIN — ALBUTEROL 2 PUFF(S): 90 AEROSOL, METERED ORAL at 23:39

## 2020-10-05 RX ADMIN — MEROPENEM 100 MILLIGRAM(S): 1 INJECTION INTRAVENOUS at 05:23

## 2020-10-05 RX ADMIN — Medication 1 PUFF(S): at 08:58

## 2020-10-05 RX ADMIN — ALBUTEROL 2 PUFF(S): 90 AEROSOL, METERED ORAL at 12:29

## 2020-10-05 RX ADMIN — ENOXAPARIN SODIUM 30 MILLIGRAM(S): 100 INJECTION SUBCUTANEOUS at 13:54

## 2020-10-05 RX ADMIN — Medication 1 TABLET(S): at 09:41

## 2020-10-05 NOTE — PHYSICAL THERAPY INITIAL EVALUATION ADULT - PATIENT PROFILE REVIEW, REHAB EVAL
yes/h/o CP/developmental delay,severe kyphoscoliosis ,non-ambulatory at baseline ,+tracheostomy yes/h/o CP/developmental delay, severe kyphoscoliosis ,non-ambulatory at baseline ,+tracheostomy; h/o multiple pneumonias in past treated at PICU @ [facility]

## 2020-10-05 NOTE — PROGRESS NOTE ADULT - ASSESSMENT
IMP:    24 y/o female with Cerebral Palsy and DD, GERD, Asthma, Seizure disorders, Scoliosis/Kyphosis, h/o Coxsackie Myocarditis, c/b pericarditis, Chronic Respiratory Failure s/p Trach and PEG placement admitted with RLL PSAE CAP with likely MDRO and septic shock--off pressors but BP labile with intermittent hypotension    High risk for acute decompensation and deterioration including death     Plan:    Full vent support--LPV strategy to maintain plateau pressures < 30--High PEEP/low TV--Permissive hypercapnea and acidemia--TC trial as tolerated  Resume Pedialyte   Add Midodrine 5 q8  Cont with Compa  TF   HOB > 30  AED  DVT prophy--SCD and LMWH  PT eval    ICU care-- d/w ICU staff on multi disciplinary rounds--Mom at bedside-- All concerns addressed including but not limited to diagnosis, treatment plan and overall prognosis

## 2020-10-05 NOTE — PROGRESS NOTE ADULT - SUBJECTIVE AND OBJECTIVE BOX
Subjective:  mother at bedside  more awakepro    Review of Systems:  All 10 systems reviewed in detailed and found to be negative with the exception of what has already been described above    Allergies:  Bactrim (Unknown)  carbamazepine (Unknown)  cephalosporins (Rash)  Corn (Unknown)  Depakote (Unknown)  diphenhydrAMINE (Seizure)  DISPOSABLE BLOOD PRESSURE CUFF - Red blistered skin where disposable blood pressure cuff was on right arm. (CONTACT DERMATITIS) (Rash; Blisters; Swelling;)  eggs (Unknown)  EGGS,  NUTS (Anaphylaxis)  Erythromycin Base (Unknown)  metoclopramide (Unknown)  Milk (Unknown)  MILK, SHELLFISH, WHEAT (Unknown)  Nuts (Unknown)  oxcarbazepine (Unknown)  peanuts (Unknown)  Potatoes (Unknown)  SEASONAL, POLLEN, GRASS, PET DANDER, FEATHERS (Unknown)  Sesame (Unknown)  shellfish (Unknown)  Trileptal (Unknown)  valproic acid (Unknown)  Wheat (Unknown)    Meds  MEDICATIONS  (STANDING):  ALBUTerol    90 MICROgram(s) HFA Inhaler 2 Puff(s) Inhalation every 4 hours  chlorhexidine 0.12% Liquid 15 milliLiter(s) Oral Mucosa every 12 hours  cholecalciferol 400 Unit(s) Oral daily  enoxaparin Injectable 30 milliGRAM(s) SubCutaneous daily  fluticasone propionate   110 MICROgram(s) HFA Inhaler 1 Puff(s) Inhalation two times a day  fluticasone propionate 50 MICROgram(s)/spray Nasal Spray 1 Spray(s) Both Nostrils two times a day  levETIRAcetam  Solution 700 milliGRAM(s) Oral at bedtime  levETIRAcetam  Solution 600 milliGRAM(s) Oral daily  meropenem  IVPB 1000 milliGRAM(s) IV Intermittent every 8 hours  midodrine 5 milliGRAM(s) Oral every 8 hours  multivitamin 1 Tablet(s) Oral daily  pantoprazole   Suspension 20 milliGRAM(s) Oral daily  potassium chloride   Powder 10 milliEquivalent(s) Oral two times a day  silver sulfADIAZINE 1% Cream 1 Application(s) Topical daily  sodium chloride 3%  Inhalation 2 milliLiter(s) Inhalation two times a day  tobramycin for Nebulization - Peds 300 milliGRAM(s) Nebulizer every 12 hours    MEDICATIONS  (PRN):  acetaminophen  Rectal Suppository - Peds. 650 milliGRAM(s) Rectal every 6 hours PRN Temp greater or equal to 38 C (100.4 F), Moderate Pain (4 - 6)  LORazepam   Injectable 1 milliGRAM(s) IV Push every 2 hours PRN Seizure  melatonin 3 milliGRAM(s) Oral at bedtime PRN Insomnia  sodium chloride 0.65% Nasal 1 Spray(s) Both Nostrils two times a day PRN Nasal Congestion    Physical Exam  T(C): 35.6 (10-05-20 @ 08:51), Max: 37.1 (10-04-20 @ 12:00)  HR: 72 (10-05-20 @ 11:00) (55 - 110)  BP: 79/45 (10-05-20 @ 11:00) (70/54 - 120/59)  RR: 17 (10-05-20 @ 11:00) (12 - 28)  SpO2: 100% (10-05-20 @ 11:00) (90% - 100%)  Gen: Trach, no distress, on vent  HEENT: Microcephalic  Cardio:  regular rate  Resp: Coarse breath sounds  GI:  GJ tube  Ext: No cyanosis, clubbing or edema  Neuro: Nonverbal  beside us 10/2 revealed small pleural effusion on right    Labs:                        12.1   3.48  )-----------( 176      ( 05 Oct 2020 06:36 )             37.9     10-    144  |  112<H>  |  12  ----------------------------<  97  4.1   |  28  |  0.32<L>    Ca    8.8      05 Oct 2020 06:36      Urinalysis Basic - ( 01 Oct 2020 18:35 )    Color: Yellow / Appearance: Clear / S.010 / pH: x  Gluc: x / Ketone: Negative  / Bili: Negative / Urobili: Negative mg/dL   Blood: x / Protein: Negative mg/dL / Nitrite: Negative   Leuk Esterase: Negative / RBC: x / WBC x   Sq Epi: x / Non Sq Epi: x / Bacteria: x    < from: Xray Chest 1 View AP/PA. (10.01.20 @ 17:13) >  PROCEDURE DATE:  10/01/2020          INTERPRETATION:  XR CHEST    History: cough    Technique: Single AP view of the chest.    Comparison: Chest x-ray 2020    Findings:    Tracheostomy. Moderate right pleural effusion with patchy opacities in the right lung. Left lung is clear.    There appears to be a partially imaged gastrostomy tube.    Heart normal in size. Levoconvex thoracolumbar scoliosis.    Impression:    Moderate size right pleural effusion with patchy opacities in the right lung; differential includes pneumonia, atelectasis or pulmonary edema.

## 2020-10-05 NOTE — PHYSICAL THERAPY INITIAL EVALUATION ADULT - MUSCLE TONE ASSESSMENT, REHAB EVAL
hypotonic/+ non-sustained clonus B ankles to PF stretch; adaptive mm .shortening noted R paraspinal mm associated with scoliosis spine ,head positioned preferentially to R lateral flexed position/bilateral upper extremities/bilateral lower extremities

## 2020-10-05 NOTE — PHYSICAL THERAPY INITIAL EVALUATION ADULT - TRANSFER SKILLS, REHAB EVAL
dependent 1 man lift ,?tilt in space chair/unable to perform unable to perform/dependent 1-2 man lift ,?tilt in space chair

## 2020-10-05 NOTE — PROGRESS NOTE ADULT - SUBJECTIVE AND OBJECTIVE BOX
Date of service: 10-05-20 @ 12:41    Patient lying in bed; was able to be weaned off pressors over the weekend  Awake, alert, afebrile        ROS: unable to obtain secondary to patient medical condition     MEDICATIONS  (STANDING):  ALBUTerol    90 MICROgram(s) HFA Inhaler 2 Puff(s) Inhalation every 4 hours  chlorhexidine 0.12% Liquid 15 milliLiter(s) Oral Mucosa every 12 hours  cholecalciferol 400 Unit(s) Oral daily  enoxaparin Injectable 30 milliGRAM(s) SubCutaneous daily  fluticasone propionate   110 MICROgram(s) HFA Inhaler 1 Puff(s) Inhalation two times a day  fluticasone propionate 50 MICROgram(s)/spray Nasal Spray 1 Spray(s) Both Nostrils two times a day  levETIRAcetam  Solution 600 milliGRAM(s) Oral daily  levETIRAcetam  Solution 700 milliGRAM(s) Oral at bedtime  meropenem  IVPB 1000 milliGRAM(s) IV Intermittent every 8 hours  midodrine 5 milliGRAM(s) Oral every 8 hours  multivitamin 1 Tablet(s) Oral daily  pantoprazole   Suspension 20 milliGRAM(s) Oral daily  potassium chloride   Powder 10 milliEquivalent(s) Oral two times a day  silver sulfADIAZINE 1% Cream 1 Application(s) Topical daily  sodium chloride 3%  Inhalation 2 milliLiter(s) Inhalation two times a day  tobramycin for Nebulization - Peds 300 milliGRAM(s) Nebulizer every 12 hours    MEDICATIONS  (PRN):  acetaminophen  Rectal Suppository - Peds. 650 milliGRAM(s) Rectal every 6 hours PRN Temp greater or equal to 38 C (100.4 F), Moderate Pain (4 - 6)  LORazepam   Injectable 1 milliGRAM(s) IV Push every 2 hours PRN Seizure  melatonin 3 milliGRAM(s) Oral at bedtime PRN Insomnia  sodium chloride 0.65% Nasal 1 Spray(s) Both Nostrils two times a day PRN Nasal Congestion      Vital Signs Last 24 Hrs  T(C): 35.6 (05 Oct 2020 08:51), Max: 37.1 (04 Oct 2020 16:00)  T(F): 96.1 (05 Oct 2020 08:51), Max: 98.8 (04 Oct 2020 16:00)  HR: 72 (05 Oct 2020 12:29) (55 - 110)  BP: 79/45 (05 Oct 2020 11:00) (70/54 - 120/59)  BP(mean): 51 (05 Oct 2020 11:00) (47 - 74)  RR: 17 (05 Oct 2020 11:00) (12 - 28)  SpO2: 97% (05 Oct 2020 12:29) (90% - 100%)    Mode: SIMV with PS, RR (machine): 15, FiO2: 40, PEEP: 8, PS: 10, ITime: 0.9, MAP: 8, PIP: 19    Physical Exam:        Constitutional: frail looking  HEENT: NC/AT, EOMI, PERRLA, conjunctivae clear; ears and nose atraumatic; pharynx clear  Neck: trach in place  Back: no tenderness  Respiratory: respiratory effort normal; scattered coarse breath sounds  Cardiovascular: S1S2 regular, no murmurs  Abdomen: soft, not tender, not distended, positive BS; no liver or spleen organomegaly; peg in place  Genitourinary: no suprapubic tenderness  Musculoskeletal: no muscle tenderness, no joint swelling or tenderness  Neurological/ Psychiatric:  moving all extremities  Skin: no rashes; no palpable lesions    Labs: all available labs reviewed                         Labs:                        12.1   3.48  )-----------( 176      ( 05 Oct 2020 06:36 )             37.9     10-05    144  |  112<H>  |  12  ----------------------------<  97  4.1   |  28  |  0.32<L>    Ca    8.8      05 Oct 2020 06:36             Cultures:       Culture - Sputum (collected 10-02-20 @ 20:00)  Source: .Sputum Sputum  Gram Stain (10-03-20 @ 05:51):    Few polymorphonuclear leukocytes per low power field    Rare Squamous epithelial cells per low power field    No organisms seen per oil power field  Preliminary Report (10-04-20 @ 22:50):    Rare Pseudomonas aeruginosa    Culture - Sputum (collected 10-02-20 @ 15:03)  Source: .Sputum Sputum  Gram Stain (10-02-20 @ 21:39):    Numerous polymorphonuclear leukocytes per low power field    Few Squamous epithelial cells per low power field    No organisms seen per oil power field  Preliminary Report (10-03-20 @ 18:33):    Normal Respiratory Ynes present    Culture - Urine (collected 10-01-20 @ 18:35)  Source: .Urine None  Final Report (10-02-20 @ 20:55):    No growth    Culture - Blood (collected 10-01-20 @ 17:40)  Source: .Blood None  Preliminary Report (10-03-20 @ 01:02):    No growth to date.    Culture - Blood (collected 10-01-20 @ 17:20)  Source: .Blood None  Preliminary Report (10-03-20 @ 01:02):    No growth to date.              Radiology: all available radiological tests reviewed    Advanced directives addressed: full resuscitation

## 2020-10-05 NOTE — PHYSICAL THERAPY INITIAL EVALUATION ADULT - PATIENT/FAMILY/SIGNIFICANT OTHER GOALS STATEMENT, PT EVAL
mother at bedside concerned with R sided neck contacture/shortening,increased muscle tension mother at bedside concerned with R sided neck contacture/shortening, increased muscle tension/spasms thru back T/L spine

## 2020-10-05 NOTE — PHYSICAL THERAPY INITIAL EVALUATION ADULT - LIVES WITH, PROFILE
parents/resides in [place] with mother Tania resides in [place] with mother Tania & father Philip, a brother Collins lives outside the home/parents

## 2020-10-05 NOTE — PHYSICAL THERAPY INITIAL EVALUATION ADULT - PRECAUTIONS/LIMITATIONS, REHAB EVAL
pt remains on vent support/oxygen therapy device and L/min aspiration precautions/pt remains on vent support,tolerated TC x ~45 mins this morning/oxygen therapy device and L/min/seizure precautions

## 2020-10-05 NOTE — PHYSICAL THERAPY INITIAL EVALUATION ADULT - ASR EQUIP NEEDS DISCH PT EVAL
parents report home is extremely well outfitted sparing no cost if it will benefit daughter ,including custom WC which mother will bring in this afternoon to allow out of bed to chair

## 2020-10-05 NOTE — PROVIDER CONTACT NOTE (OTHER) - SITUATION
LEFT MESSAGE WITH CHAITANYA FROM DR. PAEZ'S SERVICE.
Left message with service re: consult.
Pt admitted for SOB
Radha leigh

## 2020-10-05 NOTE — PHYSICAL THERAPY INITIAL EVALUATION ADULT - IMPAIRMENTS FOUND, PT EVAL
gross motor/cognitive impairment/gait, locomotion, and balance/muscle strength/ROM/arousal, attention, and cognition/posture/fine motor

## 2020-10-05 NOTE — PHYSICAL THERAPY INITIAL EVALUATION ADULT - ACTIVE RANGE OF MOTION EXAMINATION, REHAB EVAL
exhibits AROM thru partial ranges BUE/BLEs not consistently to command,elevates L arm to 60 degrees to indicate Thankyou/greeting it seems

## 2020-10-05 NOTE — PROGRESS NOTE ADULT - SUBJECTIVE AND OBJECTIVE BOX
[facility]ospital [facility]  ICU # 4    CC:  Sepsis     HPI:    26 y/o female with Cerebral Palsy and DD, GERD, Asthma, Seizure disorders, Scoliosis/Kyphosis, h/o Coxsackie Myocarditis, c/b pericarditis, Chronic Respiratory Failure s/p Trach and Peg placement admitted with RLL CAP with likely MDRO and septic shock--off pressors--BP labile.  Pt is usually on TC during the day and Vent during the night.      10/5:  Case d/w dr Burgess.  Pt seen and examined in ICU--mom at bedside--vented--interactive with mom.  Received 500ml IVF overnight for SBP 70 Hypothermic overnight     PMH:  As above.     PSH:  As above.     FH: Non Contributory other than those listed in HPI    Social History:  Unobtainable due to clinical condition     MEDICATIONS  (STANDING):  ALBUTerol    90 MICROgram(s) HFA Inhaler 2 Puff(s) Inhalation every 4 hours  chlorhexidine 0.12% Liquid 15 milliLiter(s) Oral Mucosa every 12 hours  cholecalciferol 400 Unit(s) Oral daily  enoxaparin Injectable 30 milliGRAM(s) SubCutaneous daily  fluticasone propionate   110 MICROgram(s) HFA Inhaler 1 Puff(s) Inhalation two times a day  fluticasone propionate 50 MICROgram(s)/spray Nasal Spray 1 Spray(s) Both Nostrils two times a day  levETIRAcetam  Solution 600 milliGRAM(s) Oral daily  levETIRAcetam  Solution 700 milliGRAM(s) Oral at bedtime  meropenem  IVPB 1000 milliGRAM(s) IV Intermittent every 8 hours  midodrine 5 milliGRAM(s) Oral every 8 hours  multivitamin 1 Tablet(s) Oral daily  pantoprazole   Suspension 20 milliGRAM(s) Oral daily  potassium chloride   Powder 10 milliEquivalent(s) Oral two times a day  silver sulfADIAZINE 1% Cream 1 Application(s) Topical daily  sodium chloride 3%  Inhalation 2 milliLiter(s) Inhalation two times a day  tobramycin for Nebulization - Peds 300 milliGRAM(s) Nebulizer every 12 hours    MEDICATIONS  (PRN):  acetaminophen  Rectal Suppository - Peds. 650 milliGRAM(s) Rectal every 6 hours PRN Temp greater or equal to 38 C (100.4 F), Moderate Pain (4 - 6)  LORazepam   Injectable 1 milliGRAM(s) IV Push every 2 hours PRN Seizure  melatonin 3 milliGRAM(s) Oral at bedtime PRN Insomnia  sodium chloride 0.65% Nasal 1 Spray(s) Both Nostrils two times a day PRN Nasal Congestion      Allergies: NKDA    ROS:  SEE BELOW        ICU Vital Signs Last 24 Hrs  T(C): 35.6 (05 Oct 2020 08:51), Max: 37.1 (04 Oct 2020 12:00)  T(F): 96.1 (05 Oct 2020 08:51), Max: 98.8 (04 Oct 2020 12:00)  HR: 72 (05 Oct 2020 11:00) (55 - 110)  BP: 79/45 (05 Oct 2020 11:00) (70/54 - 120/59)  BP(mean): 51 (05 Oct 2020 11:00) (47 - 74)  ABP: --  ABP(mean): --  RR: 17 (05 Oct 2020 11:00) (12 - 28)  SpO2: 100% (05 Oct 2020 11:00) (90% - 100%)      Mode: SIMV with PS  RR (machine): 15  FiO2: 40  PEEP: 8  PS: 10  ITime: 0.9  MAP: 7  PIP: 20      I&O's Summary    04 Oct 2020 07:01  -  05 Oct 2020 07:00  --------------------------------------------------------  IN: 720 mL / OUT: 0 mL / NET: 720 mL    05 Oct 2020 07:01  -  05 Oct 2020 11:16  --------------------------------------------------------  IN: 0 mL / OUT: 10 mL / NET: -10 mL        Physical Exam:  SEE BELOW                          12.1   3.48  )-----------( 176      ( 05 Oct 2020 06:36 )             37.9       10-05    144  |  112<H>  |  12  ----------------------------<  97  4.1   |  28  |  0.32<L>    Ca    8.8      05 Oct 2020 06:36                      DVT Prophylaxis:                                                            Contraindication:     Advanced Directives:    Discussed with:    Visit Information:  Time spent excluding procedure:      ** Time is exclusive of billed procedures and/or teaching and/or routine family updates.           Hospital # 4  ICU # 4    CC:  Sepsis     HPI:    24 y/o female with Cerebral Palsy and DD, GERD, Asthma, Seizure disorders, Scoliosis/Kyphosis, h/o Coxsackie Myocarditis, c/b pericarditis, Chronic Respiratory Failure s/p Trach and PEG placement admitted with RLL CAP with likely MDRO and septic shock--off pressors--BP labile.  Pt is usually on TC during the day and Vent during the night.      10/5:  Case d/w dr Burgess.  Pt seen and examined in ICU--mom at bedside--vented--interactive with mom.  Received 500ml IVF overnight for SBP 70 Hypothermic overnight     PMH:  As above.     PSH:  As above.     FH: Non Contributory other than those listed in HPI    Social History:  Unobtainable due to clinical condition     MEDICATIONS  (STANDING):  ALBUTerol    90 MICROgram(s) HFA Inhaler 2 Puff(s) Inhalation every 4 hours  chlorhexidine 0.12% Liquid 15 milliLiter(s) Oral Mucosa every 12 hours  cholecalciferol 400 Unit(s) Oral daily  enoxaparin Injectable 30 milliGRAM(s) SubCutaneous daily  fluticasone propionate   110 MICROgram(s) HFA Inhaler 1 Puff(s) Inhalation two times a day  fluticasone propionate 50 MICROgram(s)/spray Nasal Spray 1 Spray(s) Both Nostrils two times a day  levETIRAcetam  Solution 600 milliGRAM(s) Oral daily  levETIRAcetam  Solution 700 milliGRAM(s) Oral at bedtime  meropenem  IVPB 1000 milliGRAM(s) IV Intermittent every 8 hours  midodrine 5 milliGRAM(s) Oral every 8 hours  multivitamin 1 Tablet(s) Oral daily  pantoprazole   Suspension 20 milliGRAM(s) Oral daily  potassium chloride   Powder 10 milliEquivalent(s) Oral two times a day  silver sulfADIAZINE 1% Cream 1 Application(s) Topical daily  sodium chloride 3%  Inhalation 2 milliLiter(s) Inhalation two times a day  tobramycin for Nebulization - Peds 300 milliGRAM(s) Nebulizer every 12 hours    MEDICATIONS  (PRN):  acetaminophen  Rectal Suppository - Peds. 650 milliGRAM(s) Rectal every 6 hours PRN Temp greater or equal to 38 C (100.4 F), Moderate Pain (4 - 6)  LORazepam   Injectable 1 milliGRAM(s) IV Push every 2 hours PRN Seizure  melatonin 3 milliGRAM(s) Oral at bedtime PRN Insomnia  sodium chloride 0.65% Nasal 1 Spray(s) Both Nostrils two times a day PRN Nasal Congestion      Allergies: NKDA    ROS:  SEE BELOW        ICU Vital Signs Last 24 Hrs  T(C): 35.6 (05 Oct 2020 08:51), Max: 37.1 (04 Oct 2020 12:00)  T(F): 96.1 (05 Oct 2020 08:51), Max: 98.8 (04 Oct 2020 12:00)  HR: 72 (05 Oct 2020 11:00) (55 - 110)  BP: 79/45 (05 Oct 2020 11:00) (70/54 - 120/59)  BP(mean): 51 (05 Oct 2020 11:00) (47 - 74)  ABP: --  ABP(mean): --  RR: 17 (05 Oct 2020 11:00) (12 - 28)  SpO2: 100% (05 Oct 2020 11:00) (90% - 100%)      Mode: SIMV with PS  RR (machine): 15  FiO2: 40  PEEP: 8  PS: 10  ITime: 0.9  MAP: 7  PIP: 20      I&O's Summary    04 Oct 2020 07:01  -  05 Oct 2020 07:00  --------------------------------------------------------  IN: 720 mL / OUT: 0 mL / NET: 720 mL    05 Oct 2020 07:01  -  05 Oct 2020 11:16  --------------------------------------------------------  IN: 0 mL / OUT: 10 mL / NET: -10 mL        Physical Exam:  SEE BELOW                          12.1   3.48  )-----------( 176      ( 05 Oct 2020 06:36 )             37.9       10-05    144  |  112<H>  |  12  ----------------------------<  97  4.1   |  28  |  0.32<L>    Ca    8.8      05 Oct 2020 06:36                      DVT Prophylaxis:                                                            Contraindication:     Advanced Directives:    Discussed with:    Visit Information:  Time spent excluding procedure:  30 cc mins    ** Time is exclusive of billed procedures and/or teaching and/or routine family updates.

## 2020-10-05 NOTE — PROGRESS NOTE ADULT - ASSESSMENT
26 yo Female with a PMH significant for Cerebral Palsy and DD, GERD, Asthma, Seizure disorders, Scoliosis/Kyphosis, h/o Coxsackie Myocarditis, c/b pericarditis, Chronic Respiratory Failure s/p Trach and Peg placement, admission [facility] for [facility] distress, admitted on 10/1 for evaluation of shortness of breath over day prior  to admission with wet coarse breath sounds and increased secretions and mucous plugging; did have transient desaturations that were refractory to nebulizer treatments; the patient was hypothermic upon admission. History per medical record and patient mother at bedside.  Patient placed on pressors for blood pressure support.  1. Patient admitted with septic shock secondary to pneumonia in this young patient with cerebral palsy s/p trach; may have component of aspiration pneumonia as well  - follow up cultures   -  vent per icu, may trial trach collar  - - iv hydration and supportive care   - serial cbc and monitor temperature   - reviewed prior medical records to evaluate for resistant or atypical pathogens   - can continue antibiotic nebs as ordered, though benefit is unclear  - will continue meropenem as ordered, day #4  - tolerating antibiotics without rashes or side effects   - sensitivities of Pseudomonas are pending  - hold on further vancomycin for now  2. other issues; per medicine

## 2020-10-05 NOTE — PHYSICAL THERAPY INITIAL EVALUATION ADULT - DIAGNOSIS, PT EVAL
R PNA at base, restrictive lung disease in setting of severe kyphoscoliosis, CP/Developmental Delay acute hypoxic respiratory failure ,+R PNA at base, moderate R pleural effusion restrictive lung disease in setting of severe kyphoscoliosis, CP/Developmental Delay

## 2020-10-05 NOTE — PHYSICAL THERAPY INITIAL EVALUATION ADULT - FOLLOWS COMMANDS/ANSWERS QUESTIONS, REHAB EVAL
able to follow single-step instructions/25% of the time/inconsistent following of motor commands BLEs/BUEs/unable to answer questions

## 2020-10-05 NOTE — PROGRESS NOTE ADULT - SUBJECTIVE AND OBJECTIVE BOX
Patient is a 25y old  Female who presents with a chief complaint of Sepsis with Acute Hypoxic Respiratory Failure 2/2 PNA (05 Oct 2020 12:40)    PAST MEDICAL & SURGICAL HISTORY:  Hypokalemia    Kyphosis    Scoliosis    Hypotonia    Asthma    Seizures    Cerebral Palsy    Gastroesophageal Reflux Disease    Hypothyroidism  Treated with synthroid in past. Stopped treatment in 2008 due to normal thyroid function.    Developmental Delay    Tracheostomy in place    Peg (Percutaneous Endoscopic Gastrostomy) Adjustment/Replacement/Removal    S/P Tonsillectomy and Adenoidectomy    Strabismus      CHAMP CALDWELL   25y    Female    BRIEF HOSPITAL COURSE:    Review of Systems:   UATO                    All other ROS are negative.    Allergies    Bactrim (Unknown)  carbamazepine (Unknown)  cephalosporins (Rash)  Corn (Unknown)  Depakote (Unknown)  diphenhydrAMINE (Seizure)  DISPOSABLE BLOOD PRESSURE CUFF - Red blistered skin where disposable blood pressure cuff was on right arm. (CONTACT DERMATITIS) (Rash; Blisters; Swelling;)  eggs (Unknown)  EGGS,  NUTS (Anaphylaxis)  Erythromycin Base (Unknown)  metoclopramide (Unknown)  Milk (Unknown)  MILK, SHELLFISH, WHEAT (Unknown)  Nuts (Unknown)  oxcarbazepine (Unknown)  peanuts (Unknown)  Potatoes (Unknown)  SEASONAL, POLLEN, GRASS, PET DANDER, FEATHERS (Unknown)  Sesame (Unknown)  shellfish (Unknown)  Trileptal (Unknown)  valproic acid (Unknown)  Wheat (Unknown)    Intolerances          ICU Vital Signs Last 24 Hrs  T(C): 37.8 (05 Oct 2020 23:00), Max: 38.4 (05 Oct 2020 21:00)  T(F): 100.1 (05 Oct 2020 23:00), Max: 101.2 (05 Oct 2020 21:00)  HR: 85 (05 Oct 2020 23:00) (54 - 110)  BP: 88/44 (05 Oct 2020 23:00) (70/54 - 123/76)  BP(mean): 54 (05 Oct 2020 23:00) (47 - 85)  ABP: --  ABP(mean): --  RR: 15 (05 Oct 2020 23:00) (13 - 28)  SpO2: 98% (05 Oct 2020 23:00) (90% - 100%)    Physical Examination:    General: NAd    HEENT: trach site clean    PULM: bilateral BS few rhonchi     CVS: s1 s2 reg    ABD: peg    EXT: contractures    SKIN: warm    Neuro: awake       Mode: IMV (Intermittent Mandatory Ventilation)  RR (machine): 15  FiO2: 40  PEEP: 8  PS: 10  ITime: 1  MAP: 13  PIP: 18    Mode: IMV (Intermittent Mandatory Ventilation), RR (machine): 15, FiO2: 40, PEEP: 8, PS: 10, ITime: 1, MAP: 13, PIP: 18  LABS:                        12.1   3.48  )-----------( 176      ( 05 Oct 2020 06:36 )             37.9     10-05    144  |  112<H>  |  12  ----------------------------<  97  4.1   |  28  |  0.32<L>    Ca    8.8      05 Oct 2020 06:36      CAPILLARY BLOOD GLUCOSE      CULTURES:  Culture Results:   Rare Pseudomonas aeruginosa  Normal Respiratory Ynes absent (10-02 @ 20:00)  Culture Results:   Few Pseudomonas aeruginosa  Normal Respiratory Ynes present (10-02 @ 15:03)  Culture Results:   No growth (10-01 @ 18:35)  Culture Results:   No growth to date. (10-01 @ 17:40)  Culture Results:   No growth to date. (10-01 @ 17:20)      Medications:  MEDICATIONS  (STANDING):  ALBUTerol    90 MICROgram(s) HFA Inhaler 2 Puff(s) Inhalation every 4 hours  chlorhexidine 0.12% Liquid 15 milliLiter(s) Oral Mucosa every 12 hours  cholecalciferol 400 Unit(s) Oral daily  enoxaparin Injectable 30 milliGRAM(s) SubCutaneous daily  fluticasone propionate   110 MICROgram(s) HFA Inhaler 1 Puff(s) Inhalation two times a day  fluticasone propionate 50 MICROgram(s)/spray Nasal Spray 1 Spray(s) Both Nostrils two times a day  levETIRAcetam  Solution 700 milliGRAM(s) Oral at bedtime  levETIRAcetam  Solution 600 milliGRAM(s) Oral daily  meropenem  IVPB 1000 milliGRAM(s) IV Intermittent every 8 hours  Methyl B12 1 Tablet(s) 1 Tablet(s) Oral daily  midodrine 5 milliGRAM(s) Oral every 8 hours  multivitamin 1 Tablet(s) Oral daily  pantoprazole   Suspension 20 milliGRAM(s) Oral daily  potassium chloride   Powder 10 milliEquivalent(s) Oral two times a day  silver sulfADIAZINE 1% Cream 1 Application(s) Topical daily  sodium chloride 3%  Inhalation 2 milliLiter(s) Inhalation two times a day  tobramycin for Nebulization - Peds 300 milliGRAM(s) Nebulizer every 12 hours    MEDICATIONS  (PRN):  acetaminophen  Rectal Suppository - Peds. 650 milliGRAM(s) Rectal every 6 hours PRN Temp greater or equal to 38 C (100.4 F), Moderate Pain (4 - 6)  LORazepam   Injectable 1 milliGRAM(s) IV Push every 2 hours PRN Seizure  melatonin 3 milliGRAM(s) Oral at bedtime PRN Insomnia  sodium chloride 0.65% Nasal 1 Spray(s) Both Nostrils two times a day PRN Nasal Congestion        10-04 @ 07:01  -  10-05 @ 07:00  --------------------------------------------------------  IN: 720 mL / OUT: 0 mL / NET: 720 mL    10-05 @ 07:01  -  10-05 @ 23:31  --------------------------------------------------------  IN: 0 mL / OUT: 10 mL / NET: -10 mL        RADIOLOGY/IMAGING/ECHO    < from: Xray Chest 1 View- PORTABLE-Routine (Xray Chest 1 View- PORTABLE-Routine in AM.) (10.03.20 @ 06:55) >  EXAM:  XR CHEST PORTABLE ROUTINE 1V                            PROCEDURE DATE:  10/03/2020          INTERPRETATION:  AP chest on October 3, 2020 at 6:39 AM. Patient requires tracheostomy.    Heart is magnified by technique. Tracheostomy remains.    On October 1 was a significant right base pleural pulmonary process. Present film shows significant improvement.    IMPRESSION: Improved right base process.        Assessment/Plan:    26 y/o F with a h/o cerebral palsy, global developmental delay, chronic respiratory failure (s/p trach/PEG, SIMV qHS), recurrent pneumonia, GERD, asthma, seizure disorder, scoliosis/kyphosis, Coxsackie myocarditis with large pericardial effusion (January 2020),  Admit 10/1 with septic shock, lobar pneumonia, acute hypoxemic respiratory failure, metabolic encephalopathy.    RLL  MDR pseudomonal PNA   Sens to carbapenems s/p shock on midodrine.  MAP runs low but she is otherwise warm and perfused.  If further drop will increase midodrine.      AED Keppra    DVT P Ok at current dose

## 2020-10-05 NOTE — PROGRESS NOTE ADULT - ASSESSMENT
25y old Female with PMH asthma, cerebral palsy, developmental delay, seizures, scoliosis, hypothyroidism, GERD, s/p trach, PEG, on trilogy at home, with multiple hospitalizations in the past at [facility] PICU pneumonia, colonized with serratia marcescens, but sputum cultures also grew out achromobacter xylosoxidans and burkolderia cepacia in the past, with pneumonia  1. Pneumonia: ID following  -continue meropenem  -continue nebulized teofilo  -pulmonary toilet  -wean vent per icu  2. Asthma: continue xopenex, budesonide once off vent  3. Seizures: continue levetiracetam.  4.  GJ tube/malnutrition: Continue tube feeds  5. Shock/hypotension: off norepinephrine

## 2020-10-05 NOTE — PROVIDER CONTACT NOTE (OTHER) - ACTION/TREATMENT ORDERED:
Please see patient profile  Provider Notification:(Would you like a Provider to be contacted regarding this visit?)	declines

## 2020-10-05 NOTE — PHYSICAL THERAPY INITIAL EVALUATION ADULT - BALANCE DISTURBANCE, IDENTIFIED IMPAIRMENT CONTRIBUTE, REHAB EVAL
abnormal muscle tone/impaired postural control/decreased ROM/decreased strength/impaired motor control

## 2020-10-05 NOTE — PHYSICAL THERAPY INITIAL EVALUATION ADULT - GENERAL OBSERVATIONS, REHAB EVAL
recumbent in bed ,head turned/flexed laterally to R side ,mother at bedside ,R arm in ABD/ER gripping a small pull toyattached to SR; tracheostomy to MV,oral suction being used PRN to manage secretions,diaper in place,BLEs positioned in mild flexion atop pillows

## 2020-10-06 LAB — T3FREE SERPL-MCNC: 2.77 PG/ML — SIGNIFICANT CHANGE UP (ref 1.8–4.6)

## 2020-10-06 PROCEDURE — 99233 SBSQ HOSP IP/OBS HIGH 50: CPT

## 2020-10-06 RX ORDER — SODIUM CHLORIDE 9 MG/ML
250 INJECTION, SOLUTION INTRAVENOUS ONCE
Refills: 0 | Status: COMPLETED | OUTPATIENT
Start: 2020-10-06 | End: 2020-10-06

## 2020-10-06 RX ORDER — MIDODRINE HYDROCHLORIDE 2.5 MG/1
5 TABLET ORAL EVERY 8 HOURS
Refills: 0 | Status: DISCONTINUED | OUTPATIENT
Start: 2020-10-06 | End: 2020-10-07

## 2020-10-06 RX ORDER — SODIUM CHLORIDE 9 MG/ML
250 INJECTION, SOLUTION INTRAVENOUS ONCE
Refills: 0 | Status: DISCONTINUED | OUTPATIENT
Start: 2020-10-06 | End: 2020-10-06

## 2020-10-06 RX ORDER — MIDODRINE HYDROCHLORIDE 2.5 MG/1
5 TABLET ORAL ONCE
Refills: 0 | Status: COMPLETED | OUTPATIENT
Start: 2020-10-06 | End: 2020-10-06

## 2020-10-06 RX ORDER — SODIUM CHLORIDE 9 MG/ML
500 INJECTION, SOLUTION INTRAVENOUS ONCE
Refills: 0 | Status: COMPLETED | OUTPATIENT
Start: 2020-10-06 | End: 2020-10-06

## 2020-10-06 RX ORDER — MIDODRINE HYDROCHLORIDE 2.5 MG/1
10 TABLET ORAL EVERY 8 HOURS
Refills: 0 | Status: DISCONTINUED | OUTPATIENT
Start: 2020-10-06 | End: 2020-10-06

## 2020-10-06 RX ORDER — HYDROCORTISONE 20 MG
50 TABLET ORAL EVERY 8 HOURS
Refills: 0 | Status: DISCONTINUED | OUTPATIENT
Start: 2020-10-06 | End: 2020-10-07

## 2020-10-06 RX ADMIN — Medication 50 MILLIGRAM(S): at 21:04

## 2020-10-06 RX ADMIN — Medication 10 MILLIEQUIVALENT(S): at 10:09

## 2020-10-06 RX ADMIN — MEROPENEM 100 MILLIGRAM(S): 1 INJECTION INTRAVENOUS at 21:03

## 2020-10-06 RX ADMIN — SODIUM CHLORIDE 1000 MILLILITER(S): 9 INJECTION, SOLUTION INTRAVENOUS at 06:30

## 2020-10-06 RX ADMIN — SODIUM CHLORIDE 500 MILLILITER(S): 9 INJECTION, SOLUTION INTRAVENOUS at 00:15

## 2020-10-06 RX ADMIN — Medication 10 MILLIEQUIVALENT(S): at 21:03

## 2020-10-06 RX ADMIN — LEVETIRACETAM 700 MILLIGRAM(S): 250 TABLET, FILM COATED ORAL at 19:50

## 2020-10-06 RX ADMIN — Medication 50 MILLIGRAM(S): at 18:12

## 2020-10-06 RX ADMIN — Medication 50 MILLIGRAM(S): at 10:08

## 2020-10-06 RX ADMIN — Medication 1 SPRAY(S): at 21:05

## 2020-10-06 RX ADMIN — ALBUTEROL 2 PUFF(S): 90 AEROSOL, METERED ORAL at 04:00

## 2020-10-06 RX ADMIN — Medication 650 MILLIGRAM(S): at 00:45

## 2020-10-06 RX ADMIN — PANTOPRAZOLE SODIUM 20 MILLIGRAM(S): 20 TABLET, DELAYED RELEASE ORAL at 10:09

## 2020-10-06 RX ADMIN — Medication 1 SPRAY(S): at 10:13

## 2020-10-06 RX ADMIN — Medication 1 APPLICATION(S): at 10:13

## 2020-10-06 RX ADMIN — MIDODRINE HYDROCHLORIDE 5 MILLIGRAM(S): 2.5 TABLET ORAL at 06:33

## 2020-10-06 RX ADMIN — ALBUTEROL 2 PUFF(S): 90 AEROSOL, METERED ORAL at 13:14

## 2020-10-06 RX ADMIN — Medication 650 MILLIGRAM(S): at 00:16

## 2020-10-06 RX ADMIN — MIDODRINE HYDROCHLORIDE 5 MILLIGRAM(S): 2.5 TABLET ORAL at 21:03

## 2020-10-06 RX ADMIN — ALBUTEROL 2 PUFF(S): 90 AEROSOL, METERED ORAL at 16:46

## 2020-10-06 RX ADMIN — ALBUTEROL 2 PUFF(S): 90 AEROSOL, METERED ORAL at 23:52

## 2020-10-06 RX ADMIN — LEVETIRACETAM 600 MILLIGRAM(S): 250 TABLET, FILM COATED ORAL at 08:44

## 2020-10-06 RX ADMIN — Medication 1 PUFF(S): at 08:24

## 2020-10-06 RX ADMIN — Medication 1 PUFF(S): at 20:26

## 2020-10-06 RX ADMIN — CHLORHEXIDINE GLUCONATE 15 MILLILITER(S): 213 SOLUTION TOPICAL at 05:45

## 2020-10-06 RX ADMIN — Medication 1 TABLET(S): at 10:10

## 2020-10-06 RX ADMIN — MIDODRINE HYDROCHLORIDE 10 MILLIGRAM(S): 2.5 TABLET ORAL at 00:20

## 2020-10-06 RX ADMIN — MIDODRINE HYDROCHLORIDE 5 MILLIGRAM(S): 2.5 TABLET ORAL at 13:56

## 2020-10-06 RX ADMIN — MEROPENEM 100 MILLIGRAM(S): 1 INJECTION INTRAVENOUS at 13:56

## 2020-10-06 RX ADMIN — ALBUTEROL 2 PUFF(S): 90 AEROSOL, METERED ORAL at 20:26

## 2020-10-06 RX ADMIN — MEROPENEM 100 MILLIGRAM(S): 1 INJECTION INTRAVENOUS at 05:44

## 2020-10-06 RX ADMIN — Medication 400 UNIT(S): at 10:12

## 2020-10-06 RX ADMIN — CHLORHEXIDINE GLUCONATE 15 MILLILITER(S): 213 SOLUTION TOPICAL at 18:11

## 2020-10-06 RX ADMIN — ALBUTEROL 2 PUFF(S): 90 AEROSOL, METERED ORAL at 08:24

## 2020-10-06 RX ADMIN — ENOXAPARIN SODIUM 30 MILLIGRAM(S): 100 INJECTION SUBCUTANEOUS at 10:15

## 2020-10-06 NOTE — PROGRESS NOTE ADULT - SUBJECTIVE AND OBJECTIVE BOX
Date of service: 10-06-20 @ 12:08      Patient lying in bed; is hypothermic, at times hypotensive; on heating blanket      ROS unable to obtain secondary to patient medical condition     MEDICATIONS  (STANDING):  ALBUTerol    90 MICROgram(s) HFA Inhaler 2 Puff(s) Inhalation every 4 hours  chlorhexidine 0.12% Liquid 15 milliLiter(s) Oral Mucosa every 12 hours  cholecalciferol 400 Unit(s) Oral daily  enoxaparin Injectable 30 milliGRAM(s) SubCutaneous daily  fluticasone propionate   110 MICROgram(s) HFA Inhaler 1 Puff(s) Inhalation two times a day  fluticasone propionate 50 MICROgram(s)/spray Nasal Spray 1 Spray(s) Both Nostrils two times a day  hydrocortisone sodium succinate Injectable 50 milliGRAM(s) IV Push every 8 hours  levETIRAcetam  Solution 700 milliGRAM(s) Oral at bedtime  levETIRAcetam  Solution 600 milliGRAM(s) Oral daily  meropenem  IVPB 1000 milliGRAM(s) IV Intermittent every 8 hours  Methyl B12 1 Tablet(s) 1 Tablet(s) Oral daily  midodrine 5 milliGRAM(s) Oral every 8 hours  multivitamin 1 Tablet(s) Oral daily  pantoprazole   Suspension 20 milliGRAM(s) Oral daily  potassium chloride   Powder 10 milliEquivalent(s) Oral two times a day  silver sulfADIAZINE 1% Cream 1 Application(s) Topical daily  sodium chloride 3%  Inhalation 2 milliLiter(s) Inhalation two times a day    MEDICATIONS  (PRN):  acetaminophen  Rectal Suppository - Peds. 650 milliGRAM(s) Rectal every 6 hours PRN Temp greater or equal to 38 C (100.4 F), Moderate Pain (4 - 6)  LORazepam   Injectable 1 milliGRAM(s) IV Push every 2 hours PRN Seizure  sodium chloride 0.65% Nasal 1 Spray(s) Both Nostrils two times a day PRN Nasal Congestion      Vital Signs Last 24 Hrs  T(C): 35.4 (06 Oct 2020 09:00), Max: 38.4 (05 Oct 2020 21:00)  T(F): 95.8 (06 Oct 2020 09:00), Max: 101.2 (05 Oct 2020 21:00)  HR: 70 (06 Oct 2020 11:00) (50 - 108)  BP: 101/65 (06 Oct 2020 11:00) (75/39 - 123/76)  BP(mean): 74 (06 Oct 2020 11:00) (47 - 85)  RR: 17 (06 Oct 2020 11:00) (12 - 21)  SpO2: 97% (06 Oct 2020 11:00) (95% - 100%)    Mode: 40% trach collar    Physical Exam:      Constitutional: frail looking  HEENT: NC/AT, EOMI, PERRLA, conjunctivae clear; ears and nose atraumatic; pharynx clear  Neck: trach in place  Back: no tenderness  Respiratory: respiratory effort normal; scattered coarse breath sounds  Cardiovascular: S1S2 regular, no murmurs  Abdomen: soft, not tender, not distended, positive BS; no liver or spleen organomegaly; peg in place  Genitourinary: no suprapubic tenderness  Musculoskeletal: no muscle tenderness, no joint swelling or tenderness  Neurological/ Psychiatric:  moving all extremities  Skin: no rashes; no palpable lesions    Labs: all available labs reviewed                         Labs:                   Labs:                        12.1   3.48  )-----------( 176      ( 05 Oct 2020 06:36 )             37.9     10-05    144  |  112<H>  |  12  ----------------------------<  97  4.1   |  28  |  0.32<L>    Ca    8.8      05 Oct 2020 06:36             Cultures:       Culture - Sputum (collected 10-02-20 @ 20:00)  Source: .Sputum Sputum  Gram Stain (10-03-20 @ 05:51):    Few polymorphonuclear leukocytes per low power field    Rare Squamous epithelial cells per low power field    No organisms seen per oil power field  Final Report (10-05-20 @ 17:07):    Rare Pseudomonas aeruginosa    Normal Respiratory Ynes absent  Organism: Pseudomonas aeruginosa (10-05-20 @ 17:07)  Organism: Pseudomonas aeruginosa (10-05-20 @ 17:07)      -  Amikacin: I 32      -  Aztreonam: I 16      -  Cefepime: I 16      -  Ceftazidime: S 4      -  Ciprofloxacin: R >2      -  Gentamicin: R >8      -  Imipenem: S 2      -  Levofloxacin: R >4      -  Meropenem: S <=1      -  Piperacillin/Tazobactam: S <=8      -  Tobramycin: R >8      Method Type: WENDY    Culture - Sputum (collected 10-02-20 @ 15:03)  Source: .Sputum Sputum  Gram Stain (10-02-20 @ 21:39):    Numerous polymorphonuclear leukocytes per low power field    Few Squamous epithelial cells per low power field    No organisms seen per oil power field  Final Report (10-05-20 @ 17:47):    Few Pseudomonas aeruginosa    Normal Respiratory Ynes present  Organism: Pseudomonas aeruginosa (10-05-20 @ 17:47)  Organism: Pseudomonas aeruginosa (10-05-20 @ 17:47)      -  Amikacin: I 32      -  Aztreonam: R >16      -  Cefepime: R >16      -  Ceftazidime: R >16      -  Ciprofloxacin: R >2      -  Gentamicin: R >8      -  Imipenem: S 2      -  Levofloxacin: R >4      -  Meropenem: S <=1      -  Piperacillin/Tazobactam: R >64      -  Tobramycin: R >8      Method Type: WENDY    Culture - Urine (collected 10-01-20 @ 18:35)  Source: .Urine None  Final Report (10-02-20 @ 20:55):    No growth    Culture - Blood (collected 10-01-20 @ 17:40)  Source: .Blood None  Preliminary Report (10-03-20 @ 01:02):    No growth to date.    Culture - Blood (collected 10-01-20 @ 17:20)  Source: .Blood None  Preliminary Report (10-03-20 @ 01:02):    No growth to date.      Ferritin, Serum: 154 ng/mL (10-05-20 @ 06:36)              Radiology: all available radiological tests reviewed    Advanced directives addressed: full resuscitation

## 2020-10-06 NOTE — PROGRESS NOTE ADULT - SUBJECTIVE AND OBJECTIVE BOX
Patient is a 25y old  Female who presents with a chief complaint of Sepsis with Acute Hypoxic Respiratory Failure 2/2 PNA (06 Oct 2020 12:07)      BRIEF HOSPITAL COURSE: 26 y/o F with a h/o cerebral palsy, global developmental delay, chronic respiratory failure (s/p trach/PEG, SIMV qHS), recurrent pneumonia, GERD, asthma, seizure disorder, scoliosis/kyphosis, Coxsackie myocarditis with large pericardial effusion (2020), admitted on 10/1 with progressive dyspnea and hypoxemia over the past few days as per her mother. She had been requiring frequent suctioning for copious blood tinged secretions. After consulting the patient's pulmonologist they were advised to come to the ED for evaluation. Noted to have a RLL lung consolidation on CXR. Hospital course complicated by septic shock requiring IV vasopressor therapy. Found to have MDR pseudomonas in her sputum.      Events last 24 hours: BP remains labile but MAPs are typically over 65. Remains off IV vasopressor therapy. Tolerating outpatient ventilator settings.        PAST MEDICAL & SURGICAL HISTORY:  Hypokalemia    Kyphosis    Scoliosis    Hypotonia    Asthma    Seizures    Cerebral Palsy    Gastroesophageal Reflux Disease    Hypothyroidism  Treated with synthroid in past. Stopped treatment in  due to normal thyroid function.    Developmental Delay    Tracheostomy in place    Peg (Percutaneous Endoscopic Gastrostomy) Adjustment/Replacement/Removal    S/P Tonsillectomy and Adenoidectomy    Strabismus        Review of Systems:  Unable to obtain as she is nonverbal at baseline.    Medications:  meropenem  IVPB 1000 milliGRAM(s) IV Intermittent every 8 hours  midodrine 5 milliGRAM(s) Oral every 8 hours  ALBUTerol    90 MICROgram(s) HFA Inhaler 2 Puff(s) Inhalation every 4 hours  fluticasone propionate   110 MICROgram(s) HFA Inhaler 1 Puff(s) Inhalation two times a day  sodium chloride 3%  Inhalation 2 milliLiter(s) Inhalation two times a day  acetaminophen  Rectal Suppository - Peds. 650 milliGRAM(s) Rectal every 6 hours PRN  levETIRAcetam  Solution 700 milliGRAM(s) Oral at bedtime  levETIRAcetam  Solution 600 milliGRAM(s) Oral daily  LORazepam   Injectable 1 milliGRAM(s) IV Push every 2 hours PRN  enoxaparin Injectable 30 milliGRAM(s) SubCutaneous daily  pantoprazole   Suspension 20 milliGRAM(s) Oral daily  hydrocortisone sodium succinate Injectable 50 milliGRAM(s) IV Push every 8 hours  cholecalciferol 400 Unit(s) Oral daily  multivitamin 1 Tablet(s) Oral daily  potassium chloride   Powder 10 milliEquivalent(s) Oral two times a day  chlorhexidine 0.12% Liquid 15 milliLiter(s) Oral Mucosa every 12 hours  fluticasone propionate 50 MICROgram(s)/spray Nasal Spray 1 Spray(s) Both Nostrils two times a day  silver sulfADIAZINE 1% Cream 1 Application(s) Topical daily  sodium chloride 0.65% Nasal 1 Spray(s) Both Nostrils two times a day PRN    Methyl B12 1 Tablet(s) 1 Tablet(s) Oral daily      Mode: SIMV (Synchronized Intermittent Mandatory Ventilation)  RR (machine): 15  FiO2: 40  PEEP: 8  PS: 10  ITime: 1  PIP: 23      ICU Vital Signs Last 24 Hrs  T(C): 36.6 (07 Oct 2020 00:00), Max: 38.6 (06 Oct 2020 12:00)  T(F): 97.9 (07 Oct 2020 00:00), Max: 101.4 (06 Oct 2020 12:00)  HR: 63 (07 Oct 2020 02:33) (51 - 94)  BP: 109/69 (07 Oct 2020 02:00) (75/39 - 119/49)  BP(mean): 79 (07 Oct 2020 02:00) (48 - 88)  ABP: --  ABP(mean): --  RR: 21 (07 Oct 2020 02:00) (11 - 21)  SpO2: 100% (07 Oct 2020 02:33) (92% - 100%)          I&O's Detail    05 Oct 2020 07:01  -  06 Oct 2020 07:00  --------------------------------------------------------  IN:    Enteral Tube Flush: 400 mL    Free Water: 90 mL    IV PiggyBack: 100 mL    Lactated Ringers Bolus: 500 mL    Miscellaneous Tube Feedin mL  Total IN: 1360 mL    OUT:    Other (mL): 10 mL    Voided (mL): 825 mL  Total OUT: 835 mL    Total NET: 525 mL      06 Oct 2020 07:01  -  07 Oct 2020 04:24  --------------------------------------------------------  IN:    Enteral Tube Flush: 400 mL    Miscellaneous Tube Feedin mL    Pedialyte: 200 mL  Total IN: 870 mL    OUT:    Voided (mL): 1100 mL  Total OUT: 1100 mL    Total NET: -230 mL            LABS:                        12.1   3.48  )-----------( 176      ( 05 Oct 2020 06:36 )             37.9     10-05    144  |  112<H>  |  12  ----------------------------<  97  4.1   |  28  |  0.32<L>    Ca    8.8      05 Oct 2020 06:36            CAPILLARY BLOOD GLUCOSE            CULTURES:  Culture Results:   Rare Pseudomonas aeruginosa  Normal Respiratory Ynes absent (10-02-20 @ 20:00)  Culture Results:   Few Pseudomonas aeruginosa  Normal Respiratory Ynes present (10-02-20 @ 15:03)  Culture Results:   No growth (10-01-20 @ 18:35)  Culture Results:   No Growth Final (10-01-20 @ 17:40)  Culture Results:   No Growth Final (10-01-20 @ 17:20)        Physical Examination:    General: No acute distress. trach to vent    HEENT: Pupils equal, reactive to light.  Symmetric.    PULM: Clear to auscultation bilaterally, no significant sputum production    CVS: Regular rate and rhythm, no murmurs, rubs, or gallops    ABD: Soft, nondistended, nontender, normoactive bowel sounds, no masses (+)PEG    EXT: No edema, nontender, contracted    SKIN: Warm and well perfused, no rashes noted.    NEURO: nonverbal at baseline, does not follow commands, moves all extremities spontaneously        RADIOLOGY:     < from: Xray Chest 1 View- PORTABLE-Routine (Xray Chest 1 View- PORTABLE-Routine in AM.) (10.03.20 @ 06:55) >  Heart is magnified by technique. Tracheostomy remains.    On  was a significant right base pleural pulmonary process. Present film shows significant improvement.    IMPRESSION: Improved right base process.

## 2020-10-06 NOTE — PROGRESS NOTE ADULT - ASSESSMENT
25y old Female with PMH asthma, cerebral palsy, developmental delay, seizures, scoliosis, hypothyroidism, GERD, s/p trach, PEG, on trilogy at home, with multiple hospitalizations in the past at [facility] PICU pneumonia, colonized with serratia marcescens, but sputum cultures also grew out achromobacter xylosoxidans and burkolderia cepacia in the past, with pneumonia    impression   - ventilator associated with the pseudomonas with the resistance to tobra and fluoroquinolone   - resolved septic shock on midodrine now   - hypothermia and mild desmond cardia rule out autonomic imbalance   - other issues include base line vent dependency and seizure disorder   PLAN   continue vent support and iv antibiotics as per the I.D and last cxr shows improving right lower lung infiltrate .  monitor B.P closely continue with the PEG feeding   patient condition was discussed with the mother at bed side

## 2020-10-06 NOTE — PROGRESS NOTE ADULT - SUBJECTIVE AND OBJECTIVE BOX
H[facility] # 5  ICU # 5    CC:  Sepsis     HPI:    24 y/o female with Cerebral Palsy and DD, GERD, Asthma, Seizure disorders, Scoliosis/Kyphosis, h/o Coxsackie Myocarditis, c/b pericarditis, Chronic Respiratory Failure s/p Trach and PEG placement admitted with RLL CAP with likely MDRO and septic shock--off pressors--BP labile.  Pt is usually on TC during the day and Vent during the night.      10/5:  Case d/w dr Burgess.  Pt seen and examined in ICU--mom at bedside--vented--interactive with mom.  Received 500ml IVF overnight for SBP 70 Hypothermic overnight   10/6:  Case d/w ANNEMARIE Lal.  Received total 1L IVF for SBP 70s.  Was hypothermic and bradycardiac.  AM cortisol 13.  TSH normal.  Case d/w mom at bedside.    PMH:  As above.     PSH:  As above.     FH: Non Contributory other than those listed in HPI    Social History:  Unobtainable due to clinical condition     MEDICATIONS  (STANDING):  ALBUTerol    90 MICROgram(s) HFA Inhaler 2 Puff(s) Inhalation every 4 hours  chlorhexidine 0.12% Liquid 15 milliLiter(s) Oral Mucosa every 12 hours  cholecalciferol 400 Unit(s) Oral daily  enoxaparin Injectable 30 milliGRAM(s) SubCutaneous daily  fluticasone propionate   110 MICROgram(s) HFA Inhaler 1 Puff(s) Inhalation two times a day  fluticasone propionate 50 MICROgram(s)/spray Nasal Spray 1 Spray(s) Both Nostrils two times a day  hydrocortisone sodium succinate Injectable 50 milliGRAM(s) IV Push every 8 hours  levETIRAcetam  Solution 700 milliGRAM(s) Oral at bedtime  levETIRAcetam  Solution 600 milliGRAM(s) Oral daily  meropenem  IVPB 1000 milliGRAM(s) IV Intermittent every 8 hours  Methyl B12 1 Tablet(s) 1 Tablet(s) Oral daily  midodrine 5 milliGRAM(s) Oral every 8 hours  multivitamin 1 Tablet(s) Oral daily  pantoprazole   Suspension 20 milliGRAM(s) Oral daily  potassium chloride   Powder 10 milliEquivalent(s) Oral two times a day  silver sulfADIAZINE 1% Cream 1 Application(s) Topical daily  sodium chloride 3%  Inhalation 2 milliLiter(s) Inhalation two times a day  tobramycin for Nebulization - Peds 300 milliGRAM(s) Nebulizer every 12 hours    MEDICATIONS  (PRN):  acetaminophen  Rectal Suppository - Peds. 650 milliGRAM(s) Rectal every 6 hours PRN Temp greater or equal to 38 C (100.4 F), Moderate Pain (4 - 6)  LORazepam   Injectable 1 milliGRAM(s) IV Push every 2 hours PRN Seizure  sodium chloride 0.65% Nasal 1 Spray(s) Both Nostrils two times a day PRN Nasal Congestion      Allergies: NKDA    ROS:  SEE BELOW        ICU Vital Signs Last 24 Hrs  T(C): 35.4 (06 Oct 2020 09:00), Max: 38.4 (05 Oct 2020 21:00)  T(F): 95.8 (06 Oct 2020 09:00), Max: 101.2 (05 Oct 2020 21:00)  HR: 79 (06 Oct 2020 10:00) (50 - 108)  BP: 94/62 (06 Oct 2020 10:00) (75/39 - 123/76)  BP(mean): 70 (06 Oct 2020 10:00) (47 - 85)  ABP: --  ABP(mean): --  RR: 16 (06 Oct 2020 10:00) (12 - 21)  SpO2: 95% (06 Oct 2020 10:00) (95% - 100%)      Mode: SIMV with PS  RR (machine): 15  FiO2: 40  PEEP: 8  PS: 10  ITime: 1  PIP: 23      I&O's Summary    05 Oct 2020 07:01  -  06 Oct 2020 07:00  --------------------------------------------------------  IN: 1360 mL / OUT: 835 mL / NET: 525 mL        Physical Exam:  SEE BELOW                          12.1   3.48  )-----------( 176      ( 05 Oct 2020 06:36 )             37.9       10-05    144  |  112<H>  |  12  ----------------------------<  97  4.1   |  28  |  0.32<L>    Ca    8.8      05 Oct 2020 06:36                      DVT Prophylaxis:                                                            Contraindication:     Advanced Directives:    Discussed with:    Visit Information:  Time spent excluding procedure:      ** Time is exclusive of billed procedures and/or teaching and/or routine family updates.

## 2020-10-06 NOTE — PROGRESS NOTE ADULT - ASSESSMENT
IMP:    26 y/o female with Cerebral Palsy and DD, GERD, Asthma, Seizure disorders, Scoliosis/Kyphosis, h/o Coxsackie Myocarditis, c/b pericarditis, Chronic Respiratory Failure s/p Trach and PEG placement admitted with RLL PSAE CAP with likely MDRO and septic shock--off pressors but BP labile with intermittent hypotension  Autonomic dysfunction--?? Adrenal insufficiency     High risk for acute decompensation and deterioration including death     Plan:    Full vent support--LPV strategy to maintain plateau pressures < 30--High PEEP/low TV--Permissive hypercapnea and acidemia--TC trial as tolerated  Start HC 50 IV q 8  Resume Pedialyte   Cont Midodrine 5 q8  Cont with Compa (5)  TF   HOB > 30  AED  DVT prophy--SCD and LMWH  PT     ICU care-- d/w ICU staff on multi disciplinary rounds--Mom at bedside-- All concerns addressed including but not limited to diagnosis, treatment plan and overall prognosis IMP:    26 y/o female with Cerebral Palsy and DD, GERD, Asthma, Seizure disorders, Scoliosis/Kyphosis, h/o Coxsackie Myocarditis, c/b pericarditis, Chronic Respiratory Failure s/p Trach and PEG placement admitted with RLL PSAE CAP with likely MDRO and septic shock--off pressors but BP labile with intermittent hypotension  Autonomic dysfunction--?? Adrenal insufficiency     High risk for acute decompensation and deterioration including death     Plan:    Full vent support--LPV strategy to maintain plateau pressures < 30--High PEEP/low TV--Permissive hypercapnea and acidemia--TC trial as tolerated  Start HC 50 IV q 8  Resume Pedialyte   Cont Midodrine 5 q8  Cont with Compa (6)  TF   HOB > 30  AED  DVT prophy--SCD and LMWH  PT     ICU care-- d/w ICU staff on multi disciplinary rounds--Mom at bedside-- All concerns addressed including but not limited to diagnosis, treatment plan and overall prognosis.  Spoke with dr ghosh at bedside

## 2020-10-06 NOTE — PROGRESS NOTE ADULT - SUBJECTIVE AND OBJECTIVE BOX
SUBJECTIVE     Patient remained on the vent non verbal and has hypothermia on the blanket   has occasional episodes of the bradycardia   on midodrine for the hypotension   sputum cultures grew pseudomonas resistant to tobramycin   on meropenum    PAST MEDICAL & SURGICAL HISTORY:  Hypokalemia    Kyphosis    Scoliosis    Hypotonia    Asthma    Seizures    Cerebral Palsy    Gastroesophageal Reflux Disease    Hypothyroidism  Treated with synthroid in past. Stopped treatment in 2008 due to normal thyroid function.    Developmental Delay    Tracheostomy in place    Peg (Percutaneous Endoscopic Gastrostomy) Adjustment/Replacement/Removal    S/P Tonsillectomy and Adenoidectomy    Strabismus      OBJECTIVE   Vital Signs Last 24 Hrs  T(C): 34.4 (06 Oct 2020 08:00), Max: 38.4 (05 Oct 2020 21:00)  T(F): 93.9 (06 Oct 2020 08:00), Max: 101.2 (05 Oct 2020 21:00)  HR: 54 (06 Oct 2020 08:00) (50 - 108)  BP: 119/49 (06 Oct 2020 08:00) (75/39 - 123/76)  BP(mean): 64 (06 Oct 2020 08:00) (47 - 85)  RR: 18 (06 Oct 2020 08:00) (13 - 23)  SpO2: 100% (06 Oct 2020 08:00) (90% - 100%)    Review of systems   as dictated in the history of present illness with the review of other systems non contributory     PHYSICAL EXAM:  Constitutional: , awake and alert, not in distress on the vent with the trach with the simv mode   HEENT: Normo cephalic atraumatic  Neck: Soft and supple, No J.V.D   Respiratory: vesicular breathing ,poor inspiratory effort   Cardiovascular: S1 and S2, regular rate .   Gastrointestinal:  soft, nontender peg in place   Extremities: contracted extremities .  Neurological: baseline cerebral palsy     MEDICATIONS  (STANDING):  ALBUTerol    90 MICROgram(s) HFA Inhaler 2 Puff(s) Inhalation every 4 hours  chlorhexidine 0.12% Liquid 15 milliLiter(s) Oral Mucosa every 12 hours  cholecalciferol 400 Unit(s) Oral daily  enoxaparin Injectable 30 milliGRAM(s) SubCutaneous daily  fluticasone propionate   110 MICROgram(s) HFA Inhaler 1 Puff(s) Inhalation two times a day  fluticasone propionate 50 MICROgram(s)/spray Nasal Spray 1 Spray(s) Both Nostrils two times a day  levETIRAcetam  Solution 700 milliGRAM(s) Oral at bedtime  levETIRAcetam  Solution 600 milliGRAM(s) Oral daily  meropenem  IVPB 1000 milliGRAM(s) IV Intermittent every 8 hours  Methyl B12 1 Tablet(s) 1 Tablet(s) Oral daily  midodrine 5 milliGRAM(s) Oral every 8 hours  multivitamin 1 Tablet(s) Oral daily  pantoprazole   Suspension 20 milliGRAM(s) Oral daily  potassium chloride   Powder 10 milliEquivalent(s) Oral two times a day  silver sulfADIAZINE 1% Cream 1 Application(s) Topical daily  sodium chloride 3%  Inhalation 2 milliLiter(s) Inhalation two times a day  tobramycin for Nebulization - Peds 300 milliGRAM(s) Nebulizer every 12 hours                            12.1   3.48  )-----------( 176      ( 05 Oct 2020 06:36 )             37.9     10-05    144  |  112<H>  |  12  ----------------------------<  97  4.1   |  28  |  0.32<L>    Ca    8.8      05 Oct 2020 06:36

## 2020-10-06 NOTE — PROGRESS NOTE ADULT - ASSESSMENT
26 yo Female with a PMH significant for Cerebral Palsy and DD, GERD, Asthma, Seizure disorders, Scoliosis/Kyphosis, h/o Coxsackie Myocarditis, c/b pericarditis, Chronic Respiratory Failure s/p Trach and Peg placement, admission in June to [facility] for respiratory distress, admitted on 10/1 for evaluation of shortness of breath over day prior  to admission with wet coarse breath sounds and increased secretions and mucous plugging; did have transient desaturations that were refractory to nebulizer treatments; the patient was hypothermic upon admission. History per medical record and patient mother at bedside.  Patient placed on pressors for blood pressure support.  1. Patient admitted with septic shock secondary to pneumonia in this young patient with cerebral palsy s/p trach; may have component of aspiration pneumonia as well  - follow up cultures   -  vent per icu, may trial trach collar  - - iv hydration and supportive care   - serial cbc and monitor temperature   - reviewed prior medical records to evaluate for resistant or atypical pathogens   - can continue antibiotic nebs as ordered, though benefit is unclear  - will continue meropenem as ordered, day #6 (started on 10/1)  - tolerating antibiotics without rashes or side effects   - sensitivities of Pseudomonas are showing very resistant Pseudomonas; need to limit antibiotic exposure as much as possible  - expect one more day of meropenem  - hold on further vancomycin for now  2. other issues; per medicine

## 2020-10-06 NOTE — PHARMACOTHERAPY INTERVENTION NOTE - COMMENTS
Adjusted dose of vancomycin from 1,000 mg to 750 mg based on pharmacy policy.
Pt admitted to ICU, recommend to order DVT ppx
Pt was started on merrem and tobramycin neb for PNA. Now sputum culture grew pseudomonas resistant to tobramycin, recommend to d/c tobramycin

## 2020-10-06 NOTE — PROGRESS NOTE ADULT - ASSESSMENT
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    - BP still labile but without clinical or laboratory evidence of hypoperfusion, continue midodrine, possible autonomic dysregulation?  - stress dose hydrocortisone started for possible adrenal insufficiency, AM cortisol level was only 13  - TSH and free T3 levels within normal range  - continue meropenem for MDR pseudomonas PNA, blood cultures neg for growth, ID is following  - tolerating outpatient SIMV settings, titrate to maintain SpO2 > 92%  - continue inhaled hypertonic saline to assist in mobilizing secretions    Discussed case and management plan at bedside with patient's mother. All questions answered and concerns addressed. 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    - hemodynamics were near baseline for most of shift but SBP 60s/70s this morning, however without clinical or laboratory evidence of hypoperfusion, give midodrine early + 500cc LR bolus, possible autonomic dysregulation?  - maintain SBP >80  - stress dose hydrocortisone started for possible adrenal insufficiency, AM cortisol level was only 13  - TSH and free T3 levels within normal range  - continue meropenem for MDR pseudomonas PNA, blood cultures neg for growth, ID is following  - tolerating outpatient SIMV settings, titrate to maintain SpO2 > 92%  - continue inhaled hypertonic saline to assist in mobilizing secretions    Discussed case and management plan at bedside with patient's mother. All questions answered and concerns addressed. 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    - hemodynamics were near baseline for most of shift but SBP 60s/70s, however without clinical or laboratory evidence of hypoperfusion, give midodrine early + 500cc LR bolus, possible autonomic dysregulation?  - maintain SBP >80  - stress dose hydrocortisone started for possible adrenal insufficiency, AM cortisol level was only 13  - TSH and free T3 levels within normal range  - continue meropenem for MDR pseudomonas PNA, blood cultures neg for growth, ID is following  - tolerating outpatient SIMV settings, titrate to maintain SpO2 > 92%  - continue inhaled hypertonic saline to assist in mobilizing secretions    Discussed case and management plan at bedside with patient's mother. All questions answered and concerns addressed.

## 2020-10-07 ENCOUNTER — TRANSCRIPTION ENCOUNTER (OUTPATIENT)
Age: 25
End: 2020-10-07

## 2020-10-07 LAB
CULTURE RESULTS: SIGNIFICANT CHANGE UP
CULTURE RESULTS: SIGNIFICANT CHANGE UP
SPECIMEN SOURCE: SIGNIFICANT CHANGE UP
SPECIMEN SOURCE: SIGNIFICANT CHANGE UP
T4 FREE SERPL-MCNC: 0.97 NG/DL — SIGNIFICANT CHANGE UP (ref 0.76–1.46)

## 2020-10-07 PROCEDURE — 99233 SBSQ HOSP IP/OBS HIGH 50: CPT

## 2020-10-07 RX ORDER — COLISTIMETHATE SODIUM 150 MG/2ML
1 INJECTION INTRAMUSCULAR; INTRAVENOUS
Qty: 0 | Refills: 0 | DISCHARGE
Start: 2020-10-07

## 2020-10-07 RX ORDER — MIDODRINE HYDROCHLORIDE 2.5 MG/1
2.5 TABLET ORAL EVERY 8 HOURS
Refills: 0 | Status: DISCONTINUED | OUTPATIENT
Start: 2020-10-07 | End: 2020-10-08

## 2020-10-07 RX ADMIN — MIDODRINE HYDROCHLORIDE 5 MILLIGRAM(S): 2.5 TABLET ORAL at 14:11

## 2020-10-07 RX ADMIN — MIDODRINE HYDROCHLORIDE 5 MILLIGRAM(S): 2.5 TABLET ORAL at 21:50

## 2020-10-07 RX ADMIN — ENOXAPARIN SODIUM 30 MILLIGRAM(S): 100 INJECTION SUBCUTANEOUS at 10:28

## 2020-10-07 RX ADMIN — Medication 20 MILLIGRAM(S): at 10:28

## 2020-10-07 RX ADMIN — CHLORHEXIDINE GLUCONATE 15 MILLILITER(S): 213 SOLUTION TOPICAL at 05:07

## 2020-10-07 RX ADMIN — Medication 1 TABLET(S): at 10:28

## 2020-10-07 RX ADMIN — PANTOPRAZOLE SODIUM 20 MILLIGRAM(S): 20 TABLET, DELAYED RELEASE ORAL at 10:28

## 2020-10-07 RX ADMIN — MEROPENEM 100 MILLIGRAM(S): 1 INJECTION INTRAVENOUS at 14:11

## 2020-10-07 RX ADMIN — LEVETIRACETAM 700 MILLIGRAM(S): 250 TABLET, FILM COATED ORAL at 20:08

## 2020-10-07 RX ADMIN — MIDODRINE HYDROCHLORIDE 5 MILLIGRAM(S): 2.5 TABLET ORAL at 05:07

## 2020-10-07 RX ADMIN — Medication 400 UNIT(S): at 10:28

## 2020-10-07 RX ADMIN — Medication 1 SPRAY(S): at 21:51

## 2020-10-07 RX ADMIN — MEROPENEM 100 MILLIGRAM(S): 1 INJECTION INTRAVENOUS at 05:07

## 2020-10-07 RX ADMIN — Medication 1 SPRAY(S): at 10:29

## 2020-10-07 RX ADMIN — Medication 1 APPLICATION(S): at 10:29

## 2020-10-07 RX ADMIN — LEVETIRACETAM 600 MILLIGRAM(S): 250 TABLET, FILM COATED ORAL at 07:56

## 2020-10-07 RX ADMIN — MEROPENEM 100 MILLIGRAM(S): 1 INJECTION INTRAVENOUS at 21:50

## 2020-10-07 RX ADMIN — Medication 10 MILLIEQUIVALENT(S): at 10:29

## 2020-10-07 RX ADMIN — Medication 50 MILLIGRAM(S): at 05:07

## 2020-10-07 RX ADMIN — CHLORHEXIDINE GLUCONATE 15 MILLILITER(S): 213 SOLUTION TOPICAL at 17:59

## 2020-10-07 RX ADMIN — Medication 10 MILLIEQUIVALENT(S): at 21:50

## 2020-10-07 NOTE — PROGRESS NOTE ADULT - SKIN
Sagar Munoz is a 57 y.o. female. HPI/Chief C/O:  Chief Complaint   Patient presents with    Discuss Labs     Pt here to discuss lab results from 10/22    Hand Pain     Pt also here to discuss XR of R wrist d/t hand pain    Discuss Medications     MA reviewed med list with pt- pharmacy confirmed   [facility] Maintenance     Reviewed with pt- declined flu shot     Allergies   Allergen Reactions    Penicillins Other (See Comments)     Reaction unknown   this 57year old female presents with hyperlipidemia, and tenosynovitis de Quervain right wrist. Pt states the right wrist pain is better when she rests it. ROS:  Review of Systems   Constitutional: Negative. Negative for activity change, appetite change, chills, diaphoresis, fatigue, fever and unexpected weight change. HENT: Negative. Negative for congestion, dental problem, drooling, ear discharge, ear pain, facial swelling, hearing loss, mouth sores, nosebleeds, postnasal drip, rhinorrhea, sinus pain, sinus pressure, sneezing, sore throat, tinnitus, trouble swallowing and voice change. Eyes: Negative. Negative for photophobia, pain, discharge, redness, itching and visual disturbance. Respiratory: Negative. Negative for cough, choking, chest tightness, shortness of breath, wheezing and stridor. Cardiovascular: Negative. Negative for chest pain, palpitations and leg swelling. Gastrointestinal: Negative for abdominal distention, abdominal pain, anal bleeding, blood in stool, constipation, diarrhea, nausea, rectal pain and vomiting. Endocrine: Negative. Negative for cold intolerance, heat intolerance, polydipsia, polyphagia and polyuria. Genitourinary: Negative. Negative for decreased urine volume, difficulty urinating, dysuria, flank pain, frequency, genital sores, hematuria, menstrual problem, pelvic pain and urgency. Musculoskeletal: Positive for arthralgias and myalgias.  Negative for back pain, gait problem, joint detailed exam warm and dry

## 2020-10-07 NOTE — PROGRESS NOTE ADULT - SUBJECTIVE AND OBJECTIVE BOX
Hospital # 6  ICU # 6    CC:  Sepsis     HPI:    26 y/o female with Cerebral Palsy and DD, GERD, Asthma, Seizure disorders, Scoliosis/Kyphosis, h/o Coxsackie Myocarditis, c/b pericarditis, Chronic Respiratory Failure s/p Trach and PEG placement admitted with RLL CAP with likely MDRO and septic shock--off pressors--BP labile.  Pt is usually on TC during the day and Vent during the night.      10/5:  Case d/w dr Burgess.  Pt seen and examined in ICU--mom at bedside--vented--interactive with mom.  Received 500ml IVF overnight for SBP 70 Hypothermic overnight   10/6:  Case d/w ANNEMARIE Lal.  Received total 1L IVF for SBP 70s.  Was hypothermic and bradycardiac.  AM cortisol 13.  TSH normal.  Case d/w mom at bedside  10/7:  Much more stable vitals and autonomics over last 24 hrs.  Mom and dad at bedside.  One more day of gage.  Anticipate DC tomorrow    PMH:  As above.     PSH:  As above.     FH: Non Contributory other than those listed in HPI    Social History:  Unobtainable due to clinical condition     MEDICATIONS  (STANDING):  ALBUTerol    90 MICROgram(s) HFA Inhaler 2 Puff(s) Inhalation every 4 hours  chlorhexidine 0.12% Liquid 15 milliLiter(s) Oral Mucosa every 12 hours  cholecalciferol 400 Unit(s) Oral daily  enoxaparin Injectable 30 milliGRAM(s) SubCutaneous daily  fluticasone propionate   110 MICROgram(s) HFA Inhaler 1 Puff(s) Inhalation two times a day  fluticasone propionate 50 MICROgram(s)/spray Nasal Spray 1 Spray(s) Both Nostrils two times a day  levETIRAcetam  Solution 700 milliGRAM(s) Oral at bedtime  levETIRAcetam  Solution 600 milliGRAM(s) Oral daily  meropenem  IVPB 1000 milliGRAM(s) IV Intermittent every 8 hours  Methyl B12 1 Tablet(s) 1 Tablet(s) Oral daily  midodrine 5 milliGRAM(s) Oral every 8 hours  multivitamin 1 Tablet(s) Oral daily  pantoprazole   Suspension 20 milliGRAM(s) Oral daily  potassium chloride   Powder 10 milliEquivalent(s) Oral two times a day  predniSONE   Tablet 20 milliGRAM(s) Oral daily  silver sulfADIAZINE 1% Cream 1 Application(s) Topical daily    MEDICATIONS  (PRN):  acetaminophen  Rectal Suppository - Peds. 650 milliGRAM(s) Rectal every 6 hours PRN Temp greater or equal to 38 C (100.4 F), Moderate Pain (4 - 6)  LORazepam   Injectable 1 milliGRAM(s) IV Push every 2 hours PRN Seizure  sodium chloride 0.65% Nasal 1 Spray(s) Both Nostrils two times a day PRN Nasal Congestion      Allergies: NKDA    ROS:  SEE BELOW        ICU Vital Signs Last 24 Hrs  T(C): 36.6 (07 Oct 2020 04:00), Max: 38.6 (06 Oct 2020 12:00)  T(F): 97.9 (07 Oct 2020 04:00), Max: 101.4 (06 Oct 2020 12:00)  HR: 82 (07 Oct 2020 11:00) (57 - 94)  BP: 66/50 (07 Oct 2020 11:00) (66/50 - 118/80)  BP(mean): 52 (07 Oct 2020 11:00) (41 - 88)  ABP: --  ABP(mean): --  RR: 12 (07 Oct 2020 11:00) (11 - 21)  SpO2: 98% (07 Oct 2020 11:00) (92% - 100%)      Mode: SIMV (Synchronized Intermittent Mandatory Ventilation)  RR (machine): 15  FiO2: 40  PEEP: 8  PS: 10  PC: 15  PIP: 21      I&O's Summary    06 Oct 2020 07:01  -  07 Oct 2020 07:00  --------------------------------------------------------  IN: 1680 mL / OUT: 1450 mL / NET: 230 mL        Physical Exam:  SEE BELOW                              DVT Prophylaxis:                                                            Contraindication:     Advanced Directives:    Discussed with:    Visit Information:  Time spent excluding procedure:      ** Time is exclusive of billed procedures and/or teaching and/or routine family updates.

## 2020-10-07 NOTE — PROGRESS NOTE ADULT - ASSESSMENT
IMP:    24 y/o female with Cerebral Palsy and DD, GERD, Asthma, Seizure disorders, Scoliosis/Kyphosis, h/o Coxsackie Myocarditis, c/b pericarditis, Chronic Respiratory Failure s/p Trach and PEG placement admitted with RLL PSAE CAP with likely MDRO and septic shock--off pressors but BP labile with intermittent hypotension  Autonomic dysfunction--likely Adrenal insufficiency with low AM cortisol level--appears to be responding to exogenous steroid supplement     High risk for acute decompensation and deterioration including death     Plan:    Full vent support--LPV strategy to maintain plateau pressures < 30--High PEEP/low TV--Permissive hypercapnea and acidemia--TC trial as tolerated  Change to prednisone 20 daily today  Resume Pedialyte   Cont Midodrine 5 q8  Cont with Compa (7)--Last day today  TF   HOB > 30  AED  DVT prophy--SCD and LMWH  PT     ICU care-- d/w ICU staff on multi disciplinary rounds--Mom and Dad at bedside-- All concerns addressed including but not limited to diagnosis, treatment plan and overall prognosis.  Spoke with dr ghosh at bedside.  Anticipate DC home tomorrow

## 2020-10-07 NOTE — PROGRESS NOTE ADULT - ASSESSMENT
26 yo Female with a PMH significant for Cerebral Palsy and DD, GERD, Asthma, Seizure disorders, Scoliosis/Kyphosis, h/o Coxsackie Myocarditis, c/b pericarditis, Chronic Respiratory Failure s/p Trach and Peg placement, admission in June to [facility] for respiratory distress, admitted on 10/1 for evaluation of shortness of breath over day prior  to admission with wet coarse breath sounds and increased secretions and mucous plugging; did have transient desaturations that were refractory to nebulizer treatments; the patient was hypothermic upon admission. History per medical record and patient mother at bedside.  Patient placed on pressors for blood pressure support.  1. Patient admitted with septic shock secondary to pneumonia in this young patient with cerebral palsy s/p trach; may have component of aspiration pneumonia as well  - follow up cultures   -  vent per icu, may trial trach collar  - - iv hydration and supportive care   - serial cbc and monitor temperature   - reviewed prior medical records to evaluate for resistant or atypical pathogens   - can continue antibiotic nebs as ordered, though benefit is unclear  - will continue meropenem as ordered, day #7 (started on 10/1)  - tolerating antibiotics without rashes or side effects   - sensitivities of Pseudomonas are showing very resistant Pseudomonas; need to limit antibiotic exposure as much as possible  - will complete meropenem today  - expect to discharge home tomorrow on no antibiotics as all rx was iv  - hold on further vancomycin for now  2. other issues; per medicine

## 2020-10-07 NOTE — PROGRESS NOTE ADULT - ASSESSMENT
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    - septic shock compounded by adrenal insufficiency and autonomic dysregulation, baseline hypotension  - HR labile but BP has become more consistent  - persistent hypothermia  - wean midodrine to 2.5mg Q 8 hours, maintain SBP >80  - stress dose hydrocortisone changed to prednisone  - completed course of meropenem today for MDR pseudomonas pneumonia, blood cultures neg for growth, ID is following  - switched to outpatient ventilator, on SIMV, titrate to maintain SpO2 > 92%

## 2020-10-07 NOTE — PROGRESS NOTE ADULT - SUBJECTIVE AND OBJECTIVE BOX
Patient is a 25y old  Female who presents with a chief complaint of Sepsis with Acute Hypoxic Respiratory Failure / PNA (07 Oct 2020 11:44)      BRIEF HOSPITAL COURSE: ***    Events last 24 hours: ***        PAST MEDICAL & SURGICAL HISTORY:  Hypokalemia    Kyphosis    Scoliosis    Hypotonia    Asthma    Seizures    Cerebral Palsy    Gastroesophageal Reflux Disease    Hypothyroidism  Treated with synthroid in past. Stopped treatment in  due to normal thyroid function.    Developmental Delay    Tracheostomy in place    Peg (Percutaneous Endoscopic Gastrostomy) Adjustment/Replacement/Removal    S/P Tonsillectomy and Adenoidectomy    Strabismus        Review of Systems:  CONSTITUTIONAL: No fever, chills, or fatigue  EYES: No eye pain, visual disturbances, or discharge  ENMT:  No difficulty hearing, tinnitus, vertigo; No sinus or throat pain  NECK: No pain or stiffness  RESPIRATORY: No cough, wheezing, chills or hemoptysis; No shortness of breath  CARDIOVASCULAR: No chest pain, palpitations, dizziness, or leg swelling  GASTROINTESTINAL: No abdominal or epigastric pain. No nausea, vomiting, or hematemesis; No diarrhea or constipation. No melena or hematochezia.  GENITOURINARY: No dysuria, frequency, hematuria, or incontinence  NEUROLOGICAL: No headaches, memory loss, loss of strength, numbness, or tremors  SKIN: No itching, burning, rashes, or lesions   MUSCULOSKELETAL: No joint pain or swelling; No muscle, back, or extremity pain  PSYCHIATRIC: No depression, anxiety, mood swings, or difficulty sleeping      Medications:    midodrine 5 milliGRAM(s) Oral every 8 hours    ALBUTerol    90 MICROgram(s) HFA Inhaler 2 Puff(s) Inhalation every 4 hours  fluticasone propionate   110 MICROgram(s) HFA Inhaler 1 Puff(s) Inhalation two times a day    acetaminophen  Rectal Suppository - Peds. 650 milliGRAM(s) Rectal every 6 hours PRN  levETIRAcetam  Solution 700 milliGRAM(s) Oral at bedtime  levETIRAcetam  Solution 600 milliGRAM(s) Oral daily  LORazepam   Injectable 1 milliGRAM(s) IV Push every 2 hours PRN      enoxaparin Injectable 30 milliGRAM(s) SubCutaneous daily    pantoprazole   Suspension 20 milliGRAM(s) Oral daily      predniSONE   Tablet 20 milliGRAM(s) Oral daily    cholecalciferol 400 Unit(s) Oral daily  multivitamin 1 Tablet(s) Oral daily  potassium chloride   Powder 10 milliEquivalent(s) Oral two times a day      chlorhexidine 0.12% Liquid 15 milliLiter(s) Oral Mucosa every 12 hours  fluticasone propionate 50 MICROgram(s)/spray Nasal Spray 1 Spray(s) Both Nostrils two times a day  silver sulfADIAZINE 1% Cream 1 Application(s) Topical daily  sodium chloride 0.65% Nasal 1 Spray(s) Both Nostrils two times a day PRN    Methyl B12 1 Tablet(s) 1 Tablet(s) Oral daily      Mode: SIMV with PS      ICU Vital Signs Last 24 Hrs  T(C): 35.8 (07 Oct 2020 20:00), Max: 36.6 (07 Oct 2020 00:00)  T(F): 96.5 (07 Oct 2020 20:00), Max: 97.9 (07 Oct 2020 00:00)  HR: 67 (07 Oct 2020 23:00) (54 - 108)  BP: 92/47 (07 Oct 2020 23:00) (71/32 - 120/72)  BP(mean): 58 (07 Oct 2020 23:00) (41 - 86)  ABP: --  ABP(mean): --  RR: 17 (07 Oct 2020 23:00) (8 - 21)  SpO2: 100% (07 Oct 2020 23:00) (90% - 100%)          I&O's Detail    06 Oct 2020 07:01  -  07 Oct 2020 07:00  --------------------------------------------------------  IN:    Enteral Tube Flush: 600 mL    Free Water: 90 mL    IV PiggyBack: 100 mL    Miscellaneous Tube Feedin mL    Pedialyte: 350 mL  Total IN: 1680 mL    OUT:    Voided (mL): 1450 mL  Total OUT: 1450 mL    Total NET: 230 mL      07 Oct 2020 07:01  -  07 Oct 2020 23:45  --------------------------------------------------------  IN:    Free Water: 80 mL    IV PiggyBack: 50 mL    Miscellaneous Tube Feedin mL  Total IN: 265 mL    OUT:    Voided (mL): 550 mL  Total OUT: 550 mL    Total NET: -285 mL            LABS:                CAPILLARY BLOOD GLUCOSE            CULTURES:  Culture Results:   Rare Pseudomonas aeruginosa  Normal Respiratory Ynes absent (10-02-20 @ 20:00)  Culture Results:   Few Pseudomonas aeruginosa  Normal Respiratory Ynes present (10-02-20 @ 15:03)  Culture Results:   No growth (10-01-20 @ 18:35)  Culture Results:   No Growth Final (10-01-20 @ 17:40)  Culture Results:   No Growth Final (10-01-20 @ 17:20)        Physical Examination:    General: No acute distress.  Alert, oriented, interactive, nonfocal    HEENT: Pupils equal, reactive to light.  Symmetric.    PULM: Clear to auscultation bilaterally, no significant sputum production    CVS: Regular rate and rhythm, no murmurs, rubs, or gallops    ABD: Soft, nondistended, nontender, normoactive bowel sounds, no masses    EXT: No edema, nontender    SKIN: Warm and well perfused, no rashes noted.    NEURO: A&Ox3, strength 5/5 all extremities, cranial nerves grossly intact, no focal deficits        RADIOLOGY: ***        CRITICAL CARE TIME SPENT: ***  Time spent evaluating/treating patient with medical issues that pose a high risk for life threatening deterioration and/or end-organ damage, reviewing data/labs/imaging, discussing case with multidisciplinary team, discussing plan/goals of care with patient/family. Non-inclusive of procedure time.   Patient is a 25y old  Female who presents with a chief complaint of Sepsis with Acute Hypoxic Respiratory Failure 2/2 PNA (07 Oct 2020 11:44)      BRIEF HOSPITAL COURSE: 24 y/o F with a h/o cerebral palsy, global developmental delay, chronic respiratory failure (s/p trach/PEG, SIMV qHS), recurrent pneumonia, GERD, asthma, seizure disorder, scoliosis/kyphosis, Coxsackie myocarditis with large pericardial effusion (2020), admitted on 10/1 with progressive dyspnea and hypoxemia over the past few days as per her mother. She had been requiring frequent suctioning for copious blood tinged secretions. After consulting the patient's pulmonologist they were advised to come to the ED for evaluation. Noted to have a RLL lung consolidation on CXR. Hospital course complicated by septic shock requiring IV vasopressor therapy. Found to have MDR pseudomonas in her sputum.      Events last 24 hours: BP at baseline. Only on PO vasopressor therapy. Complicated course of antibiotic. Now using outpatient ventilator.        PAST MEDICAL & SURGICAL HISTORY:  Hypokalemia    Kyphosis    Scoliosis    Hypotonia    Asthma    Seizures    Cerebral Palsy    Gastroesophageal Reflux Disease    Hypothyroidism  Treated with synthroid in past. Stopped treatment in  due to normal thyroid function.    Developmental Delay    Tracheostomy in place    Peg (Percutaneous Endoscopic Gastrostomy) Adjustment/Replacement/Removal    S/P Tonsillectomy and Adenoidectomy    Strabismus        Review of Systems:  Unable to obtain as she is nonverbal at baseline.      Medications:    midodrine 5 milliGRAM(s) Oral every 8 hours  ALBUTerol    90 MICROgram(s) HFA Inhaler 2 Puff(s) Inhalation every 4 hours  fluticasone propionate   110 MICROgram(s) HFA Inhaler 1 Puff(s) Inhalation two times a day  acetaminophen  Rectal Suppository - Peds. 650 milliGRAM(s) Rectal every 6 hours PRN  levETIRAcetam  Solution 700 milliGRAM(s) Oral at bedtime  levETIRAcetam  Solution 600 milliGRAM(s) Oral daily  LORazepam   Injectable 1 milliGRAM(s) IV Push every 2 hours PRN  enoxaparin Injectable 30 milliGRAM(s) SubCutaneous daily  pantoprazole   Suspension 20 milliGRAM(s) Oral daily  predniSONE   Tablet 20 milliGRAM(s) Oral daily  cholecalciferol 400 Unit(s) Oral daily  multivitamin 1 Tablet(s) Oral daily  potassium chloride   Powder 10 milliEquivalent(s) Oral two times a day  chlorhexidine 0.12% Liquid 15 milliLiter(s) Oral Mucosa every 12 hours  fluticasone propionate 50 MICROgram(s)/spray Nasal Spray 1 Spray(s) Both Nostrils two times a day  silver sulfADIAZINE 1% Cream 1 Application(s) Topical daily  sodium chloride 0.65% Nasal 1 Spray(s) Both Nostrils two times a day PRN  Methyl B12 1 Tablet(s) 1 Tablet(s) Oral daily      Mode: SIMV with PS      ICU Vital Signs Last 24 Hrs  T(C): 35.8 (07 Oct 2020 20:00), Max: 36.6 (07 Oct 2020 00:00)  T(F): 96.5 (07 Oct 2020 20:00), Max: 97.9 (07 Oct 2020 00:00)  HR: 67 (07 Oct 2020 23:00) (54 - 108)  BP: 92/47 (07 Oct 2020 23:00) (71/32 - 120/72)  BP(mean): 58 (07 Oct 2020 23:00) (41 - 86)  ABP: --  ABP(mean): --  RR: 17 (07 Oct 2020 23:00) (8 - 21)  SpO2: 100% (07 Oct 2020 23:00) (90% - 100%)          I&O's Detail    06 Oct 2020 07:01  -  07 Oct 2020 07:00  --------------------------------------------------------  IN:    Enteral Tube Flush: 600 mL    Free Water: 90 mL    IV PiggyBack: 100 mL    Miscellaneous Tube Feedin mL    Pedialyte: 350 mL  Total IN: 1680 mL    OUT:    Voided (mL): 1450 mL  Total OUT: 1450 mL    Total NET: 230 mL      07 Oct 2020 07:01  -  07 Oct 2020 23:45  --------------------------------------------------------  IN:    Free Water: 80 mL    IV PiggyBack: 50 mL    Miscellaneous Tube Feedin mL  Total IN: 265 mL    OUT:    Voided (mL): 550 mL  Total OUT: 550 mL    Total NET: -285 mL            LABS:                CAPILLARY BLOOD GLUCOSE            CULTURES:  Culture Results:   Rare Pseudomonas aeruginosa  Normal Respiratory Ynes absent (10-02-20 @ 20:00)  Culture Results:   Few Pseudomonas aeruginosa  Normal Respiratory Ynes present (10-02-20 @ 15:03)  Culture Results:   No growth (10-01-20 @ 18:35)  Culture Results:   No Growth Final (10-01-20 @ 17:40)  Culture Results:   No Growth Final (10-01-20 @ 17:20)        Physical Examination:    General: No acute distress. trach to vent    HEENT: Pupils equal, reactive to light.  Symmetric.    PULM: Clear to auscultation bilaterally, no significant sputum production    CVS: Regular rate and rhythm, no murmurs, rubs, or gallops    ABD: Soft, nondistended, nontender, normoactive bowel sounds, no masses (+)PEG    EXT: No edema, nontender, contracted    SKIN: Warm and well perfused, no rashes noted.    NEURO: nonverbal at baseline, does not follow commands, moves all extremities spontaneously        RADIOLOGY:     < from: Xray Chest 1 View- PORTABLE-Routine (Xray Chest 1 View- PORTABLE-Routine in AM.) (10.03.20 @ 06:55) >  Heart is magnified by technique. Tracheostomy remains.    On  was a significant right base pleural pulmonary process. Present film shows significant improvement.    IMPRESSION: Improved right base process.     Patient is a 25y old  Female who presents with a chief complaint of Sepsis with Acute Hypoxic Respiratory Failure 2/2 PNA (07 Oct 2020 11:44)      BRIEF HOSPITAL COURSE: 24 y/o F with a h/o cerebral palsy, global developmental delay, chronic respiratory failure (s/p trach/PEG, SIMV qHS), recurrent pneumonia, GERD, asthma, seizure disorder, scoliosis/kyphosis, Coxsackie myocarditis with large pericardial effusion (2020), admitted on 10/1 with progressive dyspnea and hypoxemia over the past few days as per her mother. She had been requiring frequent suctioning for copious blood tinged secretions. After consulting the patient's pulmonologist they were advised to come to the ED for evaluation. Noted to have a RLL lung consolidation on CXR. Hospital course complicated by septic shock requiring IV vasopressor therapy. Found to have MDR pseudomonas in her sputum.      Events last 24 hours: BP at baseline. Only on PO vasopressor therapy. Completed course of antibiotic. Now using outpatient ventilator.        PAST MEDICAL & SURGICAL HISTORY:  Hypokalemia    Kyphosis    Scoliosis    Hypotonia    Asthma    Seizures    Cerebral Palsy    Gastroesophageal Reflux Disease    Hypothyroidism  Treated with synthroid in past. Stopped treatment in  due to normal thyroid function.    Developmental Delay    Tracheostomy in place    Peg (Percutaneous Endoscopic Gastrostomy) Adjustment/Replacement/Removal    S/P Tonsillectomy and Adenoidectomy    Strabismus        Review of Systems:  Unable to obtain as she is nonverbal at baseline.      Medications:    midodrine 5 milliGRAM(s) Oral every 8 hours  ALBUTerol    90 MICROgram(s) HFA Inhaler 2 Puff(s) Inhalation every 4 hours  fluticasone propionate   110 MICROgram(s) HFA Inhaler 1 Puff(s) Inhalation two times a day  acetaminophen  Rectal Suppository - Peds. 650 milliGRAM(s) Rectal every 6 hours PRN  levETIRAcetam  Solution 700 milliGRAM(s) Oral at bedtime  levETIRAcetam  Solution 600 milliGRAM(s) Oral daily  LORazepam   Injectable 1 milliGRAM(s) IV Push every 2 hours PRN  enoxaparin Injectable 30 milliGRAM(s) SubCutaneous daily  pantoprazole   Suspension 20 milliGRAM(s) Oral daily  predniSONE   Tablet 20 milliGRAM(s) Oral daily  cholecalciferol 400 Unit(s) Oral daily  multivitamin 1 Tablet(s) Oral daily  potassium chloride   Powder 10 milliEquivalent(s) Oral two times a day  chlorhexidine 0.12% Liquid 15 milliLiter(s) Oral Mucosa every 12 hours  fluticasone propionate 50 MICROgram(s)/spray Nasal Spray 1 Spray(s) Both Nostrils two times a day  silver sulfADIAZINE 1% Cream 1 Application(s) Topical daily  sodium chloride 0.65% Nasal 1 Spray(s) Both Nostrils two times a day PRN  Methyl B12 1 Tablet(s) 1 Tablet(s) Oral daily      Mode: SIMV with PS      ICU Vital Signs Last 24 Hrs  T(C): 35.8 (07 Oct 2020 20:00), Max: 36.6 (07 Oct 2020 00:00)  T(F): 96.5 (07 Oct 2020 20:00), Max: 97.9 (07 Oct 2020 00:00)  HR: 67 (07 Oct 2020 23:00) (54 - 108)  BP: 92/47 (07 Oct 2020 23:00) (71/32 - 120/72)  BP(mean): 58 (07 Oct 2020 23:00) (41 - 86)  ABP: --  ABP(mean): --  RR: 17 (07 Oct 2020 23:00) (8 - 21)  SpO2: 100% (07 Oct 2020 23:00) (90% - 100%)          I&O's Detail    06 Oct 2020 07:01  -  07 Oct 2020 07:00  --------------------------------------------------------  IN:    Enteral Tube Flush: 600 mL    Free Water: 90 mL    IV PiggyBack: 100 mL    Miscellaneous Tube Feedin mL    Pedialyte: 350 mL  Total IN: 1680 mL    OUT:    Voided (mL): 1450 mL  Total OUT: 1450 mL    Total NET: 230 mL      07 Oct 2020 07:01  -  07 Oct 2020 23:45  --------------------------------------------------------  IN:    Free Water: 80 mL    IV PiggyBack: 50 mL    Miscellaneous Tube Feedin mL  Total IN: 265 mL    OUT:    Voided (mL): 550 mL  Total OUT: 550 mL    Total NET: -285 mL            LABS:                CAPILLARY BLOOD GLUCOSE            CULTURES:  Culture Results:   Rare Pseudomonas aeruginosa  Normal Respiratory Ynes absent (10-02-20 @ 20:00)  Culture Results:   Few Pseudomonas aeruginosa  Normal Respiratory Ynes present (10-02-20 @ 15:03)  Culture Results:   No growth (10-01-20 @ 18:35)  Culture Results:   No Growth Final (10-01-20 @ 17:40)  Culture Results:   No Growth Final (10-01-20 @ 17:20)        Physical Examination:    General: No acute distress. trach to vent    HEENT: Pupils equal, reactive to light.  Symmetric.    PULM: Clear to auscultation bilaterally, no significant sputum production    CVS: Regular rate and rhythm, no murmurs, rubs, or gallops    ABD: Soft, nondistended, nontender, normoactive bowel sounds, no masses (+)PEG    EXT: No edema, nontender, contracted    SKIN: Warm and well perfused, no rashes noted.    NEURO: nonverbal at baseline, does not follow commands, moves all extremities spontaneously        RADIOLOGY:     < from: Xray Chest 1 View- PORTABLE-Routine (Xray Chest 1 View- PORTABLE-Routine in AM.) (10.03.20 @ 06:55) >  Heart is magnified by technique. Tracheostomy remains.    On  was a significant right base pleural pulmonary process. Present film shows significant improvement.    IMPRESSION: Improved right base process.

## 2020-10-07 NOTE — PROGRESS NOTE ADULT - SUBJECTIVE AND OBJECTIVE BOX
Subjective:  parents at bedside  doing well with vent  bp still tenuous  on midodrine    Review of Systems:  All 10 systems reviewed in detailed and found to be negative with the exception of what has already been described above    Allergies:  Bactrim (Unknown)  carbamazepine (Unknown)  cephalosporins (Rash)  Corn (Unknown)  Depakote (Unknown)  diphenhydrAMINE (Seizure)  DISPOSABLE BLOOD PRESSURE CUFF - Red blistered skin where disposable blood pressure cuff was on right arm. (CONTACT DERMATITIS) (Rash; Blisters; Swelling;)  eggs (Unknown)  EGGS,  NUTS (Anaphylaxis)  Erythromycin Base (Unknown)  metoclopramide (Unknown)  Milk (Unknown)  MILK, SHELLFISH, WHEAT (Unknown)  Nuts (Unknown)  oxcarbazepine (Unknown)  peanuts (Unknown)  Potatoes (Unknown)  SEASONAL, POLLEN, GRASS, PET DANDER, FEATHERS (Unknown)  Sesame (Unknown)  shellfish (Unknown)  Trileptal (Unknown)  valproic acid (Unknown)  Wheat (Unknown)    Meds  MEDICATIONS  (STANDING):  ALBUTerol    90 MICROgram(s) HFA Inhaler 2 Puff(s) Inhalation every 4 hours  chlorhexidine 0.12% Liquid 15 milliLiter(s) Oral Mucosa every 12 hours  cholecalciferol 400 Unit(s) Oral daily  enoxaparin Injectable 30 milliGRAM(s) SubCutaneous daily  fluticasone propionate   110 MICROgram(s) HFA Inhaler 1 Puff(s) Inhalation two times a day  fluticasone propionate 50 MICROgram(s)/spray Nasal Spray 1 Spray(s) Both Nostrils two times a day  levETIRAcetam  Solution 700 milliGRAM(s) Oral at bedtime  levETIRAcetam  Solution 600 milliGRAM(s) Oral daily  meropenem  IVPB 1000 milliGRAM(s) IV Intermittent every 8 hours  Methyl B12 1 Tablet(s) 1 Tablet(s) Oral daily  midodrine 5 milliGRAM(s) Oral every 8 hours  multivitamin 1 Tablet(s) Oral daily  pantoprazole   Suspension 20 milliGRAM(s) Oral daily  potassium chloride   Powder 10 milliEquivalent(s) Oral two times a day  predniSONE   Tablet 20 milliGRAM(s) Oral daily  silver sulfADIAZINE 1% Cream 1 Application(s) Topical daily    MEDICATIONS  (PRN):  acetaminophen  Rectal Suppository - Peds. 650 milliGRAM(s) Rectal every 6 hours PRN Temp greater or equal to 38 C (100.4 F), Moderate Pain (4 - 6)  LORazepam   Injectable 1 milliGRAM(s) IV Push every 2 hours PRN Seizure  sodium chloride 0.65% Nasal 1 Spray(s) Both Nostrils two times a day PRN Nasal Congestion    Physical Exam  T(C): 36 (10-07-20 @ 18:00), Max: 36.6 (10-07-20 @ 00:00)  HR: 108 (10-07-20 @ 19:00) (57 - 108)  BP: 107/68 (10-07-20 @ 19:00) (71/32 - 120/72)  RR: 20 (10-07-20 @ 19:00) (8 - 21)  SpO2: 93% (10-07-20 @ 19:00) (90% - 100%)  Constitutional: , awake and alert, not in distress on the vent with the trach with the simv mode   HEENT: Normo cephalic atraumatic  Neck: Soft and supple, No J.V.D   Respiratory: vesicular breathing ,poor inspiratory effort   Cardiovascular: S1 and S2, regular rate .   Gastrointestinal:  soft, nontender peg in place   Extremities: contracted extremities .  Neurological: baseline cerebral palsy     Labs:      < from: Xray Chest 1 View AP/PA. (10.01.20 @ 17:13) >  PROCEDURE DATE:  10/01/2020          INTERPRETATION:  XR CHEST    History: cough    Technique: Single AP view of the chest.    Comparison: Chest x-ray 6/21/2020    Findings:    Tracheostomy. Moderate right pleural effusion with patchy opacities in the right lung. Left lung is clear.    There appears to be a partially imaged gastrostomy tube.    Heart normal in size. Levoconvex thoracolumbar scoliosis.    Impression:    Moderate size right pleural effusion with patchy opacities in the right lung; differential includes pneumonia, atelectasis or pulmonary edema.      ultures:       Culture - Sputum (collected 10-02-20 @ 20:00)  Source: .Sputum Sputum  Gram Stain (10-03-20 @ 05:51):    Few polymorphonuclear leukocytes per low power field    Rare Squamous epithelial cells per low power field    No organisms seen per oil power field  Final Report (10-05-20 @ 17:07):    Rare Pseudomonas aeruginosa    Normal Respiratory Ynes absent  Organism: Pseudomonas aeruginosa (10-05-20 @ 17:07)  Organism: Pseudomonas aeruginosa (10-05-20 @ 17:07)      -  Amikacin: I 32      -  Aztreonam: I 16      -  Cefepime: I 16      -  Ceftazidime: S 4      -  Ciprofloxacin: R >2      -  Gentamicin: R >8      -  Imipenem: S 2      -  Levofloxacin: R >4      -  Meropenem: S <=1      -  Piperacillin/Tazobactam: S <=8      -  Tobramycin: R >8      Method Type: WENDY    Culture - Sputum (collected 10-02-20 @ 15:03)  Source: .Sputum Sputum  Gram Stain (10-02-20 @ 21:39):    Numerous polymorphonuclear leukocytes per low power field    Few Squamous epithelial cells per low power field    No organisms seen per oil power field  Final Report (10-05-20 @ 17:47):    Few Pseudomonas aeruginosa    Normal Respiratory Ynes present  Organism: Pseudomonas aeruginosa (10-05-20 @ 17:47)  Organism: Pseudomonas aeruginosa (10-05-20 @ 17:47)      -  Amikacin: I 32      -  Aztreonam: R >16      -  Cefepime: R >16      -  Ceftazidime: R >16      -  Ciprofloxacin: R >2      -  Gentamicin: R >8      -  Imipenem: S 2      -  Levofloxacin: R >4      -  Meropenem: S <=1      -  Piperacillin/Tazobactam: R >64      -  Tobramycin: R >8      Method Type: WENDY    Culture - Urine (collected 10-01-20 @ 18:35)  Source: .Urine None  Final Report (10-02-20 @ 20:55):    No growth    Culture - Blood (collected 10-01-20 @ 17:40)  Source: .Blood None  Final Report (10-07-20 @ 01:01):    No Growth Final    Culture - Blood (collected 10-01-20 @ 17:20)  Source: .Blood None  Final Report (10-07-20 @ 01:01):    No Growth Final      Ferritin, Serum: 154 ng/mL (10-05-20 @ 06:36)

## 2020-10-07 NOTE — PROGRESS NOTE ADULT - SUBJECTIVE AND OBJECTIVE BOX
Date of service: 10-07-20 @ 10:15    Patient lying in bed  Awake, alert  Has intermittent low blood pressure  Parents at bedside  Had one temperature overnite        ROS unable to obtain secondary to patient medical condition     MEDICATIONS  (STANDING):  ALBUTerol    90 MICROgram(s) HFA Inhaler 2 Puff(s) Inhalation every 4 hours  chlorhexidine 0.12% Liquid 15 milliLiter(s) Oral Mucosa every 12 hours  cholecalciferol 400 Unit(s) Oral daily  enoxaparin Injectable 30 milliGRAM(s) SubCutaneous daily  fluticasone propionate   110 MICROgram(s) HFA Inhaler 1 Puff(s) Inhalation two times a day  fluticasone propionate 50 MICROgram(s)/spray Nasal Spray 1 Spray(s) Both Nostrils two times a day  levETIRAcetam  Solution 700 milliGRAM(s) Oral at bedtime  levETIRAcetam  Solution 600 milliGRAM(s) Oral daily  meropenem  IVPB 1000 milliGRAM(s) IV Intermittent every 8 hours  Methyl B12 1 Tablet(s) 1 Tablet(s) Oral daily  midodrine 5 milliGRAM(s) Oral every 8 hours  multivitamin 1 Tablet(s) Oral daily  pantoprazole   Suspension 20 milliGRAM(s) Oral daily  potassium chloride   Powder 10 milliEquivalent(s) Oral two times a day  predniSONE   Tablet 20 milliGRAM(s) Oral daily  silver sulfADIAZINE 1% Cream 1 Application(s) Topical daily  sodium chloride 3%  Inhalation 2 milliLiter(s) Inhalation two times a day    MEDICATIONS  (PRN):  acetaminophen  Rectal Suppository - Peds. 650 milliGRAM(s) Rectal every 6 hours PRN Temp greater or equal to 38 C (100.4 F), Moderate Pain (4 - 6)  LORazepam   Injectable 1 milliGRAM(s) IV Push every 2 hours PRN Seizure  sodium chloride 0.65% Nasal 1 Spray(s) Both Nostrils two times a day PRN Nasal Congestion      Vital Signs Last 24 Hrs  T(C): 36.6 (07 Oct 2020 04:00), Max: 38.6 (06 Oct 2020 12:00)  T(F): 97.9 (07 Oct 2020 04:00), Max: 101.4 (06 Oct 2020 12:00)  HR: 57 (07 Oct 2020 09:00) (57 - 94)  BP: 106/63 (07 Oct 2020 09:00) (71/32 - 118/80)  BP(mean): 73 (07 Oct 2020 09:00) (41 - 88)  RR: 15 (07 Oct 2020 09:00) (11 - 21)  SpO2: 99% (07 Oct 2020 09:00) (92% - 100%)    Mode: SIMV (Synchronized Intermittent Mandatory Ventilation), RR (machine): 15, FiO2: 40, PEEP: 8, PS: 10, PC: 15, PIP: 21    Physical Exam:      Constitutional: frail looking  HEENT: NC/AT, EOMI, PERRLA, conjunctivae clear; ears and nose atraumatic; pharynx clear  Neck: trach in place  Back: no tenderness  Respiratory: respiratory effort normal; scattered coarse breath sounds  Cardiovascular: S1S2 regular, no murmurs  Abdomen: soft, not tender, not distended, positive BS; no liver or spleen organomegaly; peg in place  Genitourinary: no suprapubic tenderness  Musculoskeletal: no muscle tenderness, no joint swelling or tenderness  Neurological/ Psychiatric:  moving all extremities  Skin: no rashes; no palpable lesions    Labs: all available labs reviewed              Labs:                 Cultures:       Culture - Sputum (collected 10-02-20 @ 20:00)  Source: .Sputum Sputum  Gram Stain (10-03-20 @ 05:51):    Few polymorphonuclear leukocytes per low power field    Rare Squamous epithelial cells per low power field    No organisms seen per oil power field  Final Report (10-05-20 @ 17:07):    Rare Pseudomonas aeruginosa    Normal Respiratory Ynes absent  Organism: Pseudomonas aeruginosa (10-05-20 @ 17:07)  Organism: Pseudomonas aeruginosa (10-05-20 @ 17:07)      -  Amikacin: I 32      -  Aztreonam: I 16      -  Cefepime: I 16      -  Ceftazidime: S 4      -  Ciprofloxacin: R >2      -  Gentamicin: R >8      -  Imipenem: S 2      -  Levofloxacin: R >4      -  Meropenem: S <=1      -  Piperacillin/Tazobactam: S <=8      -  Tobramycin: R >8      Method Type: WENDY    Culture - Sputum (collected 10-02-20 @ 15:03)  Source: .Sputum Sputum  Gram Stain (10-02-20 @ 21:39):    Numerous polymorphonuclear leukocytes per low power field    Few Squamous epithelial cells per low power field    No organisms seen per oil power field  Final Report (10-05-20 @ 17:47):    Few Pseudomonas aeruginosa    Normal Respiratory Ynes present  Organism: Pseudomonas aeruginosa (10-05-20 @ 17:47)  Organism: Pseudomonas aeruginosa (10-05-20 @ 17:47)      -  Amikacin: I 32      -  Aztreonam: R >16      -  Cefepime: R >16      -  Ceftazidime: R >16      -  Ciprofloxacin: R >2      -  Gentamicin: R >8      -  Imipenem: S 2      -  Levofloxacin: R >4      -  Meropenem: S <=1      -  Piperacillin/Tazobactam: R >64      -  Tobramycin: R >8      Method Type: WENDY    Culture - Urine (collected 10-01-20 @ 18:35)  Source: .Urine None  Final Report (10-02-20 @ 20:55):    No growth    Culture - Blood (collected 10-01-20 @ 17:40)  Source: .Blood None  Final Report (10-07-20 @ 01:01):    No Growth Final    Culture - Blood (collected 10-01-20 @ 17:20)  Source: .Blood None  Final Report (10-07-20 @ 01:01):    No Growth Final      Ferritin, Serum: 154 ng/mL (10-05-20 @ 06:36)              Radiology: all available radiological tests reviewed    Advanced directives addressed: full resuscitation

## 2020-10-07 NOTE — DISCHARGE NOTE NURSING/CASE MANAGEMENT/SOCIAL WORK - PATIENT PORTAL LINK FT
You can access the FollowMyHealth Patient Portal offered by [facility] by registering at the following website: [email or web address]. By joining [facility]’s FollowMyHealth portal, you will also be able to view your health information using other applications (apps) compatible with our system.

## 2020-10-08 ENCOUNTER — TRANSCRIPTION ENCOUNTER (OUTPATIENT)
Age: 25
End: 2020-10-08

## 2020-10-08 VITALS
DIASTOLIC BLOOD PRESSURE: 46 MMHG | HEART RATE: 73 BPM | SYSTOLIC BLOOD PRESSURE: 82 MMHG | RESPIRATION RATE: 11 BRPM | OXYGEN SATURATION: 98 %

## 2020-10-08 LAB
ANION GAP SERPL CALC-SCNC: 6 MMOL/L — SIGNIFICANT CHANGE UP (ref 5–17)
BUN SERPL-MCNC: 12 MG/DL — SIGNIFICANT CHANGE UP (ref 7–23)
CALCIUM SERPL-MCNC: 9.1 MG/DL — SIGNIFICANT CHANGE UP (ref 8.5–10.1)
CHLORIDE SERPL-SCNC: 109 MMOL/L — HIGH (ref 96–108)
CO2 SERPL-SCNC: 25 MMOL/L — SIGNIFICANT CHANGE UP (ref 22–31)
CREAT SERPL-MCNC: 0.32 MG/DL — LOW (ref 0.5–1.3)
GLUCOSE SERPL-MCNC: 81 MG/DL — SIGNIFICANT CHANGE UP (ref 70–99)
HCT VFR BLD CALC: 41.6 % — SIGNIFICANT CHANGE UP (ref 34.5–45)
HGB BLD-MCNC: 13 G/DL — SIGNIFICANT CHANGE UP (ref 11.5–15.5)
MAGNESIUM SERPL-MCNC: 2.2 MG/DL — SIGNIFICANT CHANGE UP (ref 1.6–2.6)
MCHC RBC-ENTMCNC: 31.3 GM/DL — LOW (ref 32–36)
MCHC RBC-ENTMCNC: 32.8 PG — SIGNIFICANT CHANGE UP (ref 27–34)
MCV RBC AUTO: 105.1 FL — HIGH (ref 80–100)
PHOSPHATE SERPL-MCNC: 2.8 MG/DL — SIGNIFICANT CHANGE UP (ref 2.5–4.5)
PLATELET # BLD AUTO: SIGNIFICANT CHANGE UP K/UL (ref 150–400)
POTASSIUM SERPL-MCNC: 4 MMOL/L — SIGNIFICANT CHANGE UP (ref 3.5–5.3)
POTASSIUM SERPL-SCNC: 4 MMOL/L — SIGNIFICANT CHANGE UP (ref 3.5–5.3)
RBC # BLD: 3.96 M/UL — SIGNIFICANT CHANGE UP (ref 3.8–5.2)
RBC # FLD: 11.9 % — SIGNIFICANT CHANGE UP (ref 10.3–14.5)
SODIUM SERPL-SCNC: 140 MMOL/L — SIGNIFICANT CHANGE UP (ref 135–145)
WBC # BLD: 5.32 K/UL — SIGNIFICANT CHANGE UP (ref 3.8–10.5)
WBC # FLD AUTO: 5.32 K/UL — SIGNIFICANT CHANGE UP (ref 3.8–10.5)

## 2020-10-08 PROCEDURE — 99239 HOSP IP/OBS DSCHRG MGMT >30: CPT

## 2020-10-08 RX ORDER — SODIUM CHLORIDE 0.65 %
2 AEROSOL, SPRAY (ML) NASAL
Qty: 0 | Refills: 0 | DISCHARGE

## 2020-10-08 RX ORDER — FLUTICASONE PROPIONATE 50 MCG
1 SPRAY, SUSPENSION NASAL
Qty: 0 | Refills: 0 | DISCHARGE

## 2020-10-08 RX ORDER — LEVALBUTEROL 1.25 MG/.5ML
3 SOLUTION, CONCENTRATE RESPIRATORY (INHALATION)
Qty: 0 | Refills: 0 | DISCHARGE

## 2020-10-08 RX ORDER — BUDESONIDE, MICRONIZED 100 %
2 POWDER (GRAM) MISCELLANEOUS
Qty: 0 | Refills: 0 | DISCHARGE

## 2020-10-08 RX ORDER — LEVETIRACETAM 250 MG/1
7 TABLET, FILM COATED ORAL
Qty: 0 | Refills: 0 | DISCHARGE

## 2020-10-08 RX ORDER — MAGNESIUM GLYCINATE 100 MG
2 CAPSULE ORAL
Qty: 0 | Refills: 0 | DISCHARGE

## 2020-10-08 RX ORDER — LEVETIRACETAM 250 MG/1
2.5 TABLET, FILM COATED ORAL
Qty: 0 | Refills: 0 | DISCHARGE
Start: 2020-10-08

## 2020-10-08 RX ORDER — PANTOPRAZOLE SODIUM 20 MG/1
1 TABLET, DELAYED RELEASE ORAL
Qty: 0 | Refills: 0 | DISCHARGE

## 2020-10-08 RX ORDER — ACETAMINOPHEN 500 MG
1 TABLET ORAL
Qty: 0 | Refills: 0 | DISCHARGE

## 2020-10-08 RX ORDER — SODIUM CHLORIDE 9 MG/ML
250 INJECTION INTRAMUSCULAR; INTRAVENOUS; SUBCUTANEOUS ONCE
Refills: 0 | Status: COMPLETED | OUTPATIENT
Start: 2020-10-08 | End: 2020-10-08

## 2020-10-08 RX ORDER — ALBUTEROL 90 UG/1
2 AEROSOL, METERED ORAL
Qty: 0 | Refills: 0 | DISCHARGE
Start: 2020-10-08

## 2020-10-08 RX ORDER — MIDODRINE HYDROCHLORIDE 2.5 MG/1
1 TABLET ORAL
Qty: 90 | Refills: 0
Start: 2020-10-08 | End: 2020-11-06

## 2020-10-08 RX ORDER — CHOLECALCIFEROL (VITAMIN D3) 125 MCG
3 CAPSULE ORAL
Qty: 0 | Refills: 0 | DISCHARGE

## 2020-10-08 RX ORDER — DIAZEPAM 5 MG
1 TABLET ORAL
Qty: 0 | Refills: 0 | DISCHARGE

## 2020-10-08 RX ADMIN — CHLORHEXIDINE GLUCONATE 15 MILLILITER(S): 213 SOLUTION TOPICAL at 06:04

## 2020-10-08 RX ADMIN — LEVETIRACETAM 600 MILLIGRAM(S): 250 TABLET, FILM COATED ORAL at 09:09

## 2020-10-08 RX ADMIN — MIDODRINE HYDROCHLORIDE 2.5 MILLIGRAM(S): 2.5 TABLET ORAL at 06:03

## 2020-10-08 RX ADMIN — SODIUM CHLORIDE 1000 MILLILITER(S): 9 INJECTION INTRAMUSCULAR; INTRAVENOUS; SUBCUTANEOUS at 12:22

## 2020-10-08 NOTE — DISCHARGE NOTE PROVIDER - NSDCCPCAREPLAN_GEN_ALL_CORE_FT
PRINCIPAL DISCHARGE DIAGNOSIS  Diagnosis: Acute pneumonia  Assessment and Plan of Treatment:       SECONDARY DISCHARGE DIAGNOSES  Diagnosis: Adrenal insufficiency  Assessment and Plan of Treatment:     Diagnosis: Hypoxemia  Assessment and Plan of Treatment:

## 2020-10-08 NOTE — PROGRESS NOTE ADULT - SUBJECTIVE AND OBJECTIVE BOX
Subjective:  BP better  back on home vent  going home todayprrom    Review of Systems:  All 10 systems reviewed in detailed and found to be negative with the exception of what has already been described above    Allergies:  Bactrim (Unknown)  carbamazepine (Unknown)  cephalosporins (Rash)  Corn (Unknown)  Depakote (Unknown)  diphenhydrAMINE (Seizure)  DISPOSABLE BLOOD PRESSURE CUFF - Red blistered skin where disposable blood pressure cuff was on right arm. (CONTACT DERMATITIS) (Rash; Blisters; Swelling;)  eggs (Unknown)  EGGS,  NUTS (Anaphylaxis)  Erythromycin Base (Unknown)  metoclopramide (Unknown)  Milk (Unknown)  MILK, SHELLFISH, WHEAT (Unknown)  Nuts (Unknown)  oxcarbazepine (Unknown)  peanuts (Unknown)  Potatoes (Unknown)  SEASONAL, POLLEN, GRASS, PET DANDER, FEATHERS (Unknown)  Sesame (Unknown)  shellfish (Unknown)  Trileptal (Unknown)  valproic acid (Unknown)  Wheat (Unknown)    Meds  MEDICATIONS  (STANDING):  ALBUTerol    90 MICROgram(s) HFA Inhaler 2 Puff(s) Inhalation every 4 hours  chlorhexidine 0.12% Liquid 15 milliLiter(s) Oral Mucosa every 12 hours  cholecalciferol 400 Unit(s) Oral daily  enoxaparin Injectable 30 milliGRAM(s) SubCutaneous daily  fluticasone propionate   110 MICROgram(s) HFA Inhaler 1 Puff(s) Inhalation two times a day  fluticasone propionate 50 MICROgram(s)/spray Nasal Spray 1 Spray(s) Both Nostrils two times a day  levETIRAcetam  Solution 700 milliGRAM(s) Oral at bedtime  levETIRAcetam  Solution 600 milliGRAM(s) Oral daily  Methyl B12 1 Tablet(s) 1 Tablet(s) Oral daily  midodrine 2.5 milliGRAM(s) Oral every 8 hours  multivitamin 1 Tablet(s) Oral daily  pantoprazole   Suspension 20 milliGRAM(s) Oral daily  potassium chloride   Powder 10 milliEquivalent(s) Oral two times a day  predniSONE   Tablet 20 milliGRAM(s) Oral daily  silver sulfADIAZINE 1% Cream 1 Application(s) Topical daily    MEDICATIONS  (PRN):  acetaminophen  Rectal Suppository - Peds. 650 milliGRAM(s) Rectal every 6 hours PRN Temp greater or equal to 38 C (100.4 F), Moderate Pain (4 - 6)  LORazepam   Injectable 1 milliGRAM(s) IV Push every 2 hours PRN Seizure  sodium chloride 0.65% Nasal 1 Spray(s) Both Nostrils two times a day PRN Nasal Congestion    Physical Exam  T(C): 34.4 (10-08-20 @ 09:00), Max: 36 (10-07-20 @ 12:00)  HR: 68 (10-08-20 @ 10:00) (53 - 108)  BP: 86/56 (10-08-20 @ 10:00) (83/51 - 120/72)  RR: 18 (10-08-20 @ 10:00) (8 - 20)  SpO2: 100% (10-08-20 @ 10:00) (90% - 100%)  Constitutional: , awake and alert  HEENT: Normo cephalic atraumatic  Neck: Soft and supple, No J.V.D   Respiratory: vesicular breathing ,poor inspiratory effort   Cardiovascular: S1 and S2, regular rate .   Gastrointestinal:  soft, nontender peg in place   Extremities: contracted extremities .  Neurological: baseline cerebral palsy     Labs:                        13.0   5.32  )-----------( Clumped    ( 08 Oct 2020 07:15 )             41.6     10-08    140  |  109<H>  |  12  ----------------------------<  81  4.0   |  25  |  0.32<L>    Ca    9.1      08 Oct 2020 07:15  Phos  2.8     10-08  Mg     2.2     10-08      < from: Xray Chest 1 View AP/PA. (10.01.20 @ 17:13) >  PROCEDURE DATE:  10/01/2020          INTERPRETATION:  XR CHEST    History: cough    Technique: Single AP view of the chest.    Comparison: Chest x-ray 6/21/2020    Findings:    Tracheostomy. Moderate right pleural effusion with patchy opacities in the right lung. Left lung is clear.    There appears to be a partially imaged gastrostomy tube.    Heart normal in size. Levoconvex thoracolumbar scoliosis.    Impression:    Moderate size right pleural effusion with patchy opacities in the right lung; differential includes pneumonia, atelectasis or pulmonary edema.      ultures:       Culture - Sputum (collected 10-02-20 @ 20:00)  Source: .Sputum Sputum  Gram Stain (10-03-20 @ 05:51):    Few polymorphonuclear leukocytes per low power field    Rare Squamous epithelial cells per low power field    No organisms seen per oil power field  Final Report (10-05-20 @ 17:07):    Rare Pseudomonas aeruginosa    Normal Respiratory Ynes absent  Organism: Pseudomonas aeruginosa (10-05-20 @ 17:07)  Organism: Pseudomonas aeruginosa (10-05-20 @ 17:07)      -  Amikacin: I 32      -  Aztreonam: I 16      -  Cefepime: I 16      -  Ceftazidime: S 4      -  Ciprofloxacin: R >2      -  Gentamicin: R >8      -  Imipenem: S 2      -  Levofloxacin: R >4      -  Meropenem: S <=1      -  Piperacillin/Tazobactam: S <=8      -  Tobramycin: R >8      Method Type: WENDY    Culture - Sputum (collected 10-02-20 @ 15:03)  Source: .Sputum Sputum  Gram Stain (10-02-20 @ 21:39):    Numerous polymorphonuclear leukocytes per low power field    Few Squamous epithelial cells per low power field    No organisms seen per oil power field  Final Report (10-05-20 @ 17:47):    Few Pseudomonas aeruginosa    Normal Respiratory Ynes present  Organism: Pseudomonas aeruginosa (10-05-20 @ 17:47)  Organism: Pseudomonas aeruginosa (10-05-20 @ 17:47)      -  Amikacin: I 32      -  Aztreonam: R >16      -  Cefepime: R >16      -  Ceftazidime: R >16      -  Ciprofloxacin: R >2      -  Gentamicin: R >8      -  Imipenem: S 2      -  Levofloxacin: R >4      -  Meropenem: S <=1      -  Piperacillin/Tazobactam: R >64      -  Tobramycin: R >8      Method Type: WENDY    Culture - Urine (collected 10-01-20 @ 18:35)  Source: .Urine None  Final Report (10-02-20 @ 20:55):    No growth    Culture - Blood (collected 10-01-20 @ 17:40)  Source: .Blood None  Final Report (10-07-20 @ 01:01):    No Growth Final    Culture - Blood (collected 10-01-20 @ 17:20)  Source: .Blood None  Final Report (10-07-20 @ 01:01):    No Growth Final      Ferritin, Serum: 154 ng/mL (10-05-20 @ 06:36)

## 2020-10-08 NOTE — DISCHARGE NOTE PROVIDER - HOSPITAL COURSE
24 y/o female with Cerebral Palsy and DD, GERD, Asthma, Seizure disorders, Scoliosis/Kyphosis, h/o Coxsackie Myocarditis, c/b pericarditis, Chronic Respiratory Failure s/p Trach and PEG placement admitted with RLL Pseudomonas pneumonia and septic shock requiring vasoactive medications.  She completed 7 days of meropenem and was weaned off pressors.  She was noted to have autonomic dysfunction such as hypo/hypertherma, Bradycardia, hypotension especially during night time.  Her TSH as well as free T4 and T3 were normal.  AM cortisol was on lower end of normal.  She was placed on hydrocortisone at 50mg IV q 8 which she responded to very nicely with stabilization of her vitals.  She was also started on Midodrine 5mg PO q8 in effort to help stabilize her BP.    Today she is awake and alert  Vitals have been stable  So will DC home on prednisone 20 mg Po daily and Midodrine 2.5mg PO q 8  Will remain on mechanical ventilatory support with Trach collar during the day as tolerated    Above d/w mom at bedside and agree with DC home today with skilled nursing help

## 2020-10-08 NOTE — PROGRESS NOTE ADULT - RS GEN PE MLT RESP DETAILS PC
breath sounds equal
breath sounds equal
good air movement/breath sounds equal
good air movement/breath sounds equal

## 2020-10-08 NOTE — PROGRESS NOTE ADULT - PROVIDER SPECIALTY LIST ADULT
Critical Care
Infectious Disease
Pulmonology
Critical Care

## 2020-10-08 NOTE — DISCHARGE NOTE PROVIDER - NSDCMRMEDTOKEN_GEN_ALL_CORE_FT
albuterol 90 mcg/inh inhalation aerosol: 2 puff(s) inhaled every 4 hours, As Needed  cholecalciferol 400 intl units (10 mcg)/0.025 mL oral liquid: 3 drop(s) orally once a day  Diastat 10 mg rectal kit: 1 application rectal once a day, As Needed - for seizures lasting three minutes or more  fluticasone 50 mcg/inh nasal spray: 1 spray(s) in each nostril 2 times a day  levETIRAcetam 100 mg/mL oral solution: 2.5 milliliter(s) orally 2 times a day  magnesium glycinate 200 mg oral tablet: 2 tab(s) by gastrostomy tube 2 times a day  Methyl B-12 500 mcg oral lozenge: 1 lozenge by gastrostomy tube once a day  midodrine 2.5 mg oral tablet: 1 tab(s) orally every 8 hours  Poly Vit Drops oral liquid: 2 milliliter(s) orally once a day  predniSONE 20 mg oral tablet: 1 tab(s) orally once a day  Protonix 40 mg oral granule, enteric coated: 0.5 packet orally once a day  ***mixed in apple sauce***  Saline Nasal Mist 0.65% nasal solution: 2 drop(s) in each nostril 2 times a day  silver sulfADIAZINE 1% topical cream: Apply topically to affected area once a day  ****G-tube site****  sodium chloride 7% inhalation solution: 2 milliliter(s) inhaled 2 times a day   albuterol 90 mcg/inh inhalation aerosol: 2 puff(s) inhaled every 4 hours, As Needed  cholecalciferol 400 intl units (10 mcg)/0.025 mL oral liquid: 3 drop(s) orally once a day  Diastat 10 mg rectal kit: 1 application rectal once a day, As Needed - for seizures lasting three minutes or more  fluticasone 50 mcg/inh nasal spray: 1 spray(s) in each nostril 2 times a day  levETIRAcetam 100 mg/mL oral solution: 2.5 milliliter(s) orally 2 times a day  magnesium glycinate 200 mg oral tablet: 2 tab(s) by gastrostomy tube 2 times a day  Methyl B-12 500 mcg oral lozenge: 1 lozenge by gastrostomy tube once a day  midodrine 5 mg oral tablet: 1 tab(s) by gastrostomy tube every 8 hours   Poly Vit Drops oral liquid: 2 milliliter(s) orally once a day  predniSONE 20 mg oral tablet: 1 tab(s) orally once a day  Protonix 40 mg oral granule, enteric coated: 0.5 packet orally once a day  ***mixed in apple sauce***  Saline Nasal Mist 0.65% nasal solution: 2 drop(s) in each nostril 2 times a day  silver sulfADIAZINE 1% topical cream: Apply topically to affected area once a day  ****G-tube site****  sodium chloride 7% inhalation solution: 2 milliliter(s) inhaled 2 times a day

## 2020-10-08 NOTE — PROGRESS NOTE ADULT - SUBJECTIVE AND OBJECTIVE BOX
Hospital # 7  ICU # 7    CC:  Sepsis     HPI:    26 y/o female with Cerebral Palsy and DD, GERD, Asthma, Seizure disorders, Scoliosis/Kyphosis, h/o Coxsackie Myocarditis, c/b pericarditis, Chronic Respiratory Failure s/p Trach and PEG placement admitted with RLL CAP with likely MDRO and septic shock--off pressors--BP labile.  Pt is usually on TC during the day and Vent during the night.      10/5:  Case d/w dr Burgess.  Pt seen and examined in ICU--mom at bedside--vented--interactive with mom.  Received 500ml IVF overnight for SBP 70 Hypothermic overnight   10/6:  Case d/w ANNEMARIE Lal.  Received total 1L IVF for SBP 70s.  Was hypothermic and bradycardiac.  AM cortisol 13.  TSH normal.  Case d/w mom at bedside  10/7:  Much more stable vitals and autonomics over last 24 hrs.  Mom and dad at bedside.  One more day of gage.  Anticipate DC tomorrow  10/8:  Awake and alert.  No more fluctuation in vitals over last 24 hrs.  Completed 7 days of Gage.  Stable for DC home with skilled nursing     PMH:  As above.     PSH:  As above.     FH: Non Contributory other than those listed in HPI    Social History:  Unobtainable due to clinical condition     MEDICATIONS  (STANDING):  ALBUTerol    90 MICROgram(s) HFA Inhaler 2 Puff(s) Inhalation every 4 hours  chlorhexidine 0.12% Liquid 15 milliLiter(s) Oral Mucosa every 12 hours  cholecalciferol 400 Unit(s) Oral daily  enoxaparin Injectable 30 milliGRAM(s) SubCutaneous daily  fluticasone propionate   110 MICROgram(s) HFA Inhaler 1 Puff(s) Inhalation two times a day  fluticasone propionate 50 MICROgram(s)/spray Nasal Spray 1 Spray(s) Both Nostrils two times a day  levETIRAcetam  Solution 700 milliGRAM(s) Oral at bedtime  levETIRAcetam  Solution 600 milliGRAM(s) Oral daily  Methyl B12 1 Tablet(s) 1 Tablet(s) Oral daily  midodrine 2.5 milliGRAM(s) Oral every 8 hours  multivitamin 1 Tablet(s) Oral daily  pantoprazole   Suspension 20 milliGRAM(s) Oral daily  potassium chloride   Powder 10 milliEquivalent(s) Oral two times a day  predniSONE   Tablet 20 milliGRAM(s) Oral daily  silver sulfADIAZINE 1% Cream 1 Application(s) Topical daily    MEDICATIONS  (PRN):  acetaminophen  Rectal Suppository - Peds. 650 milliGRAM(s) Rectal every 6 hours PRN Temp greater or equal to 38 C (100.4 F), Moderate Pain (4 - 6)  LORazepam   Injectable 1 milliGRAM(s) IV Push every 2 hours PRN Seizure  sodium chloride 0.65% Nasal 1 Spray(s) Both Nostrils two times a day PRN Nasal Congestion      Allergies: NKDA    ROS:  SEE BELOW        ICU Vital Signs Last 24 Hrs  T(C): 34.4 (08 Oct 2020 09:00), Max: 36 (07 Oct 2020 12:00)  T(F): 94 (08 Oct 2020 09:00), Max: 96.8 (07 Oct 2020 12:00)  HR: 68 (08 Oct 2020 10:00) (53 - 108)  BP: 86/56 (08 Oct 2020 10:00) (82/50 - 120/72)  BP(mean): 63 (08 Oct 2020 10:00) (50 - 86)  ABP: --  ABP(mean): --  RR: 18 (08 Oct 2020 10:00) (8 - 20)  SpO2: 100% (08 Oct 2020 10:00) (90% - 100%)      Mode: SIMV with PS      I&O's Summary    07 Oct 2020 07:01  -  08 Oct 2020 07:00  --------------------------------------------------------  IN: 890 mL / OUT: 850 mL / NET: 40 mL        Physical Exam:  SEE BELOW                          13.0   5.32  )-----------( Clumped    ( 08 Oct 2020 07:15 )             41.6       10-08    140  |  109<H>  |  12  ----------------------------<  81  4.0   |  25  |  0.32<L>    Ca    9.1      08 Oct 2020 07:15  Phos  2.8     10-08  Mg     2.2     10-08                      DVT Prophylaxis:                                                            Contraindication:     Advanced Directives:    Discussed with:    Visit Information:  Time spent excluding procedure:      ** Time is exclusive of billed procedures and/or teaching and/or routine family updates.

## 2020-10-08 NOTE — PROGRESS NOTE ADULT - ASSESSMENT
IMP:    24 y/o female with Cerebral Palsy and DD, GERD, Asthma, Seizure disorders, Scoliosis/Kyphosis, h/o Coxsackie Myocarditis, c/b pericarditis, Chronic Respiratory Failure s/p Trach and PEG placement admitted with RLL PSAE CAP with likely MDRO and septic shock--off pressors but BP labile with intermittent hypotension  Autonomic dysfunction--likely Adrenal insufficiency with low AM cortisol level--appears to be responding to exogenous steroid supplement     Plan:    Full vent support--LPV strategy to maintain plateau pressures < 30--High PEEP/low TV--Permissive hypercapnea and acidemia--TC trial as tolerated  Cont with prednisone  Resume Pedialyte   Cont Midodrine 2.5 q8  Compa (7)--completed   TF   HOB > 30  AED  DVT prophy--SCD and LMWH  PT     Stable for DC home

## 2020-10-08 NOTE — PROGRESS NOTE ADULT - ASSESSMENT
25y old Female with PMH asthma, cerebral palsy, developmental delay, seizures, scoliosis, hypothyroidism, GERD, s/p trach, PEG, on trilogy at home, with multiple hospitalizations in the past at [facility] PICU pneumonia, colonized with serratia marcescens, but sputum cultures also grew out achromobacter xylosoxidans and burkolderia cepacia in the past, with pneumonia and wavering BP  1. Pneumonia: ID following  -continue meropenem for one more day  -anticipate d/c tomorrow  -discussed wtih family, likely would not benefit from nebulized teofilo now given sputum cultures  -discussed With her prior Pediatric pulmonologist Dr. Latif. agreed to stop nebulized teofilo  and continue nebulized colistin every other month  -pulmonary toilet  -wean vent per icu  2. Asthma: continue xopenex, budesonide once off vent  3. Seizures: continue levetiracetam.  4.  GJ tube/malnutrition: Continue tube feeds  5. Shock/hypotension: off norepinephrine. on midodrine    Follow up in clinic with Dr. Bustillo in 1 week

## 2020-10-08 NOTE — PROGRESS NOTE ADULT - NECK DETAILS
Trach in situ connected to Vent
s/p trach
s/p trach

## 2020-10-08 NOTE — PROGRESS NOTE ADULT - CARDIOVASCULAR
Regular rate & rhythm, normal S1, S2; no murmurs, gallops or rubs; no S3, S4
Regular rate & rhythm, normal S1, S2; no murmurs, gallops or rubs; no S3, S4
detailed exam
Regular rate & rhythm, normal S1, S2; no murmurs, gallops or rubs; no S3, S4

## 2020-10-12 NOTE — CONSULT NOTE ADULT - REASON FOR ADMISSION
Patient's daughter recently exposed to [place] by another girl on her volleyball team. Patient and spouse needing testing before they can return to work. Order placed.
tachycardia, shortness of breath

## 2020-10-20 ENCOUNTER — NON-APPOINTMENT (OUTPATIENT)
Age: 25
End: 2020-10-20

## 2020-10-20 NOTE — HISTORY OF PRESENT ILLNESS
[FreeTextEntry1] : Mr. Clark's mom left message for me Oct 9th.\par Called her back.\par Pt was in hospital for sepsis, requiring vasopressors.\par Eventually changed to midodrine 2.5 TID.\par PCP gradually weaning to 2.5 daily.\par SBPs 80-90s/40-60s.  \par Advised pt's mom to increase fluid intake \par from 4085-8753 cc daily to 7682-3252 cc daily.\par also advised to try d/cing midodrine 2.5 daily \par & to restart if SBPs</=80 x 2 measurements.\par She will call me in 1 week w/ update.

## 2020-10-23 ENCOUNTER — APPOINTMENT (OUTPATIENT)
Dept: CARDIOLOGY | Facility: CLINIC | Age: 25
End: 2020-10-23

## 2020-10-23 DIAGNOSIS — Z93.0 TRACHEOSTOMY STATUS: ICD-10-CM

## 2020-10-23 DIAGNOSIS — M41.9 SCOLIOSIS, UNSPECIFIED: ICD-10-CM

## 2020-10-23 DIAGNOSIS — J95.851 VENTILATOR ASSOCIATED PNEUMONIA: ICD-10-CM

## 2020-10-23 DIAGNOSIS — Z93.1 GASTROSTOMY STATUS: ICD-10-CM

## 2020-10-23 DIAGNOSIS — R62.50 UNSPECIFIED LACK OF EXPECTED NORMAL PHYSIOLOGICAL DEVELOPMENT IN CHILDHOOD: ICD-10-CM

## 2020-10-23 DIAGNOSIS — K21.9 GASTRO-ESOPHAGEAL REFLUX DISEASE WITHOUT ESOPHAGITIS: ICD-10-CM

## 2020-10-23 DIAGNOSIS — B96.5 PSEUDOMONAS (AERUGINOSA) (MALLEI) (PSEUDOMALLEI) AS THE CAUSE OF DISEASES CLASSIFIED ELSEWHERE: ICD-10-CM

## 2020-10-23 DIAGNOSIS — J45.909 UNSPECIFIED ASTHMA, UNCOMPLICATED: ICD-10-CM

## 2020-10-23 DIAGNOSIS — Z16.24 RESISTANCE TO MULTIPLE ANTIBIOTICS: ICD-10-CM

## 2020-10-23 DIAGNOSIS — G93.41 METABOLIC ENCEPHALOPATHY: ICD-10-CM

## 2020-10-23 DIAGNOSIS — J96.21 ACUTE AND CHRONIC RESPIRATORY FAILURE WITH HYPOXIA: ICD-10-CM

## 2020-10-23 DIAGNOSIS — A41.9 SEPSIS, UNSPECIFIED ORGANISM: ICD-10-CM

## 2020-10-23 DIAGNOSIS — J98.11 ATELECTASIS: ICD-10-CM

## 2020-10-23 DIAGNOSIS — E27.40 UNSPECIFIED ADRENOCORTICAL INSUFFICIENCY: ICD-10-CM

## 2020-10-23 DIAGNOSIS — G80.9 CEREBRAL PALSY, UNSPECIFIED: ICD-10-CM

## 2020-10-23 DIAGNOSIS — G40.909 EPILEPSY, UNSPECIFIED, NOT INTRACTABLE, WITHOUT STATUS EPILEPTICUS: ICD-10-CM

## 2020-10-23 DIAGNOSIS — Z74.01 BED CONFINEMENT STATUS: ICD-10-CM

## 2020-10-23 DIAGNOSIS — R65.21 SEVERE SEPSIS WITH SEPTIC SHOCK: ICD-10-CM

## 2020-11-16 ENCOUNTER — NON-APPOINTMENT (OUTPATIENT)
Age: 25
End: 2020-11-16

## 2020-11-18 ENCOUNTER — NON-APPOINTMENT (OUTPATIENT)
Age: 25
End: 2020-11-18

## 2020-12-30 ENCOUNTER — OUTPATIENT (OUTPATIENT)
Dept: OUTPATIENT SERVICES | Age: 25
LOS: 1 days | End: 2020-12-30
Payer: COMMERCIAL

## 2020-12-30 ENCOUNTER — RESULT REVIEW (OUTPATIENT)
Age: 25
End: 2020-12-30

## 2020-12-30 DIAGNOSIS — K21.9 GASTRO-ESOPHAGEAL REFLUX DISEASE WITHOUT ESOPHAGITIS: ICD-10-CM

## 2020-12-30 DIAGNOSIS — Z93.0 TRACHEOSTOMY STATUS: Chronic | ICD-10-CM

## 2020-12-30 PROCEDURE — 49452 REPLACE G-J TUBE PERC: CPT

## 2021-01-06 DIAGNOSIS — Z46.59 ENCOUNTER FOR FITTING AND ADJUSTMENT OF OTHER GASTROINTESTINAL APPLIANCE AND DEVICE: ICD-10-CM

## 2021-02-03 ENCOUNTER — NON-APPOINTMENT (OUTPATIENT)
Age: 26
End: 2021-02-03

## 2021-02-04 NOTE — H&P PEDIATRIC - APPEARANCE
Refill see below patient requesting a 90 day supply.  
No acute distress, no pain or agitation apparent

## 2021-02-10 ENCOUNTER — NON-APPOINTMENT (OUTPATIENT)
Age: 26
End: 2021-02-10

## 2021-02-17 ENCOUNTER — APPOINTMENT (OUTPATIENT)
Dept: CARDIOLOGY | Facility: CLINIC | Age: 26
End: 2021-02-17
Payer: COMMERCIAL

## 2021-02-17 DIAGNOSIS — G90.9 DISORDER OF THE AUTONOMIC NERVOUS SYSTEM, UNSPECIFIED: ICD-10-CM

## 2021-02-17 PROCEDURE — 99443: CPT | Mod: GT

## 2021-02-20 NOTE — PROGRESS NOTE ADULT - ASSESSMENT
Problem: Potential for Falls  Goal: Patient will remain free of falls  Description: INTERVENTIONS:  - Assess patient frequently for physical needs  -  Identify cognitive and physical deficits and behaviors that affect risk of falls    -  [place] fall precautions as indicated by assessment   - Educate patient/family on patient safety including physical limitations  - Instruct patient to call for assistance with activity based on assessment  - Modify environment to reduce risk of injury  - Consider OT/PT consult to assist with strengthening/mobility  Outcome: Progressing     Problem: RESPIRATORY - ADULT  Goal: Achieves optimal ventilation and oxygenation  Description: INTERVENTIONS:  - Assess for changes in respiratory status  - Assess for changes in mentation and behavior  - Position to facilitate oxygenation and minimize respiratory effort  - Oxygen administered by appropriate delivery if ordered  - Initiate smoking cessation education as indicated  - Encourage broncho-pulmonary hygiene including cough, deep breathe, Incentive Spirometry  - Assess the need for suctioning and aspirate as needed  - Assess and instruct to report SOB or any respiratory difficulty  - Respiratory Therapy support as indicated  Outcome: Progressing     Problem: SKIN/TISSUE INTEGRITY - ADULT  Goal: Skin integrity remains intact  Description: INTERVENTIONS  - Identify patients at risk for skin breakdown  - Assess and monitor skin integrity  - Assess and monitor nutrition and hydration status  - Monitor labs (i e  albumin)  - Assess for incontinence   - Turn and reposition patient  - Assist with mobility/ambulation  - Relieve pressure over bony prominences  - Avoid friction and shearing  - Provide appropriate hygiene as needed including keeping skin clean and dry  - Evaluate need for skin moisturizer/barrier cream  - Collaborate with interdisciplinary team (i e  Nutrition, Rehabilitation, etc )   - Patient/family teaching  Outcome: Progressing     Pt is aware of plan and is progressing  25y old Female with PMH asthma, cerebral palsy, developmental delay, seizures, scoliosis, hypothyroidism, GERD, s/p trach, PEG, on trilogy at home, with multiple hospitalizations in the past at [facility] PICU pneumonia, colonized with serratia marcescens, but sputum cultures also grew out achromobacter xylosoxidans and burkolderia cepacia in the past, with pneumonia  1. Pneumonia: ID following  -continue meropenem for one more day  -anticipate d/c tomorrow  -discussed wtih family, likely would not benefit from nebulized teofilo now given sputum cultures  -pulmonary toilet  -wean vent per icu  2. Asthma: continue xopenex, budesonide once off vent  3. Seizures: continue levetiracetam.  4.  GJ tube/malnutrition: Continue tube feeds  5. Shock/hypotension: off norepinephrine. on midodrine

## 2021-02-21 PROBLEM — G90.9 AUTONOMIC FAILURE: Status: ACTIVE | Noted: 2021-02-21

## 2021-02-21 NOTE — ASSESSMENT
[FreeTextEntry1] : A/P:\par \par *hypotension\par -w/ normal heart rates\par -h/o pericardial effusion in the past\par unlikely cause of hypotension given normal HRs\par -recommend echo to r/o this as a cause - ordered today\par to be scheduled in 1 week.\par -midodrine increase to 5 mg TID.\par \par -Reviewed dysautonomia website.\par [email or web address]\par Provided pt's mother w/ name of neurologic specialist\par in [place].\par Dr. Ham Bhat\par [facility]\par David Acharya \par [phone]

## 2021-02-21 NOTE — HISTORY OF PRESENT ILLNESS
[FreeTextEntry1] : CHAMP CALDWELL (CHAMP CALDWELL) \par 1995(25y)F\par \par 23 yo F with hx of pericardial effusion due to coxsackie virus Jan '20, \par GERD, cerebral palsy s/p trach and PEG, seizures, \par asthma and scoliosis \par \par telehealth visit scheduled today.\par spoke w/ mother - pt unable to provide history.\par \par Since last visit, spoke w/ endocrinologist\par & reviewed endocrinology notes.\par Per endo. 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  \par SBPs normal in the 90/60s but have sometimes\par been running low in the 70s.  Not tachycardic.\par \par Discussed possibility of neuordegenerative condition\par or generalized autonomic dysfunction.\par Difficult to assess this by standard measure\par (tilt table) as pt is bedbound.\par

## 2021-02-22 ENCOUNTER — NON-APPOINTMENT (OUTPATIENT)
Age: 26
End: 2021-02-22

## 2021-02-25 ENCOUNTER — OUTPATIENT (OUTPATIENT)
Dept: OUTPATIENT SERVICES | Age: 26
LOS: 1 days | End: 2021-02-25
Payer: COMMERCIAL

## 2021-02-25 ENCOUNTER — RESULT REVIEW (OUTPATIENT)
Age: 26
End: 2021-02-25

## 2021-02-25 DIAGNOSIS — Z93.0 TRACHEOSTOMY STATUS: Chronic | ICD-10-CM

## 2021-02-25 DIAGNOSIS — K21.9 GASTRO-ESOPHAGEAL REFLUX DISEASE WITHOUT ESOPHAGITIS: ICD-10-CM

## 2021-02-25 PROCEDURE — 49452 REPLACE G-J TUBE PERC: CPT

## 2021-03-02 ENCOUNTER — OUTPATIENT (OUTPATIENT)
Dept: OUTPATIENT SERVICES | Facility: HOSPITAL | Age: 26
LOS: 1 days | End: 2021-03-02
Payer: COMMERCIAL

## 2021-03-02 DIAGNOSIS — I31.3 PERICARDIAL EFFUSION (NONINFLAMMATORY): ICD-10-CM

## 2021-03-02 DIAGNOSIS — Z93.0 TRACHEOSTOMY STATUS: Chronic | ICD-10-CM

## 2021-03-02 PROCEDURE — 93306 TTE W/DOPPLER COMPLETE: CPT

## 2021-03-02 PROCEDURE — 93306 TTE W/DOPPLER COMPLETE: CPT | Mod: 26

## 2021-03-02 NOTE — ED ADULT NURSE NOTE - NS ED NURSE TRANSPORT WITH
How Severe Are Your Spot(S)?: mild Have Your Spot(S) Been Treated In The Past?: has not been treated Hpi Title: Evaluation of Skin Lesions oxygen/cardiac monitor

## 2021-03-03 DIAGNOSIS — I31.3 PERICARDIAL EFFUSION (NONINFLAMMATORY): ICD-10-CM

## 2021-03-03 DIAGNOSIS — Z46.59 ENCOUNTER FOR FITTING AND ADJUSTMENT OF OTHER GASTROINTESTINAL APPLIANCE AND DEVICE: ICD-10-CM

## 2021-03-05 ENCOUNTER — APPOINTMENT (OUTPATIENT)
Dept: NEUROLOGY | Facility: CLINIC | Age: 26
End: 2021-03-05

## 2021-03-12 ENCOUNTER — NON-APPOINTMENT (OUTPATIENT)
Age: 26
End: 2021-03-12

## 2021-03-15 ENCOUNTER — NON-APPOINTMENT (OUTPATIENT)
Age: 26
End: 2021-03-15

## 2021-03-26 ENCOUNTER — APPOINTMENT (OUTPATIENT)
Dept: CARDIOLOGY | Facility: CLINIC | Age: 26
End: 2021-03-26
Payer: COMMERCIAL

## 2021-03-26 VITALS
WEIGHT: 85 LBS | BODY MASS INDEX: 19.67 KG/M2 | DIASTOLIC BLOOD PRESSURE: 58 MMHG | HEIGHT: 55 IN | SYSTOLIC BLOOD PRESSURE: 76 MMHG

## 2021-03-26 PROCEDURE — 99215 OFFICE O/P EST HI 40 MIN: CPT | Mod: GC,95

## 2021-03-28 NOTE — HISTORY OF PRESENT ILLNESS
[Home] : at home, [unfilled] , at the time of the visit. [Medical Office: ([facility])___] : at the medical office located in  [Parents] : parents [Verbal consent obtained from patient] : the patient, [unfilled] [FreeTextEntry1] : initiated at 11:20am\par \par CHAMP CALDWELL (CHAMP CALDWELL) \par 1995(25y)F\par \par 25 yo F\par \par *pericardial effusion due to coxsackie virus Jan '20, \par *recurrent episodic hypotension-suspect dysautonomia.\par *cerebral palsy s/p trach and PEG\par *seizures\par *scoliosis \par *GERD, \par  *asthma\par \par here for telehealth visit.\par Discussed pt w/ mother.\par \par Since last discussion, seen by Dr. Bhat\par neurologist who specializes in dysautonomias.\par Per mother Dr. Aparicio confirmed diagnosis of dysautonomia.\par Northera was considered but not started.\par No followup scheduled.\par \par \par \par A/P:\par \par *pericardial effusion due to coxsackie virus Jan '20, \par -unable to provide history but mother \par denies any recent changes\par -Echo March '21 neg for pericardial effusion.\par \par *Hypotension-d/c dysautonomia.\par -Will contact Dr. Bhat re: starting northera\par \par Telehealth visit in 1-2 months\par  [FreeTextEntry3] : Mother of Shira [FreeTextEntry4] : YAMILETH Merino

## 2021-03-28 NOTE — HISTORY OF PRESENT ILLNESS
[Home] : at home, [unfilled] , at the time of the visit. [Medical Office: ([facility])___] : at the medical office located in  [Parents] : parents [Verbal consent obtained from patient] : the patient, [unfilled] [FreeTextEntry1] : initiated at 11:20am\par \par CHAMP CALDWELL (CHAMP CALDWELL) \par 1995(25y)F\par \par 25 yo F\par \par *pericardial effusion due to coxsackie virus Jan '20, \par *recurrent episodic hypotension-suspect dysautonomia.\par *cerebral palsy s/p trach and PEG\par *seizures\par *scoliosis \par *GERD, \par  *asthma\par \par here for telehealth visit.\par Discussed pt w/ mother.\par \par Since last discussion, seen by Dr. Bhat\par neurologist who specializes in dysautonomias.\par Per mother Dr. Aparicio confirmed diagnosis of dysautonomia.\par Northera was considered but not started.\par No followup scheduled.\par \par \par \par A/P:\par \par *pericardial effusion due to coxsackie virus Jan '20, \par -unable to provide history but mother \par denies any recent changes\par -Echo March '21 neg for pericardial effusion.\par \par *Hypotension-d/c dysautonomia.\par -Will contact Dr. Bhat re: starting northera\par \par Telehealth visit in 1-2 months\par  [FreeTextEntry3] : Mother of Shira [FreeTextEntry4] : YAMILETH Merino

## 2021-04-14 ENCOUNTER — APPOINTMENT (OUTPATIENT)
Dept: NEUROLOGY | Facility: CLINIC | Age: 26
End: 2021-04-14

## 2021-04-20 ENCOUNTER — RESULT REVIEW (OUTPATIENT)
Age: 26
End: 2021-04-20

## 2021-04-20 ENCOUNTER — OUTPATIENT (OUTPATIENT)
Dept: OUTPATIENT SERVICES | Facility: HOSPITAL | Age: 26
LOS: 1 days | End: 2021-04-20
Payer: COMMERCIAL

## 2021-04-20 DIAGNOSIS — K21.9 GASTRO-ESOPHAGEAL REFLUX DISEASE WITHOUT ESOPHAGITIS: ICD-10-CM

## 2021-04-20 DIAGNOSIS — Z93.0 TRACHEOSTOMY STATUS: Chronic | ICD-10-CM

## 2021-04-20 PROCEDURE — 49452 REPLACE G-J TUBE PERC: CPT

## 2021-04-23 DIAGNOSIS — Z46.59 ENCOUNTER FOR FITTING AND ADJUSTMENT OF OTHER GASTROINTESTINAL APPLIANCE AND DEVICE: ICD-10-CM

## 2021-04-28 ENCOUNTER — NON-APPOINTMENT (OUTPATIENT)
Age: 26
End: 2021-04-28

## 2021-05-13 ENCOUNTER — APPOINTMENT (OUTPATIENT)
Dept: NEUROLOGY | Facility: CLINIC | Age: 26
End: 2021-05-13
Payer: COMMERCIAL

## 2021-05-13 PROCEDURE — 99213 OFFICE O/P EST LOW 20 MIN: CPT | Mod: 95

## 2021-05-13 RX ORDER — CLOBAZAM 2.5 MG/ML
2.5 SUSPENSION ORAL
Qty: 1 | Refills: 2 | Status: DISCONTINUED | COMMUNITY
Start: 2020-08-21 | End: 2021-05-13

## 2021-05-13 NOTE — DATA REVIEWED
[de-identified] : Routine EEG 6/24/20:\par -Jerking of both arms did not correlate with ictal pattern on EEG\par -Occasional sharp wave discharges distributed in the left frontocentral (max F3/C3), at times with field extending to the left parietal and temporal regions.\par -Moderate generalized slowing\par -Diffuse excess beta activity\par \par Impression/Clinical correlate\par 1. POtential epileptogenic focus int he left frontocentral region\par 2. moderate nonspecific diffuse or multifocal cerebral dysfunction\par 3. No seizure seen\par 4. diffuse excess beta activity may be seen with medication use such as benzodiazepines or barbiturates\par \par \par \par \par routine EEG 10/16/17: Moderate generalized background slowing and disorganization.\par Video EEG 8/6/2016: \par 1. Rare sharps, right and left fronto-temporal\par 2. Intermittent polymorphic delta, bilateral fronto-temporal\par 3. Generalized background slowing\par \par EEG 8/27/20: This was an abnormal EEG study in the awake and drowsy states due to the presence of:\par -Mild to moderate generalized slowing\par -Multifocal epileptiform discharges\par -Less frequent diffuse/generalized epileptiform discharges.\par \par EEG Impression/Clinical Correlate:\par The findings are consistent with diffuse cerebral dysfunction and a risk for seizures. The EEG findings along with the clinical picture is suggestive of symptomatic generalized epilepsy.\par \par 48 hour ambulatory EEG 8/27/20-8/29/20:\par EEG Summary/Classification:\par This was a markedly abnormal EEG study in the awake, drowsy, and asleep states due to the presence of:\par Mild to moderate generalized slowing\par Disorganized background\par  Multifocal epileptiform discharges are seen including:\par -Left fronto-central sharp waves\par -Left central-parietal\par -Sharp waves seen over the right posterior quadrant, at times with a posterior-temporal predominance\par -Sharp waves that are seen broadly and independently over the left or right hemisphere\par -Diffuse spikes and wave discharges, at time with shifting predominance\par \par Multiple clinical events are reported, some with possible electrographic correlate of rhythmic or fast activity over the central region.\par \par \par EEG Impression/Clinical Correlate:\par The EEG is most suggestive of symptomatic generalized epilepsy. \par Multiple events are captured with subtle background changes but electrographic seizures are not clearly identified.\par \par  [de-identified] : Keppra levels\par 9/10/20: 38.5\par 8/21/20: 21\par 7/25/20: 15.6\par 6/20/20: 39.6 (while in hospital)\par 6/11/20: 15.5\par 9/26/19: 15.4\par 5/6/19: 24\par 1/18/19: 25.6\par \par \par CT brain 10/13/17:\par No acute intracranial findings.  The ventricles, sulci and basal cisterns \par are dilated, compatible generalized cerebral volume loss, as seen on \par prior exams.

## 2021-05-13 NOTE — DISCUSSION/SUMMARY
[FreeTextEntry1] : Ms. Clark is a 25 year old woman with cerebral palsy, intellectual disability and epilepsy.\par She had a long period of relative seizure freedom with a dose of Keppra 500 mg BID.\par However, over the last year or so she has had increased episodes suspicious for seizures in the setting of decreased Keppra levels.\par \par She had a period of time when Keppra levels were therapeutic but relatively low.\par \par She was having issues with wide swings in blood pressure and more frequent seizures.\par She is having benefit with Northera.\par \par -Continue Northera\par -Continue Keppra 500 mg in the AM and 800 mg in the PM\par -Will hold off on starting additional anticonvulsants at this time since her mother is seeing improvement in seizure frequency with Northera\par -If worsening of seizures occur can try Zonismide\par -Mother has expressed interested in Epidiolex which I am not opposed to. However, I explained that it may be difficult to obtain insurance coverage since there are multiple traditional anticonvulsants which have not been tried.\par -Mother asked about EEG to see if there is new activity. I explained that I am happy to do an EEG, but we are treating at this time based on clinical observations from family. Mother will get back to me if she wants an EEG.\par -Continue physical therapy, massage. Continue magnesium supplements.\par Can try topical agents such as Augustin Zurita for muscle spasms.\par \par Her mother will be in touch with any changes.\par

## 2021-05-13 NOTE — PHYSICAL EXAM
[FreeTextEntry1] : Exam:\par Limited exam due to video visit and patient's condition.\par Awake and alert.\par Tracheostomy in place\par Language: non-verbal\par Not making eye contact\par no obvious facial weakness\par Decreased movement in extremities\par Gait: in bed\par Reacts to suctioning

## 2021-05-13 NOTE — HISTORY OF PRESENT ILLNESS
[Home] : at home, [unfilled] , at the time of the visit. [Medical Office: ([facility])___] : at the medical office located in  [Verbal consent obtained from patient] : the patient, [unfilled] [FreeTextEntry1] : I last saw Yoli on 9/24/20.\par She is accompanied by her mother.\par \par Since the last visit she saw Dr. Bhat who did agree that she has dysautonomia.\par She started Northera last week.\par Her mother has observed fewer fluctuations in blood pressure.\par \par She has also seen Dr. Washington.\par \par Her nocturnal seizures have diminished.\par Her mother thinks that they were occurring with drops in blood pressure.\par Now that BP is more stable she is seeing less of these events.\par \par She does sometimes have blinking of the eyes and jerking of her head.\par The blinking can last for up to 15 minutes but she can be refocused when this is happening.\par This tends to occur when waking from sleep.\par \par She is currently ill and has more "neuro activity" when ill.\par \par She has also noticed muscle spasms, particularly at night.\par \par She is taking Keppra 500-800.\par \par Zonisamide was not started.\par \par \par \par \par \par \par

## 2021-05-13 NOTE — CONSULT LETTER
[Dear  ___] : Dear  [unfilled], [Consult Letter:] : I had the pleasure of evaluating your patient, [unfilled]. [Please see my note below.] : Please see my note below. [Consult Closing:] : Thank you very much for allowing me to participate in the care of this patient.  If you have any questions, please do not hesitate to contact me. [FreeTextEntry2] : Haseeb Teran [FreeTextEntry3] : Sincerely,\par \par \par Stefany Villareal MD\par Diplomate, American Academy of Psychiatry and Neurology\par Board Certified in the Subspecialty of Clinical Neurophysiology\par Board Certified in the Subspecialty of Sleep Medicine\par Board Certified in the Subspecialty of Epilepsy\par

## 2021-07-13 ENCOUNTER — APPOINTMENT (OUTPATIENT)
Dept: NEUROLOGY | Facility: CLINIC | Age: 26
End: 2021-07-13
Payer: COMMERCIAL

## 2021-07-13 ENCOUNTER — RESULT REVIEW (OUTPATIENT)
Age: 26
End: 2021-07-13

## 2021-07-13 ENCOUNTER — OUTPATIENT (OUTPATIENT)
Dept: OUTPATIENT SERVICES | Age: 26
LOS: 1 days | End: 2021-07-13
Payer: COMMERCIAL

## 2021-07-13 DIAGNOSIS — Z93.0 TRACHEOSTOMY STATUS: Chronic | ICD-10-CM

## 2021-07-13 DIAGNOSIS — K21.9 GASTRO-ESOPHAGEAL REFLUX DISEASE WITHOUT ESOPHAGITIS: ICD-10-CM

## 2021-07-13 PROCEDURE — 49452 REPLACE G-J TUBE PERC: CPT

## 2021-07-13 PROCEDURE — 99072 ADDL SUPL MATRL&STAF TM PHE: CPT

## 2021-07-13 PROCEDURE — 95816 EEG AWAKE AND DROWSY: CPT

## 2021-07-14 ENCOUNTER — APPOINTMENT (OUTPATIENT)
Dept: NEUROLOGY | Facility: CLINIC | Age: 26
End: 2021-07-14
Payer: COMMERCIAL

## 2021-07-14 PROCEDURE — 95719 EEG PHYS/QHP EA INCR W/O VID: CPT

## 2021-07-14 PROCEDURE — ZZZZZ: CPT

## 2021-07-14 PROCEDURE — 95708 EEG WO VID EA 12-26HR UNMNTR: CPT

## 2021-07-16 DIAGNOSIS — Z46.59 ENCOUNTER FOR FITTING AND ADJUSTMENT OF OTHER GASTROINTESTINAL APPLIANCE AND DEVICE: ICD-10-CM

## 2021-07-19 ENCOUNTER — APPOINTMENT (OUTPATIENT)
Dept: NEUROLOGY | Facility: CLINIC | Age: 26
End: 2021-07-19

## 2021-07-23 NOTE — H&P ADULT - NEUROLOGICAL DETAILS
responds to pain/non verbal Siliq Counseling:  I discussed with the patient the risks of Siliq including but not limited to new or worsening depression, suicidal thoughts and behavior, immunosuppression, malignancy, posterior leukoencephalopathy syndrome, and serious infections.  The patient understands that monitoring is required including a PPD at baseline and must alert us or the primary physician if symptoms of infection or other concerning signs are noted. There is also a special program designed to monitor depression which is required with Siliq.

## 2021-08-02 ENCOUNTER — APPOINTMENT (OUTPATIENT)
Dept: NEUROLOGY | Facility: CLINIC | Age: 26
End: 2021-08-02
Payer: COMMERCIAL

## 2021-08-02 DIAGNOSIS — F88 OTHER DISORDERS OF PSYCHOLOGICAL DEVELOPMENT: ICD-10-CM

## 2021-08-02 PROCEDURE — 99213 OFFICE O/P EST LOW 20 MIN: CPT | Mod: 95

## 2021-08-02 NOTE — DISCUSSION/SUMMARY
[FreeTextEntry1] : Ms. Clark is a 26 year old woman with cerebral palsy, intellectual disability and epilepsy.\par She had a long period of relative seizure freedom with a dose of Keppra 500 mg BID.\par However, over the last year or so she has had increased episodes suspicious for seizures in the setting of decreased Keppra levels.\par \par She had a period of time when Keppra levels were therapeutic but relatively low.\par \par She was having issues with wide swings in blood pressure and more frequent seizures.\par She is having benefit with Northera.\par -Continue Northera. She reportedly has significant benefit with this medication.\par -EEG shows multifocal discharges but no definite seizures.\par -Continue Keppra 500 mg in the AM and 800 mg in the PM\par \par -Mother is still interested in trying  Epidiolex which I am not opposed to. However, I explained that it may be difficult to obtain insurance coverage since there are multiple traditional anticonvulsants which have not been tried. In addition, it can lead to CNS depression and respiratory depression. She will speak to her pulmonologist about this.\par -If Epidiolex is not an option, can consider Zonisamide at bedtime.\par \par -Mother will have genetics report forwarded.\par \par Her mother will be in touch with any changes.\par \par f/u 3-4 months, sooner if needed.\par

## 2021-08-02 NOTE — HISTORY OF PRESENT ILLNESS
[Home] : at home, [unfilled] , at the time of the visit. [Medical Office: ([facility])___] : at the medical office located in  [Verbal consent obtained from patient] : the patient, [unfilled] [FreeTextEntry1] : I last saw Ms. Clark on 5/13/21.\par Her mother gives the history.\par She reports that Yoli is doing well.\par She continues to take Northera which her mother believes is helping to keep her vitals stable.\par \par She reports two breakthrough seizures in May and three in July.\par She believes that they were related either to illness or stress.\par They occur at night and last 30-45 seconds.\par She turns her head to the side, her mouth turns up and her arms extend.\par \par She continues to have muscle spasms throughout the night.\par There was a 2-3 week period in July when she had more inconsistent sleep with startles and muscle spasms.\par Her mother says that she was "out of sync".\par \par She continues to take Keppra 500 mg in the AM and 800 mg at night.\par She tends to do better when the Keppra is given on an empty stomach.\par \par She went for genetic testing.\par 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

## 2021-08-02 NOTE — PHYSICAL EXAM
[FreeTextEntry1] : Exam:\par Limited exam due to video visit and patient's condition.\par Awake and alert.\par Tracheostomy in place\par Language: non-verbal\par Not making eye contact\par Smiles\par no obvious facial weakness\par Decreased movement in extremities\par Gait: in bed\par

## 2021-08-02 NOTE — DATA REVIEWED
[de-identified] : Routine EEG 6/24/20:\par -Jerking of both arms did not correlate with ictal pattern on EEG\par -Occasional sharp wave discharges distributed in the left frontocentral (max F3/C3), at times with field extending to the left parietal and temporal regions.\par -Moderate generalized slowing\par -Diffuse excess beta activity\par \par Impression/Clinical correlate\par 1. POtential epileptogenic focus in the left frontocentral region\par 2. moderate nonspecific diffuse or multifocal cerebral dysfunction\par 3. No seizure seen\par 4. diffuse excess beta activity may be seen with medication use such as benzodiazepines or barbiturates\par \par \par \par \par routine EEG 10/16/17: Moderate generalized background slowing and disorganization.\par Video EEG 8/6/2016: \par 1. Rare sharps, right and left fronto-temporal\par 2. Intermittent polymorphic delta, bilateral fronto-temporal\par 3. Generalized background slowing\par \par EEG 8/27/20: This was an abnormal EEG study in the awake and drowsy states due to the presence of:\par -Mild to moderate generalized slowing\par -Multifocal epileptiform discharges\par -Less frequent diffuse/generalized epileptiform discharges.\par \par EEG Impression/Clinical Correlate:\par The findings are consistent with diffuse cerebral dysfunction and a risk for seizures. The EEG findings along with the clinical picture is suggestive of symptomatic generalized epilepsy.\par \par 48 hour ambulatory EEG 8/27/20-8/29/20:\par EEG Summary/Classification:\par This was a markedly abnormal EEG study in the awake, drowsy, and asleep states due to the presence of:\par Mild to moderate generalized slowing\par Disorganized background\par  Multifocal epileptiform discharges are seen including:\par -Left fronto-central sharp waves\par -Left central-parietal\par -Sharp waves seen over the right posterior quadrant, at times with a posterior-temporal predominance\par -Sharp waves that are seen broadly and independently over the left or right hemisphere\par -Diffuse spikes and wave discharges, at time with shifting predominance\par \par Multiple clinical events are reported, some with possible electrographic correlate of rhythmic or fast activity over the central region.\par \par \par EEG Impression/Clinical Correlate:\par The EEG is most suggestive of symptomatic generalized epilepsy. \par Multiple events are captured with subtle background changes but electrographic seizures are not clearly identified.\par \par EEG 7/13/21:\par This was an abnormal EEG study in the awake and drowsy states due to:\par -Poor organization without a definite posterior dominant rhythm.\par -Mild generalized slowing\par -Multifocal epileptiform discharges.\par \par \par 24 hour EEG 7/13/21-7/14/21:\par -Poor organization without a definite posterior dominant rhythm.\par -Mild generalized slowing\par -Multifocal epileptiform discharges with rare discharges that are diffuse. Epileptiform discharges are seen with greater frequency on the left compared with the right. [de-identified] : Keppra levels\par 9/10/20: 38.5\par 8/21/20: 21\par 7/25/20: 15.6\par 6/20/20: 39.6 (while in hospital)\par 6/11/20: 15.5\par 9/26/19: 15.4\par 5/6/19: 24\par 1/18/19: 25.6\par \par \par CT brain 10/13/17:\par No acute intracranial findings.  The ventricles, sulci and basal cisterns \par are dilated, compatible generalized cerebral volume loss, as seen on \par prior exams.

## 2021-10-11 ENCOUNTER — RESULT REVIEW (OUTPATIENT)
Age: 26
End: 2021-10-11

## 2021-10-11 ENCOUNTER — OUTPATIENT (OUTPATIENT)
Dept: OUTPATIENT SERVICES | Facility: HOSPITAL | Age: 26
LOS: 1 days | End: 2021-10-11
Payer: COMMERCIAL

## 2021-10-11 VITALS
OXYGEN SATURATION: 95 % | TEMPERATURE: 97 F | DIASTOLIC BLOOD PRESSURE: 75 MMHG | HEART RATE: 73 BPM | SYSTOLIC BLOOD PRESSURE: 107 MMHG | RESPIRATION RATE: 20 BRPM

## 2021-10-11 VITALS
HEART RATE: 74 BPM | TEMPERATURE: 97 F | DIASTOLIC BLOOD PRESSURE: 78 MMHG | RESPIRATION RATE: 19 BRPM | OXYGEN SATURATION: 97 % | SYSTOLIC BLOOD PRESSURE: 113 MMHG

## 2021-10-11 DIAGNOSIS — Z93.0 TRACHEOSTOMY STATUS: Chronic | ICD-10-CM

## 2021-10-11 DIAGNOSIS — K21.9 GASTRO-ESOPHAGEAL REFLUX DISEASE WITHOUT ESOPHAGITIS: ICD-10-CM

## 2021-10-11 PROCEDURE — 49452 REPLACE G-J TUBE PERC: CPT

## 2021-10-15 DIAGNOSIS — Z46.59 ENCOUNTER FOR FITTING AND ADJUSTMENT OF OTHER GASTROINTESTINAL APPLIANCE AND DEVICE: ICD-10-CM

## 2021-10-15 DIAGNOSIS — K21.9 GASTRO-ESOPHAGEAL REFLUX DISEASE WITHOUT ESOPHAGITIS: ICD-10-CM

## 2021-12-08 ENCOUNTER — RESULT REVIEW (OUTPATIENT)
Age: 26
End: 2021-12-08

## 2021-12-08 ENCOUNTER — OUTPATIENT (OUTPATIENT)
Dept: OUTPATIENT SERVICES | Facility: HOSPITAL | Age: 26
LOS: 1 days | End: 2021-12-08
Payer: COMMERCIAL

## 2021-12-08 VITALS
DIASTOLIC BLOOD PRESSURE: 81 MMHG | OXYGEN SATURATION: 95 % | TEMPERATURE: 98 F | SYSTOLIC BLOOD PRESSURE: 107 MMHG | RESPIRATION RATE: 20 BRPM | HEART RATE: 74 BPM

## 2021-12-08 VITALS
SYSTOLIC BLOOD PRESSURE: 111 MMHG | DIASTOLIC BLOOD PRESSURE: 75 MMHG | HEART RATE: 67 BPM | OXYGEN SATURATION: 97 % | RESPIRATION RATE: 20 BRPM | TEMPERATURE: 98 F

## 2021-12-08 DIAGNOSIS — K21.00 GASTRO-ESOPHAGEAL REFLUX DISEASE WITH ESOPHAGITIS, WITHOUT BLEEDING: ICD-10-CM

## 2021-12-08 DIAGNOSIS — Z93.0 TRACHEOSTOMY STATUS: Chronic | ICD-10-CM

## 2021-12-08 PROCEDURE — 49452 REPLACE G-J TUBE PERC: CPT

## 2021-12-10 DIAGNOSIS — Z46.59 ENCOUNTER FOR FITTING AND ADJUSTMENT OF OTHER GASTROINTESTINAL APPLIANCE AND DEVICE: ICD-10-CM

## 2022-01-21 NOTE — PROGRESS NOTE PEDS - PROBLEM/PLAN-2
Addended by: VARSHA GALICIA on: 1/21/2022 02:31 PM     Modules accepted: Orders    
DISPLAY PLAN FREE TEXT

## 2022-03-01 ENCOUNTER — RESULT REVIEW (OUTPATIENT)
Age: 27
End: 2022-03-01

## 2022-03-01 ENCOUNTER — OUTPATIENT (OUTPATIENT)
Dept: OUTPATIENT SERVICES | Facility: HOSPITAL | Age: 27
LOS: 1 days | End: 2022-03-01
Payer: MEDICAID

## 2022-03-01 VITALS
TEMPERATURE: 97 F | DIASTOLIC BLOOD PRESSURE: 74 MMHG | RESPIRATION RATE: 17 BRPM | HEART RATE: 89 BPM | OXYGEN SATURATION: 99 % | SYSTOLIC BLOOD PRESSURE: 114 MMHG

## 2022-03-01 VITALS
HEART RATE: 91 BPM | TEMPERATURE: 98 F | RESPIRATION RATE: 18 BRPM | OXYGEN SATURATION: 99 % | SYSTOLIC BLOOD PRESSURE: 110 MMHG | DIASTOLIC BLOOD PRESSURE: 70 MMHG

## 2022-03-01 DIAGNOSIS — K21.9 GASTRO-ESOPHAGEAL REFLUX DISEASE WITHOUT ESOPHAGITIS: ICD-10-CM

## 2022-03-01 DIAGNOSIS — Z93.0 TRACHEOSTOMY STATUS: Chronic | ICD-10-CM

## 2022-03-01 PROCEDURE — 49452 REPLACE G-J TUBE PERC: CPT

## 2022-03-07 DIAGNOSIS — K21.9 GASTRO-ESOPHAGEAL REFLUX DISEASE WITHOUT ESOPHAGITIS: ICD-10-CM

## 2022-03-07 DIAGNOSIS — Z46.59 ENCOUNTER FOR FITTING AND ADJUSTMENT OF OTHER GASTROINTESTINAL APPLIANCE AND DEVICE: ICD-10-CM

## 2022-04-30 NOTE — ED PROVIDER NOTE - OBJECTIVE STATEMENT
25F w/ pmh hypothyroidism, asthma, GERD, seizures, cerebral palsy, scoliosis, developmental delay -- bib mom for bradycardia. Patient has been having increased seizure like activity the past few weeks (head jerking), seen by neurology and had keppra level increased 1 wk ago, also briefly restarted on (and stopped) dilantin. Pt finished treatment 1 wk ago w/ levaquin for pneumonia.     Patient started on holter monitor for possible cardiac etiology of her convulsions - noted had HR down to 40s-50s (usual 80). Per mother and chart review - BP usually 80/50 at baseline.    Cards: Parvez Meyer Ejection Fraction... 25F w/ pmh hypothyroidism, asthma, GERD, seizures, cerebral palsy, scoliosis, developmental delay -- bib mom for bradycardia. Patient has been having increased seizure like activity the past few weeks (head jerking), seen by neurology and had keppra level increased 1 wk ago, also briefly restarted on (and stopped) dilantin. Pt finished treatment 1 wk ago w/ levaquin for pneumonia.     Patient started on holter monitor for possible cardiac etiology of her convulsions - noted had HR down to 40s-50s (usual 80). Per mother and chart review - BP usually 80/50 at baseline.    PCP: Dr. Haseeb Teran  Cards: Parvez Meyer

## 2022-05-11 ENCOUNTER — APPOINTMENT (OUTPATIENT)
Dept: NEUROLOGY | Facility: CLINIC | Age: 27
End: 2022-05-11
Payer: COMMERCIAL

## 2022-05-11 PROCEDURE — 99213 OFFICE O/P EST LOW 20 MIN: CPT | Mod: 95

## 2022-05-11 RX ORDER — MIDODRINE HYDROCHLORIDE 5 MG/1
5 TABLET ORAL 3 TIMES DAILY
Qty: 93 | Refills: 3 | Status: DISCONTINUED | COMMUNITY
Start: 2021-02-21 | End: 2022-05-11

## 2022-05-11 RX ORDER — LEVETIRACETAM 100 MG/ML
100 SOLUTION ORAL
Qty: 300 | Refills: 5 | Status: DISCONTINUED | COMMUNITY
Start: 2017-09-09 | End: 2022-05-11

## 2022-05-11 NOTE — HISTORY OF PRESENT ILLNESS
[FreeTextEntry1] : Last visit 8/2/21.\par Yoli has been doing well. \par Her mother reports that she has "various" neuro activity.\par She has about 1-2 short seizures at night lasting less than one minute. She stiffens up, her arms extend and she turns her head one way or the other.\par She is taking Keppra 500 mg in the Am and 800 mg at night.\par She also has some head spasms. This can go on for several minutes and tends to happen at night/early morning hours. It can occur after awakening with a start from sleep. It worsens during times of infection.\par She gets spasms in her back. Orthopedics has declined to prescribe muscle relaxants because of concerns of respiratory suppression.\par Autonomic function has stabilized since taking droxidopa. This is being prescribed by Dr. Washington.\par She is taking hydrocortisone for secondary adrenal insufficiency.\par

## 2022-05-11 NOTE — DATA REVIEWED
[de-identified] : Routine EEG 6/24/20:\par -Jerking of both arms did not correlate with ictal pattern on EEG\par -Occasional sharp wave discharges distributed in the left frontocentral (max F3/C3), at times with field extending to the left parietal and temporal regions.\par -Moderate generalized slowing\par -Diffuse excess beta activity\par \par Impression/Clinical correlate\par 1. POtential epileptogenic focus in the left frontocentral region\par 2. moderate nonspecific diffuse or multifocal cerebral dysfunction\par 3. No seizure seen\par 4. diffuse excess beta activity may be seen with medication use such as benzodiazepines or barbiturates\par \par \par \par \par routine EEG 10/16/17: Moderate generalized background slowing and disorganization.\par Video EEG 8/6/2016: \par 1. Rare sharps, right and left fronto-temporal\par 2. Intermittent polymorphic delta, bilateral fronto-temporal\par 3. Generalized background slowing\par \par EEG 8/27/20: This was an abnormal EEG study in the awake and drowsy states due to the presence of:\par -Mild to moderate generalized slowing\par -Multifocal epileptiform discharges\par -Less frequent diffuse/generalized epileptiform discharges.\par \par EEG Impression/Clinical Correlate:\par The findings are consistent with diffuse cerebral dysfunction and a risk for seizures. The EEG findings along with the clinical picture is suggestive of symptomatic generalized epilepsy.\par \par 48 hour ambulatory EEG 8/27/20-8/29/20:\par EEG Summary/Classification:\par This was a markedly abnormal EEG study in the awake, drowsy, and asleep states due to the presence of:\par Mild to moderate generalized slowing\par Disorganized background\par  Multifocal epileptiform discharges are seen including:\par -Left fronto-central sharp waves\par -Left central-parietal\par -Sharp waves seen over the right posterior quadrant, at times with a posterior-temporal predominance\par -Sharp waves that are seen broadly and independently over the left or right hemisphere\par -Diffuse spikes and wave discharges, at time with shifting predominance\par \par Multiple clinical events are reported, some with possible electrographic correlate of rhythmic or fast activity over the central region.\par \par \par EEG Impression/Clinical Correlate:\par The EEG is most suggestive of symptomatic generalized epilepsy. \par Multiple events are captured with subtle background changes but electrographic seizures are not clearly identified.\par \par EEG 7/13/21:\par This was an abnormal EEG study in the awake and drowsy states due to:\par -Poor organization without a definite posterior dominant rhythm.\par -Mild generalized slowing\par -Multifocal epileptiform discharges.\par \par \par 24 hour EEG 7/13/21-7/14/21:\par -Poor organization without a definite posterior dominant rhythm.\par -Mild generalized slowing\par -Multifocal epileptiform discharges with rare discharges that are diffuse. Epileptiform discharges are seen with greater frequency on the left compared with the right. [de-identified] : Keppra levels\par 9/10/20: 38.5\par 8/21/20: 21\par 7/25/20: 15.6\par 6/20/20: 39.6 (while in hospital)\par 6/11/20: 15.5\par 9/26/19: 15.4\par 5/6/19: 24\par 1/18/19: 25.6\par \par \par CT brain 10/13/17:\par No acute intracranial findings.  The ventricles, sulci and basal cisterns \par are dilated, compatible generalized cerebral volume loss, as seen on \par prior exams.

## 2022-05-11 NOTE — PHYSICAL EXAM
[FreeTextEntry1] : Exam:\par Limited exam due to video visit and patient's condition.\par Awake and alert.\par Tracheostomy in place\par Language: non-verbal\par Makes eye contact with her mother\par Smiles\par no obvious facial weakness\par Decreased movement in extremities but seen moving left arm.\par Gait: in recliner. Not ambulatory

## 2022-05-11 NOTE — DISCUSSION/SUMMARY
[FreeTextEntry1] : Ms. Clark is a 26 year old woman with cerebral palsy, intellectual disability and epilepsy.\par She had a long period of relative seizure freedom with a dose of Keppra 500 mg BID.\par However, over the last year or so she has had increased episodes suspicious for seizures in the setting of decreased Keppra levels.\par \par She had a period of time when Keppra levels were therapeutic but relatively low.\par \par Seizures\par -Relatively well controlled.\par -Continue Keppra 500 ml in the AM and 800 ml at night\par -Discussed addition of Epidiolex in the past, but has not been necessary recently.\par \par Autonomic Dysfunction\par -Continue droxidopa\par \par f/u 6 months, sooner if needed \par

## 2022-05-19 ENCOUNTER — RESULT REVIEW (OUTPATIENT)
Age: 27
End: 2022-05-19

## 2022-05-19 ENCOUNTER — OUTPATIENT (OUTPATIENT)
Dept: OUTPATIENT SERVICES | Facility: HOSPITAL | Age: 27
LOS: 1 days | End: 2022-05-19
Payer: COMMERCIAL

## 2022-05-19 VITALS
DIASTOLIC BLOOD PRESSURE: 92 MMHG | TEMPERATURE: 98 F | HEART RATE: 55 BPM | RESPIRATION RATE: 22 BRPM | SYSTOLIC BLOOD PRESSURE: 122 MMHG | OXYGEN SATURATION: 100 %

## 2022-05-19 VITALS
RESPIRATION RATE: 22 BRPM | TEMPERATURE: 97 F | OXYGEN SATURATION: 100 % | DIASTOLIC BLOOD PRESSURE: 86 MMHG | SYSTOLIC BLOOD PRESSURE: 127 MMHG | HEART RATE: 54 BPM

## 2022-05-19 DIAGNOSIS — K21.9 GASTRO-ESOPHAGEAL REFLUX DISEASE WITHOUT ESOPHAGITIS: ICD-10-CM

## 2022-05-19 DIAGNOSIS — Z93.0 TRACHEOSTOMY STATUS: Chronic | ICD-10-CM

## 2022-05-19 PROCEDURE — 49452 REPLACE G-J TUBE PERC: CPT

## 2022-05-24 DIAGNOSIS — Z46.59 ENCOUNTER FOR FITTING AND ADJUSTMENT OF OTHER GASTROINTESTINAL APPLIANCE AND DEVICE: ICD-10-CM

## 2022-05-24 DIAGNOSIS — K21.9 GASTRO-ESOPHAGEAL REFLUX DISEASE WITHOUT ESOPHAGITIS: ICD-10-CM

## 2022-06-01 ENCOUNTER — NON-APPOINTMENT (OUTPATIENT)
Age: 27
End: 2022-06-01

## 2022-07-20 ENCOUNTER — RESULT REVIEW (OUTPATIENT)
Age: 27
End: 2022-07-20

## 2022-07-20 ENCOUNTER — NON-APPOINTMENT (OUTPATIENT)
Age: 27
End: 2022-07-20

## 2022-07-20 ENCOUNTER — OUTPATIENT (OUTPATIENT)
Dept: OUTPATIENT SERVICES | Facility: HOSPITAL | Age: 27
LOS: 1 days | End: 2022-07-20

## 2022-07-20 VITALS
RESPIRATION RATE: 16 BRPM | DIASTOLIC BLOOD PRESSURE: 78 MMHG | HEART RATE: 62 BPM | SYSTOLIC BLOOD PRESSURE: 115 MMHG | OXYGEN SATURATION: 98 %

## 2022-07-20 VITALS
RESPIRATION RATE: 16 BRPM | HEART RATE: 60 BPM | DIASTOLIC BLOOD PRESSURE: 79 MMHG | OXYGEN SATURATION: 98 % | SYSTOLIC BLOOD PRESSURE: 113 MMHG

## 2022-07-20 DIAGNOSIS — Z93.0 TRACHEOSTOMY STATUS: Chronic | ICD-10-CM

## 2022-07-20 PROCEDURE — 49452 REPLACE G-J TUBE PERC: CPT

## 2022-07-25 DIAGNOSIS — Z46.59 ENCOUNTER FOR FITTING AND ADJUSTMENT OF OTHER GASTROINTESTINAL APPLIANCE AND DEVICE: ICD-10-CM

## 2022-07-28 ENCOUNTER — OUTPATIENT (OUTPATIENT)
Dept: OUTPATIENT SERVICES | Facility: HOSPITAL | Age: 27
LOS: 1 days | End: 2022-07-28

## 2022-07-28 VITALS
OXYGEN SATURATION: 98 % | TEMPERATURE: 97 F | HEART RATE: 70 BPM | RESPIRATION RATE: 16 BRPM | SYSTOLIC BLOOD PRESSURE: 106 MMHG | DIASTOLIC BLOOD PRESSURE: 74 MMHG

## 2022-07-28 DIAGNOSIS — Z93.0 TRACHEOSTOMY STATUS: Chronic | ICD-10-CM

## 2022-07-28 PROCEDURE — 49452 REPLACE G-J TUBE PERC: CPT

## 2022-08-04 DIAGNOSIS — Z46.59 ENCOUNTER FOR FITTING AND ADJUSTMENT OF OTHER GASTROINTESTINAL APPLIANCE AND DEVICE: ICD-10-CM

## 2022-08-19 NOTE — ED PROVIDER NOTE - GASTROINTESTINAL, MLM
Vyvanse - [facility] team will exit this encounter.       Abdomen soft, non-tender, no guarding.

## 2022-09-10 NOTE — DISCHARGE NOTE PEDIATRIC - MEDICATION SUMMARY - MEDICATIONS TO STOP TAKING
I will STOP taking the medications listed below when I get home from the hospital:  None ASD/ID, disturbance of behavior

## 2022-09-15 NOTE — ED ADULT NURSE NOTE - SUICIDE SCREENING DEPRESSION
Lab Results   Component Value Date    EGFR 48 09/15/2022    EGFR 42 09/14/2022    EGFR 52 08/04/2021    CREATININE 1 48 (H) 09/15/2022    CREATININE 1 64 (H) 09/14/2022    CREATININE 1 40 (H) 08/04/2021   Baseline creatinine is 1 4-1 7; OR I/Os 3L/375 ml  Avoid nephrotoxins, avoid hypotension  IV hydration [id number]  Monitor renal indices closely Negative

## 2022-10-21 ENCOUNTER — OUTPATIENT (OUTPATIENT)
Dept: OUTPATIENT SERVICES | Facility: HOSPITAL | Age: 27
LOS: 1 days | End: 2022-10-21

## 2022-10-21 VITALS
DIASTOLIC BLOOD PRESSURE: 57 MMHG | SYSTOLIC BLOOD PRESSURE: 97 MMHG | RESPIRATION RATE: 20 BRPM | HEART RATE: 58 BPM | OXYGEN SATURATION: 100 %

## 2022-10-21 VITALS
HEART RATE: 80 BPM | RESPIRATION RATE: 20 BRPM | DIASTOLIC BLOOD PRESSURE: 59 MMHG | SYSTOLIC BLOOD PRESSURE: 80 MMHG | OXYGEN SATURATION: 99 %

## 2022-10-21 DIAGNOSIS — Z93.0 TRACHEOSTOMY STATUS: Chronic | ICD-10-CM

## 2022-10-21 DIAGNOSIS — K21.00 GASTRO-ESOPHAGEAL REFLUX DISEASE WITH ESOPHAGITIS, WITHOUT BLEEDING: ICD-10-CM

## 2022-10-21 PROCEDURE — 49452 REPLACE G-J TUBE PERC: CPT

## 2022-10-26 DIAGNOSIS — Z46.59 ENCOUNTER FOR FITTING AND ADJUSTMENT OF OTHER GASTROINTESTINAL APPLIANCE AND DEVICE: ICD-10-CM

## 2022-11-30 ENCOUNTER — APPOINTMENT (OUTPATIENT)
Dept: NEUROLOGY | Facility: CLINIC | Age: 27
End: 2022-11-30

## 2022-11-30 VITALS — WEIGHT: 97 LBS

## 2022-11-30 PROCEDURE — 99213 OFFICE O/P EST LOW 20 MIN: CPT | Mod: 95

## 2022-11-30 NOTE — DISCUSSION/SUMMARY
[FreeTextEntry1] : Ms. Clark is a 27 year old woman with cerebral palsy, intellectual disability and epilepsy.\par She had a long period of relative seizure freedom with a dose of Keppra 500 mg BID.\par However, over the last year or so she has had increased episodes suspicious for seizures in the setting of decreased Keppra levels.\par \par She had a period of time when Keppra levels were therapeutic but relatively low.\par \par Seizures\par -She continues to have nocturnal events.\par - We discussed whether the nocturnal events are causing daytime impairment. Her mother does think that the seizures may contribute to fatigue and possibly headaches. \par -Currently a viral infection may have more impact on her fatigue than the seizures.\par -Will increase evening dose of Keppra to 10 ml. Continue 5 ml in the AM. She will need a new PA by February.\par -Will repeat levetiracetam level. She goes to the [facility] lab where an outside rx is not accepted. She will ask Dr. Oliveira to add the level on to her bloodwork.\par -Will hold off on Epidiolex for now.\par \par Autonomic Dysfunction\par -Continue droxidopa\par \par f/u 6 months, sooner if needed \par

## 2022-11-30 NOTE — DATA REVIEWED
[de-identified] : Routine EEG 6/24/20:\par -Jerking of both arms did not correlate with ictal pattern on EEG\par -Occasional sharp wave discharges distributed in the left frontocentral (max F3/C3), at times with field extending to the left parietal and temporal regions.\par -Moderate generalized slowing\par -Diffuse excess beta activity\par \par Impression/Clinical correlate\par 1. POtential epileptogenic focus in the left frontocentral region\par 2. moderate nonspecific diffuse or multifocal cerebral dysfunction\par 3. No seizure seen\par 4. diffuse excess beta activity may be seen with medication use such as benzodiazepines or barbiturates\par \par \par \par \par routine EEG 10/16/17: Moderate generalized background slowing and disorganization.\par Video EEG 8/6/2016: \par 1. Rare sharps, right and left fronto-temporal\par 2. Intermittent polymorphic delta, bilateral fronto-temporal\par 3. Generalized background slowing\par \par EEG 8/27/20: This was an abnormal EEG study in the awake and drowsy states due to the presence of:\par -Mild to moderate generalized slowing\par -Multifocal epileptiform discharges\par -Less frequent diffuse/generalized epileptiform discharges.\par \par EEG Impression/Clinical Correlate:\par The findings are consistent with diffuse cerebral dysfunction and a risk for seizures. The EEG findings along with the clinical picture is suggestive of symptomatic generalized epilepsy.\par \par 48 hour ambulatory EEG 8/27/20-8/29/20:\par EEG Summary/Classification:\par This was a markedly abnormal EEG study in the awake, drowsy, and asleep states due to the presence of:\par Mild to moderate generalized slowing\par Disorganized background\par  Multifocal epileptiform discharges are seen including:\par -Left fronto-central sharp waves\par -Left central-parietal\par -Sharp waves seen over the right posterior quadrant, at times with a posterior-temporal predominance\par -Sharp waves that are seen broadly and independently over the left or right hemisphere\par -Diffuse spikes and wave discharges, at time with shifting predominance\par \par Multiple clinical events are reported, some with possible electrographic correlate of rhythmic or fast activity over the central region.\par \par \par EEG Impression/Clinical Correlate:\par The EEG is most suggestive of symptomatic generalized epilepsy. \par Multiple events are captured with subtle background changes but electrographic seizures are not clearly identified.\par \par EEG 7/13/21:\par This was an abnormal EEG study in the awake and drowsy states due to:\par -Poor organization without a definite posterior dominant rhythm.\par -Mild generalized slowing\par -Multifocal epileptiform discharges.\par \par \par 24 hour EEG 7/13/21-7/14/21:\par -Poor organization without a definite posterior dominant rhythm.\par -Mild generalized slowing\par -Multifocal epileptiform discharges with rare discharges that are diffuse. Epileptiform discharges are seen with greater frequency on the left compared with the right. [de-identified] : Keppra levels\par 9/10/20: 38.5\par 8/21/20: 21\par 7/25/20: 15.6\par 6/20/20: 39.6 (while in hospital)\par 6/11/20: 15.5\par 9/26/19: 15.4\par 5/6/19: 24\par 1/18/19: 25.6\par \par \par CT brain 10/13/17:\par No acute intracranial findings.  The ventricles, sulci and basal cisterns \par are dilated, compatible generalized cerebral volume loss, as seen on \par prior exams.

## 2022-11-30 NOTE — PHYSICAL EXAM
[FreeTextEntry1] : Exam:\par Limited exam due to video visit and patient's condition.\par Awake and alert.\par Tracheostomy in place\par Language: non-verbal\par Makes eye contact with her mother\par Smiles\par no obvious facial weakness\par Decreased movement in extremities but seen moving left arm.\par Gait: in recliner. Not ambulatory. \par

## 2022-11-30 NOTE — HISTORY OF PRESENT ILLNESS
[Home] : at home, [unfilled] , at the time of the visit. [Medical Office: ([facility])___] : at the medical office located in  [FreeTextEntry3] : Mother [FreeTextEntry1] : Last visit 5/11/22.\par Her mother reports that she has been having increased seizures.\par They are brief with elevation of one or both arms with version of her head to either side.\par They last 10-25 seconds. Most of these occur in her sleep. She goes right back to sleep so it is unclear if there is any alteration after the seizure.\par She has slight elevation of heart rate. She is on the ventilator at night and there is no drop in oxygenation.\par \par She had one more intense seizure in September with shaking of both legs that lasted about 45 seconds.\par \par She had two in August, eight in September, seven in October, 13 in November.\par In October and November she had more events around menses. She also had infections in October and November.\par \par Sometimes she seems more fatigued and other times she seems like she has a headache. \par She scrunches her forehead.\par \par It is unclear if she is more impaired on a day following a nocturnal seizure. Some days she seems more fatigued and other times she does not. \par \par Sleep patterns are "poor". She falls asleep early at about 8-9 PM but may wake up at 3-4 AM.\par \par She is currently taking Keppra 500 mg in the AM and 900 mg at night.\par \par She is still taking droxidopa which is being prescribed by Dr. Washington.\par \par \par \par

## 2022-11-30 NOTE — CONSULT LETTER
[Dear  ___] : Dear  [unfilled], [Consult Letter:] : I had the pleasure of evaluating your patient, [unfilled]. [Please see my note below.] : Please see my note below. [Consult Closing:] : Thank you very much for allowing me to participate in the care of this patient.  If you have any questions, please do not hesitate to contact me. [FreeTextEntry2] : Elvia Oliveira [FreeTextEntry3] : Sincerely,\par \par \par Stefany Villareal MD\par Diplomate, American Academy of Psychiatry and Neurology\par Board Certified in the Subspecialty of Clinical Neurophysiology\par Board Certified in the Subspecialty of Sleep Medicine\par Board Certified in the Subspecialty of Epilepsy\par

## 2023-01-19 ENCOUNTER — OUTPATIENT (OUTPATIENT)
Dept: OUTPATIENT SERVICES | Facility: HOSPITAL | Age: 28
LOS: 1 days | End: 2023-01-19
Payer: COMMERCIAL

## 2023-01-19 VITALS — TEMPERATURE: 97 F | OXYGEN SATURATION: 98 % | HEART RATE: 69 BPM | RESPIRATION RATE: 20 BRPM

## 2023-01-19 VITALS — TEMPERATURE: 97 F | OXYGEN SATURATION: 97 % | RESPIRATION RATE: 20 BRPM | HEART RATE: 65 BPM

## 2023-01-19 DIAGNOSIS — K21.9 GASTRO-ESOPHAGEAL REFLUX DISEASE WITHOUT ESOPHAGITIS: ICD-10-CM

## 2023-01-19 DIAGNOSIS — Z93.0 TRACHEOSTOMY STATUS: Chronic | ICD-10-CM

## 2023-01-19 PROCEDURE — 49452 REPLACE G-J TUBE PERC: CPT

## 2023-01-25 DIAGNOSIS — K21.9 GASTRO-ESOPHAGEAL REFLUX DISEASE WITHOUT ESOPHAGITIS: ICD-10-CM

## 2023-01-25 DIAGNOSIS — Z46.59 ENCOUNTER FOR FITTING AND ADJUSTMENT OF OTHER GASTROINTESTINAL APPLIANCE AND DEVICE: ICD-10-CM

## 2023-04-20 ENCOUNTER — OUTPATIENT (OUTPATIENT)
Dept: OUTPATIENT SERVICES | Facility: HOSPITAL | Age: 28
LOS: 1 days | End: 2023-04-20
Payer: COMMERCIAL

## 2023-04-20 VITALS — TEMPERATURE: 96 F | OXYGEN SATURATION: 98 % | RESPIRATION RATE: 19 BRPM | HEART RATE: 57 BPM

## 2023-04-20 VITALS — RESPIRATION RATE: 19 BRPM | TEMPERATURE: 96 F | OXYGEN SATURATION: 99 % | HEART RATE: 56 BPM

## 2023-04-20 DIAGNOSIS — Z93.0 TRACHEOSTOMY STATUS: Chronic | ICD-10-CM

## 2023-04-20 DIAGNOSIS — K21.9 GASTRO-ESOPHAGEAL REFLUX DISEASE WITHOUT ESOPHAGITIS: ICD-10-CM

## 2023-04-20 PROCEDURE — 49452 REPLACE G-J TUBE PERC: CPT

## 2023-04-25 DIAGNOSIS — K94.29 OTHER COMPLICATIONS OF GASTROSTOMY: ICD-10-CM

## 2023-05-30 ENCOUNTER — RX RENEWAL (OUTPATIENT)
Age: 28
End: 2023-05-30

## 2023-07-13 NOTE — PROGRESS NOTE ADULT - RS GEN PE MLT RESP DETAILS PC
We talked about Tourette's and I showed you the website of the Tourette syndrome [employer] of [place] that has a lot of resources.    While Tenex and clonidine are good medications for some people, many people have trouble increasing it because it makes him too tired.    One of the medicines that can be more effective in tic control is Orap or pimozide.  It comes in a 1 or 2 mg tablet.  I am going to prescribe a 2 mg tablet that you can crack in half.  Start by taking half at night and see how you feel with your tics the next day.  Be prepared to feel sleepy the first few days you take it especially with the Tenex.  Our plan would be to taper the Tenex if we get any sense that the Orap is working.  But I do not want to leave you without some medicine so I do not want it taper the Tenex before beginning this.    After use a half a pill at night we could either go to half a pill twice a day or 1 pill at night.  We could go up higher than that going to half a pill in the morning half at night, half in the morning and 1 at night or even 1 tablet twice a day.  We will continue to plan this depending on how you feel.    We will get an EKG done before you start this because it is rare but happens that some people have a change in their heart rate conduction using this medicine      Check in in about 2 weeks with text    Monday Thursday 4 - 6 PM  [phone]       
breath sounds equal/airway patent
breath sounds equal/airway patent

## 2023-07-13 NOTE — DISCHARGE NOTE NURSING/CASE MANAGEMENT/SOCIAL WORK - NSDCPETBCESMAN_GEN_ALL_CORE
If you are a smoker, it is important for your health to stop smoking. Please be aware that second hand smoke is also harmful.
MOLECULAR PCR

## 2023-08-09 ENCOUNTER — OUTPATIENT (OUTPATIENT)
Dept: OUTPATIENT SERVICES | Facility: HOSPITAL | Age: 28
LOS: 1 days | End: 2023-08-09
Payer: COMMERCIAL

## 2023-08-09 VITALS
SYSTOLIC BLOOD PRESSURE: 115 MMHG | RESPIRATION RATE: 18 BRPM | DIASTOLIC BLOOD PRESSURE: 69 MMHG | OXYGEN SATURATION: 100 % | HEART RATE: 62 BPM | TEMPERATURE: 98 F

## 2023-08-09 VITALS
OXYGEN SATURATION: 100 % | DIASTOLIC BLOOD PRESSURE: 69 MMHG | HEART RATE: 62 BPM | TEMPERATURE: 98 F | SYSTOLIC BLOOD PRESSURE: 114 MMHG | RESPIRATION RATE: 18 BRPM

## 2023-08-09 DIAGNOSIS — Z93.0 TRACHEOSTOMY STATUS: Chronic | ICD-10-CM

## 2023-08-09 DIAGNOSIS — K21.9 GASTRO-ESOPHAGEAL REFLUX DISEASE WITHOUT ESOPHAGITIS: ICD-10-CM

## 2023-08-09 PROCEDURE — 49452 REPLACE G-J TUBE PERC: CPT

## 2023-08-22 DIAGNOSIS — Z46.59 ENCOUNTER FOR FITTING AND ADJUSTMENT OF OTHER GASTROINTESTINAL APPLIANCE AND DEVICE: ICD-10-CM

## 2023-09-14 ENCOUNTER — APPOINTMENT (OUTPATIENT)
Dept: NEUROLOGY | Facility: CLINIC | Age: 28
End: 2023-09-14
Payer: COMMERCIAL

## 2023-09-14 DIAGNOSIS — G40.409 OTHER GENERALIZED EPILEPSY AND EPILEPTIC SYNDROMES, NOT INTRACTABLE, W/OUT STATUS EPILEPTICUS: ICD-10-CM

## 2023-09-14 DIAGNOSIS — G93.40 ENCEPHALOPATHY, UNSPECIFIED: ICD-10-CM

## 2023-09-14 DIAGNOSIS — G80.9 CEREBRAL PALSY, UNSPECIFIED: ICD-10-CM

## 2023-09-14 PROCEDURE — 99213 OFFICE O/P EST LOW 20 MIN: CPT | Mod: 95

## 2023-09-14 RX ORDER — ZONISAMIDE 50 MG/1
50 CAPSULE ORAL
Qty: 60 | Refills: 2 | Status: DISCONTINUED | COMMUNITY
Start: 2021-03-15 | End: 2023-09-14

## 2023-09-14 RX ORDER — LEVETIRACETAM 100 MG/ML
100 SOLUTION ORAL
Qty: 1350 | Refills: 3 | Status: ACTIVE | COMMUNITY
Start: 2021-05-13 | End: 1900-01-01

## 2023-10-19 NOTE — ED PROVIDER NOTE - SCRIBE NAME
Farzaneh Flowers Assistance with ambulation/Assistance OOB with selected safe patient handling equipment/Communicate Risk of Fall with Harm to all staff/Discuss with provider need for PT consult/Monitor gait and stability/Provide patient with walking aids - walker, cane, crutches/Reinforce activity limits and safety measures with patient and family/Tailored Fall Risk Interventions/Visual Cue: Yellow wristband and red socks/Bed in lowest position, wheels locked, appropriate side rails in place/Call bell, personal items and telephone in reach/Instruct patient to call for assistance before getting out of bed or chair/Non-slip footwear when patient is out of bed/[place] to call system/Physically safe environment - no spills, clutter or unnecessary equipment/Purposeful Proactive Rounding/Room/bathroom lighting operational, light cord in reach

## 2023-10-30 ENCOUNTER — OUTPATIENT (OUTPATIENT)
Dept: OUTPATIENT SERVICES | Facility: HOSPITAL | Age: 28
LOS: 1 days | End: 2023-10-30
Payer: COMMERCIAL

## 2023-10-30 ENCOUNTER — RESULT REVIEW (OUTPATIENT)
Age: 28
End: 2023-10-30

## 2023-10-30 VITALS
TEMPERATURE: 97 F | OXYGEN SATURATION: 100 % | RESPIRATION RATE: 16 BRPM | HEART RATE: 68 BPM | DIASTOLIC BLOOD PRESSURE: 68 MMHG | SYSTOLIC BLOOD PRESSURE: 124 MMHG

## 2023-10-30 VITALS
RESPIRATION RATE: 16 BRPM | TEMPERATURE: 97 F | OXYGEN SATURATION: 100 % | SYSTOLIC BLOOD PRESSURE: 122 MMHG | DIASTOLIC BLOOD PRESSURE: 68 MMHG | HEART RATE: 70 BPM

## 2023-10-30 DIAGNOSIS — K21.9 GASTRO-ESOPHAGEAL REFLUX DISEASE WITHOUT ESOPHAGITIS: ICD-10-CM

## 2023-10-30 DIAGNOSIS — Z93.0 TRACHEOSTOMY STATUS: Chronic | ICD-10-CM

## 2023-10-30 PROCEDURE — 49452 REPLACE G-J TUBE PERC: CPT

## 2023-11-07 DIAGNOSIS — Z46.59 ENCOUNTER FOR FITTING AND ADJUSTMENT OF OTHER GASTROINTESTINAL APPLIANCE AND DEVICE: ICD-10-CM

## 2023-11-07 DIAGNOSIS — K21.9 GASTRO-ESOPHAGEAL REFLUX DISEASE WITHOUT ESOPHAGITIS: ICD-10-CM

## 2024-01-22 ENCOUNTER — OUTPATIENT (OUTPATIENT)
Dept: OUTPATIENT SERVICES | Facility: HOSPITAL | Age: 29
LOS: 1 days | End: 2024-01-22
Payer: COMMERCIAL

## 2024-01-22 ENCOUNTER — RESULT REVIEW (OUTPATIENT)
Age: 29
End: 2024-01-22

## 2024-01-22 VITALS — TEMPERATURE: 97 F | HEART RATE: 60 BPM | RESPIRATION RATE: 17 BRPM | OXYGEN SATURATION: 100 %

## 2024-01-22 VITALS — OXYGEN SATURATION: 100 % | HEART RATE: 55 BPM | TEMPERATURE: 97 F | RESPIRATION RATE: 17 BRPM

## 2024-01-22 DIAGNOSIS — Z93.0 TRACHEOSTOMY STATUS: Chronic | ICD-10-CM

## 2024-01-22 DIAGNOSIS — K21.9 GASTRO-ESOPHAGEAL REFLUX DISEASE WITHOUT ESOPHAGITIS: ICD-10-CM

## 2024-01-22 PROCEDURE — 49452 REPLACE G-J TUBE PERC: CPT

## 2024-01-29 DIAGNOSIS — Z46.59 ENCOUNTER FOR FITTING AND ADJUSTMENT OF OTHER GASTROINTESTINAL APPLIANCE AND DEVICE: ICD-10-CM

## 2024-05-02 ENCOUNTER — OUTPATIENT (OUTPATIENT)
Dept: OUTPATIENT SERVICES | Facility: HOSPITAL | Age: 29
LOS: 1 days | End: 2024-05-02
Payer: COMMERCIAL

## 2024-05-02 ENCOUNTER — RESULT REVIEW (OUTPATIENT)
Age: 29
End: 2024-05-02

## 2024-05-02 VITALS
SYSTOLIC BLOOD PRESSURE: 104 MMHG | RESPIRATION RATE: 16 BRPM | TEMPERATURE: 98 F | HEART RATE: 86 BPM | DIASTOLIC BLOOD PRESSURE: 67 MMHG | OXYGEN SATURATION: 97 %

## 2024-05-02 VITALS
SYSTOLIC BLOOD PRESSURE: 96 MMHG | OXYGEN SATURATION: 98 % | TEMPERATURE: 98 F | DIASTOLIC BLOOD PRESSURE: 56 MMHG | RESPIRATION RATE: 17 BRPM | HEART RATE: 77 BPM

## 2024-05-02 DIAGNOSIS — Z93.0 TRACHEOSTOMY STATUS: Chronic | ICD-10-CM

## 2024-05-02 PROCEDURE — 49452 REPLACE G-J TUBE PERC: CPT

## 2024-05-03 DIAGNOSIS — K21.9 GASTRO-ESOPHAGEAL REFLUX DISEASE WITHOUT ESOPHAGITIS: ICD-10-CM

## 2024-05-03 DIAGNOSIS — Z46.59 ENCOUNTER FOR FITTING AND ADJUSTMENT OF OTHER GASTROINTESTINAL APPLIANCE AND DEVICE: ICD-10-CM

## 2024-07-23 ENCOUNTER — APPOINTMENT (OUTPATIENT)
Dept: NEUROLOGY | Facility: CLINIC | Age: 29
End: 2024-07-23
Payer: COMMERCIAL

## 2024-07-23 DIAGNOSIS — G40.409 OTHER GENERALIZED EPILEPSY AND EPILEPTIC SYNDROMES, NOT INTRACTABLE, W/OUT STATUS EPILEPTICUS: ICD-10-CM

## 2024-07-23 DIAGNOSIS — G80.9 CEREBRAL PALSY, UNSPECIFIED: ICD-10-CM

## 2024-07-23 PROCEDURE — G2211 COMPLEX E/M VISIT ADD ON: CPT | Mod: NC

## 2024-07-23 PROCEDURE — 99213 OFFICE O/P EST LOW 20 MIN: CPT

## 2024-07-23 NOTE — PHYSICAL EXAM
[FreeTextEntry1] : Exam: Limited exam due to video visit and patient's condition. Awake and alert. Tracheostomy in place Language: non-verbal Makes eye contact with her mother Smiles no obvious facial weakness Decreased movement in extremities but seen moving left arm. Gait: in recliner. Not ambulatory.

## 2024-07-23 NOTE — DISCUSSION/SUMMARY
[FreeTextEntry1] : Ms. Clark is a 29 year old woman with cerebral palsy, intellectual disability and epilepsy. She had a long period of relative seizure freedom with a dose of Keppra 500 mg BID. She then began to have increased episodes suspicious for seizures in the setting of decreased Keppra levels.  She had a period of time when Keppra levels were therapeutic but relatively low.  Seizures -Recently seizures are slightly more frequent which tends to occur when she has other medical issues going on.  -Discussed vitamin and mineral deficiencies which can cause seizures (sodium, calcium, magnesium, vitamin B1, vitamin b6) -Can recheck Keppra level with next blood tests. She may not have been absorbing Keppra as well. If level remains relatively low for her, can increase the dose. Hopefully the level has improved since diarrhea has stopped. Mom will ask PCP to add this on.  -For now will continue with current dose of Keppra 5 ml in the AM and 10 ml in the PM, brand name.    Autonomic Dysfunction -Continue droxidopa   f/u 6 months, sooner if needed

## 2024-07-23 NOTE — REASON FOR VISIT
[Home] : at home, [unfilled] , at the time of the visit. [Medical Office: ([facility])___] : at the medical office located in  [Patient] : the patient [Follow-Up: _____] : a [unfilled] follow-up visit

## 2024-07-23 NOTE — HISTORY OF PRESENT ILLNESS
[Home] : at home, [unfilled] , at the time of the visit. [Medical Office: ([facility])___] : at the medical office located in  [FreeTextEntry3] : Mother [FreeTextEntry1] : 9/14/23: Last visit 11/30/22. She had c-diff in early 2023.  Her mother reports that recently she has been stable with only 1-2 seizures per month, always nocturnal. Her head turns to the right than to the left. These last about 30 seconds. Sometimes she has acceleration of her heart rate.  She does not seem to be any worse off the day after a seizure. Sleep is chronically interrupted. She seems to be uncomfortable. Her various doctors feel that muscle relaxants are too risky due to possible respiratory suppression. They try to do stretching and repositioning.  She is taking Keppra 500 mg in the AM and 1000 mg at night.  She continues to take droxidopa which seems to be helping with autonomic dysfunction. She wears an [employer] neuro watch which helps to relax her.  Her family has not been able to find appropriate nursing care.   7/23/24: Earlier in the year she was diagnosed with pancytopenia. She was hospitalized in April for two days with lactic acidosis and respiratory distress. A cause was not found.  She has macrocytic anemia.  Her mother states that when blood levels are low, more seizure activity is seen. Recently some shaking of her right leg was seen with a seizure. Seizures tend to occur between 11 PM and 3 AM.  Keppra level on 4/12 was 15. Prior to this it has been in the 20s and 30s although it had been 18 in the past. She had diarrhea for the better part of a year. Her mother does say that she seems more fatigued. Focus is somewhat improved since starting vitamin B6 supplementation.

## 2024-07-23 NOTE — DATA REVIEWED
[de-identified] : Routine EEG 6/24/20:\par  -Jerking of both arms did not correlate with ictal pattern on EEG\par  -Occasional sharp wave discharges distributed in the left frontocentral (max F3/C3), at times with field extending to the left parietal and temporal regions.\par  -Moderate generalized slowing\par  -Diffuse excess beta activity\par  \par  Impression/Clinical correlate\par  1. POtential epileptogenic focus in the left frontocentral region\par  2. moderate nonspecific diffuse or multifocal cerebral dysfunction\par  3. No seizure seen\par  4. diffuse excess beta activity may be seen with medication use such as benzodiazepines or barbiturates\par  \par  \par  \par  \par  routine EEG 10/16/17: Moderate generalized background slowing and disorganization.\par  Video EEG 8/6/2016: \par  1. Rare sharps, right and left fronto-temporal\par  2. Intermittent polymorphic delta, bilateral fronto-temporal\par  3. Generalized background slowing\par  \par  EEG 8/27/20: This was an abnormal EEG study in the awake and drowsy states due to the presence of:\par  -Mild to moderate generalized slowing\par  -Multifocal epileptiform discharges\par  -Less frequent diffuse/generalized epileptiform discharges.\par  \par  EEG Impression/Clinical Correlate:\par  The findings are consistent with diffuse cerebral dysfunction and a risk for seizures. The EEG findings along with the clinical picture is suggestive of symptomatic generalized epilepsy.\par  \par  48 hour ambulatory EEG 8/27/20-8/29/20:\par  EEG Summary/Classification:\par  This was a markedly abnormal EEG study in the awake, drowsy, and asleep states due to the presence of:\par  Mild to moderate generalized slowing\par  Disorganized background\par   Multifocal epileptiform discharges are seen including:\par  -Left fronto-central sharp waves\par  -Left central-parietal\par  -Sharp waves seen over the right posterior quadrant, at times with a posterior-temporal predominance\par  -Sharp waves that are seen broadly and independently over the left or right hemisphere\par  -Diffuse spikes and wave discharges, at time with shifting predominance\par  \par  Multiple clinical events are reported, some with possible electrographic correlate of rhythmic or fast activity over the central region.\par  \par  \par  EEG Impression/Clinical Correlate:\par  The EEG is most suggestive of symptomatic generalized epilepsy. \par  Multiple events are captured with subtle background changes but electrographic seizures are not clearly identified.\par  \par  EEG 7/13/21:\par  This was an abnormal EEG study in the awake and drowsy states due to:\par  -Poor organization without a definite posterior dominant rhythm.\par  -Mild generalized slowing\par  -Multifocal epileptiform discharges.\par  \par  \par  24 hour EEG 7/13/21-7/14/21:\par  -Poor organization without a definite posterior dominant rhythm.\par  -Mild generalized slowing\par  -Multifocal epileptiform discharges with rare discharges that are diffuse. Epileptiform discharges are seen with greater frequency on the left compared with the right. [de-identified] : Keppra levels\par  9/10/20: 38.5\par  8/21/20: 21\par  7/25/20: 15.6\par  6/20/20: 39.6 (while in hospital)\par  6/11/20: 15.5\par  9/26/19: 15.4\par  5/6/19: 24\par  1/18/19: 25.6\par  \par  \par  CT brain 10/13/17:\par  No acute intracranial findings.  The ventricles, sulci and basal cisterns \par  are dilated, compatible generalized cerebral volume loss, as seen on \par  prior exams.

## 2024-07-23 NOTE — CONSULT LETTER
[Dear  ___] : Dear  [unfilled], [Consult Letter:] : I had the pleasure of evaluating your patient, [unfilled]. [Please see my note below.] : Please see my note below. [Consult Closing:] : Thank you very much for allowing me to participate in the care of this patient.  If you have any questions, please do not hesitate to contact me. [FreeTextEntry2] : Key Pacheco [FreeTextEntry3] : Sincerely,   Stefany Villareal MD Diplomate, American Academy of Psychiatry and Neurology Board Certified in the Subspecialty of Clinical Neurophysiology Board Certified in the Subspecialty of Sleep Medicine Board Certified in the Subspecialty of Epilepsy

## 2024-07-26 ENCOUNTER — RESULT REVIEW (OUTPATIENT)
Age: 29
End: 2024-07-26

## 2024-08-13 NOTE — PROVIDER CONTACT NOTE (OTHER) - REASON
Mild CAR  Needs to wear a CPAP at night, however has been having difficulties with insurance to receive equipment.   Advised to contact pulmonology in regards to the CPAP status as they are the ordering provider.   Patient was a no show to her pulmonology appointment on 04/24/24   Encouraged follow up    Pt has temp of 95.7

## 2024-08-21 ENCOUNTER — NON-APPOINTMENT (OUTPATIENT)
Age: 29
End: 2024-08-21

## 2024-10-15 NOTE — DISCHARGE NOTE PROVIDER - NSFTFHOMEHTHYNRD_GEN_ALL_CORE
Left detailed message for dad requesting a call back to schedule a MIKIE appt with Sonia Hurtado from [facility] from [facility]. Left TA number.  
Yes

## 2024-10-31 ENCOUNTER — OUTPATIENT (OUTPATIENT)
Dept: OUTPATIENT SERVICES | Facility: HOSPITAL | Age: 29
LOS: 1 days | End: 2024-10-31
Payer: COMMERCIAL

## 2024-10-31 ENCOUNTER — RESULT REVIEW (OUTPATIENT)
Age: 29
End: 2024-10-31

## 2024-10-31 VITALS — TEMPERATURE: 96 F | RESPIRATION RATE: 18 BRPM | OXYGEN SATURATION: 99 % | HEART RATE: 60 BPM

## 2024-10-31 DIAGNOSIS — Z93.0 TRACHEOSTOMY STATUS: Chronic | ICD-10-CM

## 2024-10-31 DIAGNOSIS — K21.9 GASTRO-ESOPHAGEAL REFLUX DISEASE WITHOUT ESOPHAGITIS: ICD-10-CM

## 2024-10-31 PROCEDURE — 49452 REPLACE G-J TUBE PERC: CPT

## 2024-11-12 DIAGNOSIS — Z46.59 ENCOUNTER FOR FITTING AND ADJUSTMENT OF OTHER GASTROINTESTINAL APPLIANCE AND DEVICE: ICD-10-CM

## 2024-11-16 NOTE — ED PROVIDER NOTE - NSCAREINITIATED _GEN_ER
Pt seen for ICU reassessment.   Pt seen at bedside.   A/O X3, interactive, laying in hospital bed in NAD.   Pt BP now 107/70, other VSS.   Does not need ICU admission at this time. Adeline Valenzuela(Attending)

## 2024-12-05 NOTE — ED PROVIDER NOTE - BIRTH SEX
----- Message from Gaston Shaw PA-C sent at 12/5/2024  9:25 AM CST -----  Strep test was negative.  Continue the steroids as they were prescribed.  
Conveyed results below to pt.  
Female

## 2025-01-14 ENCOUNTER — APPOINTMENT (OUTPATIENT)
Dept: NEUROLOGY | Facility: CLINIC | Age: 30
End: 2025-01-14
Payer: COMMERCIAL

## 2025-01-14 DIAGNOSIS — G40.409 OTHER GENERALIZED EPILEPSY AND EPILEPTIC SYNDROMES, NOT INTRACTABLE, W/OUT STATUS EPILEPTICUS: ICD-10-CM

## 2025-01-14 PROCEDURE — 99213 OFFICE O/P EST LOW 20 MIN: CPT

## 2025-01-14 PROCEDURE — G2211 COMPLEX E/M VISIT ADD ON: CPT | Mod: NC

## 2025-02-07 ENCOUNTER — RESULT REVIEW (OUTPATIENT)
Age: 30
End: 2025-02-07

## 2025-02-07 ENCOUNTER — OUTPATIENT (OUTPATIENT)
Dept: OUTPATIENT SERVICES | Facility: HOSPITAL | Age: 30
LOS: 1 days | End: 2025-02-07
Payer: COMMERCIAL

## 2025-02-07 VITALS
HEART RATE: 63 BPM | RESPIRATION RATE: 18 BRPM | OXYGEN SATURATION: 100 % | SYSTOLIC BLOOD PRESSURE: 106 MMHG | DIASTOLIC BLOOD PRESSURE: 73 MMHG | TEMPERATURE: 96 F

## 2025-02-07 VITALS
OXYGEN SATURATION: 100 % | HEART RATE: 61 BPM | TEMPERATURE: 96 F | DIASTOLIC BLOOD PRESSURE: 88 MMHG | SYSTOLIC BLOOD PRESSURE: 137 MMHG | RESPIRATION RATE: 16 BRPM

## 2025-02-07 DIAGNOSIS — Z93.0 TRACHEOSTOMY STATUS: Chronic | ICD-10-CM

## 2025-02-07 DIAGNOSIS — K21.9 GASTRO-ESOPHAGEAL REFLUX DISEASE WITHOUT ESOPHAGITIS: ICD-10-CM

## 2025-02-07 PROCEDURE — 49452 REPLACE G-J TUBE PERC: CPT

## 2025-02-13 DIAGNOSIS — Z46.59 ENCOUNTER FOR FITTING AND ADJUSTMENT OF OTHER GASTROINTESTINAL APPLIANCE AND DEVICE: ICD-10-CM

## 2025-04-16 ENCOUNTER — OUTPATIENT (OUTPATIENT)
Dept: OUTPATIENT SERVICES | Facility: HOSPITAL | Age: 30
LOS: 1 days | End: 2025-04-16
Payer: COMMERCIAL

## 2025-04-16 VITALS
HEART RATE: 89 BPM | RESPIRATION RATE: 19 BRPM | TEMPERATURE: 98 F | SYSTOLIC BLOOD PRESSURE: 131 MMHG | OXYGEN SATURATION: 99 % | DIASTOLIC BLOOD PRESSURE: 82 MMHG

## 2025-04-16 VITALS
RESPIRATION RATE: 18 BRPM | DIASTOLIC BLOOD PRESSURE: 72 MMHG | OXYGEN SATURATION: 96 % | HEART RATE: 70 BPM | TEMPERATURE: 97 F | SYSTOLIC BLOOD PRESSURE: 111 MMHG

## 2025-04-16 DIAGNOSIS — Z93.0 TRACHEOSTOMY STATUS: Chronic | ICD-10-CM

## 2025-04-16 DIAGNOSIS — K21.9 GASTRO-ESOPHAGEAL REFLUX DISEASE WITHOUT ESOPHAGITIS: ICD-10-CM

## 2025-04-16 PROCEDURE — 49452 REPLACE G-J TUBE PERC: CPT

## 2025-04-23 DIAGNOSIS — K21.9 GASTRO-ESOPHAGEAL REFLUX DISEASE WITHOUT ESOPHAGITIS: ICD-10-CM

## 2025-04-23 DIAGNOSIS — Z46.59 ENCOUNTER FOR FITTING AND ADJUSTMENT OF OTHER GASTROINTESTINAL APPLIANCE AND DEVICE: ICD-10-CM

## 2025-04-26 NOTE — PATIENT PROFILE ADULT. - MENTAL HEALTH CONDITIONS/SYMPTOMS, PROFILE
Problem: Adult Inpatient Plan of Care  Goal: Plan of Care Review  Outcome: Progressing  Goal: Patient-Specific Goal (Individualized)  Outcome: Progressing  Goal: Absence of Hospital-Acquired Illness or Injury  Outcome: Progressing  Goal: Optimal Comfort and Wellbeing  Outcome: Progressing  Goal: Readiness for Transition of Care  Outcome: Progressing     Problem: Stroke, Ischemic (Includes Transient Ischemic Attack)  Goal: Optimal Coping  Outcome: Progressing  Goal: Effective Bowel Elimination  Outcome: Progressing  Goal: Optimal Cerebral Tissue Perfusion  Outcome: Progressing  Goal: Optimal Cognitive Function  Outcome: Progressing  Goal: Improved Communication Skills  Outcome: Progressing  Goal: Optimal Functional Ability  Outcome: Progressing  Goal: Optimal Nutrition Intake  Outcome: Progressing  Goal: Effective Oxygenation and Ventilation  Outcome: Progressing  Goal: Improved Sensorimotor Function  Outcome: Progressing  Goal: Safe and Effective Swallow  Outcome: Progressing  Goal: Effective Urinary Elimination  Outcome: Progressing     Problem: Wound  Goal: Optimal Coping  Outcome: Progressing  Goal: Optimal Functional Ability  Outcome: Progressing  Goal: Absence of Infection Signs and Symptoms  Outcome: Progressing  Goal: Improved Oral Intake  Outcome: Progressing  Goal: Optimal Pain Control and Function  Outcome: Progressing  Goal: Skin Health and Integrity  Outcome: Progressing  Goal: Optimal Wound Healing  Outcome: Progressing     Problem: Diabetes Comorbidity  Goal: Blood Glucose Level Within Targeted Range  Outcome: Progressing     Problem: Skin Injury Risk Increased  Goal: Skin Health and Integrity  Outcome: Progressing     Problem: Fall Injury Risk  Goal: Absence of Fall and Fall-Related Injury  Outcome: Progressing      altered thought process/unable to assess due to acuity of illness.

## 2025-06-25 ENCOUNTER — OUTPATIENT (OUTPATIENT)
Dept: OUTPATIENT SERVICES | Facility: HOSPITAL | Age: 30
LOS: 1 days | End: 2025-06-25
Payer: COMMERCIAL

## 2025-06-25 VITALS — HEART RATE: 62 BPM | OXYGEN SATURATION: 95 % | TEMPERATURE: 97 F | RESPIRATION RATE: 20 BRPM

## 2025-06-25 VITALS — OXYGEN SATURATION: 99 % | HEART RATE: 64 BPM | TEMPERATURE: 97 F | RESPIRATION RATE: 20 BRPM

## 2025-06-25 DIAGNOSIS — Z93.0 TRACHEOSTOMY STATUS: Chronic | ICD-10-CM

## 2025-06-25 PROCEDURE — 49452 REPLACE G-J TUBE PERC: CPT

## 2025-07-04 DIAGNOSIS — Z46.59 ENCOUNTER FOR FITTING AND ADJUSTMENT OF OTHER GASTROINTESTINAL APPLIANCE AND DEVICE: ICD-10-CM

## 2025-07-22 ENCOUNTER — APPOINTMENT (OUTPATIENT)
Dept: NEUROLOGY | Facility: CLINIC | Age: 30
End: 2025-07-22
Payer: COMMERCIAL

## 2025-07-22 VITALS
HEIGHT: 55 IN | BODY MASS INDEX: 22.45 KG/M2 | SYSTOLIC BLOOD PRESSURE: 112 MMHG | HEART RATE: 73 BPM | WEIGHT: 97 LBS | DIASTOLIC BLOOD PRESSURE: 81 MMHG

## 2025-07-22 DIAGNOSIS — G40.409 OTHER GENERALIZED EPILEPSY AND EPILEPTIC SYNDROMES, NOT INTRACTABLE, W/OUT STATUS EPILEPTICUS: ICD-10-CM

## 2025-07-22 DIAGNOSIS — G80.9 CEREBRAL PALSY, UNSPECIFIED: ICD-10-CM

## 2025-07-22 PROCEDURE — 99213 OFFICE O/P EST LOW 20 MIN: CPT

## 2025-07-22 PROCEDURE — G2211 COMPLEX E/M VISIT ADD ON: CPT | Mod: NC
